# Patient Record
Sex: FEMALE | Race: WHITE | NOT HISPANIC OR LATINO | Employment: OTHER | URBAN - METROPOLITAN AREA
[De-identification: names, ages, dates, MRNs, and addresses within clinical notes are randomized per-mention and may not be internally consistent; named-entity substitution may affect disease eponyms.]

---

## 2023-02-01 PROBLEM — M16.10 ARTHRITIS PAIN, HIP: Status: ACTIVE | Noted: 2023-02-01

## 2023-02-01 PROBLEM — K59.00 CONSTIPATION: Status: ACTIVE | Noted: 2023-02-01

## 2023-02-01 PROBLEM — R53.83 MALAISE AND FATIGUE: Status: ACTIVE | Noted: 2023-02-01
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
## 2023-02-01 PROBLEM — M65.30 TRIGGER FINGER, ACQUIRED: Status: ACTIVE | Noted: 2023-02-01

## 2023-02-01 PROBLEM — N93.9 ABNORMAL UTERINE BLEEDING: Status: ACTIVE | Noted: 2023-02-01

## 2023-02-01 PROBLEM — R73.03 PREDIABETES: Status: ACTIVE | Noted: 2023-02-01

## 2023-02-01 PROBLEM — N95.2 ATROPHIC VAGINITIS: Status: ACTIVE | Noted: 2023-02-01

## 2023-02-01 PROBLEM — R20.0 NUMBNESS AND TINGLING IN RIGHT HAND: Status: ACTIVE | Noted: 2023-02-01

## 2023-02-01 PROBLEM — S62.309A FRACTURE OF METACARPAL BONE OF RIGHT HAND: Status: ACTIVE | Noted: 2023-02-01

## 2023-02-01 PROBLEM — S22.009A THORACIC SPINE FRACTURE (MULTI): Status: ACTIVE | Noted: 2023-02-01

## 2023-02-01 PROBLEM — H91.93 BILATERAL HEARING LOSS: Status: ACTIVE | Noted: 2023-02-01

## 2023-02-01 PROBLEM — M25.552 LEFT HIP PAIN: Status: ACTIVE | Noted: 2023-02-01

## 2023-02-01 PROBLEM — Z96.0 PRESENCE OF PESSARY: Status: ACTIVE | Noted: 2023-02-01

## 2023-02-01 PROBLEM — R53.83 FATIGUE: Status: ACTIVE | Noted: 2023-02-01

## 2023-02-01 PROBLEM — R58 ECCHYMOSIS: Status: ACTIVE | Noted: 2023-02-01

## 2023-02-01 PROBLEM — N81.11 MIDLINE CYSTOCELE: Status: ACTIVE | Noted: 2023-02-01

## 2023-02-01 PROBLEM — D22.9 ATYPICAL MOLE: Status: ACTIVE | Noted: 2023-02-01

## 2023-02-01 PROBLEM — R73.9 HYPERGLYCEMIA: Status: ACTIVE | Noted: 2023-02-01

## 2023-02-01 PROBLEM — R20.2 NUMBNESS AND TINGLING IN RIGHT HAND: Status: ACTIVE | Noted: 2023-02-01

## 2023-02-01 PROBLEM — F41.9 ANXIETY: Status: ACTIVE | Noted: 2023-02-01

## 2023-02-01 PROBLEM — J06.9 URI (UPPER RESPIRATORY INFECTION): Status: ACTIVE | Noted: 2023-02-01

## 2023-02-01 PROBLEM — D22.9 SKIN MOLE: Status: ACTIVE | Noted: 2023-02-01

## 2023-02-01 PROBLEM — R35.0 URINARY FREQUENCY: Status: ACTIVE | Noted: 2023-02-01

## 2023-02-01 PROBLEM — E78.5 HYPERLIPIDEMIA: Status: ACTIVE | Noted: 2023-02-01

## 2023-02-01 PROBLEM — N39.0 RECURRENT URINARY TRACT INFECTION: Status: ACTIVE | Noted: 2023-02-01

## 2023-02-01 PROBLEM — M19.049 OSTEOARTHRITIS, HAND: Status: ACTIVE | Noted: 2023-02-01

## 2023-02-01 PROBLEM — G56.00 CARPAL TUNNEL SYNDROME: Status: ACTIVE | Noted: 2023-02-01

## 2023-02-01 PROBLEM — M80.00XA OSTEOPOROSIS WITH CURRENT PATHOLOGICAL FRACTURE: Status: ACTIVE | Noted: 2023-02-01

## 2023-02-01 PROBLEM — R29.6 FALLS: Status: ACTIVE | Noted: 2023-02-01

## 2023-02-01 PROBLEM — R53.81 MALAISE AND FATIGUE: Status: ACTIVE | Noted: 2023-02-01

## 2023-02-01 PROBLEM — H93.19 TINNITUS: Status: ACTIVE | Noted: 2023-02-01

## 2023-02-01 PROBLEM — B37.2 SKIN YEAST INFECTION: Status: ACTIVE | Noted: 2023-02-01

## 2023-02-01 PROBLEM — M79.609 LIMB PAIN: Status: ACTIVE | Noted: 2023-02-01

## 2023-02-01 RX ORDER — ACETAMINOPHEN 325 MG/1
1-2 TABLET ORAL EVERY 6 HOURS PRN
COMMUNITY
Start: 2018-05-07

## 2023-02-01 RX ORDER — CALCIUM CARBONATE 600 MG
1 TABLET ORAL 2 TIMES DAILY
COMMUNITY

## 2023-02-01 RX ORDER — CHOLECALCIFEROL (VITAMIN D3) 125 MCG
1 CAPSULE ORAL DAILY
COMMUNITY
End: 2023-11-20 | Stop reason: WASHOUT

## 2023-02-01 RX ORDER — LATANOPROST 50 UG/ML
1 SOLUTION/ DROPS OPHTHALMIC NIGHTLY
COMMUNITY
Start: 2021-09-02

## 2023-03-15 ENCOUNTER — OFFICE VISIT (OUTPATIENT)
Dept: PRIMARY CARE | Facility: CLINIC | Age: 88
End: 2023-03-15
Payer: MEDICARE

## 2023-03-15 VITALS
WEIGHT: 143.8 LBS | RESPIRATION RATE: 18 BRPM | HEIGHT: 60 IN | SYSTOLIC BLOOD PRESSURE: 142 MMHG | DIASTOLIC BLOOD PRESSURE: 58 MMHG | OXYGEN SATURATION: 98 % | TEMPERATURE: 97.3 F | HEART RATE: 75 BPM | BODY MASS INDEX: 28.23 KG/M2

## 2023-03-15 DIAGNOSIS — K59.00 CONSTIPATION, UNSPECIFIED CONSTIPATION TYPE: ICD-10-CM

## 2023-03-15 DIAGNOSIS — R10.9 SIDE PAIN: ICD-10-CM

## 2023-03-15 DIAGNOSIS — R35.0 URINARY FREQUENCY: Primary | ICD-10-CM

## 2023-03-15 PROCEDURE — 1036F TOBACCO NON-USER: CPT | Performed by: STUDENT IN AN ORGANIZED HEALTH CARE EDUCATION/TRAINING PROGRAM

## 2023-03-15 PROCEDURE — 1159F MED LIST DOCD IN RCRD: CPT | Performed by: STUDENT IN AN ORGANIZED HEALTH CARE EDUCATION/TRAINING PROGRAM

## 2023-03-15 PROCEDURE — 99213 OFFICE O/P EST LOW 20 MIN: CPT | Performed by: STUDENT IN AN ORGANIZED HEALTH CARE EDUCATION/TRAINING PROGRAM

## 2023-03-15 PROCEDURE — 1157F ADVNC CARE PLAN IN RCRD: CPT | Performed by: STUDENT IN AN ORGANIZED HEALTH CARE EDUCATION/TRAINING PROGRAM

## 2023-03-15 PROCEDURE — 1160F RVW MEDS BY RX/DR IN RCRD: CPT | Performed by: STUDENT IN AN ORGANIZED HEALTH CARE EDUCATION/TRAINING PROGRAM

## 2023-03-15 RX ORDER — SENNOSIDES 8.6 MG/1
2 TABLET ORAL NIGHTLY
COMMUNITY
Start: 2022-08-17 | End: 2023-11-20 | Stop reason: WASHOUT

## 2023-03-15 ASSESSMENT — PATIENT HEALTH QUESTIONNAIRE - PHQ9
SUM OF ALL RESPONSES TO PHQ9 QUESTIONS 1 AND 2: 0
2. FEELING DOWN, DEPRESSED OR HOPELESS: NOT AT ALL
1. LITTLE INTEREST OR PLEASURE IN DOING THINGS: NOT AT ALL

## 2023-03-15 ASSESSMENT — ENCOUNTER SYMPTOMS
DEPRESSION: 0
LOSS OF SENSATION IN FEET: 1

## 2023-03-15 NOTE — PROGRESS NOTES
Subjective   Patient ID: Xiomy Rubio is a 89 y.o. female who presents for 3 month follow up (Pt is here for a 3 month follow up.).    Feels very aggravated due to not being able to clean and do her house work the way that she wants to, it takes her a longer time than previous   Its making her sad    Last week in the shower, her bilateral legs tightened up on her, doing her bike recumbant  Left side hip and upper side/rib pain intermittently- she stopped lifting and running the sweeper and it did feel better           Review of Systems    Objective   Physical Exam  Vitals reviewed.   Constitutional:       Appearance: Normal appearance.   HENT:      Head:      Comments: Extremely hard of hearing, with aides  Cardiovascular:      Rate and Rhythm: Normal rate and regular rhythm.      Heart sounds: No murmur heard.  Pulmonary:      Effort: Pulmonary effort is normal. No respiratory distress.      Breath sounds: Normal breath sounds. No wheezing.   Musculoskeletal:      Cervical back: Neck supple.      Left lower leg: No edema.   Neurological:      Mental Status: She is alert.         Assessment/Plan   Diagnoses and all orders for this visit:  Urinary frequency  Comments:  pt increased hydration to help with constipation now up0 3 times a night   advised to stop her drinking around 8-9pm  Constipation, unspecified constipation type  Comments:  miralax takes 4 days to get a good stool  avised to try every other day then increase to daily  Side pain  Comments:  left rib along rib 9 pin point axillary pain   worse with movement  pt would like to hold off on imaging   worse with her lifting things out of her cabinets

## 2023-04-18 ENCOUNTER — OFFICE VISIT (OUTPATIENT)
Dept: PRIMARY CARE | Facility: CLINIC | Age: 88
End: 2023-04-18
Payer: MEDICARE

## 2023-04-18 VITALS
HEIGHT: 66 IN | RESPIRATION RATE: 16 BRPM | OXYGEN SATURATION: 97 % | DIASTOLIC BLOOD PRESSURE: 82 MMHG | BODY MASS INDEX: 22.92 KG/M2 | HEART RATE: 70 BPM | SYSTOLIC BLOOD PRESSURE: 152 MMHG | WEIGHT: 142.6 LBS

## 2023-04-18 DIAGNOSIS — J01.00 ACUTE NON-RECURRENT MAXILLARY SINUSITIS: Primary | ICD-10-CM

## 2023-04-18 DIAGNOSIS — R41.3 MEMORY CHANGES: ICD-10-CM

## 2023-04-18 DIAGNOSIS — R11.0 NAUSEA: ICD-10-CM

## 2023-04-18 LAB
POC APPEARANCE, URINE: CLEAR
POC BILIRUBIN, URINE: NEGATIVE
POC BLOOD, URINE: ABNORMAL
POC COLOR, URINE: YELLOW
POC GLUCOSE, URINE: NEGATIVE MG/DL
POC KETONES, URINE: NEGATIVE MG/DL
POC LEUKOCYTES, URINE: ABNORMAL
POC NITRITE,URINE: NEGATIVE
POC PH, URINE: 7 PH
POC PROTEIN, URINE: NEGATIVE MG/DL
POC SPECIFIC GRAVITY, URINE: 1.02
POC UROBILINOGEN, URINE: 0.2 EU/DL

## 2023-04-18 PROCEDURE — 99214 OFFICE O/P EST MOD 30 MIN: CPT | Performed by: STUDENT IN AN ORGANIZED HEALTH CARE EDUCATION/TRAINING PROGRAM

## 2023-04-18 PROCEDURE — 1157F ADVNC CARE PLAN IN RCRD: CPT | Performed by: STUDENT IN AN ORGANIZED HEALTH CARE EDUCATION/TRAINING PROGRAM

## 2023-04-18 PROCEDURE — 1159F MED LIST DOCD IN RCRD: CPT | Performed by: STUDENT IN AN ORGANIZED HEALTH CARE EDUCATION/TRAINING PROGRAM

## 2023-04-18 PROCEDURE — 1036F TOBACCO NON-USER: CPT | Performed by: STUDENT IN AN ORGANIZED HEALTH CARE EDUCATION/TRAINING PROGRAM

## 2023-04-18 PROCEDURE — 1160F RVW MEDS BY RX/DR IN RCRD: CPT | Performed by: STUDENT IN AN ORGANIZED HEALTH CARE EDUCATION/TRAINING PROGRAM

## 2023-04-18 PROCEDURE — 87086 URINE CULTURE/COLONY COUNT: CPT

## 2023-04-18 PROCEDURE — 81003 URINALYSIS AUTO W/O SCOPE: CPT | Performed by: STUDENT IN AN ORGANIZED HEALTH CARE EDUCATION/TRAINING PROGRAM

## 2023-04-18 RX ORDER — ONDANSETRON 4 MG/1
4 TABLET, FILM COATED ORAL EVERY 8 HOURS PRN
Qty: 20 TABLET | Refills: 0 | Status: SHIPPED | OUTPATIENT
Start: 2023-04-18 | End: 2023-04-25

## 2023-04-18 RX ORDER — DOXYCYCLINE 100 MG/1
100 CAPSULE ORAL 2 TIMES DAILY
Qty: 14 CAPSULE | Refills: 0 | Status: SHIPPED | OUTPATIENT
Start: 2023-04-18 | End: 2023-04-25

## 2023-04-18 ASSESSMENT — PATIENT HEALTH QUESTIONNAIRE - PHQ9
1. LITTLE INTEREST OR PLEASURE IN DOING THINGS: NOT AT ALL
SUM OF ALL RESPONSES TO PHQ9 QUESTIONS 1 AND 2: 0
2. FEELING DOWN, DEPRESSED OR HOPELESS: NOT AT ALL

## 2023-04-18 ASSESSMENT — ENCOUNTER SYMPTOMS
OCCASIONAL FEELINGS OF UNSTEADINESS: 1
LOSS OF SENSATION IN FEET: 1
DEPRESSION: 1
SINUS PAIN: 1
NAUSEA: 1
SORE THROAT: 1
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
## 2023-04-20 LAB — URINE CULTURE: NORMAL

## 2023-06-08 ENCOUNTER — OFFICE VISIT (OUTPATIENT)
Dept: PRIMARY CARE | Facility: CLINIC | Age: 88
End: 2023-06-08
Payer: MEDICARE

## 2023-06-08 VITALS
SYSTOLIC BLOOD PRESSURE: 136 MMHG | OXYGEN SATURATION: 96 % | HEART RATE: 76 BPM | BODY MASS INDEX: 23.3 KG/M2 | DIASTOLIC BLOOD PRESSURE: 50 MMHG | WEIGHT: 145 LBS | HEIGHT: 66 IN

## 2023-06-08 DIAGNOSIS — F41.9 ANXIETY: ICD-10-CM

## 2023-06-08 DIAGNOSIS — S72.002A CLOSED FRACTURE OF LEFT HIP, INITIAL ENCOUNTER (MULTI): Primary | ICD-10-CM

## 2023-06-08 PROCEDURE — 1157F ADVNC CARE PLAN IN RCRD: CPT | Performed by: STUDENT IN AN ORGANIZED HEALTH CARE EDUCATION/TRAINING PROGRAM

## 2023-06-08 PROCEDURE — 1159F MED LIST DOCD IN RCRD: CPT | Performed by: STUDENT IN AN ORGANIZED HEALTH CARE EDUCATION/TRAINING PROGRAM

## 2023-06-08 PROCEDURE — 1160F RVW MEDS BY RX/DR IN RCRD: CPT | Performed by: STUDENT IN AN ORGANIZED HEALTH CARE EDUCATION/TRAINING PROGRAM

## 2023-06-08 PROCEDURE — 99214 OFFICE O/P EST MOD 30 MIN: CPT | Performed by: STUDENT IN AN ORGANIZED HEALTH CARE EDUCATION/TRAINING PROGRAM

## 2023-06-08 PROCEDURE — 1036F TOBACCO NON-USER: CPT | Performed by: STUDENT IN AN ORGANIZED HEALTH CARE EDUCATION/TRAINING PROGRAM

## 2023-06-08 RX ORDER — LORAZEPAM 0.5 MG/1
0.5 TABLET ORAL DAILY PRN
Qty: 14 TABLET | Refills: 0 | Status: SHIPPED | OUTPATIENT
Start: 2023-06-08 | End: 2023-07-17 | Stop reason: ALTCHOICE

## 2023-06-08 ASSESSMENT — PATIENT HEALTH QUESTIONNAIRE - PHQ9
2. FEELING DOWN, DEPRESSED OR HOPELESS: SEVERAL DAYS
10. IF YOU CHECKED OFF ANY PROBLEMS, HOW DIFFICULT HAVE THESE PROBLEMS MADE IT FOR YOU TO DO YOUR WORK, TAKE CARE OF THINGS AT HOME, OR GET ALONG WITH OTHER PEOPLE: SOMEWHAT DIFFICULT
1. LITTLE INTEREST OR PLEASURE IN DOING THINGS: SEVERAL DAYS
SUM OF ALL RESPONSES TO PHQ9 QUESTIONS 1 AND 2: 2

## 2023-06-08 ASSESSMENT — ENCOUNTER SYMPTOMS: OCCASIONAL FEELINGS OF UNSTEADINESS: 1

## 2023-06-08 NOTE — PROGRESS NOTES
Subjective   Patient ID: Xiomy Rubio is a 89 y.o. female who presents for Follow-up (Follow up on broken left leg nursing home suggested a follow up with pcp ).  Pt was bending over looking at the faucet at home on May 11, 2023 when she fell over in the backyard.  She was home alone when she fell.  Her son found her in the backyard.  She was immediately taken to the emergency department.  She was found to have a left femur fracture and she is /p L femur IMN w/ [facility] for kenneth-implant subtroch femur fx performed on 5/11/23.  Immediately post surgery patient was taken to SNF for more intensive rehab.  Last week patient was discharged from rehab and is now at home with her son.      Currently pain is being maintained Tylenol but patient states she is doing very well.   Patient states that she is doing well at home with her walker.  But is currently refusing life alert button        Review of Systems    Objective   Physical Exam  Vitals reviewed.   Constitutional:       Appearance: Normal appearance.   HENT:      Right Ear: Tympanic membrane normal.      Left Ear: Tympanic membrane normal.   Cardiovascular:      Rate and Rhythm: Normal rate and regular rhythm.   Pulmonary:      Effort: Pulmonary effort is normal.      Breath sounds: Normal breath sounds.   Skin:     Comments: Left surgical incision clean dry and intact without surrounding erythema   Neurological:      Mental Status: She is alert.         Assessment/Plan   Problem List Items Addressed This Visit       Anxiety    Relevant Medications    LORazepam (Ativan) 0.5 mg tablet     Other Visit Diagnoses       Closed fracture of left hip, initial encounter (CMS/[facility])    -  Primary    Reviewed labs images and updated medication list  Patient to start outpatient therapy  Pain is adequately maintained  Encouraged family for life alert

## 2023-06-29 ENCOUNTER — TELEPHONE (OUTPATIENT)
Dept: PRIMARY CARE | Facility: CLINIC | Age: 88
End: 2023-06-29
Payer: MEDICARE

## 2023-07-17 ENCOUNTER — LAB (OUTPATIENT)
Dept: LAB | Facility: LAB | Age: 88
End: 2023-07-17
Payer: MEDICARE

## 2023-07-17 ENCOUNTER — OFFICE VISIT (OUTPATIENT)
Dept: PRIMARY CARE | Facility: CLINIC | Age: 88
End: 2023-07-17
Payer: MEDICARE

## 2023-07-17 VITALS
WEIGHT: 142 LBS | SYSTOLIC BLOOD PRESSURE: 106 MMHG | HEIGHT: 65 IN | OXYGEN SATURATION: 97 % | DIASTOLIC BLOOD PRESSURE: 58 MMHG | RESPIRATION RATE: 18 BRPM | HEART RATE: 76 BPM | BODY MASS INDEX: 23.66 KG/M2

## 2023-07-17 DIAGNOSIS — Z00.00 ROUTINE GENERAL MEDICAL EXAMINATION AT HEALTH CARE FACILITY: Primary | ICD-10-CM

## 2023-07-17 DIAGNOSIS — D64.9 ANEMIA, UNSPECIFIED TYPE: ICD-10-CM

## 2023-07-17 DIAGNOSIS — E78.5 HYPERLIPIDEMIA, UNSPECIFIED HYPERLIPIDEMIA TYPE: ICD-10-CM

## 2023-07-17 DIAGNOSIS — H91.93 BILATERAL HEARING LOSS, UNSPECIFIED HEARING LOSS TYPE: ICD-10-CM

## 2023-07-17 PROBLEM — R73.9 HYPERGLYCEMIA: Status: RESOLVED | Noted: 2023-02-01 | Resolved: 2023-07-17

## 2023-07-17 PROBLEM — H93.19 TINNITUS: Status: RESOLVED | Noted: 2023-02-01 | Resolved: 2023-07-17

## 2023-07-17 PROBLEM — S72.22XA: Status: ACTIVE | Noted: 2023-07-17

## 2023-07-17 PROBLEM — S72.143A INTERTROCHANTERIC FRACTURE OF FEMUR (MULTI): Status: ACTIVE | Noted: 2023-07-17

## 2023-07-17 PROBLEM — M79.606 LEG PAIN: Status: ACTIVE | Noted: 2023-05-12

## 2023-07-17 PROBLEM — J06.9 URI (UPPER RESPIRATORY INFECTION): Status: RESOLVED | Noted: 2023-02-01 | Resolved: 2023-07-17

## 2023-07-17 PROBLEM — R26.9 ABNORMAL GAIT: Status: ACTIVE | Noted: 2023-05-12

## 2023-07-17 PROBLEM — R20.0 NUMBNESS: Status: ACTIVE | Noted: 2023-07-17

## 2023-07-17 PROBLEM — R53.83 TIREDNESS: Status: ACTIVE | Noted: 2023-07-17

## 2023-07-17 LAB
ALANINE AMINOTRANSFERASE (SGPT) (U/L) IN SER/PLAS: 10 U/L (ref 7–45)
ALBUMIN (G/DL) IN SER/PLAS: 3.8 G/DL (ref 3.4–5)
ALKALINE PHOSPHATASE (U/L) IN SER/PLAS: 107 U/L (ref 33–136)
ANION GAP IN SER/PLAS: 10 MMOL/L (ref 10–20)
ASPARTATE AMINOTRANSFERASE (SGOT) (U/L) IN SER/PLAS: 16 U/L (ref 9–39)
BASOPHILS (10*3/UL) IN BLOOD BY AUTOMATED COUNT: 0.08 X10E9/L (ref 0–0.1)
BASOPHILS/100 LEUKOCYTES IN BLOOD BY AUTOMATED COUNT: 1.2 % (ref 0–2)
BILIRUBIN TOTAL (MG/DL) IN SER/PLAS: 0.7 MG/DL (ref 0–1.2)
CALCIUM (MG/DL) IN SER/PLAS: 9.3 MG/DL (ref 8.6–10.3)
CARBON DIOXIDE, TOTAL (MMOL/L) IN SER/PLAS: 30 MMOL/L (ref 21–32)
CHLORIDE (MMOL/L) IN SER/PLAS: 100 MMOL/L (ref 98–107)
CREATININE (MG/DL) IN SER/PLAS: 0.7 MG/DL (ref 0.5–1.05)
EOSINOPHILS (10*3/UL) IN BLOOD BY AUTOMATED COUNT: 0.33 X10E9/L (ref 0–0.4)
EOSINOPHILS/100 LEUKOCYTES IN BLOOD BY AUTOMATED COUNT: 5 % (ref 0–6)
ERYTHROCYTE DISTRIBUTION WIDTH (RATIO) BY AUTOMATED COUNT: 13.1 % (ref 11.5–14.5)
ERYTHROCYTE MEAN CORPUSCULAR HEMOGLOBIN CONCENTRATION (G/DL) BY AUTOMATED: 31.2 G/DL (ref 32–36)
ERYTHROCYTE MEAN CORPUSCULAR VOLUME (FL) BY AUTOMATED COUNT: 91 FL (ref 80–100)
ERYTHROCYTES (10*6/UL) IN BLOOD BY AUTOMATED COUNT: 4.2 X10E12/L (ref 4–5.2)
GFR FEMALE: 81 ML/MIN/1.73M2
GLUCOSE (MG/DL) IN SER/PLAS: 179 MG/DL (ref 74–99)
HEMATOCRIT (%) IN BLOOD BY AUTOMATED COUNT: 38.2 % (ref 36–46)
HEMOGLOBIN (G/DL) IN BLOOD: 11.9 G/DL (ref 12–16)
IMMATURE GRANULOCYTES/100 LEUKOCYTES IN BLOOD BY AUTOMATED COUNT: 0.6 % (ref 0–0.9)
LEUKOCYTES (10*3/UL) IN BLOOD BY AUTOMATED COUNT: 6.6 X10E9/L (ref 4.4–11.3)
LYMPHOCYTES (10*3/UL) IN BLOOD BY AUTOMATED COUNT: 1.51 X10E9/L (ref 0.8–3)
LYMPHOCYTES/100 LEUKOCYTES IN BLOOD BY AUTOMATED COUNT: 22.9 % (ref 13–44)
MONOCYTES (10*3/UL) IN BLOOD BY AUTOMATED COUNT: 0.52 X10E9/L (ref 0.05–0.8)
MONOCYTES/100 LEUKOCYTES IN BLOOD BY AUTOMATED COUNT: 7.9 % (ref 2–10)
NEUTROPHILS (10*3/UL) IN BLOOD BY AUTOMATED COUNT: 4.11 X10E9/L (ref 1.6–5.5)
NEUTROPHILS/100 LEUKOCYTES IN BLOOD BY AUTOMATED COUNT: 62.4 % (ref 40–80)
PLATELETS (10*3/UL) IN BLOOD AUTOMATED COUNT: 253 X10E9/L (ref 150–450)
POTASSIUM (MMOL/L) IN SER/PLAS: 4 MMOL/L (ref 3.5–5.3)
PROTEIN TOTAL: 6.1 G/DL (ref 6.4–8.2)
SODIUM (MMOL/L) IN SER/PLAS: 136 MMOL/L (ref 136–145)
UREA NITROGEN (MG/DL) IN SER/PLAS: 27 MG/DL (ref 6–23)

## 2023-07-17 PROCEDURE — 80053 COMPREHEN METABOLIC PANEL: CPT

## 2023-07-17 PROCEDURE — 1036F TOBACCO NON-USER: CPT | Performed by: STUDENT IN AN ORGANIZED HEALTH CARE EDUCATION/TRAINING PROGRAM

## 2023-07-17 PROCEDURE — 1157F ADVNC CARE PLAN IN RCRD: CPT | Performed by: STUDENT IN AN ORGANIZED HEALTH CARE EDUCATION/TRAINING PROGRAM

## 2023-07-17 PROCEDURE — 85025 COMPLETE CBC W/AUTO DIFF WBC: CPT

## 2023-07-17 PROCEDURE — 36415 COLL VENOUS BLD VENIPUNCTURE: CPT

## 2023-07-17 PROCEDURE — 99213 OFFICE O/P EST LOW 20 MIN: CPT | Performed by: STUDENT IN AN ORGANIZED HEALTH CARE EDUCATION/TRAINING PROGRAM

## 2023-07-17 PROCEDURE — 1160F RVW MEDS BY RX/DR IN RCRD: CPT | Performed by: STUDENT IN AN ORGANIZED HEALTH CARE EDUCATION/TRAINING PROGRAM

## 2023-07-17 PROCEDURE — G0439 PPPS, SUBSEQ VISIT: HCPCS | Performed by: STUDENT IN AN ORGANIZED HEALTH CARE EDUCATION/TRAINING PROGRAM

## 2023-07-17 PROCEDURE — 1170F FXNL STATUS ASSESSED: CPT | Performed by: STUDENT IN AN ORGANIZED HEALTH CARE EDUCATION/TRAINING PROGRAM

## 2023-07-17 PROCEDURE — 1159F MED LIST DOCD IN RCRD: CPT | Performed by: STUDENT IN AN ORGANIZED HEALTH CARE EDUCATION/TRAINING PROGRAM

## 2023-07-17 RX ORDER — NAPROXEN SODIUM 220 MG/1
TABLET, FILM COATED ORAL 2 TIMES DAILY
COMMUNITY
Start: 2023-05-16 | End: 2023-11-20 | Stop reason: WASHOUT

## 2023-07-17 RX ORDER — FERROUS SULFATE, DRIED 160(50) MG
TABLET, EXTENDED RELEASE ORAL 2 TIMES DAILY
COMMUNITY
Start: 2023-05-16

## 2023-07-17 RX ORDER — DOCUSATE SODIUM 100 MG/1
CAPSULE, LIQUID FILLED ORAL 2 TIMES DAILY
COMMUNITY
Start: 2023-05-16 | End: 2023-11-20 | Stop reason: WASHOUT

## 2023-07-17 RX ORDER — OXYCODONE HYDROCHLORIDE 5 MG/1
TABLET ORAL EVERY 4 HOURS PRN
COMMUNITY
Start: 2023-05-16 | End: 2023-11-20 | Stop reason: WASHOUT

## 2023-07-17 RX ORDER — POLYETHYLENE GLYCOL 3350 17 G/17G
17 POWDER, FOR SOLUTION ORAL
COMMUNITY
Start: 2023-05-16 | End: 2023-11-20 | Stop reason: WASHOUT

## 2023-07-17 RX ORDER — PANTOPRAZOLE SODIUM 20 MG/1
1 TABLET, DELAYED RELEASE ORAL DAILY
COMMUNITY
Start: 2023-05-17

## 2023-07-17 RX ORDER — FERROUS SULFATE 325(65) MG
TABLET ORAL EVERY OTHER DAY
COMMUNITY
Start: 2023-05-16

## 2023-07-17 ASSESSMENT — ACTIVITIES OF DAILY LIVING (ADL)
BATHING: INDEPENDENT
DOING_HOUSEWORK: NEEDS ASSISTANCE
TAKING_MEDICATION: INDEPENDENT
DRESSING: INDEPENDENT
MANAGING_FINANCES: INDEPENDENT
GROCERY_SHOPPING: NEEDS ASSISTANCE

## 2023-07-17 NOTE — PROGRESS NOTES
"   Subjective   Reason for Visit: Xiomy Rubio is an 89 y.o. female here for a Medicare Wellness visit.               Pt is s/p hip fracture.   Currently undergoing physical therapy outpt   Ambulating with a walker  Expresses a fear of falling at this time  Stopped her gardening for this year     Last week she had an episodes of HIVE  Round circular rash, erythematous, itching         Patient Care Team:  Lian Hernandez DO as PCP - General  Lian Hernandez DO [facility] PCP [facility]     Review of Systems    Objective   Vitals:  /58   Pulse 76   Resp 18   Ht 1.651 m (5' 5\")   Wt 64.4 kg (142 lb)   SpO2 97%   BMI 23.63 kg/m²       Physical Exam  Constitutional:       Appearance: Normal appearance.   Cardiovascular:      Rate and Rhythm: Normal rate and regular rhythm.      Heart sounds: No murmur heard.  Pulmonary:      Effort: Pulmonary effort is normal. No respiratory distress.      Breath sounds: Normal breath sounds. No wheezing.   Musculoskeletal:      Cervical back: Neck supple.      Left lower leg: No edema.   Neurological:      Mental Status: She is alert.         Assessment/Plan   Problem List Items Addressed This Visit       Bilateral hearing loss    Relevant Orders    Comprehensive metabolic panel (Completed)    Hyperlipidemia    Relevant Orders    CBC and Auto Differential (Completed)    Comprehensive metabolic panel (Completed)     Other Visit Diagnoses       Routine general medical examination at health care facility    -  Primary    Anemia, unspecified type        Relevant Orders    CBC and Auto Differential (Completed)               "

## 2023-07-29 NOTE — ASSESSMENT & PLAN NOTE
Reviewed labs images and updated medication list  Patient to start outpatient therapy  Pain is adequately maintained  Encouraged family for life alert

## 2023-08-08 ENCOUNTER — APPOINTMENT (OUTPATIENT)
Dept: PRIMARY CARE | Facility: CLINIC | Age: 88
End: 2023-08-08
Payer: MEDICARE

## 2023-08-09 ENCOUNTER — APPOINTMENT (OUTPATIENT)
Dept: PRIMARY CARE | Facility: CLINIC | Age: 88
End: 2023-08-09
Payer: MEDICARE

## 2023-09-11 ENCOUNTER — TELEPHONE (OUTPATIENT)
Dept: PRIMARY CARE | Facility: CLINIC | Age: 88
End: 2023-09-11
Payer: MEDICARE

## 2023-09-11 DIAGNOSIS — R26.2 AMBULATORY DYSFUNCTION: Primary | ICD-10-CM

## 2023-11-14 ENCOUNTER — OFFICE VISIT (OUTPATIENT)
Dept: ORTHOPEDIC SURGERY | Facility: HOSPITAL | Age: 88
End: 2023-11-14
Payer: MEDICARE

## 2023-11-14 ENCOUNTER — HOSPITAL ENCOUNTER (OUTPATIENT)
Dept: RADIOLOGY | Facility: HOSPITAL | Age: 88
Discharge: HOME | End: 2023-11-14
Payer: MEDICARE

## 2023-11-14 DIAGNOSIS — S72.8X2D OTHER CLOSED FRACTURE OF LEFT FEMUR WITH ROUTINE HEALING, UNSPECIFIED PORTION OF FEMUR, SUBSEQUENT ENCOUNTER: ICD-10-CM

## 2023-11-14 DIAGNOSIS — M16.12 PRIMARY OSTEOARTHRITIS OF LEFT HIP: Primary | ICD-10-CM

## 2023-11-14 PROCEDURE — 72170 X-RAY EXAM OF PELVIS: CPT | Mod: LEFT SIDE | Performed by: RADIOLOGY

## 2023-11-14 PROCEDURE — 72170 X-RAY EXAM OF PELVIS: CPT

## 2023-11-14 PROCEDURE — 1036F TOBACCO NON-USER: CPT | Performed by: ORTHOPAEDIC SURGERY

## 2023-11-14 PROCEDURE — 1160F RVW MEDS BY RX/DR IN RCRD: CPT | Performed by: ORTHOPAEDIC SURGERY

## 2023-11-14 PROCEDURE — 99214 OFFICE O/P EST MOD 30 MIN: CPT | Performed by: ORTHOPAEDIC SURGERY

## 2023-11-14 PROCEDURE — 1159F MED LIST DOCD IN RCRD: CPT | Performed by: ORTHOPAEDIC SURGERY

## 2023-11-14 PROCEDURE — 73552 X-RAY EXAM OF FEMUR 2/>: CPT | Mod: LT,FY

## 2023-11-14 PROCEDURE — 73552 X-RAY EXAM OF FEMUR 2/>: CPT | Mod: LEFT SIDE | Performed by: RADIOLOGY

## 2023-11-20 ENCOUNTER — OFFICE VISIT (OUTPATIENT)
Dept: PRIMARY CARE | Facility: CLINIC | Age: 88
End: 2023-11-20
Payer: MEDICARE

## 2023-11-20 ENCOUNTER — LAB (OUTPATIENT)
Dept: LAB | Facility: LAB | Age: 88
End: 2023-11-20
Payer: MEDICARE

## 2023-11-20 VITALS
SYSTOLIC BLOOD PRESSURE: 130 MMHG | HEART RATE: 87 BPM | WEIGHT: 152 LBS | DIASTOLIC BLOOD PRESSURE: 60 MMHG | OXYGEN SATURATION: 97 % | RESPIRATION RATE: 17 BRPM | BODY MASS INDEX: 25.29 KG/M2

## 2023-11-20 DIAGNOSIS — M85.89 OTHER SPECIFIED DISORDERS OF BONE DENSITY AND STRUCTURE, MULTIPLE SITES: ICD-10-CM

## 2023-11-20 DIAGNOSIS — R73.03 PREDIABETES: ICD-10-CM

## 2023-11-20 DIAGNOSIS — R63.5 WEIGHT GAIN: ICD-10-CM

## 2023-11-20 DIAGNOSIS — F41.9 ANXIETY: ICD-10-CM

## 2023-11-20 DIAGNOSIS — M80.00XS AGE-RELATED OSTEOPOROSIS WITH CURRENT PATHOLOGICAL FRACTURE, SEQUELA: ICD-10-CM

## 2023-11-20 DIAGNOSIS — E78.5 HYPERLIPIDEMIA, UNSPECIFIED HYPERLIPIDEMIA TYPE: Primary | ICD-10-CM

## 2023-11-20 DIAGNOSIS — E78.5 HYPERLIPIDEMIA, UNSPECIFIED HYPERLIPIDEMIA TYPE: ICD-10-CM

## 2023-11-20 LAB
ALBUMIN SERPL BCP-MCNC: 4.1 G/DL (ref 3.4–5)
ALP SERPL-CCNC: 73 U/L (ref 33–136)
ALT SERPL W P-5'-P-CCNC: 11 U/L (ref 7–45)
ANION GAP SERPL CALC-SCNC: 9 MMOL/L (ref 10–20)
AST SERPL W P-5'-P-CCNC: 15 U/L (ref 9–39)
BASOPHILS # BLD AUTO: 0.07 X10*3/UL (ref 0–0.1)
BASOPHILS NFR BLD AUTO: 1.1 %
BILIRUB SERPL-MCNC: 0.7 MG/DL (ref 0–1.2)
BUN SERPL-MCNC: 27 MG/DL (ref 6–23)
CALCIUM SERPL-MCNC: 8.8 MG/DL (ref 8.6–10.3)
CHLORIDE SERPL-SCNC: 102 MMOL/L (ref 98–107)
CO2 SERPL-SCNC: 30 MMOL/L (ref 21–32)
CREAT SERPL-MCNC: 0.91 MG/DL (ref 0.5–1.05)
EOSINOPHIL # BLD AUTO: 0.35 X10*3/UL (ref 0–0.4)
EOSINOPHIL NFR BLD AUTO: 5.7 %
ERYTHROCYTE [DISTWIDTH] IN BLOOD BY AUTOMATED COUNT: 14.5 % (ref 11.5–14.5)
GFR SERPL CREATININE-BSD FRML MDRD: 59 ML/MIN/1.73M*2
GLUCOSE SERPL-MCNC: 87 MG/DL (ref 74–99)
HCT VFR BLD AUTO: 43.1 % (ref 36–46)
HGB BLD-MCNC: 13.2 G/DL (ref 12–16)
IMM GRANULOCYTES # BLD AUTO: 0.05 X10*3/UL (ref 0–0.5)
IMM GRANULOCYTES NFR BLD AUTO: 0.8 % (ref 0–0.9)
LYMPHOCYTES # BLD AUTO: 1.41 X10*3/UL (ref 0.8–3)
LYMPHOCYTES NFR BLD AUTO: 22.8 %
MCH RBC QN AUTO: 28.7 PG (ref 26–34)
MCHC RBC AUTO-ENTMCNC: 30.6 G/DL (ref 32–36)
MCV RBC AUTO: 94 FL (ref 80–100)
MONOCYTES # BLD AUTO: 0.54 X10*3/UL (ref 0.05–0.8)
MONOCYTES NFR BLD AUTO: 8.7 %
NEUTROPHILS # BLD AUTO: 3.77 X10*3/UL (ref 1.6–5.5)
NEUTROPHILS NFR BLD AUTO: 60.9 %
NRBC BLD-RTO: 0 /100 WBCS (ref 0–0)
PLATELET # BLD AUTO: 218 X10*3/UL (ref 150–450)
POTASSIUM SERPL-SCNC: 4.1 MMOL/L (ref 3.5–5.3)
PROT SERPL-MCNC: 6 G/DL (ref 6.4–8.2)
RBC # BLD AUTO: 4.6 X10*6/UL (ref 4–5.2)
SODIUM SERPL-SCNC: 137 MMOL/L (ref 136–145)
TSH SERPL-ACNC: 2.81 MIU/L (ref 0.44–3.98)
WBC # BLD AUTO: 6.2 X10*3/UL (ref 4.4–11.3)

## 2023-11-20 PROCEDURE — 1160F RVW MEDS BY RX/DR IN RCRD: CPT | Performed by: STUDENT IN AN ORGANIZED HEALTH CARE EDUCATION/TRAINING PROGRAM

## 2023-11-20 PROCEDURE — 85025 COMPLETE CBC W/AUTO DIFF WBC: CPT

## 2023-11-20 PROCEDURE — 1159F MED LIST DOCD IN RCRD: CPT | Performed by: STUDENT IN AN ORGANIZED HEALTH CARE EDUCATION/TRAINING PROGRAM

## 2023-11-20 PROCEDURE — 80053 COMPREHEN METABOLIC PANEL: CPT

## 2023-11-20 PROCEDURE — 82306 VITAMIN D 25 HYDROXY: CPT

## 2023-11-20 PROCEDURE — 83036 HEMOGLOBIN GLYCOSYLATED A1C: CPT

## 2023-11-20 PROCEDURE — 82607 VITAMIN B-12: CPT

## 2023-11-20 PROCEDURE — 1036F TOBACCO NON-USER: CPT | Performed by: STUDENT IN AN ORGANIZED HEALTH CARE EDUCATION/TRAINING PROGRAM

## 2023-11-20 PROCEDURE — 99214 OFFICE O/P EST MOD 30 MIN: CPT | Performed by: STUDENT IN AN ORGANIZED HEALTH CARE EDUCATION/TRAINING PROGRAM

## 2023-11-20 PROCEDURE — 84443 ASSAY THYROID STIM HORMONE: CPT

## 2023-11-20 PROCEDURE — 36415 COLL VENOUS BLD VENIPUNCTURE: CPT

## 2023-11-20 ASSESSMENT — PATIENT HEALTH QUESTIONNAIRE - PHQ9
SUM OF ALL RESPONSES TO PHQ9 QUESTIONS 1 AND 2: 0
1. LITTLE INTEREST OR PLEASURE IN DOING THINGS: NOT AT ALL
2. FEELING DOWN, DEPRESSED OR HOPELESS: NOT AT ALL

## 2023-11-20 NOTE — PROGRESS NOTES
sSubjective   Patient ID: Xiomy Rubio is a 89 y.o. female who presents for Follow-up (Pt is here for a 4 month follow up.).    Less active since her fracture   Planning to go back to the senior center  Doing her stretches at home    [facility] hip xray- 11/13/23 severe arthritis but hardware inplace    Severe hand arthritis-declines further finger injections        Review of Systems    Objective   Physical Exam  Vitals reviewed.   Constitutional:       Appearance: Normal appearance.   Cardiovascular:      Rate and Rhythm: Normal rate and regular rhythm.      Heart sounds: No murmur heard.  Pulmonary:      Effort: Pulmonary effort is normal. No respiratory distress.      Breath sounds: Normal breath sounds. No wheezing.   Musculoskeletal:      Cervical back: Neck supple.      Left lower leg: No edema.   Neurological:      Mental Status: She is alert.         Assessment/Plan   Problem List Items Addressed This Visit             ICD-10-CM    Anxiety F41.9    Relevant Orders    Hemoglobin A1C (Completed)    CBC and Auto Differential (Completed)    Comprehensive metabolic panel (Completed)    Tsh With Reflex To Free T4 If Abnormal (Completed)    Hyperlipidemia - Primary E78.5    Relevant Orders    Hemoglobin A1C (Completed)    CBC and Auto Differential (Completed)    Comprehensive metabolic panel (Completed)    Tsh With Reflex To Free T4 If Abnormal (Completed)    Osteoporosis with current pathological fracture M80.00XA    Relevant Orders    Hemoglobin A1C (Completed)    CBC and Auto Differential (Completed)    Comprehensive metabolic panel (Completed)    Tsh With Reflex To Free T4 If Abnormal (Completed)    Prediabetes R73.03    Relevant Orders    Hemoglobin A1C (Completed)    CBC and Auto Differential (Completed)    Comprehensive metabolic panel (Completed)    Tsh With Reflex To Free T4 If Abnormal (Completed)     Other Visit Diagnoses         Codes    Weight gain     R63.5    Relevant Orders    Vitamin B12 (Completed)     Vitamin D 25-Hydroxy,Total (for eval of Vitamin D levels) (Completed)    Other specified disorders of bone density and structure, multiple sites     M85.89    Relevant Orders    Vitamin D 25-Hydroxy,Total (for eval of Vitamin D levels) (Completed)          Nathen Rubio is a 89 year old female who presents to the office for follow up.         T11 Compression fracture post fall   doing much better, she has residual stiffnes  activity as tolerated   Bone Density Scan revealed osteoporosis   spent extensive time reviewing treatment options with pt and her son   no neurological deficits at this time no need for MRI      Constipation   advised to do the mirlax daily to help with her constipation instead of just when she gets behind  increase hydration        Prediabetes  A1C 5.8%  Continue monitoring Q6 months per pt preference

## 2023-11-21 LAB
25(OH)D3 SERPL-MCNC: 58 NG/ML (ref 30–100)
EST. AVERAGE GLUCOSE BLD GHB EST-MCNC: 117 MG/DL
HBA1C MFR BLD: 5.7 %
VIT B12 SERPL-MCNC: 484 PG/ML (ref 211–911)

## 2023-11-26 PROBLEM — S72.92XD CLOSED FRACTURE OF LEFT FEMUR WITH ROUTINE HEALING: Status: ACTIVE | Noted: 2023-11-26

## 2023-11-27 NOTE — PROGRESS NOTES
Subjective    Patient ID: Xiomy Rubio is a 89 y.o. female.    Chief Complaint: Follow-up left hip fracture  Last Surgery: Left hip removal of hardware and intramedullary nail fixation of kenneth-implant subtrochanteric femur fracture on May 11, 2023    HPI  Patient is here with her son.  She reported that she is doing well.  She currently lives at home with her son.  She has completed physical therapy in September.  She is ambulating using a cane.  She has minimal pain at this time.  She has pre-existing hip arthritis and this is not really bothersome to her.  Overall she is very pleased with her outcome.  She denies any recent fall at home.  Objective   Ortho Exam  Gen: The patient is alert and oriented ×3, is in no acute distress, and appear their stated age and weight.    Patient is able to ambulate with a mild antalgic gait with a cane.  Examination of the hip reveals no skin abnormalities.  No tenderness to palpation about the hip, knee and thigh area.  Range of motion of the hip reveals flexion past 90 degrees, patient has full extension, 40 degrees of hip abduction, 30 degrees of abduction, internal rotation to 10 degrees and external rotation to 30 degrees.  There is no pain with rotational range of motion of the hip.    Image Results:  I personally reviewed left femur radiograph that reveals intramedullary nail fixation.  There is union of the subtrochanteric fracture.  Patient has severe left hip osteoarthritis.  This is stable.    Assessment/Plan   Encounter Diagnoses:  Primary osteoarthritis of left hip    Other closed fracture of left femur with routine healing, unspecified portion of femur, subsequent encounter  Patient is doing well.  She has union of her fracture.  She has minimal pain at this time.  Pre-existing hip arthritis is more bothersome to her.  We discussed option for hip osteoarthritis with his son and recommend continued observation.  Treatment.  She will perform activity as tolerated.   She will continue to use assistive device.  We reviewed fall prevention at home.  Given that her fracture is healed with minimal pain at this point she will come back and see me as needed in the future.  Orders Placed This Encounter    XR femur left 2+ views    XR pelvis 1-2 views     No follow-ups on file.

## 2024-01-03 ENCOUNTER — TELEPHONE (OUTPATIENT)
Dept: PRIMARY CARE | Facility: CLINIC | Age: 89
End: 2024-01-03
Payer: MEDICARE

## 2024-01-09 ENCOUNTER — OFFICE VISIT (OUTPATIENT)
Dept: PRIMARY CARE | Facility: CLINIC | Age: 89
End: 2024-01-09
Payer: MEDICARE

## 2024-01-09 VITALS
DIASTOLIC BLOOD PRESSURE: 72 MMHG | OXYGEN SATURATION: 96 % | BODY MASS INDEX: 24.61 KG/M2 | HEART RATE: 74 BPM | RESPIRATION RATE: 16 BRPM | WEIGHT: 147.87 LBS | SYSTOLIC BLOOD PRESSURE: 156 MMHG

## 2024-01-09 DIAGNOSIS — H61.22 IMPACTED CERUMEN OF LEFT EAR: ICD-10-CM

## 2024-01-09 DIAGNOSIS — H69.90 DYSFUNCTION OF EUSTACHIAN TUBE, UNSPECIFIED LATERALITY: Primary | ICD-10-CM

## 2024-01-09 PROCEDURE — 1036F TOBACCO NON-USER: CPT | Performed by: STUDENT IN AN ORGANIZED HEALTH CARE EDUCATION/TRAINING PROGRAM

## 2024-01-09 PROCEDURE — 1159F MED LIST DOCD IN RCRD: CPT | Performed by: STUDENT IN AN ORGANIZED HEALTH CARE EDUCATION/TRAINING PROGRAM

## 2024-01-09 PROCEDURE — 69210 REMOVE IMPACTED EAR WAX UNI: CPT | Performed by: STUDENT IN AN ORGANIZED HEALTH CARE EDUCATION/TRAINING PROGRAM

## 2024-01-09 PROCEDURE — 99213 OFFICE O/P EST LOW 20 MIN: CPT | Performed by: STUDENT IN AN ORGANIZED HEALTH CARE EDUCATION/TRAINING PROGRAM

## 2024-01-09 RX ORDER — LORATADINE 10 MG/1
10 TABLET ORAL DAILY
Qty: 30 TABLET | Refills: 11 | Status: SHIPPED | OUTPATIENT
Start: 2024-01-09 | End: 2024-04-16 | Stop reason: WASHOUT

## 2024-01-09 ASSESSMENT — ENCOUNTER SYMPTOMS
NAUSEA: 0
COUGH: 1
SORE THROAT: 0
SINUS PAIN: 1

## 2024-01-09 ASSESSMENT — PATIENT HEALTH QUESTIONNAIRE - PHQ9
1. LITTLE INTEREST OR PLEASURE IN DOING THINGS: NOT AT ALL
2. FEELING DOWN, DEPRESSED OR HOPELESS: NOT AT ALL
SUM OF ALL RESPONSES TO PHQ9 QUESTIONS 1 AND 2: 0

## 2024-01-09 NOTE — PROGRESS NOTES
Subjective   Patient ID: Xiomy Rubio is a 89 y.o. female who presents for URI (Pt c/o of sinus pain and ear pain for the last 2 or 3 weeks. Pt has a lot of mucus. Pt is taking mucinex. Pts BP is 188/81).  URI   This is a new problem. The current episode started 1 to 4 weeks ago (3 weeks). The problem has been rapidly improving. There has been no fever. Associated symptoms include congestion, coughing, ear pain, sinus pain and sneezing. Pertinent negatives include no nausea or sore throat. Associated symptoms comments: Hot flashes and chills initially . She has tried decongestant (antihistamineand mucinex) for the symptoms.       Review of Systems   HENT:  Positive for congestion, ear pain, sinus pain and sneezing. Negative for sore throat.    Respiratory:  Positive for cough.    Gastrointestinal:  Negative for nausea.       Objective   Physical Exam  Vitals reviewed.   Constitutional:       Appearance: Normal appearance.   Cardiovascular:      Rate and Rhythm: Normal rate and regular rhythm.      Heart sounds: No murmur heard.  Pulmonary:      Effort: Pulmonary effort is normal. No respiratory distress.      Breath sounds: Normal breath sounds. No wheezing.   Musculoskeletal:      Cervical back: Neck supple.      Left lower leg: No edema.   Neurological:      Mental Status: She is alert.         Assessment/Plan     Viral illness  Patient is much improved today with her Mucinex  BP was likely elevated due to usage of the Mucinex  Patient to continue symptomatic care    Cerumen impaction on the left  He procedure note  Patient'sPatient ID: Xiomy Rubio is a 89 y.o. female.    Ear Cerumen Removal    Date/Time: 1/9/2024 3:33 PM    Performed by: Lian Hernandez DO  Authorized by: Lian Hernandez DO    Consent:     Consent obtained:  Verbal    Consent given by:  Patient    Alternatives discussed:  No treatment  Procedure details:     Location:  L ear    Procedure type: curette      Procedure outcomes: cerumen removed     Post-procedure details:     Inspection:  No bleeding    Procedure completion:  Tolerated well, no immediate complications         Lian Hernandez DO 01/09/24 2:10 PM

## 2024-01-15 ENCOUNTER — APPOINTMENT (OUTPATIENT)
Dept: PRIMARY CARE | Facility: CLINIC | Age: 89
End: 2024-01-15
Payer: MEDICARE

## 2024-02-06 ENCOUNTER — APPOINTMENT (OUTPATIENT)
Dept: PRIMARY CARE | Facility: CLINIC | Age: 89
End: 2024-02-06
Payer: MEDICARE

## 2024-04-09 ENCOUNTER — OFFICE VISIT (OUTPATIENT)
Dept: PRIMARY CARE | Facility: CLINIC | Age: 89
End: 2024-04-09
Payer: MEDICARE

## 2024-04-09 ENCOUNTER — HOSPITAL ENCOUNTER (OUTPATIENT)
Dept: RADIOLOGY | Facility: CLINIC | Age: 89
Discharge: HOME | End: 2024-04-09
Payer: MEDICARE

## 2024-04-09 ENCOUNTER — LAB (OUTPATIENT)
Dept: LAB | Facility: LAB | Age: 89
End: 2024-04-09
Payer: MEDICARE

## 2024-04-09 VITALS
HEIGHT: 66 IN | RESPIRATION RATE: 17 BRPM | WEIGHT: 151 LBS | BODY MASS INDEX: 24.27 KG/M2 | OXYGEN SATURATION: 97 % | HEART RATE: 74 BPM

## 2024-04-09 DIAGNOSIS — N39.0 URINARY TRACT INFECTION WITHOUT HEMATURIA, SITE UNSPECIFIED: ICD-10-CM

## 2024-04-09 DIAGNOSIS — M25.561 ACUTE PAIN OF RIGHT KNEE: ICD-10-CM

## 2024-04-09 DIAGNOSIS — F41.9 ANXIETY: ICD-10-CM

## 2024-04-09 DIAGNOSIS — R35.0 INCREASED URINARY FREQUENCY: ICD-10-CM

## 2024-04-09 DIAGNOSIS — R35.0 INCREASED URINARY FREQUENCY: Primary | ICD-10-CM

## 2024-04-09 LAB
ALBUMIN SERPL BCP-MCNC: 4.3 G/DL (ref 3.4–5)
ALP SERPL-CCNC: 57 U/L (ref 33–136)
ALT SERPL W P-5'-P-CCNC: 11 U/L (ref 7–45)
ANION GAP SERPL CALC-SCNC: 14 MMOL/L (ref 10–20)
APPEARANCE UR: ABNORMAL
AST SERPL W P-5'-P-CCNC: 16 U/L (ref 9–39)
BASOPHILS # BLD AUTO: 0.07 X10*3/UL (ref 0–0.1)
BASOPHILS NFR BLD AUTO: 1 %
BILIRUB SERPL-MCNC: 1.1 MG/DL (ref 0–1.2)
BILIRUB UR STRIP.AUTO-MCNC: NEGATIVE MG/DL
BUN SERPL-MCNC: 24 MG/DL (ref 6–23)
CALCIUM SERPL-MCNC: 9.3 MG/DL (ref 8.6–10.3)
CAOX CRY #/AREA UR COMP ASSIST: ABNORMAL /HPF
CHLORIDE SERPL-SCNC: 101 MMOL/L (ref 98–107)
CO2 SERPL-SCNC: 27 MMOL/L (ref 21–32)
COLOR UR: YELLOW
CREAT SERPL-MCNC: 0.8 MG/DL (ref 0.5–1.05)
EGFRCR SERPLBLD CKD-EPI 2021: 69 ML/MIN/1.73M*2
EOSINOPHIL # BLD AUTO: 0.16 X10*3/UL (ref 0–0.4)
EOSINOPHIL NFR BLD AUTO: 2.4 %
ERYTHROCYTE [DISTWIDTH] IN BLOOD BY AUTOMATED COUNT: 13.5 % (ref 11.5–14.5)
GLUCOSE SERPL-MCNC: 84 MG/DL (ref 74–99)
GLUCOSE UR STRIP.AUTO-MCNC: NEGATIVE MG/DL
HCT VFR BLD AUTO: 45.1 % (ref 36–46)
HGB BLD-MCNC: 14.5 G/DL (ref 12–16)
IMM GRANULOCYTES # BLD AUTO: 0.04 X10*3/UL (ref 0–0.5)
IMM GRANULOCYTES NFR BLD AUTO: 0.6 % (ref 0–0.9)
KETONES UR STRIP.AUTO-MCNC: NEGATIVE MG/DL
LEUKOCYTE ESTERASE UR QL STRIP.AUTO: ABNORMAL
LYMPHOCYTES # BLD AUTO: 1.54 X10*3/UL (ref 0.8–3)
LYMPHOCYTES NFR BLD AUTO: 22.9 %
MCH RBC QN AUTO: 29.6 PG (ref 26–34)
MCHC RBC AUTO-ENTMCNC: 32.2 G/DL (ref 32–36)
MCV RBC AUTO: 92 FL (ref 80–100)
MONOCYTES # BLD AUTO: 0.63 X10*3/UL (ref 0.05–0.8)
MONOCYTES NFR BLD AUTO: 9.4 %
MUCOUS THREADS #/AREA URNS AUTO: ABNORMAL /LPF
NEUTROPHILS # BLD AUTO: 4.28 X10*3/UL (ref 1.6–5.5)
NEUTROPHILS NFR BLD AUTO: 63.7 %
NITRITE UR QL STRIP.AUTO: NEGATIVE
NRBC BLD-RTO: 0 /100 WBCS (ref 0–0)
PH UR STRIP.AUTO: 6 [PH]
PLATELET # BLD AUTO: 223 X10*3/UL (ref 150–450)
POTASSIUM SERPL-SCNC: 4.4 MMOL/L (ref 3.5–5.3)
PROT SERPL-MCNC: 6.9 G/DL (ref 6.4–8.2)
PROT UR STRIP.AUTO-MCNC: NEGATIVE MG/DL
RBC # BLD AUTO: 4.9 X10*6/UL (ref 4–5.2)
RBC # UR STRIP.AUTO: NEGATIVE /UL
RBC #/AREA URNS AUTO: ABNORMAL /HPF
SODIUM SERPL-SCNC: 138 MMOL/L (ref 136–145)
SP GR UR STRIP.AUTO: 1.02
SQUAMOUS #/AREA URNS AUTO: ABNORMAL /HPF
UROBILINOGEN UR STRIP.AUTO-MCNC: <2 MG/DL
WBC # BLD AUTO: 6.7 X10*3/UL (ref 4.4–11.3)
WBC #/AREA URNS AUTO: ABNORMAL /HPF

## 2024-04-09 PROCEDURE — 1036F TOBACCO NON-USER: CPT | Performed by: STUDENT IN AN ORGANIZED HEALTH CARE EDUCATION/TRAINING PROGRAM

## 2024-04-09 PROCEDURE — 81001 URINALYSIS AUTO W/SCOPE: CPT

## 2024-04-09 PROCEDURE — 87086 URINE CULTURE/COLONY COUNT: CPT

## 2024-04-09 PROCEDURE — 36415 COLL VENOUS BLD VENIPUNCTURE: CPT

## 2024-04-09 PROCEDURE — 1160F RVW MEDS BY RX/DR IN RCRD: CPT | Performed by: STUDENT IN AN ORGANIZED HEALTH CARE EDUCATION/TRAINING PROGRAM

## 2024-04-09 PROCEDURE — 85025 COMPLETE CBC W/AUTO DIFF WBC: CPT

## 2024-04-09 PROCEDURE — 1157F ADVNC CARE PLAN IN RCRD: CPT | Performed by: STUDENT IN AN ORGANIZED HEALTH CARE EDUCATION/TRAINING PROGRAM

## 2024-04-09 PROCEDURE — 99213 OFFICE O/P EST LOW 20 MIN: CPT | Performed by: STUDENT IN AN ORGANIZED HEALTH CARE EDUCATION/TRAINING PROGRAM

## 2024-04-09 PROCEDURE — 73562 X-RAY EXAM OF KNEE 3: CPT | Mod: RIGHT SIDE | Performed by: RADIOLOGY

## 2024-04-09 PROCEDURE — 1159F MED LIST DOCD IN RCRD: CPT | Performed by: STUDENT IN AN ORGANIZED HEALTH CARE EDUCATION/TRAINING PROGRAM

## 2024-04-09 PROCEDURE — 73562 X-RAY EXAM OF KNEE 3: CPT | Mod: RT

## 2024-04-09 PROCEDURE — 80053 COMPREHEN METABOLIC PANEL: CPT

## 2024-04-09 ASSESSMENT — PATIENT HEALTH QUESTIONNAIRE - PHQ9
2. FEELING DOWN, DEPRESSED OR HOPELESS: NOT AT ALL
1. LITTLE INTEREST OR PLEASURE IN DOING THINGS: NOT AT ALL
SUM OF ALL RESPONSES TO PHQ9 QUESTIONS 1 AND 2: 0

## 2024-04-09 NOTE — PATIENT INSTRUCTIONS
Please pop downstairs to give me a urine. I do believe you may have a UTI based on your current symptoms.     I also ordered an xray of the right knee due to your swelling and pain.     You can continue to use the tylenol as needed.

## 2024-04-09 NOTE — PROGRESS NOTES
Subjective   Patient ID: Xiomy Rubio is a 89 y.o. female who presents for Follow-up (Pt is here for a 3 month follow up. Pt feels like she has to pee a lot. /62).  Right knee and hip has been bothering her increasingly   Using a cane  Tylenol up to 2 500mg tabs daily   Increased fatigue   Noticed increased swelling no warmth or eythema         Increased urinary urgency         Objective   Physical Exam  Vitals reviewed.   Constitutional:       Appearance: Normal appearance.   Cardiovascular:      Rate and Rhythm: Normal rate and regular rhythm.      Heart sounds: No murmur heard.  Pulmonary:      Effort: Pulmonary effort is normal. No respiratory distress.      Breath sounds: Normal breath sounds. No wheezing.   Musculoskeletal:      Cervical back: Neck supple.      Left lower leg: No edema.   Neurological:      Mental Status: She is alert.         Assessment/Plan   Diagnoses and all orders for this visit:  Increased urinary frequency  -     Urinalysis with Reflex Microscopic; Future  -     Urine Culture; Future  Anxiety  -     CBC and Auto Differential; Future  -     Comprehensive metabolic panel; Future  Urinary tract infection without hematuria, site unspecified  -     CBC and Auto Differential; Future  Acute pain of right knee    Right knee pain   Likely a strain from increasing work outs at the [facility]    Increased Urination   UA suggestive of possible UTI  Urine culture sent for confirmation            Lian Hernandez DO 04/09/24 12:51 PM

## 2024-04-10 DIAGNOSIS — N39.0 URINARY TRACT INFECTION WITHOUT HEMATURIA, SITE UNSPECIFIED: Primary | ICD-10-CM

## 2024-04-10 LAB — BACTERIA UR CULT: NORMAL

## 2024-04-10 RX ORDER — CIPROFLOXACIN 250 MG/1
250 TABLET, FILM COATED ORAL 2 TIMES DAILY
Qty: 14 TABLET | Refills: 0 | Status: SHIPPED | OUTPATIENT
Start: 2024-04-10 | End: 2024-04-16 | Stop reason: WASHOUT

## 2024-04-11 ENCOUNTER — TELEPHONE (OUTPATIENT)
Dept: PRIMARY CARE | Facility: CLINIC | Age: 89
End: 2024-04-11
Payer: MEDICARE

## 2024-04-16 ENCOUNTER — OFFICE VISIT (OUTPATIENT)
Dept: PRIMARY CARE | Facility: CLINIC | Age: 89
End: 2024-04-16
Payer: MEDICARE

## 2024-04-16 VITALS
RESPIRATION RATE: 18 BRPM | WEIGHT: 149.6 LBS | HEIGHT: 66 IN | HEART RATE: 77 BPM | OXYGEN SATURATION: 98 % | BODY MASS INDEX: 24.04 KG/M2

## 2024-04-16 DIAGNOSIS — F41.9 ANXIETY: Primary | ICD-10-CM

## 2024-04-16 PROCEDURE — 99213 OFFICE O/P EST LOW 20 MIN: CPT | Performed by: STUDENT IN AN ORGANIZED HEALTH CARE EDUCATION/TRAINING PROGRAM

## 2024-04-16 PROCEDURE — 1157F ADVNC CARE PLAN IN RCRD: CPT | Performed by: STUDENT IN AN ORGANIZED HEALTH CARE EDUCATION/TRAINING PROGRAM

## 2024-04-16 PROCEDURE — 1036F TOBACCO NON-USER: CPT | Performed by: STUDENT IN AN ORGANIZED HEALTH CARE EDUCATION/TRAINING PROGRAM

## 2024-04-16 PROCEDURE — 1159F MED LIST DOCD IN RCRD: CPT | Performed by: STUDENT IN AN ORGANIZED HEALTH CARE EDUCATION/TRAINING PROGRAM

## 2024-04-16 PROCEDURE — 1160F RVW MEDS BY RX/DR IN RCRD: CPT | Performed by: STUDENT IN AN ORGANIZED HEALTH CARE EDUCATION/TRAINING PROGRAM

## 2024-04-16 RX ORDER — SERTRALINE HYDROCHLORIDE 50 MG/1
50 TABLET, FILM COATED ORAL DAILY
Qty: 30 TABLET | Refills: 1 | Status: SHIPPED | OUTPATIENT
Start: 2024-04-16 | End: 2024-04-26 | Stop reason: SINTOL

## 2024-04-16 NOTE — PROGRESS NOTES
Subjective   Patient ID: Xiomy Rubio is a 89 y.o. female who presents for Anxiety (Pt is here to discuss anxiety. /68).    HPI    Pt states she is having increased anxiety and ruminating thoughts especially at night time. She has been thinking about her parents and mistakes she made within her life. She is having trouble initiating sleep.   Her son will be leaving to go to [place] to stay with his son and grandchildren. She would like to go with him but her son states that she cannot come with him with her current mental concerns.   Concerns for hip scar  Wakes up early   Cooks breakfast for her and her son Thinking about her family and her family       Objective   Physical Exam  Vitals reviewed.   Constitutional:       Appearance: Normal appearance.   Cardiovascular:      Rate and Rhythm: Normal rate and regular rhythm.      Heart sounds: No murmur heard.  Pulmonary:      Effort: Pulmonary effort is normal. No respiratory distress.      Breath sounds: Normal breath sounds. No wheezing.   Musculoskeletal:      Cervical back: Neck supple.      Left lower leg: No edema.   Neurological:      Mental Status: She is alert.         Assessment/Plan   Anxiety   Pt currently having daily symptoms of her anxiety   She declines psychology intervention at this time   Will trial Zoloft 50mg daily          Lian Hernandez DO 04/16/24 1:05 PM

## 2024-04-23 ENCOUNTER — OFFICE VISIT (OUTPATIENT)
Dept: PRIMARY CARE | Facility: CLINIC | Age: 89
End: 2024-04-23
Payer: MEDICARE

## 2024-04-23 VITALS
RESPIRATION RATE: 17 BRPM | HEIGHT: 66 IN | BODY MASS INDEX: 23.91 KG/M2 | SYSTOLIC BLOOD PRESSURE: 128 MMHG | OXYGEN SATURATION: 96 % | WEIGHT: 148.8 LBS | DIASTOLIC BLOOD PRESSURE: 60 MMHG | HEART RATE: 74 BPM

## 2024-04-23 DIAGNOSIS — F41.9 ANXIETY: Primary | ICD-10-CM

## 2024-04-23 PROCEDURE — 99214 OFFICE O/P EST MOD 30 MIN: CPT | Performed by: STUDENT IN AN ORGANIZED HEALTH CARE EDUCATION/TRAINING PROGRAM

## 2024-04-23 PROCEDURE — 1157F ADVNC CARE PLAN IN RCRD: CPT | Performed by: STUDENT IN AN ORGANIZED HEALTH CARE EDUCATION/TRAINING PROGRAM

## 2024-04-23 PROCEDURE — 1160F RVW MEDS BY RX/DR IN RCRD: CPT | Performed by: STUDENT IN AN ORGANIZED HEALTH CARE EDUCATION/TRAINING PROGRAM

## 2024-04-23 PROCEDURE — 1159F MED LIST DOCD IN RCRD: CPT | Performed by: STUDENT IN AN ORGANIZED HEALTH CARE EDUCATION/TRAINING PROGRAM

## 2024-04-23 RX ORDER — BUSPIRONE HYDROCHLORIDE 5 MG/1
5 TABLET ORAL 2 TIMES DAILY
Qty: 20 TABLET | Refills: 0 | Status: SHIPPED | OUTPATIENT
Start: 2024-04-23 | End: 2024-04-30 | Stop reason: SDUPTHER
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
## 2024-04-29 ENCOUNTER — TELEPHONE (OUTPATIENT)
Dept: PRIMARY CARE | Facility: CLINIC | Age: 89
End: 2024-04-29
Payer: MEDICARE

## 2024-04-30 ENCOUNTER — OFFICE VISIT (OUTPATIENT)
Dept: PRIMARY CARE | Facility: CLINIC | Age: 89
End: 2024-04-30
Payer: MEDICARE

## 2024-04-30 VITALS
RESPIRATION RATE: 16 BRPM | HEART RATE: 73 BPM | BODY MASS INDEX: 24.01 KG/M2 | SYSTOLIC BLOOD PRESSURE: 132 MMHG | DIASTOLIC BLOOD PRESSURE: 50 MMHG | WEIGHT: 149.4 LBS | OXYGEN SATURATION: 98 % | HEIGHT: 66 IN

## 2024-04-30 DIAGNOSIS — F41.9 ANXIETY: Primary | ICD-10-CM

## 2024-04-30 PROCEDURE — 1036F TOBACCO NON-USER: CPT | Performed by: STUDENT IN AN ORGANIZED HEALTH CARE EDUCATION/TRAINING PROGRAM

## 2024-04-30 PROCEDURE — 1160F RVW MEDS BY RX/DR IN RCRD: CPT | Performed by: STUDENT IN AN ORGANIZED HEALTH CARE EDUCATION/TRAINING PROGRAM

## 2024-04-30 PROCEDURE — 1159F MED LIST DOCD IN RCRD: CPT | Performed by: STUDENT IN AN ORGANIZED HEALTH CARE EDUCATION/TRAINING PROGRAM

## 2024-04-30 PROCEDURE — 1157F ADVNC CARE PLAN IN RCRD: CPT | Performed by: STUDENT IN AN ORGANIZED HEALTH CARE EDUCATION/TRAINING PROGRAM

## 2024-04-30 PROCEDURE — 99213 OFFICE O/P EST LOW 20 MIN: CPT | Performed by: STUDENT IN AN ORGANIZED HEALTH CARE EDUCATION/TRAINING PROGRAM

## 2024-04-30 RX ORDER — BUSPIRONE HYDROCHLORIDE 10 MG/1
10 TABLET ORAL 2 TIMES DAILY
Qty: 60 TABLET | Refills: 1 | Status: SHIPPED | OUTPATIENT
Start: 2024-04-30 | End: 2024-06-11 | Stop reason: SDUPTHER

## 2024-04-30 NOTE — PROGRESS NOTES
Subjective   Patient ID: Xiomy Rubio is a 89 y.o. female who presents for Follow-up (Pt is here for a 1 wk follow up.).  Patient presents to the office today with her son in regards to starting BuSpar last week.  Patient denies any adverse side effects to this medication.  She does admit to minor somnolence at nighttime.  She has not felt any effects on her anxiety in the past week..        Assessment/Plan   Problem List Items Addressed This Visit             ICD-10-CM    Anxiety - Primary F41.9    Relevant Medications    busPIRone (Buspar) 10 mg tablet    Other Relevant Orders    Referral to Psychiatry     Anxiety   Patient has been compliant with the BuSpar 5 mg twice a day she has not had any adverse effects to this medication.  She has not had any anxiety relief. We will plan to increase to 10mg BID next week. Strongly suggest psychology intervention.     RTC in 1 month           Lian Hernandez DO 04/30/24 11:25 AM

## 2024-04-30 NOTE — PATIENT INSTRUCTIONS
I'm glad that you did not have an adverse reaction to the medication. We will increase to 10mg twice daily starting next week 5/6/24.    [facility], [address] [place] [address] [phone]

## 2024-05-07 ENCOUNTER — APPOINTMENT (OUTPATIENT)
Dept: PRIMARY CARE | Facility: CLINIC | Age: 89
End: 2024-05-07
Payer: MEDICARE

## 2024-05-21 ENCOUNTER — APPOINTMENT (OUTPATIENT)
Dept: PRIMARY CARE | Facility: CLINIC | Age: 89
End: 2024-05-21
Payer: MEDICARE

## 2024-05-21 ENCOUNTER — OFFICE VISIT (OUTPATIENT)
Dept: PRIMARY CARE | Facility: CLINIC | Age: 89
End: 2024-05-21
Payer: MEDICARE

## 2024-05-21 VITALS
BODY MASS INDEX: 23.82 KG/M2 | SYSTOLIC BLOOD PRESSURE: 144 MMHG | OXYGEN SATURATION: 95 % | WEIGHT: 148.2 LBS | HEART RATE: 72 BPM | RESPIRATION RATE: 17 BRPM | HEIGHT: 66 IN | DIASTOLIC BLOOD PRESSURE: 60 MMHG

## 2024-05-21 DIAGNOSIS — F41.9 ANXIETY: Primary | ICD-10-CM

## 2024-05-21 PROCEDURE — 1157F ADVNC CARE PLAN IN RCRD: CPT | Performed by: STUDENT IN AN ORGANIZED HEALTH CARE EDUCATION/TRAINING PROGRAM

## 2024-05-21 PROCEDURE — 99213 OFFICE O/P EST LOW 20 MIN: CPT | Performed by: STUDENT IN AN ORGANIZED HEALTH CARE EDUCATION/TRAINING PROGRAM

## 2024-05-21 PROCEDURE — 1160F RVW MEDS BY RX/DR IN RCRD: CPT | Performed by: STUDENT IN AN ORGANIZED HEALTH CARE EDUCATION/TRAINING PROGRAM

## 2024-05-21 PROCEDURE — 1159F MED LIST DOCD IN RCRD: CPT | Performed by: STUDENT IN AN ORGANIZED HEALTH CARE EDUCATION/TRAINING PROGRAM

## 2024-05-21 NOTE — PROGRESS NOTES
Subjective   Patient ID: Xiomy Rubio is a 89 y.o. female who presents for Follow-up (Pt is here for a 3 wk follow up.).    HPI  Today, presents as a follow-up to adding BuSpar into her regimen.  Her son is present in our visit today.  He states that she continues to have meltdowns periodically throughout the week she gets overwhelmed frequently.  Her son also admitted to having previous episodes like this when his dad passed away in 1999.  She had a stent in a behavioral health facility, [facility] at that time due to her mental health.  She has been very against medications.  We did have a bad adverse reaction to Zoloft.  She seems to tolerate the BuSpar but the anxiety has not shown a significant decrease.    Patient is to follow-up with a therapist tomorrow to start talking about the feelings that she is currently having in regards to her anxiety    Objective   Physical Exam  Vitals reviewed.   Constitutional:       Appearance: Normal appearance.   Cardiovascular:      Rate and Rhythm: Normal rate and regular rhythm.      Heart sounds: No murmur heard.  Pulmonary:      Effort: Pulmonary effort is normal. No respiratory distress.      Breath sounds: Normal breath sounds. No wheezing.   Musculoskeletal:      Cervical back: Neck supple.      Left lower leg: No edema.   Neurological:      Mental Status: She is alert.         Assessment/Plan      Anxiety   Patient has been compliant with the BuSpar 5 mg twice a day she has not had any adverse effects to this medication.  She has not had any anxiety relief. We will plan to increase to 10mg BID next week. Strongly suggest psychology intervention.        Lian Hernandez DO 05/21/24 3:25 PM

## 2024-06-10 ENCOUNTER — TELEPHONE (OUTPATIENT)
Dept: PRIMARY CARE | Facility: CLINIC | Age: 89
End: 2024-06-10
Payer: COMMERCIAL

## 2024-06-11 DIAGNOSIS — F41.9 ANXIETY: ICD-10-CM

## 2024-06-11 RX ORDER — BUSPIRONE HYDROCHLORIDE 10 MG/1
10 TABLET ORAL 3 TIMES DAILY
Qty: 90 TABLET | Refills: 2 | Status: ON HOLD | OUTPATIENT
Start: 2024-06-11 | End: 2024-09-09

## 2024-06-16 ENCOUNTER — APPOINTMENT (OUTPATIENT)
Dept: RADIOLOGY | Facility: HOSPITAL | Age: 89
End: 2024-06-16
Payer: MEDICARE

## 2024-06-16 ENCOUNTER — HOSPITAL ENCOUNTER (INPATIENT)
Facility: HOSPITAL | Age: 89
End: 2024-06-16
Attending: STUDENT IN AN ORGANIZED HEALTH CARE EDUCATION/TRAINING PROGRAM | Admitting: STUDENT IN AN ORGANIZED HEALTH CARE EDUCATION/TRAINING PROGRAM
Payer: MEDICARE

## 2024-06-16 ENCOUNTER — HOSPITAL ENCOUNTER (EMERGENCY)
Facility: HOSPITAL | Age: 89
Discharge: OTHER INSTITUTION | DRG: 880 | End: 2024-06-16
Attending: EMERGENCY MEDICINE
Payer: MEDICARE

## 2024-06-16 ENCOUNTER — APPOINTMENT (OUTPATIENT)
Dept: CARDIOLOGY | Facility: HOSPITAL | Age: 89
End: 2024-06-16
Payer: MEDICARE

## 2024-06-16 VITALS
TEMPERATURE: 98.1 F | DIASTOLIC BLOOD PRESSURE: 86 MMHG | HEIGHT: 66 IN | RESPIRATION RATE: 17 BRPM | BODY MASS INDEX: 23.95 KG/M2 | HEART RATE: 80 BPM | WEIGHT: 149.03 LBS | SYSTOLIC BLOOD PRESSURE: 183 MMHG | OXYGEN SATURATION: 95 %

## 2024-06-16 DIAGNOSIS — I10 HYPERTENSION, UNSPECIFIED TYPE: ICD-10-CM

## 2024-06-16 DIAGNOSIS — F41.9 ANXIETY: Primary | ICD-10-CM

## 2024-06-16 DIAGNOSIS — F41.9 ANXIETY: ICD-10-CM

## 2024-06-16 DIAGNOSIS — D64.9 ANEMIA, UNSPECIFIED TYPE: ICD-10-CM

## 2024-06-16 DIAGNOSIS — K21.9 GASTROESOPHAGEAL REFLUX DISEASE, UNSPECIFIED WHETHER ESOPHAGITIS PRESENT: ICD-10-CM

## 2024-06-16 DIAGNOSIS — M81.0 OSTEOPOROSIS, UNSPECIFIED OSTEOPOROSIS TYPE, UNSPECIFIED PATHOLOGICAL FRACTURE PRESENCE: ICD-10-CM

## 2024-06-16 DIAGNOSIS — G47.00 INSOMNIA, UNSPECIFIED TYPE: ICD-10-CM

## 2024-06-16 DIAGNOSIS — K59.00 CONSTIPATION, UNSPECIFIED CONSTIPATION TYPE: ICD-10-CM

## 2024-06-16 LAB
ALBUMIN SERPL-MCNC: 4 G/DL (ref 3.5–5)
ALP BLD-CCNC: 56 U/L (ref 35–125)
ALT SERPL-CCNC: 11 U/L (ref 5–40)
AMPHETAMINES UR QL SCN>1000 NG/ML: NEGATIVE
ANION GAP SERPL CALC-SCNC: 9 MMOL/L
APPEARANCE UR: CLEAR
AST SERPL-CCNC: 16 U/L (ref 5–40)
BARBITURATES UR QL SCN>300 NG/ML: NEGATIVE
BASOPHILS # BLD AUTO: 0.06 X10*3/UL (ref 0–0.1)
BASOPHILS NFR BLD AUTO: 0.6 %
BENZODIAZ UR QL SCN>300 NG/ML: NEGATIVE
BILIRUB SERPL-MCNC: 1 MG/DL (ref 0.1–1.2)
BILIRUB UR STRIP.AUTO-MCNC: NEGATIVE MG/DL
BUN SERPL-MCNC: 22 MG/DL (ref 8–25)
BZE UR QL SCN>300 NG/ML: NEGATIVE
CALCIUM SERPL-MCNC: 9.3 MG/DL (ref 8.5–10.4)
CANNABINOIDS UR QL SCN>50 NG/ML: NEGATIVE
CHLORIDE SERPL-SCNC: 100 MMOL/L (ref 97–107)
CO2 SERPL-SCNC: 25 MMOL/L (ref 24–31)
COLOR UR: ABNORMAL
CREAT SERPL-MCNC: 0.7 MG/DL (ref 0.4–1.6)
EGFRCR SERPLBLD CKD-EPI 2021: 81 ML/MIN/1.73M*2
EOSINOPHIL # BLD AUTO: 0.1 X10*3/UL (ref 0–0.4)
EOSINOPHIL NFR BLD AUTO: 1 %
ERYTHROCYTE [DISTWIDTH] IN BLOOD BY AUTOMATED COUNT: 13 % (ref 11.5–14.5)
ETHANOL SERPL-MCNC: <0.01 G/DL
FENTANYL+NORFENTANYL UR QL SCN: NEGATIVE
GLUCOSE SERPL-MCNC: 104 MG/DL (ref 65–99)
GLUCOSE UR STRIP.AUTO-MCNC: NORMAL MG/DL
HCT VFR BLD AUTO: 45.6 % (ref 36–46)
HGB BLD-MCNC: 15.1 G/DL (ref 12–16)
IMM GRANULOCYTES # BLD AUTO: 0.09 X10*3/UL (ref 0–0.5)
IMM GRANULOCYTES NFR BLD AUTO: 0.9 % (ref 0–0.9)
KETONES UR STRIP.AUTO-MCNC: NEGATIVE MG/DL
LEUKOCYTE ESTERASE UR QL STRIP.AUTO: ABNORMAL
LYMPHOCYTES # BLD AUTO: 0.99 X10*3/UL (ref 0.8–3)
LYMPHOCYTES NFR BLD AUTO: 9.7 %
MCH RBC QN AUTO: 29.3 PG (ref 26–34)
MCHC RBC AUTO-ENTMCNC: 33.1 G/DL (ref 32–36)
MCV RBC AUTO: 89 FL (ref 80–100)
METHADONE UR QL SCN>300 NG/ML: NEGATIVE
MONOCYTES # BLD AUTO: 0.82 X10*3/UL (ref 0.05–0.8)
MONOCYTES NFR BLD AUTO: 8 %
NEUTROPHILS # BLD AUTO: 8.14 X10*3/UL (ref 1.6–5.5)
NEUTROPHILS NFR BLD AUTO: 79.8 %
NITRITE UR QL STRIP.AUTO: NEGATIVE
NRBC BLD-RTO: 0 /100 WBCS (ref 0–0)
NT-PROBNP SERPL-MCNC: 141 PG/ML (ref 0–624)
OPIATES UR QL SCN>300 NG/ML: NEGATIVE
OXYCODONE UR QL: NEGATIVE
PCP UR QL SCN>25 NG/ML: NEGATIVE
PH UR STRIP.AUTO: 5.5 [PH]
PLATELET # BLD AUTO: 211 X10*3/UL (ref 150–450)
POTASSIUM SERPL-SCNC: 4.2 MMOL/L (ref 3.4–5.1)
PROT SERPL-MCNC: 6.8 G/DL (ref 5.9–7.9)
PROT UR STRIP.AUTO-MCNC: NEGATIVE MG/DL
RBC # BLD AUTO: 5.15 X10*6/UL (ref 4–5.2)
RBC # UR STRIP.AUTO: ABNORMAL /UL
RBC #/AREA URNS AUTO: NORMAL /HPF
SODIUM SERPL-SCNC: 134 MMOL/L (ref 133–145)
SP GR UR STRIP.AUTO: 1.01
SQUAMOUS #/AREA URNS AUTO: NORMAL /HPF
TROPONIN T SERPL-MCNC: 12 NG/L
TROPONIN T SERPL-MCNC: 15 NG/L
UROBILINOGEN UR STRIP.AUTO-MCNC: NORMAL MG/DL
WBC # BLD AUTO: 10.2 X10*3/UL (ref 4.4–11.3)
WBC #/AREA URNS AUTO: NORMAL /HPF

## 2024-06-16 PROCEDURE — 80307 DRUG TEST PRSMV CHEM ANLYZR: CPT | Performed by: PHYSICIAN ASSISTANT

## 2024-06-16 PROCEDURE — 87086 URINE CULTURE/COLONY COUNT: CPT | Mod: TRILAB,WESLAB | Performed by: PHYSICIAN ASSISTANT

## 2024-06-16 PROCEDURE — 82077 ASSAY SPEC XCP UR&BREATH IA: CPT | Performed by: PHYSICIAN ASSISTANT

## 2024-06-16 PROCEDURE — 93005 ELECTROCARDIOGRAM TRACING: CPT

## 2024-06-16 PROCEDURE — 96374 THER/PROPH/DIAG INJ IV PUSH: CPT

## 2024-06-16 PROCEDURE — 2500000004 HC RX 250 GENERAL PHARMACY W/ HCPCS (ALT 636 FOR OP/ED): Performed by: EMERGENCY MEDICINE

## 2024-06-16 PROCEDURE — 71046 X-RAY EXAM CHEST 2 VIEWS: CPT

## 2024-06-16 PROCEDURE — 90839 PSYTX CRISIS INITIAL 60 MIN: CPT

## 2024-06-16 PROCEDURE — 84484 ASSAY OF TROPONIN QUANT: CPT | Mod: 91 | Performed by: PHYSICIAN ASSISTANT

## 2024-06-16 PROCEDURE — 2500000001 HC RX 250 WO HCPCS SELF ADMINISTERED DRUGS (ALT 637 FOR MEDICARE OP): Performed by: STUDENT IN AN ORGANIZED HEALTH CARE EDUCATION/TRAINING PROGRAM

## 2024-06-16 PROCEDURE — 36415 COLL VENOUS BLD VENIPUNCTURE: CPT | Performed by: PHYSICIAN ASSISTANT

## 2024-06-16 PROCEDURE — 85025 COMPLETE CBC W/AUTO DIFF WBC: CPT | Performed by: PHYSICIAN ASSISTANT

## 2024-06-16 PROCEDURE — 83880 ASSAY OF NATRIURETIC PEPTIDE: CPT | Performed by: PHYSICIAN ASSISTANT

## 2024-06-16 PROCEDURE — 96375 TX/PRO/DX INJ NEW DRUG ADDON: CPT

## 2024-06-16 PROCEDURE — 96361 HYDRATE IV INFUSION ADD-ON: CPT

## 2024-06-16 PROCEDURE — 81001 URINALYSIS AUTO W/SCOPE: CPT | Mod: 59 | Performed by: PHYSICIAN ASSISTANT

## 2024-06-16 PROCEDURE — 84484 ASSAY OF TROPONIN QUANT: CPT | Performed by: PHYSICIAN ASSISTANT

## 2024-06-16 PROCEDURE — 71046 X-RAY EXAM CHEST 2 VIEWS: CPT | Performed by: RADIOLOGY

## 2024-06-16 PROCEDURE — 1140000001 HC PRIVATE PSYCH ROOM DAILY

## 2024-06-16 PROCEDURE — 80053 COMPREHEN METABOLIC PANEL: CPT | Performed by: PHYSICIAN ASSISTANT

## 2024-06-16 PROCEDURE — 99285 EMERGENCY DEPT VISIT HI MDM: CPT | Mod: 25

## 2024-06-16 RX ORDER — ONDANSETRON HYDROCHLORIDE 2 MG/ML
4 INJECTION, SOLUTION INTRAVENOUS ONCE
Status: COMPLETED | OUTPATIENT
Start: 2024-06-16 | End: 2024-06-16

## 2024-06-16 RX ORDER — ACETAMINOPHEN 325 MG/1
650 TABLET ORAL EVERY 4 HOURS PRN
Status: DISCONTINUED | OUTPATIENT
Start: 2024-06-16 | End: 2024-06-28 | Stop reason: HOSPADM

## 2024-06-16 RX ORDER — FERROUS SULFATE, DRIED 160(50) MG
1 TABLET, EXTENDED RELEASE ORAL 2 TIMES DAILY
Status: DISCONTINUED | OUTPATIENT
Start: 2024-06-16 | End: 2024-06-28 | Stop reason: HOSPADM

## 2024-06-16 RX ORDER — LORAZEPAM 2 MG/ML
0.5 INJECTION INTRAMUSCULAR ONCE
Status: COMPLETED | OUTPATIENT
Start: 2024-06-16 | End: 2024-06-16

## 2024-06-16 RX ORDER — POLYETHYLENE GLYCOL 3350 17 G/17G
17 POWDER, FOR SOLUTION ORAL DAILY
Status: DISCONTINUED | OUTPATIENT
Start: 2024-06-17 | End: 2024-06-28 | Stop reason: HOSPADM

## 2024-06-16 RX ORDER — QUETIAPINE FUMARATE 25 MG/1
12.5 TABLET, FILM COATED ORAL EVERY 4 HOURS PRN
Status: DISCONTINUED | OUTPATIENT
Start: 2024-06-16 | End: 2024-06-28 | Stop reason: HOSPADM

## 2024-06-16 RX ORDER — FAMOTIDINE 10 MG/ML
20 INJECTION INTRAVENOUS ONCE
Status: COMPLETED | OUTPATIENT
Start: 2024-06-16 | End: 2024-06-16

## 2024-06-16 RX ORDER — PANTOPRAZOLE SODIUM 20 MG/1
20 TABLET, DELAYED RELEASE ORAL DAILY
Status: DISCONTINUED | OUTPATIENT
Start: 2024-06-17 | End: 2024-06-28 | Stop reason: HOSPADM

## 2024-06-16 RX ORDER — HYDROXYZINE HYDROCHLORIDE 10 MG/1
10 TABLET, FILM COATED ORAL EVERY 6 HOURS PRN
Status: DISCONTINUED | OUTPATIENT
Start: 2024-06-16 | End: 2024-06-18

## 2024-06-16 RX ORDER — CALCIUM CARBONATE 500(1250)
1250 TABLET ORAL 2 TIMES DAILY
Status: DISCONTINUED | OUTPATIENT
Start: 2024-06-16 | End: 2024-06-28 | Stop reason: HOSPADM

## 2024-06-16 RX ORDER — OLANZAPINE 10 MG/2ML
5 INJECTION, POWDER, FOR SOLUTION INTRAMUSCULAR EVERY 6 HOURS PRN
Status: DISCONTINUED | OUTPATIENT
Start: 2024-06-16 | End: 2024-06-28 | Stop reason: HOSPADM

## 2024-06-16 RX ORDER — TALC
3 POWDER (GRAM) TOPICAL
Status: DISCONTINUED | OUTPATIENT
Start: 2024-06-17 | End: 2024-06-28 | Stop reason: HOSPADM

## 2024-06-16 RX ORDER — BUSPIRONE HYDROCHLORIDE 10 MG/1
10 TABLET ORAL 3 TIMES DAILY
Status: DISCONTINUED | OUTPATIENT
Start: 2024-06-16 | End: 2024-06-28 | Stop reason: HOSPADM

## 2024-06-16 RX ORDER — LATANOPROST 50 UG/ML
1 SOLUTION/ DROPS OPHTHALMIC NIGHTLY
Status: DISCONTINUED | OUTPATIENT
Start: 2024-06-16 | End: 2024-06-28 | Stop reason: HOSPADM

## 2024-06-16 RX ORDER — FERROUS SULFATE 325(65) MG
65 TABLET ORAL EVERY OTHER DAY
Status: DISCONTINUED | OUTPATIENT
Start: 2024-06-17 | End: 2024-06-28 | Stop reason: HOSPADM

## 2024-06-16 SDOH — SOCIAL STABILITY: SOCIAL NETWORK: HOW OFTEN DO YOU ATTENT MEETINGS OF THE CLUB OR ORGANIZATION YOU BELONG TO?: PATIENT UNABLE TO ANSWER

## 2024-06-16 SDOH — HEALTH STABILITY: MENTAL HEALTH: ANXIETY SYMPTOMS: GENERALIZED;PANIC ATTACK;UNEXPLAINED FEARS;FEELINGS OF DOOM

## 2024-06-16 SDOH — HEALTH STABILITY: MENTAL HEALTH: HOW MANY STANDARD DRINKS CONTAINING ALCOHOL DO YOU HAVE ON A TYPICAL DAY?: PATIENT DECLINED

## 2024-06-16 SDOH — HEALTH STABILITY: MENTAL HEALTH
HOW OFTEN DO YOU NEED TO HAVE SOMEONE HELP YOU WHEN YOU READ INSTRUCTIONS, PAMPHLETS, OR OTHER WRITTEN MATERIAL FROM YOUR DOCTOR OR PHARMACY?: RARELY

## 2024-06-16 SDOH — ECONOMIC STABILITY: FOOD INSECURITY: WITHIN THE PAST 12 MONTHS, THE FOOD YOU BOUGHT JUST DIDN'T LAST AND YOU DIDN'T HAVE MONEY TO GET MORE.: PATIENT DECLINED

## 2024-06-16 SDOH — ECONOMIC STABILITY: INCOME INSECURITY: HOW HARD IS IT FOR YOU TO PAY FOR THE VERY BASICS LIKE FOOD, HOUSING, MEDICAL CARE, AND HEATING?: PATIENT DECLINED

## 2024-06-16 SDOH — SOCIAL STABILITY: SOCIAL INSECURITY
WITHIN THE LAST YEAR, HAVE YOU BEEN HUMILIATED OR EMOTIONALLY ABUSED IN OTHER WAYS BY YOUR PARTNER OR EX-PARTNER?: PATIENT UNABLE TO ANSWER

## 2024-06-16 SDOH — HEALTH STABILITY: MENTAL HEALTH: HOW OFTEN DO YOU HAVE A DRINK CONTAINING ALCOHOL?: PATIENT UNABLE TO ANSWER

## 2024-06-16 SDOH — HEALTH STABILITY: MENTAL HEALTH: NON-SPECIFIC ACTIVE SUICIDAL THOUGHTS (PAST 1 MONTH): NO

## 2024-06-16 SDOH — SOCIAL STABILITY: SOCIAL INSECURITY
WITHIN THE LAST YEAR, HAVE TO BEEN RAPED OR FORCED TO HAVE ANY KIND OF SEXUAL ACTIVITY BY YOUR PARTNER OR EX-PARTNER?: PATIENT UNABLE TO ANSWER

## 2024-06-16 SDOH — HEALTH STABILITY: MENTAL HEALTH: HOW OFTEN DO YOU HAVE 6 OR MORE DRINKS ON ONE OCCASION?: PATIENT DECLINED

## 2024-06-16 SDOH — SOCIAL STABILITY: SOCIAL INSECURITY: HAVE YOU HAD ANY THOUGHTS OF HARMING ANYONE ELSE?: NO

## 2024-06-16 SDOH — ECONOMIC STABILITY: FOOD INSECURITY: WITHIN THE PAST 12 MONTHS, YOU WORRIED THAT YOUR FOOD WOULD RUN OUT BEFORE YOU GOT MONEY TO BUY MORE.: PATIENT DECLINED

## 2024-06-16 SDOH — ECONOMIC STABILITY: TRANSPORTATION INSECURITY
IN THE PAST 12 MONTHS, HAS LACK OF TRANSPORTATION KEPT YOU FROM MEETINGS, WORK, OR FROM GETTING THINGS NEEDED FOR DAILY LIVING?: PATIENT UNABLE TO ANSWER

## 2024-06-16 SDOH — SOCIAL STABILITY: SOCIAL INSECURITY: ABUSE: ADULT

## 2024-06-16 SDOH — SOCIAL STABILITY: SOCIAL INSECURITY: DOES ANYONE TRY TO KEEP YOU FROM HAVING/CONTACTING OTHER FRIENDS OR DOING THINGS OUTSIDE YOUR HOME?: NO

## 2024-06-16 SDOH — ECONOMIC STABILITY: TRANSPORTATION INSECURITY
IN THE PAST 12 MONTHS, HAS LACK OF TRANSPORTATION KEPT YOU FROM MEETINGS, WORK, OR FROM GETTING THINGS NEEDED FOR DAILY LIVING?: PATIENT DECLINED

## 2024-06-16 SDOH — HEALTH STABILITY: MENTAL HEALTH: HOW OFTEN DO YOU HAVE 6 OR MORE DRINKS ON ONE OCCASION?: PATIENT UNABLE TO ANSWER

## 2024-06-16 SDOH — HEALTH STABILITY: MENTAL HEALTH: HOW OFTEN DO YOU HAVE A DRINK CONTAINING ALCOHOL?: PATIENT DECLINED

## 2024-06-16 SDOH — ECONOMIC STABILITY: INCOME INSECURITY
IN THE PAST 12 MONTHS, HAS THE ELECTRIC, GAS, OIL, OR WATER COMPANY THREATENED TO SHUT OFF SERVICE IN YOUR HOME?: PATIENT UNABLE TO ANSWER

## 2024-06-16 SDOH — HEALTH STABILITY: MENTAL HEALTH: SUICIDAL BEHAVIOR (LIFETIME): NO

## 2024-06-16 SDOH — ECONOMIC STABILITY: FOOD INSECURITY
WITHIN THE PAST 12 MONTHS, THE FOOD YOU BOUGHT JUST DIDN'T LAST AND YOU DIDN'T HAVE MONEY TO GET MORE.: PATIENT UNABLE TO ANSWER

## 2024-06-16 SDOH — HEALTH STABILITY: MENTAL HEALTH: ARE YOU HAVING THOUGHTS OF KILLING YOURSELF RIGHT NOW?: NO

## 2024-06-16 SDOH — SOCIAL STABILITY: SOCIAL NETWORK: HOW OFTEN DO YOU ATTEND CHURCH OR RELIGIOUS SERVICES?: PATIENT UNABLE TO ANSWER

## 2024-06-16 SDOH — SOCIAL STABILITY: SOCIAL INSECURITY
WITHIN THE LAST YEAR, HAVE TO BEEN RAPED OR FORCED TO HAVE ANY KIND OF SEXUAL ACTIVITY BY YOUR PARTNER OR EX-PARTNER?: PATIENT DECLINED

## 2024-06-16 SDOH — SOCIAL STABILITY: SOCIAL NETWORK
DO YOU BELONG TO ANY CLUBS OR ORGANIZATIONS SUCH AS CHURCH GROUPS UNIONS, FRATERNAL OR ATHLETIC GROUPS, OR SCHOOL GROUPS?: PATIENT UNABLE TO ANSWER

## 2024-06-16 SDOH — SOCIAL STABILITY: SOCIAL INSECURITY: HAVE YOU HAD THOUGHTS OF HARMING ANYONE ELSE?: NO

## 2024-06-16 SDOH — HEALTH STABILITY: MENTAL HEALTH: IN THE PAST FEW WEEKS, HAVE YOU FELT THAT YOU OR YOUR FAMILY WOULD BE BETTER OFF IF YOU WERE DEAD?: NO

## 2024-06-16 SDOH — HEALTH STABILITY: MENTAL HEALTH: DEPRESSION SYMPTOMS: NO PROBLEMS REPORTED OR OBSERVED.

## 2024-06-16 SDOH — SOCIAL STABILITY: SOCIAL INSECURITY: WITHIN THE LAST YEAR, HAVE YOU BEEN AFRAID OF YOUR PARTNER OR EX-PARTNER?: PATIENT UNABLE TO ANSWER

## 2024-06-16 SDOH — HEALTH STABILITY: PHYSICAL HEALTH: ON AVERAGE, HOW MANY MINUTES DO YOU ENGAGE IN EXERCISE AT THIS LEVEL?: PATIENT UNABLE TO ANSWER

## 2024-06-16 SDOH — SOCIAL STABILITY: SOCIAL INSECURITY
WITHIN THE LAST YEAR, HAVE YOU BEEN HUMILIATED OR EMOTIONALLY ABUSED IN OTHER WAYS BY YOUR PARTNER OR EX-PARTNER?: PATIENT DECLINED

## 2024-06-16 SDOH — HEALTH STABILITY: MENTAL HEALTH: IN THE PAST WEEK, HAVE YOU BEEN HAVING THOUGHTS ABOUT KILLING YOURSELF?: NO

## 2024-06-16 SDOH — HEALTH STABILITY: MENTAL HEALTH: HOW MANY STANDARD DRINKS CONTAINING ALCOHOL DO YOU HAVE ON A TYPICAL DAY?: PATIENT UNABLE TO ANSWER

## 2024-06-16 SDOH — HEALTH STABILITY: MENTAL HEALTH
HOW OFTEN DO YOU NEED TO HAVE SOMEONE HELP YOU WHEN YOU READ INSTRUCTIONS, PAMPHLETS, OR OTHER WRITTEN MATERIAL FROM YOUR DOCTOR OR PHARMACY?: PATIENT DECLINES TO RESPOND

## 2024-06-16 SDOH — HEALTH STABILITY: MENTAL HEALTH: WISH TO BE DEAD (PAST 1 MONTH): NO

## 2024-06-16 SDOH — HEALTH STABILITY: MENTAL HEALTH: HAVE YOU EVER TRIED TO KILL YOURSELF?: NO

## 2024-06-16 SDOH — ECONOMIC STABILITY: INCOME INSECURITY
HOW HARD IS IT FOR YOU TO PAY FOR THE VERY BASICS LIKE FOOD, HOUSING, MEDICAL CARE, AND HEATING?: PATIENT UNABLE TO ANSWER

## 2024-06-16 SDOH — HEALTH STABILITY: MENTAL HEALTH: IN THE PAST FEW WEEKS, HAVE YOU WISHED YOU WERE DEAD?: NO

## 2024-06-16 SDOH — HEALTH STABILITY: PHYSICAL HEALTH: ON AVERAGE, HOW MANY MINUTES DO YOU ENGAGE IN EXERCISE AT THIS LEVEL?: PATIENT DECLINED

## 2024-06-16 SDOH — SOCIAL STABILITY: SOCIAL INSECURITY: DO YOU FEEL UNSAFE GOING BACK TO THE PLACE WHERE YOU ARE LIVING?: NO

## 2024-06-16 SDOH — SOCIAL STABILITY: SOCIAL NETWORK: HOW OFTEN DO YOU GET TOGETHER WITH FRIENDS OR RELATIVES?: PATIENT DECLINED

## 2024-06-16 SDOH — ECONOMIC STABILITY: FOOD INSECURITY
WITHIN THE PAST 12 MONTHS, YOU WORRIED THAT YOUR FOOD WOULD RUN OUT BEFORE YOU GOT MONEY TO BUY MORE.: PATIENT UNABLE TO ANSWER

## 2024-06-16 SDOH — ECONOMIC STABILITY: TRANSPORTATION INSECURITY
IN THE PAST 12 MONTHS, HAS THE LACK OF TRANSPORTATION KEPT YOU FROM MEDICAL APPOINTMENTS OR FROM GETTING MEDICATIONS?: PATIENT DECLINED

## 2024-06-16 SDOH — SOCIAL STABILITY: SOCIAL NETWORK: ARE YOU MARRIED, WIDOWED, DIVORCED, SEPARATED, NEVER MARRIED, OR LIVING WITH A PARTNER?: PATIENT UNABLE TO ANSWER

## 2024-06-16 SDOH — SOCIAL STABILITY: SOCIAL NETWORK: HOW OFTEN DO YOU ATTENT MEETINGS OF THE CLUB OR ORGANIZATION YOU BELONG TO?: PATIENT DECLINED

## 2024-06-16 SDOH — SOCIAL STABILITY: SOCIAL NETWORK: HOW OFTEN DO YOU ATTEND CHURCH OR RELIGIOUS SERVICES?: PATIENT DECLINED

## 2024-06-16 SDOH — ECONOMIC STABILITY: HOUSING INSECURITY: IN THE LAST 12 MONTHS, HOW MANY PLACES HAVE YOU LIVED?: 1

## 2024-06-16 SDOH — SOCIAL STABILITY: SOCIAL INSECURITY: WERE YOU ABLE TO COMPLETE ALL THE BEHAVIORAL HEALTH SCREENINGS?: YES

## 2024-06-16 SDOH — SOCIAL STABILITY: SOCIAL NETWORK: IN A TYPICAL WEEK, HOW MANY TIMES DO YOU TALK ON THE PHONE WITH FAMILY, FRIENDS, OR NEIGHBORS?: PATIENT DECLINED

## 2024-06-16 SDOH — SOCIAL STABILITY: SOCIAL INSECURITY: ARE YOU OR HAVE YOU BEEN THREATENED OR ABUSED PHYSICALLY, EMOTIONALLY, OR SEXUALLY BY ANYONE?: NO

## 2024-06-16 SDOH — ECONOMIC STABILITY: HOUSING INSECURITY
IN THE LAST 12 MONTHS, WAS THERE A TIME WHEN YOU DID NOT HAVE A STEADY PLACE TO SLEEP OR SLEPT IN A SHELTER (INCLUDING NOW)?: PATIENT UNABLE TO ANSWER

## 2024-06-16 SDOH — SOCIAL STABILITY: SOCIAL NETWORK: ARE YOU MARRIED, WIDOWED, DIVORCED, SEPARATED, NEVER MARRIED, OR LIVING WITH A PARTNER?: PATIENT DECLINED

## 2024-06-16 SDOH — ECONOMIC STABILITY: INCOME INSECURITY: IN THE LAST 12 MONTHS, WAS THERE A TIME WHEN YOU WERE NOT ABLE TO PAY THE MORTGAGE OR RENT ON TIME?: PATIENT DECLINED

## 2024-06-16 SDOH — SOCIAL STABILITY: SOCIAL INSECURITY: DO YOU FEEL ANYONE HAS EXPLOITED OR TAKEN ADVANTAGE OF YOU FINANCIALLY OR OF YOUR PERSONAL PROPERTY?: NO

## 2024-06-16 SDOH — SOCIAL STABILITY: SOCIAL INSECURITY: HAS ANYONE EVER THREATENED TO HURT YOUR FAMILY OR YOUR PETS?: NO

## 2024-06-16 SDOH — ECONOMIC STABILITY: INCOME INSECURITY
IN THE PAST 12 MONTHS, HAS THE ELECTRIC, GAS, OIL, OR WATER COMPANY THREATENED TO SHUT OFF SERVICE IN YOUR HOME?: PATIENT DECLINED

## 2024-06-16 SDOH — ECONOMIC STABILITY: HOUSING INSECURITY
IN THE LAST 12 MONTHS, WAS THERE A TIME WHEN YOU DID NOT HAVE A STEADY PLACE TO SLEEP OR SLEPT IN A SHELTER (INCLUDING NOW)?: PATIENT DECLINED

## 2024-06-16 SDOH — SOCIAL STABILITY: SOCIAL NETWORK
DO YOU BELONG TO ANY CLUBS OR ORGANIZATIONS SUCH AS CHURCH GROUPS UNIONS, FRATERNAL OR ATHLETIC GROUPS, OR SCHOOL GROUPS?: PATIENT DECLINED

## 2024-06-16 SDOH — SOCIAL STABILITY: SOCIAL NETWORK: HOW OFTEN DO YOU GET TOGETHER WITH FRIENDS OR RELATIVES?: PATIENT UNABLE TO ANSWER

## 2024-06-16 SDOH — ECONOMIC STABILITY: HOUSING INSECURITY: FEELS SAFE LIVING IN HOME: YES

## 2024-06-16 SDOH — SOCIAL STABILITY: SOCIAL INSECURITY: WITHIN THE LAST YEAR, HAVE YOU BEEN AFRAID OF YOUR PARTNER OR EX-PARTNER?: PATIENT DECLINED

## 2024-06-16 SDOH — HEALTH STABILITY: PHYSICAL HEALTH
ON AVERAGE, HOW MANY DAYS PER WEEK DO YOU ENGAGE IN MODERATE TO STRENUOUS EXERCISE (LIKE A BRISK WALK)?: PATIENT UNABLE TO ANSWER

## 2024-06-16 SDOH — HEALTH STABILITY: PHYSICAL HEALTH
ON AVERAGE, HOW MANY DAYS PER WEEK DO YOU ENGAGE IN MODERATE TO STRENUOUS EXERCISE (LIKE A BRISK WALK)?: PATIENT DECLINED

## 2024-06-16 SDOH — HEALTH STABILITY: MENTAL HEALTH
STRESS IS WHEN SOMEONE FEELS TENSE, NERVOUS, ANXIOUS, OR CAN'T SLEEP AT NIGHT BECAUSE THEIR MIND IS TROUBLED. HOW STRESSED ARE YOU?: PATIENT UNABLE TO ANSWER

## 2024-06-16 SDOH — ECONOMIC STABILITY: INCOME INSECURITY
IN THE LAST 12 MONTHS, WAS THERE A TIME WHEN YOU WERE NOT ABLE TO PAY THE MORTGAGE OR RENT ON TIME?: PATIENT UNABLE TO ANSWER

## 2024-06-16 SDOH — SOCIAL STABILITY: SOCIAL INSECURITY
WITHIN THE LAST YEAR, HAVE YOU BEEN KICKED, HIT, SLAPPED, OR OTHERWISE PHYSICALLY HURT BY YOUR PARTNER OR EX-PARTNER?: PATIENT DECLINED

## 2024-06-16 SDOH — SOCIAL STABILITY: SOCIAL INSECURITY: ARE THERE ANY APPARENT SIGNS OF INJURIES/BEHAVIORS THAT COULD BE RELATED TO ABUSE/NEGLECT?: NO

## 2024-06-16 ASSESSMENT — PAIN - FUNCTIONAL ASSESSMENT
PAIN_FUNCTIONAL_ASSESSMENT: 0-10

## 2024-06-16 ASSESSMENT — LIFESTYLE VARIABLES
PRESCIPTION_ABUSE_PAST_12_MONTHS: NO
TOTAL_SCORE: 0
HEADACHE, FULLNESS IN HEAD: NOT PRESENT
SUBSTANCE_ABUSE_PAST_12_MONTHS: NO
HOW OFTEN DO YOU HAVE A DRINK CONTAINING ALCOHOL: PATIENT UNABLE TO ANSWER
SUBSTANCE_ABUSE_PAST_12_MONTHS: NO
AUDIT-C TOTAL SCORE: -1
VISUAL DISTURBANCES: NOT PRESENT
NAUSEA AND VOMITING: NO NAUSEA AND NO VOMITING
HOW OFTEN DO YOU HAVE 6 OR MORE DRINKS ON ONE OCCASION: PATIENT UNABLE TO ANSWER
PRESCIPTION_ABUSE_PAST_12_MONTHS: NO
ORIENTATION AND CLOUDING OF SENSORIUM: ORIENTED AND CAN DO SERIAL ADDITIONS
AGITATION: NORMAL ACTIVITY
ANXIETY: NO ANXIETY, AT EASE
AUDIT-C TOTAL SCORE: -1
AUDITORY DISTURBANCES: NOT PRESENT
AUDIT-C TOTAL SCORE: -1
AUDIT-C TOTAL SCORE: -1
HOW MANY STANDARD DRINKS CONTAINING ALCOHOL DO YOU HAVE ON A TYPICAL DAY: PATIENT UNABLE TO ANSWER
TREMOR: NO TREMOR
SKIP TO QUESTIONS 9-10: 0
SKIP TO QUESTIONS 9-10: 0
PAROXYSMAL SWEATS: NO SWEAT VISIBLE
SKIP TO QUESTIONS 9-10: 0
CIWA TOTAL SCORE: 0

## 2024-06-16 ASSESSMENT — PAIN SCALES - GENERAL
PAINLEVEL_OUTOF10: 0 - NO PAIN
PAINLEVEL_OUTOF10: 0 - NO PAIN

## 2024-06-16 ASSESSMENT — ACTIVITIES OF DAILY LIVING (ADL)
GROOMING: NEEDS ASSISTANCE
WALKS IN HOME: NEEDS ASSISTANCE
ADEQUATE_TO_COMPLETE_ADL: YES
PATIENT'S MEMORY ADEQUATE TO SAFELY COMPLETE DAILY ACTIVITIES?: NO
DRESSING YOURSELF: NEEDS ASSISTANCE
HEARING - LEFT EAR: HEARING AID
JUDGMENT_ADEQUATE_SAFELY_COMPLETE_DAILY_ACTIVITIES: NO
BATHING: NEEDS ASSISTANCE
TOILETING: NEEDS ASSISTANCE
FEEDING YOURSELF: INDEPENDENT
HEARING - RIGHT EAR: HEARING AID

## 2024-06-16 ASSESSMENT — PATIENT HEALTH QUESTIONNAIRE - PHQ9
SUM OF ALL RESPONSES TO PHQ9 QUESTIONS 1 & 2: 0
2. FEELING DOWN, DEPRESSED OR HOPELESS: NOT AT ALL
1. LITTLE INTEREST OR PLEASURE IN DOING THINGS: NOT AT ALL

## 2024-06-16 ASSESSMENT — COLUMBIA-SUICIDE SEVERITY RATING SCALE - C-SSRS
2. HAVE YOU ACTUALLY HAD ANY THOUGHTS OF KILLING YOURSELF?: NO
6. HAVE YOU EVER DONE ANYTHING, STARTED TO DO ANYTHING, OR PREPARED TO DO ANYTHING TO END YOUR LIFE?: NO
1. IN THE PAST MONTH, HAVE YOU WISHED YOU WERE DEAD OR WISHED YOU COULD GO TO SLEEP AND NOT WAKE UP?: NO

## 2024-06-16 NOTE — PROGRESS NOTES
Attestation/Supervisory note for JUDI Logan      The patient is a 89-year-old female presenting to the emergency department for evaluation of an anxiety attack.  The patient reportedly does have a history of anxiety with previous anxiety attacks with similar symptoms.  She was reportedly at [employer] today and she suddenly felt very anxious.  She was having some chest pain and some shortness of breath.  She states that she just did not feel well.  She states that her symptoms have since improved.  She states that she was just very afraid but she does not know why she was afraid.  She denies any homicidal or suicidal ideation.  No hallucinations.  She denies any headache or visual changes.  No focal weakness or numbness.  The chest pain was a tightness.  No better or worse.  No radiation.  No nausea, vomiting or diarrhea.  No urinary complaints.  She does not have any abdominal pain.  She denies any urinary complaints.  All pertinent positives and negatives are recorded above.  All other systems reviewed and otherwise negative.  Vital signs with hypertension but otherwise within normal limits.  Physical exam with a well-nourished well-developed female who appears intermittently anxious but is calmed with verbal reassurance.  HEENT exam with dry mucous membranes but otherwise unremarkable.  She has no evidence of airway compromise or respiratory distress.  Abdominal exam is benign.  She has no gross motor, neurologic or vascular deficits on exam.  Pulses are equal bilaterally.      EKG with EKG with sinus rhythm at 74 bpm, occasional PVCs, LVH criteria, normal axis, normal ST segment, normal T waves      IV Pepcid, IV Zofran, IV fluids and IV Ativan ordered.      Diagnostic labs without significant abnormality      Initial troponin T 15.  Repeat troponin T 12      XR chest 2 views   Final Result   Worsening bibasilar pulmonary opacities favored to be due to   atelectasis.        Age indeterminate compression fracture  lower thoracic spine.   Recommend follow-up with dedicated imaging of the spine for further   assessment.        Signed by: Nolbertokrystal De La Cruzlenore 6/16/2024 2:04 PM   Dictation workstation:   [id number]           The patient did not have any evidence of ischemia on EKG or cardiac enzymes.  No events on telemetry.  The patient does not have any gross motor, neurologic or vascular deficits on exam.  No significant lab abnormalities.  No evidence of hemodynamic instability.  The patient does have some bibasilar pulmonary opacifications considered to be atelectasis the radiology reading but no evidence of pneumonia.  The patient does have an age-indeterminate compression fracture of the lower thoracic spine but she denies having any acute back pain at this time.  She does not have any gross motor, neurologic or vascular deficits on exam.      The patient's family would like her to be assessed for possible geropsych placement due to her anxiety symptoms.      Crisis team did evaluate the patient and will attempt placement for inpatient [facility].      The patient was accepted by Dr. Nova at [facility] and was transferred for further management.      Impression/diagnosis:  1.  Anxiety, acute on chronic  2.  Hypertension, unspecified  3.  Dyspnea, acute on chronic  4.  Chest tightness, resolved  5.  Compression fracture of the thoracic spine      I personally saw the patient and made/approve the management plan and take responsibility for the patient management.      I independently interpreted the following study (S) EKG and diagnostic labs      I personally discussed the patient's management with the patient      I reviewed the results of the diagnostic labs and diagnostic imaging.  Formal radiology read was completed by the radiologist.      Hilary Bay MD

## 2024-06-16 NOTE — PROGRESS NOTES
EPAT - Social Work Psychiatric Assessment    Arrival Details  Mode of Arrival: Ambulance  Admission Source: Other (Comment)  Admission Type: Voluntary  EPAT Assessment Start Date: 06/16/24  EPAT Assessment Start Time: 1406  Name of : MARCE Johansen    History of Present Illness  Admission Reason: Anxiety/panic attacks  HPI: Pt is a 89 y/o F brought in by EMS for evaluation after pt had panic attack while at [employer]. Friend called EMS. Pt remains very anxious in ED upon arrival especially with staff giving her any other medication aside from her own. Pt denies SI/HI/AVH. Social work reviewed Pts chart, medical record, and provider notes which indicate history of anxiety and receives medication from PCP. No records indicating history of inpatient mental health treatment or history of SI. The triage risk assessment was reviewed and Pt was inidcated to be no risk during triage assessment.    SW Readmission Information   Readmission within 30 Days: No    Psychiatric Symptoms  Anxiety Symptoms: Generalized, Panic attack, Unexplained fears, Feelings of doom  Depression Symptoms: No problems reported or observed.  Sunni Symptoms: No problems reported or observed.    Psychosis Symptoms  Hallucination Type: No problems reported or observed.  Delusion Type: No problems reported or observed.    Additional Symptoms - Adult  Generalized Anxiety Disorder: No problems reported or observed.  Obsessive Compulsive Disorder: No problems reported or observed.  Panic Attack: No problems reported or observed.  Post Traumatic Stress Disorder: No problems reported or observed.  Delirium: No problems reported or observed.    Past Psychiatric History/Meds/Treatments  Past Psychiatric History: Pt has history of anxiety and panic attacks. She is currently on Buspar 30mg which is prescribed by PCP  Past Psychiatric Meds/Treatments: Pt denies any inpatient hospitalization in the past.  Past Violence/Victimization History: None  reported    Current Mental Health Contacts   Name/Phone Number: None  Provider Name/Phone Number: [facility]  Provider Last Appointment Date: unknown; next appointment scheduled for Tuesday (6/18)    Support System: Immediate family, [employer] institution, Friends    Living Arrangement: House, Lives with someone (Son currently living with pt.)    Home Safety  Feels Safe Living in Home: Yes    Income Information  Employment Status for: Patient  Employment Status: Retired    [employer] Service/Education History  Current or Previous  Service: None         Legal  Legal Considerations: Patient/ Family Ability to Make Healthcare Decisions  Assistance with Managing/Advocating Healthcare Needs: Other (Comment)  Criminal Activity/ Legal Involvement Pertinent to Current Situation/ Hospitalization: None  Legal Concerns: None  Legal Comments: Legal guardian- self, POA- self    Drug Screening  Have you used any substances (canabis, cocaine, heroin, hallucinogens, inhalants, etc.) in the past 12 months?: No  Have you used any prescription drugs other than prescribed in the past 12 months?: No  Is a toxicology screen needed?: Yes    Stage of Change  Stage of Change: Other (Comment) (N/A)    Psychosocial  Psychosocial (WDL): Within Defined Limits    Orientation  Orientation Level: Disoriented X4    General Appearance  Motor Activity: Unremarkable  Speech Pattern: Other (Comment) (Clear)  General Attitude: Cooperative  Appearance/Hygiene: Unremarkable    Thought Process  Coherency: Circumstantial  Content: Unremarkable  Delusions: Other (Comment) (none)  Perception: Not altered  Hallucination: None  Judgment/Insight: Impaired  Confusion: None  Cognition: Poor judgement    Sleep Pattern  Sleep Pattern: Sleeps all night    Risk Factors  Self Harm/Suicidal Ideation Plan: Pt denies any thoughts of SI.  Previous Self Harm/Suicidal Plans: Pt denies history of SI or any attempts.  Risk Factors: Other  (Comment)  Description of Thoughts/Ideas Leaving Unit Now: Pt unable to guarantee safety and stabilization.    Violence Risk Assessment  Assessment of Violence: None noted  Thoughts of Harm to Others: No    Ability to Assess Risk Screen  Risk Screen - Ability to Assess: Able to be screened  Ask Suicide-Screening Questions  1. In the past few weeks, have you wished you were dead?: No  2. In the past few weeks, have you felt that you or your family would be better off if you were dead?: No  3. In the past week, have you been having thoughts about killing yourself?: No  4. Have you ever tried to kill yourself?: No  5. Are you having thoughts of killing yourself right now?: No  Calculated Risk Score: No intervention is necessary  [place] Suicide Severity Rating Scale (Screener/Recent Self-Report)  1. Wish to be Dead (Past 1 Month): No  2. Non-Specific Active Suicidal Thoughts (Past 1 Month): No  6. Suicidal Behavior (Lifetime): No  Calculated C-SSRS Risk Score (Lifetime/Recent): No Risk Indicated  Step 1: Risk Factors  Current & Past Psychiatric Dx: Mood disorder  Presenting Symptoms: Anxiety and/or panic, Hopelessness or despair  Precipitants/Stressors: Triggering events leading to humiliation, shame, and/or despair (e.g. loss of relationship, financial or health status) (real or anticipated), Perceived burden on others  Change in Treatment: Hopeless or dissatisfied with provider or treatment (Feels like her medication is not working.)  Access to Lethal Methods : No  Step 2: Protective Factors   Protective Factors Internal: Pentecostalism beliefs, Identifies reasons for living  Protective Factors External: Cultural, spiritual and/or moral attitudes against suicide, Supportive social network or family or friends, Positive therapeutic relationships  Step 3: Suicidal Ideation Intensity  Are There Things - Anyone or Anything - That Stopped You From Wanting to Die or Acting on: Does not apply  What Sort of Reasons Did You Have  "For Thinking About Wanting to Die or Killing Yourself: Does not apply  Step 5: Documentation  Risk Level: Low suicide risk    Psychiatric Impression and Plan of Care  Assessment and Plan: Upon assessment Pt presents with extreme anxiety. She begins to cry when talking about her symptoms and getting anxious stating \"I do not know what is wrong with me\". Pt hard of hearing and son providing collateral at bedside. Per son pt has always dealt with anxiety symptoms however for the past month they seem to be worsening. They have spoken to pt's PCP who has increased pt Buspar medications several times from 5mg to 10mg and then again 5 days ago from 10mg to the current 30mg. Son understands medication can take time to work after being adjusted but pt reports it has not worked for her at all at any of the doses. Pt's PCP had her follow up with providers through [facility] however son states she has only gone twice with a third appointment coming up on Tuesday. Pt has not seen a psychiatrist yet to discuss medications. Pt admitting to consant worry while at home that she \"may leave stove on accidentally and burn the house down\". She also is fearful of daughter \"walking out on me because I need help remembering to take the medications\". Pt putting self into panic attack while talking about these fears and worries while social work in room. Son states pt's daughter Mily does not live at the house with them but was going to stay with pt this upcoming week while he was out of town. Pt then mentions that she \"does not want to hurt Mily\" but when asked what she means pt anxiously states \"I do not know\" and says she does not want to do anything that could hurt herself or anyone else and provides an example of \"burning the house down\" again. Pt then turns to son tearful and states \"just go to [place] and dont worry about me\" and \"tell Mily not to come, I do not want to burden her\". Social work asked pt if she would feel safe " "with self to return home if she was feeling better and pt reports \"I do not know\" and \"whenever I am home I worry about it all\". Pt could not clarify what \"it all\" is and becomes anxious again. After speaking with son pt agrees inpatient may be the best option for her at this time as she cannot guarantee stabilization at home currently. Pt presents with no risk at time of social work assessment but would benefit inpatient placement for further evaluation of panic attacks.  Specific Resources Provided to Patient: Peer support not needed.  CM Notified: Yes  Plan Comments: Diagnostic Impression: Generalized Anxiety DO. Plan for inpatient placement for stabilization.    Outcome/Disposition  Patient's Perception of Outcome Achieved: Pt agreeable to plan and willing to sign voluntarily in to facility.  Assessment, Recommendations and Risk Level Reviewed with: Reviewed case with Dr. Bay and ESTEVAN Artis. All parties agree for placement at this time.  Contact Name: Ramana Rubio  Contact Number(s): [phone]  Contact Relationship: Son  EPAT Assessment Completed Date: 06/16/24  EPAT Assessment Completed Time: 1440  Patient Disposition:  Adult Inpatient Psych      "

## 2024-06-16 NOTE — ED PROVIDER NOTES
HPI   Chief Complaint   Patient presents with    Anxiety     Pt was at [employer]. After [employer], pt got into the car with her friend and started to have a panic attack.        HPI     Patient is a 89-year-old female with a history of anxiety presenting for evaluation of a panic attack.  Patient was supposedly at [employer] she got into a friend's car and started hyperventilating.  Patient has a history of severe anxiety and is followed by her primary care physician for this issue.  Patient denies associated chest pain but does admit to shortness of breath.  Denies abdominal pain.  No constipation or diarrhea.  No nausea or vomiting.  No dysuria or hematuria.  Daughter is at the bedside, stating that her mother has been struggling with anxiety.               Schuyler Coma Scale Score: 15                     Patient History   Past Medical History:   Diagnosis Date    Other conditions influencing health status 11/24/2017    History of cough    Pain in joints of unspecified hand 01/05/2016    Pain, hand joint    Personal history of diseases of the skin and subcutaneous tissue 11/24/2017    History of cellulitis    Personal history of Methicillin resistant Staphylococcus aureus infection 11/24/2017    History of methicillin resistant Staphylococcus aureus infection    Personal history of other diseases of the respiratory system 12/05/2017    History of acute bronchitis    Personal history of other specified conditions 05/04/2017    History of dizziness    Personal history of pneumonia (recurrent)     History of pneumonia     Past Surgical History:   Procedure Laterality Date    OTHER SURGICAL HISTORY  08/29/2017    Hip Surgery Left     No family history on file.  Social History     Tobacco Use    Smoking status: Never    Smokeless tobacco: Never   Vaping Use    Vaping status: Never Used   Substance Use Topics    Alcohol use: Not Currently    Drug use: Not Currently       Physical Exam   ED Triage Vitals [06/16/24 1231]    Temperature Heart Rate Respirations BP   36.7 °C (98 °F) 80 17 (!) 183/86      Pulse Ox Temp Source Heart Rate Source Patient Position   95 % Temporal Monitor Lying      BP Location FiO2 (%)     Left arm --       Physical Exam  Vitals and nursing note reviewed.   Constitutional:       Comments: Tearful, very anxious   HENT:      Head: Normocephalic and atraumatic.   Eyes:      Extraocular Movements: Extraocular movements intact.      Conjunctiva/sclera: Conjunctivae normal.      Pupils: Pupils are equal, round, and reactive to light.   Cardiovascular:      Rate and Rhythm: Normal rate and regular rhythm.   Pulmonary:      Effort: Pulmonary effort is normal.      Breath sounds: Normal breath sounds.   Abdominal:      General: Abdomen is flat. Bowel sounds are normal.      Palpations: Abdomen is soft.   Musculoskeletal:         General: Normal range of motion.   Neurological:      Mental Status: She is alert.         ED Course & MDM   Diagnoses as of 06/16/24 1555   Anxiety       Medical Decision Making  Parts of this chart have been completed using voice recognition software. Please excuse any errors of transcription. Despite the medical decision making time stamp above-my medical decision making has taken place during the patient's entire visit. My thought process and reason for plan has been formulated from the time that I saw the patient until the time of disposition and is not specific to one specific moment during their visit and furthermore my MDM encompasses this entire chart and not only this text box.      HPI: Detailed above.    Exam: A medically appropriate exam performed, outlined above, given the known history and presentation.    History obtained from: Patient, daughter    Social Determinants of Health considered during this visit: Lives at home    EKG interpreted by my attending physician, reviewed by myself.    Labs Reviewed   CBC WITH AUTO DIFFERENTIAL - Abnormal       Result Value    WBC 10.2       nRBC 0.0      RBC 5.15      Hemoglobin 15.1      Hematocrit 45.6      MCV 89      MCH 29.3      MCHC 33.1      RDW 13.0      Platelets 211      Neutrophils % 79.8      Immature Granulocytes %, Automated 0.9      Lymphocytes % 9.7      Monocytes % 8.0      Eosinophils % 1.0      Basophils % 0.6      Neutrophils Absolute 8.14 (*)     Immature Granulocytes Absolute, Automated 0.09      Lymphocytes Absolute 0.99      Monocytes Absolute 0.82 (*)     Eosinophils Absolute 0.10      Basophils Absolute 0.06     COMPREHENSIVE METABOLIC PANEL - Abnormal    Glucose 104 (*)     Sodium 134      Potassium 4.2      Chloride 100      Bicarbonate 25      Urea Nitrogen 22      Creatinine 0.70      eGFR 81      Calcium 9.3      Albumin 4.0      Alkaline Phosphatase 56      Total Protein 6.8      AST 16      Bilirubin, Total 1.0      ALT 11      Anion Gap 9     SERIAL TROPONIN, INITIAL ([facility]) - Abnormal    Troponin T, High Sensitivity 15 (*)    URINALYSIS WITH REFLEX CULTURE AND MICROSCOPIC - Abnormal    Color, Urine Light-Yellow      Appearance, Urine Clear      Specific Gravity, Urine 1.010      pH, Urine 5.5      Protein, Urine NEGATIVE      Glucose, Urine Normal      Blood, Urine 0.03 (TRACE) (*)     Ketones, Urine NEGATIVE      Bilirubin, Urine NEGATIVE      Urobilinogen, Urine Normal      Nitrite, Urine NEGATIVE      Leukocyte Esterase, Urine 25 Deena/µL (*)    N-TERMINAL PROBNP - Normal    PROBNP 141      Narrative:     Reference ranges are based on clinical submission data. These ranges represent the 95th percentile of normal cut-off points. As NT Pro- BNP values approach 1000 pg/ml, clinical symptoms are more likely associated with CHF.   DRUG SCREEN,URINE - Normal    Amphetamine Screen, Urine Negative      Barbiturate Screen, Urine Negative      Benzodiazepines Screen, Urine Negative      Cannabinoid Screen, Urine Negative      Cocaine Metabolite Screen, Urine Negative      Fentanyl Screen, Urine Negative      Methadone Screen,  Urine Negative      Opiate Screen, Urine Negative      Oxycodone Screen, Urine Negative      PCP Screen, Urine Negative      Narrative:     These toxicological screening tests provide unconfirmed qualitative measurements to aid in treatment and diagnosis in cases of drug use or overdose. This test is used only for medical purposes. A positive result does not indicate or measure intoxication. For specific test performance or pathologist consultation, please contact the Laboratory.    The following threshold concentrations are used for these analyses.Values at or above the threshold concentration are reported as positive. Values below the threshold are reported as negative.    Drug /Screening Threshold                                                                                                 THC/CANNABINOIDS................50 ng/ml  METHADONE......................300 ng/ml  COCAINE METABOLITES............300 ng/ml  BENZODIAZEPINE.................300 ng/ml  PCP.............................25 ng/ml  OPIATE.........................300 ng/ml  AMPHETAMINE/ECSTASY...........1000 ng/ml  BARBITURATE....................200 ng/ml  OXYCODONE......................100 ng/ml  FENTANYL.........................5 ng/ml       ALCOHOL - Normal    Alcohol <0.010     SERIAL TROPONIN,  2 HOUR ([facility]) - Normal    Troponin T, High Sensitivity 12     URINE CULTURE   TROPONIN T SERIES, HIGH SENSITIVITY (0, 2 HR, 6 HR)    Narrative:     The following orders were created for panel order Troponin T Series, High Sensitivity (0, 2HR, 6HR).  Procedure                               Abnormality         Status                     ---------                               -----------         ------                     Serial Troponin, Initial...[[id number]]  Abnormal            Final result               Serial Troponin, 2 Hour ...[[id number]]  Normal              Final result                 Please view results for these tests on the individual orders.    MICROSCOPIC ONLY, URINE    WBC, Urine NONE      RBC, Urine NONE      Squamous Epithelial Cells, Urine 1-9 ([facility]AR[facility])     URINALYSIS WITH REFLEX CULTURE AND MICROSCOPIC    Narrative:     The following orders were created for panel order Urinalysis with Reflex Culture and Microscopic.  Procedure                               Abnormality         Status                     ---------                               -----------         ------                     Urinalysis with Reflex C...[[id number]]  Abnormal            Final result               Extra Urine Gray Tube[[id number]]                                                         Please view results for these tests on the individual orders.   EXTRA URINE GRAY TUBE     XR chest 2 views   Final Result   Worsening bibasilar pulmonary opacities favored to be due to   atelectasis.        Age indeterminate compression fracture lower thoracic spine.   Recommend follow-up with dedicated imaging of the spine for further   assessment.        Signed by: Karina Brand 6/16/2024 2:04 PM   Dictation workstation:   [id number]        Medications   famotidine PF (Pepcid) injection 20 mg (20 mg intravenous Given 6/16/24 1415)   ondansetron (Zofran) injection 4 mg (4 mg intravenous Given 6/16/24 1415)   sodium chloride 0.9 % bolus 500 mL (500 mL intravenous New Bag 6/16/24 1415)   LORazepam (Ativan) injection 0.5 mg (0.5 mg intravenous Given 6/16/24 1415)       Differential diagnoses considered for this visit include: Acute coronary syndrome versus STEMI versus NSTEMI versus urinary tract infection versus drug intoxication    Considerations/further MDM:    Patient is a 89-year-old female presenting from evaluation of anxiety.  Vital signs demonstrate hypertension but are otherwise within normal limits.  Physical exam appreciated a well-nourished well-developed female that is very anxious and mildly tearful with mild hyperventilation with speaking.  Patient is a poor historian and  most of the history was obtained from her daughter.  Cardiac workup was ordered to rule out cardiac etiology with additional urinalysis and drug screen.    CBC and CMP were within normal limits.  proBNP unremarkable.  Urinalysis positive for leukocyte esterase but otherwise unremarkable.  Alcohol testing negative.  Initial troponin of 15, repeat of 12.  Drug screen negative. Chest x-ray showed worsening bibasilar pulmonary opacities favored to be due to atelectasis.  Age-indeterminate compression fracture of the lower thoracic spine.    Social work evaluated the patient and found geropsych to be most appropriate.  Patient was medically cleared for inpatient treatment at this time.    Patient was seen in conjunction with attending physician Dr. Hilary Bay   Patient's history, physical exam, diagnostic studies, and treatment plan were discussed thoroughly.    Procedure  Procedures     Julissa Logan PA-C  06/16/24 1557

## 2024-06-16 NOTE — PROGRESS NOTES
Social Work Note    15:40 Referred clinicals to [facility] for review for placement. Still waiting on statement of medical clearance and results of redraw on Troponin. Pt did sign voluntary form as she is her own guardian.     16:05 Pt accepted to [facility] by Dr. Nova for room 400.   [facility] requesting time before pt transported as they have several pts they just accepted to the unit.

## 2024-06-17 LAB
ATRIAL RATE: 74 BPM
CHOLEST SERPL-MCNC: 187 MG/DL (ref 133–200)
CHOLEST/HDLC SERPL: 3.7 {RATIO}
GLUCOSE P FAST SERPL-MCNC: 111 MG/DL (ref 69–99)
HDLC SERPL-MCNC: 50 MG/DL
LDLC SERPL CALC-MCNC: 117 MG/DL (ref 65–130)
P AXIS: 58 DEGREES
P OFFSET: 185 MS
P ONSET: 132 MS
PR INTERVAL: 170 MS
Q ONSET: 217 MS
QRS COUNT: 12 BEATS
QRS DURATION: 100 MS
QT INTERVAL: 386 MS
QTC CALCULATION(BAZETT): 428 MS
QTC FREDERICIA: 414 MS
R AXIS: -19 DEGREES
T AXIS: 58 DEGREES
T OFFSET: 410 MS
TRIGL SERPL-MCNC: 101 MG/DL (ref 40–150)
VENTRICULAR RATE: 74 BPM

## 2024-06-17 PROCEDURE — 2500000004 HC RX 250 GENERAL PHARMACY W/ HCPCS (ALT 636 FOR OP/ED): Performed by: STUDENT IN AN ORGANIZED HEALTH CARE EDUCATION/TRAINING PROGRAM

## 2024-06-17 PROCEDURE — 97530 THERAPEUTIC ACTIVITIES: CPT | Mod: GP

## 2024-06-17 PROCEDURE — 2500000001 HC RX 250 WO HCPCS SELF ADMINISTERED DRUGS (ALT 637 FOR MEDICARE OP): Performed by: INTERNAL MEDICINE

## 2024-06-17 PROCEDURE — 97161 PT EVAL LOW COMPLEX 20 MIN: CPT | Mod: GP

## 2024-06-17 PROCEDURE — 1140000001 HC PRIVATE PSYCH ROOM DAILY

## 2024-06-17 PROCEDURE — 2500000001 HC RX 250 WO HCPCS SELF ADMINISTERED DRUGS (ALT 637 FOR MEDICARE OP): Performed by: STUDENT IN AN ORGANIZED HEALTH CARE EDUCATION/TRAINING PROGRAM

## 2024-06-17 PROCEDURE — 2500000002 HC RX 250 W HCPCS SELF ADMINISTERED DRUGS (ALT 637 FOR MEDICARE OP, ALT 636 FOR OP/ED): Performed by: STUDENT IN AN ORGANIZED HEALTH CARE EDUCATION/TRAINING PROGRAM

## 2024-06-17 PROCEDURE — 97110 THERAPEUTIC EXERCISES: CPT | Mod: GP

## 2024-06-17 PROCEDURE — 97165 OT EVAL LOW COMPLEX 30 MIN: CPT | Mod: GO

## 2024-06-17 PROCEDURE — 36415 COLL VENOUS BLD VENIPUNCTURE: CPT | Performed by: STUDENT IN AN ORGANIZED HEALTH CARE EDUCATION/TRAINING PROGRAM

## 2024-06-17 PROCEDURE — 99222 1ST HOSP IP/OBS MODERATE 55: CPT | Performed by: PSYCHIATRY & NEUROLOGY

## 2024-06-17 PROCEDURE — 82947 ASSAY GLUCOSE BLOOD QUANT: CPT | Performed by: STUDENT IN AN ORGANIZED HEALTH CARE EDUCATION/TRAINING PROGRAM

## 2024-06-17 PROCEDURE — 99221 1ST HOSP IP/OBS SF/LOW 40: CPT | Performed by: INTERNAL MEDICINE

## 2024-06-17 PROCEDURE — 80061 LIPID PANEL: CPT | Performed by: STUDENT IN AN ORGANIZED HEALTH CARE EDUCATION/TRAINING PROGRAM

## 2024-06-17 RX ORDER — DICLOFENAC SODIUM 10 MG/G
4 GEL TOPICAL 4 TIMES DAILY PRN
Status: DISPENSED | OUTPATIENT
Start: 2024-06-17 | End: 2024-06-24

## 2024-06-17 SDOH — HEALTH STABILITY: MENTAL HEALTH: FAMILY HISTORY SUBSTANCE USE: OTHER (COMMENT)

## 2024-06-17 SDOH — SOCIAL STABILITY: SOCIAL INSECURITY
WITHIN THE LAST YEAR, HAVE YOU BEEN KICKED, HIT, SLAPPED, OR OTHERWISE PHYSICALLY HURT BY YOUR PARTNER OR EX-PARTNER?: PATIENT UNABLE TO ANSWER

## 2024-06-17 SDOH — HEALTH STABILITY: MENTAL HEALTH: SUBSTANCE USE: UNABLE TO ASSESS

## 2024-06-17 SDOH — ECONOMIC STABILITY: HOUSING INSECURITY: HOME SAFETY: UNABLE TO ASSESS

## 2024-06-17 SDOH — HEALTH STABILITY: MENTAL HEALTH: BEHAVIOR: OTHER (COMMENT)

## 2024-06-17 SDOH — SOCIAL STABILITY: SOCIAL INSECURITY: FEELS SAFE LIVING IN HOME: OTHER (COMMENT)

## 2024-06-17 SDOH — ECONOMIC STABILITY: HOUSING INSECURITY: POTENTIALLY UNSAFE HOUSING CONDITIONS: UNABLE TO ASSESS

## 2024-06-17 SDOH — SOCIAL STABILITY: SOCIAL INSECURITY: WITHIN THE LAST YEAR, HAVE YOU BEEN AFRAID OF YOUR PARTNER OR EX-PARTNER?: PATIENT UNABLE TO ANSWER

## 2024-06-17 SDOH — HEALTH STABILITY: MENTAL HEALTH: HARK TOTAL SCORE: 0

## 2024-06-17 SDOH — SOCIAL STABILITY: SOCIAL INSECURITY
WITHIN THE LAST YEAR, HAVE YOU BEEN RAPED OR FORCED TO HAVE ANY KIND OF SEXUAL ACTIVITY BY YOUR PARTNER OR EX-PARTNER?: PATIENT UNABLE TO ANSWER

## 2024-06-17 SDOH — SOCIAL STABILITY: SOCIAL INSECURITY: RELIGIOUS/CULTURAL FACTORS: BAPTIST

## 2024-06-17 ASSESSMENT — PAIN SCALES - GENERAL
PAINLEVEL_OUTOF10: 5 - MODERATE PAIN
PAINLEVEL_OUTOF10: 0 - NO PAIN
PAINLEVEL_OUTOF10: 6

## 2024-06-17 ASSESSMENT — COLUMBIA-SUICIDE SEVERITY RATING SCALE - C-SSRS
1. SINCE LAST CONTACT, HAVE YOU WISHED YOU WERE DEAD OR WISHED YOU COULD GO TO SLEEP AND NOT WAKE UP?: NO
2. HAVE YOU ACTUALLY HAD ANY THOUGHTS OF KILLING YOURSELF?: NO
6. HAVE YOU EVER DONE ANYTHING, STARTED TO DO ANYTHING, OR PREPARED TO DO ANYTHING TO END YOUR LIFE?: NO
2. HAVE YOU ACTUALLY HAD ANY THOUGHTS OF KILLING YOURSELF?: NO
6. HAVE YOU EVER DONE ANYTHING, STARTED TO DO ANYTHING, OR PREPARED TO DO ANYTHING TO END YOUR LIFE?: NO
1. SINCE LAST CONTACT, HAVE YOU WISHED YOU WERE DEAD OR WISHED YOU COULD GO TO SLEEP AND NOT WAKE UP?: NO

## 2024-06-17 ASSESSMENT — ACTIVITIES OF DAILY LIVING (ADL)
BATHING_ASSISTANCE: INDEPENDENT
ADL_ASSISTANCE: INDEPENDENT
ADL_ASSISTANCE: INDEPENDENT

## 2024-06-17 ASSESSMENT — COGNITIVE AND FUNCTIONAL STATUS - GENERAL
WALKING IN HOSPITAL ROOM: A LITTLE
CLIMB 3 TO 5 STEPS WITH RAILING: A LITTLE
DAILY ACTIVITIY SCORE: 24
MOBILITY SCORE: 19
STANDING UP FROM CHAIR USING ARMS: A LITTLE
MOVING TO AND FROM BED TO CHAIR: A LITTLE
TURNING FROM BACK TO SIDE WHILE IN FLAT BAD: A LITTLE

## 2024-06-17 ASSESSMENT — PAIN - FUNCTIONAL ASSESSMENT
PAIN_FUNCTIONAL_ASSESSMENT: 0-10

## 2024-06-17 NOTE — CARE PLAN
Problem: Mobility  Goal: Patient will propel wheelchair 150 feet with two turns on even surface with independent assist to facilitate safe mobility.    Outcome: Progressing  Goal: Patient will amb 125 feet with least restrictive device including two turns on even surface with independent assist to facilitate safe mobility.   Outcome: Progressing  Goal: Patient will increase bilateral LE strength to 4+/5 to improve functional mobility.     Outcome: Progressing     Problem: PT Transfers  Goal: Patient will transfer supine to sit and sit to supine with independent assist to facilitate mobility.   Outcome: Progressing  Goal: Patient will transfer sit to stand and stand to sit with  independent assist to facilitate mobility.   Outcome: Progressing  Goal: Patient will transfer bed to chair and chair to bed with independent assist to facilitate mobility.   Outcome: Progressing

## 2024-06-17 NOTE — NURSING NOTE
"Nurse Admission Note    Reason for admission in patient's own words:  \"I am mad at myself. I've been feeling overwhelmed and anxious.\"    Patient living arrangements prior to admission:  home    Legal status:  voluntarily    Medical consult notification to:  Dr. Caldera    Admission folder given to:   No- will need to give to family.    Problems/treatment plans:  Anxiety  Falls    Patient requests family to be notified of admission?    No- pt unable to sign ANH due to cognition and hearing.  Person notified:  N/A    Strengths:  motivated and ready for change and willing to follow established treatment plan    Liabilities:  impaired cognition, hearing impairment.    Previous mental health treatment:  N/A    Preliminary discharge planning needs:  PT/OT, home health, SNF    "

## 2024-06-17 NOTE — GROUP NOTE
Group Topic: Goals   Group Date: 6/17/2024  Start Time: 0730  End Time: 0800  Facilitators: Leland Roca   Department: [facility]    Number of Participants: 7   Group Focus: goals  Treatment Modality: Patient-Centered Therapy  Interventions utilized were assignment  Purpose: coping skills, insight or knowledge, and self-worth    Name: Xiomy Rubio YOB: 1931   MR: [id number]    Facilitator: [facility]  Level of Participation: did not attend  Quality of Participation: did not attend  Interactions with others: did not attend  Mood/Affect: did not attend  Triggers (if applicable): n/a  Cognition: did not attend  Progress: None  Comments: did not attend  Plan: continue with services

## 2024-06-17 NOTE — NURSING NOTE
LOGAN NOTE     Problem:  Anxiety     Behavior:  Pt has a bright and friendly affect.  She is hard of hearing.  She is taking scheduled medications.  Group Participation: yes    Appetite/Meals: fair       Interventions:  1:1 interaction with active listening, every 15 minute checks, give scheduled medications.    Response:  Patient is bright and friendly and calm.  She is confused and does not answer questions appropriately.     Plan:  1:1 interaction with active listening, every 15 minute checks, give scheduled medications.

## 2024-06-17 NOTE — PROGRESS NOTES
Physical Therapy    Physical Therapy Evaluation & Treatment    Patient Name: Xiomy Rubio  MRN: [id number]  Today's Date: 6/17/2024   Time Calculation  Start Time: 1050  Stop Time: 1129  Time Calculation (min): 39 min    Assessment/Plan   PT Assessment  PT Assessment Results: Decreased strength, Decreased endurance, Impaired balance, Decreased mobility, Decreased cognition, Impaired vision, Impaired hearing, Impaired sensation  Rehab Prognosis: Good  Evaluation/Treatment Tolerance: Patient limited by pain  Medical Staff Made Aware: Yes  Strengths: Housing layout, Premorbid level of function, Support of Caregivers  Barriers to Participation: Coping skills  End of Session Communication: Bedside nurse  Assessment Comment: Patient is a 89 y/o female admitted due to increasing anxiety and panic attack. Patient reports independent prior to admssion with mobility. Now requiring contact guard assist due to left knee pain. Demonstrates antalgic transfers and amb.Will benefit from PT services to address functionla impairments.   IP OR SWING BED PT PLAN  Inpatient or Swing Bed: Inpatient  PT Plan  Treatment/Interventions: Bed mobility, Transfer training, Gait training, Strengthening, Balance training, Therapeutic exercise, Therapeutic activity  PT Plan: Ongoing PT  PT Frequency: 3 times per week  PT Discharge Recommendations: Low intensity level of continued care  Equipment Recommended upon Discharge: Wheeled walker  PT Recommended Transfer Status: Contact guard  PT - OK to Discharge: Yes      Subjective     General Visit Information:  General  Reason for Referral: decreased mobility. Patient was at [employer], had increasing anxiety, panic attack.  Referred By: Dr. Smith  Past Medical History Relevant to Rehab: anxiety, panic attacks, HTN, thoracic compression fractue of indeterminent age, fractue left hip with surgical repair, arthritis  Home Living:  Home Living  Type of Home: House  Lives With: Adult children (lives with  son)  Home Layout: Two level, Other (Comment) (stays on main level)  Home Access: Stairs to enter with rails  Entrance Stairs-Rails: Left  Entrance Stairs-Number of Steps: 2  Bathroom Shower/Tub: Tub/shower unit  Bathroom Toilet: Standard  Bathroom Equipment: Shower chair without back, Grab bars in shower  Prior Level of Function:  Prior Function Per Pt/Caregiver Report  Level of [place]: Independent with ADLs and functional transfers, Needs assistance with homemaking  Receives Help From: Family  ADL Assistance: Independent  Meal Prep:  (son does)  Laundry: Independent  Vacuuming:  (son performs)  Cleaning:  (son perfroms)  Ambulatory Assistance: Independent (has cane, rollator, RW. Also reports furniture walks in home as well.)  Vocational: Retired  Leisure:  (go to [employer], [employer])  Hand Dominance: Right  Precautions:  Precautions  Hearing/Visual Limitations: glasses,  still [place] even with hearing aides  Medical Precautions: Fall precautions    Objective   Pain:  Pain Assessment  Pain Assessment: 0-10  Pain Score: 6  Pain Type: Chronic pain  Pain Location: Knee  Pain Orientation: Left  Cognition:  Cognition  Orientation Level: Oriented X4      Sensation  Light Touch: No apparent deficits  Sensation Comment: reports tingling and numbness hands and fingers and occasional burning in toes    Coordination  Movements are Fluid and Coordinated: Yes    Postural Control  Postural Control: Within Functional Limits    Static Sitting Balance  Static Sitting-Balance Support: Feet supported  Static Sitting-Level of Assistance: Independent  Dynamic Sitting Balance  Dynamic Sitting-Balance Support: Feet supported  Dynamic Sitting-Comments:  (independent)    Static Standing Balance  Static Standing-Balance Support: Bilateral upper extremity supported  Static Standing-Level of Assistance: Close supervision  Static Standing-Comment/Number of Minutes: with RW  Dynamic Standing Balance  Dynamic Standing-Balance  Support: Bilateral upper extremity supported  Dynamic Standing-Comments: CGA with RW  Functional Assessments:  Bed Mobility 1  Bed Mobility 1: Supine to sitting  Level of Assistance 1: Contact guard    Transfer 1  Technique 1: Sit to stand, Stand to sit  Transfer Device 1: Walker  Transfer Level of Assistance 1: Contact guard    Ambulation/Gait Training 1  Surface 1: Level tile  Device 1: Rolling walker  Assistance 1: Contact guard  Quality of Gait 1: Decreased step length, Antalgic  Comments/Distance (ft) 1: 75' includes turns. Slightly unsteady on turns but no LOB.Mildly antalgic gait due to left knee pain  Extremity/Trunk Assessments:  Strength RLE  R Hip Flexion: 4-/5  R Hip ABduction: 4-/5  R Hip ADduction: 4-/5  R Knee Flexion: 4/5  R Knee Extension: 4/5  R Ankle Dorsiflexion: 4/5  R Ankle Plantar Flexion: 4/5  Strength LLE  L Hip Flexion: 4-/5  L Hip ABduction: 4-/5  L Hip ADduction: 4-/5  L Knee Flexion: 4-/5  L Knee Extension: 4-/5  L Ankle Dorsiflexion: 4/5  L Ankle Plantar Flexion: 4/5  Treatments:  Therapeutic Exercise  Therapeutic Exercise Activity 1: LAQ 1 x 15  Therapeutic Exercise Activity 2: seated hip flex 1 x 15  Therapeutic Exercise Activity 3: AP 1 x 15  Therapeutic Exercise Activity 4: hip add/ball squeezes 1 x 15       Bed Mobility 1  Bed Mobility 1: Supine to sitting  Level of Assistance 1: Contact guard    Ambulation/Gait Training 1  Surface 1: Level tile  Device 1: Rolling walker  Assistance 1: Contact guard  Quality of Gait 1: Decreased step length, Antalgic  Comments/Distance (ft) 1: 75' includes turns. Slightly unsteady on turns but no LOB.Mildly antalgic gait due to left knee pain  Transfer 1  Technique 1: Sit to stand, Stand to sit  Transfer Device 1: Walker  Transfer Level of Assistance 1: Contact guard       Outcome Measures:  [facility] Basic Mobility  Turning from your back to your side while in a flat bed without using bedrails: None  Moving from lying on your back to sitting on the  side of a flat bed without using bedrails: A little  Moving to and from bed to chair (including a wheelchair): A little  Standing up from a chair using your arms (e.g. wheelchair or bedside chair): A little  To walk in hospital room: A little  Climbing 3-5 steps with railing: A little  Basic Mobility - Total Score: 19    Encounter Problems       Encounter Problems (Active)       Mobility       Patient will propel wheelchair 150 feet with two turns on even surface with independent assist to facilitate safe mobility.   (Progressing)       Start:  06/17/24    Expected End:  07/01/24            Patient will amb 125 feet with least restrictive device including two turns on even surface with independent assist to facilitate safe mobility.  (Progressing)       Start:  06/17/24    Expected End:  07/01/24            Patient will increase bilateral LE strength to 4+/5 to improve functional mobility.    (Progressing)       Start:  06/17/24    Expected End:  07/01/24               PT Transfers       Patient will transfer supine to sit and sit to supine with independent assist to facilitate mobility.  (Progressing)       Start:  06/17/24    Expected End:  07/01/24            Patient will transfer sit to stand and stand to sit with  independent assist to facilitate mobility.  (Progressing)       Start:  06/17/24    Expected End:  07/01/24            Patient will transfer bed to chair and chair to bed with independent assist to facilitate mobility.  (Progressing)       Start:  06/17/24    Expected End:  07/01/24               Pain - Adult              Education Documentation  Precautions, taught by Rachelle Casey PT at 6/17/2024 12:12 PM.  Learner: Patient  Readiness: Acceptance  Method: Explanation  Response: Needs Reinforcement    Mobility Training, taught by Rachelle Casey PT at 6/17/2024 12:12 PM.  Learner: Patient  Readiness: Acceptance  Method: Explanation  Response: Needs Reinforcement    Education  Comments  No comments found.

## 2024-06-17 NOTE — SIGNIFICANT EVENT
"   06/17/24 1800   Initial Assessment   Attention Span 5 Minutes or less   Cognitive Behavior Status/Orientation Other (Comment)  (unable to assess pt is very hard of hearing.)   Crisis Triggers Other (Comment)  (according to chart patient had axiety at [employer] and has not been functioning well at home.)   Emotional Concerns/Mood/Affect Anxious   Hearing Poor;Hearing aid right   Memory Other (Comment)  (Patient did not want to answer questions.)   Motivation Level Other (Comment)  (N/A patient refused to answer.)   Negative Coping Skills Other (Comment)  (refused to answer.)   Speech/Communication/Socialization Inappropriate interation  (pt stated she was not going to answer questions because she can not hear and not interested.)   Vision Glasses   Additional Comments Pt stated \" I won't answer no questions'\"    Rehab Leisure Interest Survey   Activity Preference Other (Comment)  (unable to assess.)   Activity Tolerance Other (Comment)  (unable to assess.Pt refused)   At Home ADL Deficits Other (Comment)  (unable to assess, patient refused.)   Barriers to Leisure Participation Other (Comment)  (unable to assess patient refused.)   Community Resources Other (Comment)  (according to chart patient goes to [employer] .)   Creative Activities Other (Comment)  (unable to assess.)   Education/School N/A  (unable to assess. Pt refused.)   Following Directions Able to follow simple commands;Other (Comment)  (very hard of hearing.)   Leisure Interests Other (Comment)  (did not answer, refused.)   Living Arrangement Alone   Motivators for Recreation/Leisure Involvement Other (Comment)  (unable to assess, pt refused.)   Outdoor Activities Other (Comment)  (uable to assess, pt refused.)   Passive Games Other (Comment)  (refused assessment.)   Patient/Family Education Needs N/A  (Patient has son but could not contact , no sigh of release avaible.)   Patient Strengths N/A  (Refused eval.)   Patient Weakness N/A  (refused eval.) " "  Physial Activity Other (Comment)  (refused eval.)   Social/Group Activities Muslim/Lutheran activities   Solitary Activities Other (Comment)  (refused eval.)   Special Hobbies other  (refused eval.)   Spectator Events Other (Comment)  (N/A refused to answer.)   Transportation Other (Comment)  (N/A refused to answer.)   Work/Volunteer N/A  (Refused to answer.)   Additional Comments Pt reported she can not hear anything and stated \" I will not  answer any questions.\"     Pt. was seen on 6/17/2024 @1515 with an attempt to complete Recreation Therapy Assessment. During interview patient seemed pleasant upon approach but became very upset with this writer when attempting to ask her questions. Pt reported she cannot hear anything and stated” I won't answer no questions “This therapist started to write stuff down on paper and she became agitated and stated I am not answering any question, I do not even know who you are.” She then tried to get up from wheelchair and RN redirected her. RT staff will monitor patient and provide daily programming. Unable to call son for information since patient has not signed  release of information.  "

## 2024-06-17 NOTE — PROGRESS NOTES
Social Work Note    LISW-S and DO met with pt to complete initial assessments. At this time provider and SW had difficulties being able to effectively community with pt d/t her hearing. Pt was unable to provide much information to treatment team. Pt is against treatment team contacting her son despite encouragement.        06/17/24 1400   Referral Data   Referral Source Physician   Referral Name Dr. Smith   Referral Reason Psychosocial assessment   County Information   [facility]   Patient Information   Primary Caregiver Self   Provides Primary Care For No One   Accompanied by/Relationship No One   Support System Immediate family   Lives With Son   [employer]   SW Readmission Information    Readmission within 30 Days No   Home Safety   Feels Safe Living in Home Other (Comment)  (Unable to assess)   Potentially Unsafe Housing Conditions Unable to Assess   Home Safety  Unable to Assess   Family Member Substance Use   Family History Substance Use Other (Comment)  (Unable to assess)   Substance Use Unable to Assess   Activities of Daily Living   Functional Status Other (Comment)  (Unable to assess)   Assistive Device Wheelchair   Living Arrangement House;Lives with someone   Prothesis/Equipment Hearing aid;Glasses   Behavior Other (Comment)  (paranoid)   Communication Talks;Other (Comment)  (Difficulties with hearing)   Income Information   Employment Status for Patient   Employment Status Retired   Emotional/ Psychological   Person Assessed Patient   Affect Unable to Assess   Behaviors/Mood Paranoid   Verbal Skills Other (Comment)  (Unable to assess)   HARK Assessment   Within the last year, have you been afraid of your partner or ex-partner? Pt Unable   Within the last year, have you been humiliated or emotionally abused in other ways by your partner or ex-partner? Pt Unable   Within the last year, have you been kicked, hit, slapped, or otherwise physically hurt by your  partner or ex-partner? Pt Unable   Within the last year, have you been raped or forced to have any kind of sexual activity by your partner or ex-partner? Pt Unable   HARK Total Score 0   Legal   Legal Comments Unable to assess at this time.     Per ED    Patient is a 89-year-old female with a history of anxiety presenting for evaluation of a panic attack.  Patient was supposedly at [employer] she got into a friend's car and started hyperventilating.  Patient has a history of severe anxiety and is followed by her primary care physician for this issue.  Patient denies associated chest pain but does admit to shortness of breath.  Denies abdominal pain.  No constipation or diarrhea.  No nausea or vomiting.  No dysuria or hematuria.  Daughter is at the bedside, stating that her mother has been struggling with anxiety.

## 2024-06-17 NOTE — H&P
The reason for admission includes: anxiety, anxiety attacks, fearfulness, increased irritability, poor concentration, and tearfulness.  Onset of symptoms was gradual starting 3 months ago with gradually worsening course since that time. Psychosocial Stressors: family.        Chart reviewed including Care Everywhere with notes incorporated by reference or direct quotation.    History Of Present Illness  Xiomy Rubio is a 89 y.o. female presenting with anxiety from [facility].    Patient brought in by ambulance to [facility] emergency department on June 16 described as having a panic attack at [facility] with hyperventilation with a history of severe anxiety and being treated by her outpatient provider.    Associated symptoms were noted to be chest pain and shortness of breath and a general feeling of not being well.  Patient in the emergency department reporting though that the acute symptoms were improved by that time.  Patient described as reporting being afraid not knowing why she was afraid.  Patient denying thoughts of harming self or others and also denying hallucinosis.    Patient's family was with her in the emergency department and provided most of the history as documented in the emergency department notes.  The family requesting assessment for potential geriatric psychiatric placement related to her anxiety.    After medical clearance, emergency psychiatric Access team assessed the patient with similar description as above.  Patient continued to deny thoughts of harming self and others and hallucinosis.  Patient was deemed to be a low risk for suicide on the Winn scale.  Patient observed as presenting with extreme anxiety with tearfulness while talking about symptoms reporting increasing anxiety during the encounter with patient questioning what is wrong with her.  It is noted the patient is hard of hearing patient's son was providing collateral during the assessment.  Patient's son reporting the  patient has had anxiety symptoms chronically but worsening over the past month.  Patient has been treated in the outpatient setting with BuSpar with dose adjustments which have largely been unhelpful.  Patient was to follow up with community mental health provider for psychiatry services and was due to be again seen this coming week.  It is noted though the patient had not yet seen a psychiatrist at this agency.  Patient endorsing constant worry about leaving the stove on and burning the house down and being fearful of daughter abandoning her.  Patient is described as experiencing panic during the encounter.  It was noted the patient was asked if she felt safe returning home from the emergency department setting to which patient responded that she did not know and that she continued to feel worried about being at home.  It was thus decided that inpatient admission for stabilization was the next best option.    This morning, patient is in bed in no acute distress in a supine position and appearing to be resting comfortably.  Social work and this provider approach patient for discussion.  Patient immediately reports that she has difficulty hearing.  Despite multiple attempts at leaning close to patient and speaking loudly to interact with her, patient continued to report she was having difficulty hearing.  At this time, social work and provider switched from trying to speak with patient to write down questions for her to answer.  As patient appeared to be reading the papers, she responded that she did not want to get her son into any trouble and multiple attempts were made to ask patient if she would allow us to speak with her son and she questioned the purpose of this appearing to become paranoid and more disengaged changing her posture to 1 of apprehension.  Ultimately, limited content was available during this encounter with patient who appeared paranoid and anxious.  Patient became more disengaged throughout the  "duration of the encounter.  When asked about having thoughts of harming herself or others, patient reported that she was having difficulty hearing.  To most questions asked, patient either responded that she was having difficulty hearing or appeared to misunderstand the content being presented to her and responded in a more paranoid, concerned, and worried manner.    PCP phone call collateral:  Failed low dose zoloft trial - side effects  Trialing buspar now  Lives with son Ramana  ++anxiety  Inpatient before in 1999 at time of husbands death  Son described patient as \"off of her rocker\"  Fear of abandonment as son is due to move out of area  Over past few weeks, declining  Thought was doing better with therapy  Patient usually poorly accepting newly started meds  Discussed with provider that patient unwilling to sign ANH for son at this time  PCP has release to speak with son however and will notify of admission, and encourage son to have patient sign ANH for additional collateral and discharge planning on this unit    Past Medical History  She has a past medical history of Anxiety, Memory loss, Other conditions influencing health status (11/24/2017), Pain in joints of unspecified hand (01/05/2016), Personal history of diseases of the skin and subcutaneous tissue (11/24/2017), Personal history of Methicillin resistant Staphylococcus aureus infection (11/24/2017), Personal history of other diseases of the respiratory system (12/05/2017), Personal history of other specified conditions (05/04/2017), and Personal history of pneumonia (recurrent).    Past Psychiatric History:   From chart review, patient is linked with outpatient mental health treatment yet to see a psychiatrist.  Has a history of anxiety managed by her primary care provider with BuSpar with limited improvement.  There is no documented history of suicidal ideations nor attempts nor hospitalizations.  Patient was able to contribute following the birth of " "her youngest son that she may have struggled with some psychiatric features but does not further explain when asked.  Family history is not available for review at this time.    Surgical History  She has a past surgical history that includes Other surgical history (08/29/2017).     Social History  She reports that she has never smoked. She has never used smokeless tobacco. She reports that she does not currently use alcohol. She reports that she does not currently use drugs.  Patient describes being from [place] having been raised on a farm.  She notes she moved to [place] and lived with her  and had a family.  When asked further questions, patient responds that she is not able to hear what is being asked of her and further contests that the questions being asked her questions that she has never been asked before and does not contribute further information to this extent.     Allergies  Adhesive tape-silicones, Other, Cortisone, Penicillin, Prednisone, and Zoloft [sertraline]    Review of Systems    Psychiatric ROS - Adult  Anxiety: General Anxiety Disorder (YAMILETH)YAMILETH Behaviors: difficult to control worry, excessive anxiety/worry, and restlessness  Depression: concentration, tearfulness, and withdrawn  Delirium: negative  Psychosis: negative  Sunni: negative  Safety Issues:  not documented, patient unwilling or unable to respond directly to questions asked  Psychiatric ROS Comment: as noted    Physical Exam      Mental Status Examination  General Appearance: Younger than age appearing female in hospital provided garments supine in bed resting in no acute distress  Gait/Station: Not observed ambulating, observed changing positions with these  Speech: Normal rate, volume, prosody  Mood: \"what are you trying to do asking all these questions?  I've never been asked these questions before\"  Affect: Constricted, Anxious, and Paranoid  Thought Process:  illogical with concern related to hard of hearing, when " "spontaneous, is linear and off topic  Thought Associations: No loosening of associations  Thought Content:  paranoia of treatment team  Perception: No perceptual abnormalities noted  Level of Consciousness: Alert  Orientation: Oriented to person, place, and situation  Attention and Concentration:  hard of hearing in discussion with illogical responses  Recent Memory: Intact as evidenced by ability to recall details from the past 24 hours   Remote Memory: Intact as evidenced by ability to recall previous medical issues   Executive function: Intact  Language: Naming intact  Fund of knowledge: Good  Insight: Limited, as evidenced by hard of hearing status and limited treatment team engagement, aware of anxiety as discussed superficially  Judgment: Limited, as evidence by inability to reason through medical decision making     Psychiatric Risk Assessment  Violence Risk Assessment: major mental illness, paranoia  Acute Risk of Harm to Others is Considered: moderate   Suicide Risk Assessment: age > 65 yrs old, , current psychiatric illness, feelings of hopelessness, living alone or lack of social support, severe anxiety, and unmarried  Protective Factors against Suicide: [employer]ism affiliation/spirituality  Acute Risk of Harm to Self is Considered: moderate    Last Recorded Vitals  Blood pressure 139/69, pulse 69, temperature 37 °C (98.6 °F), temperature source Temporal, resp. rate 16, height 1.676 m (5' 6\"), weight 66.9 kg (147 lb 7.8 oz), SpO2 94%.    Relevant Results        Results for orders placed or performed during the hospital encounter of 06/16/24 (from the past 96 hour(s))   Glucose, Fasting   Result Value Ref Range    Glucose, Fasting 111 (H) 69 - 99 mg/dL   Lipid Panel   Result Value Ref Range    Cholesterol 187 133 - 200 mg/dL    HDL-Cholesterol 50.0 (L) >50.0 mg/dL    Cholesterol/HDL Ratio 3.7 SEE COMMENT    LDL Calculated 117 65 - 130 mg/dL    Triglycerides 101 40 - 150 mg/dL        "   Assessment/Plan   Principal Problem:    Anxiety      89-year-old female admitted from the community after presenting to [facility] with panic like symptoms.  Patient is hard of hearing with limited engagement in initial encounter with concern for paranoia being evident as questions were asked with patient responding more apprehensively.  Emergency room documentation reporting son serving as primary collateral.  Patient unwilling to sign release for son at this time.  Conversation with primary care provider who will update son and encourage contact with unit to allow for patient to sign a release of information.  No medication changes indicated at this time.  Patient is reluctant to further engage the treatment team and we will work with family to improve communication strategies while patient is admitted.  Whether presenting with primary anxiety versus neurocognitive impairments complicated by hard of hearing status resulting in relative paranoia will need to be teased apart.  Continue hospitalization for safety stabilization and monitoring at this time will further details become available.       Biological -     Continue buspar 10 mg tid - anxiety - potentially limited benefit as monotherapy, failed previous trial of SSRI per PCP.     Discussion with patient regarding risk benefits and alternatives and side effects with opportunity to ask questions and questions answered.  Patient given consent to the plan as noted    2. Psychological -       Patient is encouraged to participate with the therapeutic milieu and program and group therapy      3. Sociological -      Patient encouraged to cooperate with social work staff on issues relevant to discharge planning    Goals for discharge include:  Psychiatric condition: to lower acute risk, return to baseline level of functioning, work to identify positive coping skills to manage psychiatric symptoms, allow for reconciliation of medications  Physical condition: increase  daily physical activity, demonstrate interest in completing daily activity, and complete Activities of Daily Living  Social function: identify protective factors and family supports, increase social interactions on the unit with peers and providing staff, and demonstrate appropriate problem solving skills for engaging after care on discharge      I personally spent 50 minutes today providing care for this patient, including preparation, face to face time, documentation and other services such as review of medical records, diagnostic result, patient education, counseling, coordination of care as specified in the encounter.    Parts of this chart have been completed using voice recognition software.  Please excuse any errors of transcription.  Despite the medical decision making time stamp, my medical decision making has taken place during the patient's entire visit.  Thought process and reason for plan has been formulated from the time that I saw the patient until the time of disposition and is not specific to one specific moment during their visit and furthermore the medical decision making encompasses the entire chart and not only that represented in this note.        Medication Consent  Medication Consent: unwilling or unable to engage for consent discussions.  Patient will be offered medications as ordered in home regimen.  No medication changes pending further discussion and consent obtaining.      Ke Smith, DO

## 2024-06-17 NOTE — NURSING NOTE
Pt complained of pain behind and around her left knee. Dr Caldera saw the patient and ordered Voltaren gel which was applied when the patient requested it at 1534.     Reassessment for Voltaren gel: At 1630 the patient reports the pain around her knee is better.

## 2024-06-17 NOTE — CONSULTS
"Nutrition Assessement Note Pt seen per unsure MST. Per old charts, wt has been stable. Currently taking po well. Pt is very [place], and caretakers are having difficulty in communicating    Nutrition Assessment    Reason for Assessment: Admission nursing screening    Reason for Hospital Admission:  Xiomy Rubio is a 89 y.o. female who is admitted for anxiety    Past Medical History:   Diagnosis Date    Anxiety     Memory loss     Other conditions influencing health status 11/24/2017    History of cough    Pain in joints of unspecified hand 01/05/2016    Pain, hand joint    Personal history of diseases of the skin and subcutaneous tissue 11/24/2017    History of cellulitis    Personal history of Methicillin resistant Staphylococcus aureus infection 11/24/2017    History of methicillin resistant Staphylococcus aureus infection    Personal history of other diseases of the respiratory system 12/05/2017    History of acute bronchitis    Personal history of other specified conditions 05/04/2017    History of dizziness    Personal history of pneumonia (recurrent)     History of pneumonia      Past Surgical History:   Procedure Laterality Date    OTHER SURGICAL HISTORY  08/29/2017    Hip Surgery Left       Nutrition History:     Energy Intake: Good > 75 %  Food Allergies/Intolerances:  None  GI Symptoms: None  Oral Problems: Chewing difficulty    Anthropometrics:  Ht: 167.6 cm (5' 6\"), Wt: 66.9 kg (147 lb 7.8 oz), BMI: 23.82  IBW/kg (Dietitian Calculated): 59 kg  Percent of IBW: 114 %       Weight Change:  Daily Weight  06/16/24 : 66.9 kg (147 lb 7.8 oz)  06/16/24 : 67.6 kg (149 lb 0.5 oz)  05/21/24 : 67.2 kg (148 lb 3.2 oz)  04/30/24 : 67.8 kg (149 lb 6.4 oz)  04/23/24 : 67.5 kg (148 lb 12.8 oz)  04/16/24 : 67.9 kg (149 lb 9.6 oz)  04/09/24 : 68.5 kg (151 lb)  01/09/24 : 67.1 kg (147 lb 13.9 oz)  11/20/23 : 68.9 kg (152 lb)  07/17/23 : 64.4 kg (142 lb)                 Nutrition Focused Physical Exam Findings: defer: CASSI, " not appropriate        Edema  Edema: none     Nutrition Significant Labs:  Lab Results   Component Value Date    WBC 10.2 06/16/2024    HGB 15.1 06/16/2024    HCT 45.6 06/16/2024     06/16/2024    CHOL 187 06/17/2024    TRIG 101 06/17/2024    HDL 50.0 (L) 06/17/2024    ALT 11 06/16/2024    AST 16 06/16/2024     06/16/2024    K 4.2 06/16/2024     06/16/2024    CREATININE 0.70 06/16/2024    BUN 22 06/16/2024    CO2 25 06/16/2024    TSH 2.81 11/20/2023    INR 1.1 05/10/2023    GLUF 111 (H) 06/17/2024    HGBA1C 5.7 (H) 11/20/2023     Nutrition Specific Medications:  busPIRone, 10 mg, oral, TID  calcium carbonate, 1,250 mg, oral, BID  calcium carbonate-vitamin D3, 1 tablet, oral, BID  ferrous sulfate (325 mg ferrous sulfate), 65 mg of iron, oral, Every other day  latanoprost, 1 drop, ophthalmic (eye), Nightly  melatonin, 3 mg, oral, Daily  pantoprazole, 20 mg, oral, Daily  polyethylene glycol, 17 g, oral, Daily         Dietary Orders (From admission, onward)       Start     Ordered    06/16/24 2058  Adult diet Regular; Minced/moist food 5  Diet effective now        Question Answer Comment   Diet type Regular    Texture Minced/moist food 5        06/16/24 2057                    Estimated Needs:   Estimated Energy Needs  Total Energy Estimated Needs (kCal): 1675 kCal  Total Estimated Energy Need per Day (kCal/kg): 25 kCal/kg  Method for Estimating Needs: actual wt    Estimated Protein Needs  Total Protein Estimated Needs (g): 67 g  Total Protein Estimated Needs (g/kg): 1 g/kg  Method for Estimating Needs: actual wt    Estimated Fluid Needs  Total Fluid Estimated Needs (mL): 1675 mL  Total Fluid Estimated Needs (mL/kg): 1 mL/kg  Method for Estimating Needs: 1ml/kcal      Nutrition Diagnosis   Nutrition Diagnosis:  Malnutrition Diagnosis  Patient has Malnutrition Diagnosis: No    Nutrition Diagnosis  Patient has Nutrition Diagnosis: No     Nutrition Interventions/Recommendations   Nutrition Interventions  and Recommendations:    Nutrition Prescription:  Individualized Nutrition Prescription Provided for : 1675 calories, 67gm protein    Nutrition Interventions:   Food and/or Nutrient Delivery Interventions  Interventions: Meals and snacks  Meals and Snacks: Texture-modified diet  Goal: Provide diet as ordered    Education Documentation  No documentation found.       Nutrition Monitoring and Evaluation   Monitoring/Evaluation:   Food/Nutrient Related History Monitoring  Monitoring and Evaluation Plan: Energy intake  Energy Intake: Estimated energy intake  Criteria: Pt will consume >/=75^% estimated energy needs    Body Composition/Growth/Weight History  Monitoring and Evaluation Plan: Weight  Weight: Measured weight  Criteria: Pt will maintain wt           Time Spent/Follow-up:   Follow Up  Time Spent (min): 25 minutes  Last Date of Nutrition Visit: 06/17/24  Nutrition Follow-Up Needed?: 5-7 days  Follow up Comment: 6/24/24

## 2024-06-17 NOTE — CONSULTS
[facility]: [facility]  PROGRESS NOTE      Xiomy Rubio is a 89 y.o. female that is being seen  today for No chief complaint on file..  Subjective   Patient 82-year-old female with history of osteoarthritis, gastroesophageal reflux disease and anxiety who is being seen for medical management and geropsych.  Patient was seen in the ER after she had a panic attack after coming back from [facility].  Patient denies any significant anxiety at present.  Patient has been able to walk with a walker.  Patient does complain of pain in her left knee and has history of osteoarthritis.  Patient is being started on Voltaren gel topically.  Patient does have memory loss .      ROS  Negative for fever or chills  Negative for sore throat, ear pain, nasal discharge  Negative for cough, shortness of breath or wheezing  Negative for chest pain, palpitations, swelling of legs  Negative for abdominal pain, constipation, diarrhea, blood in the stools  Negative for urinary complaints  Negative for headache, dizziness, weakness or numbness  Negative for joint pain  Positive for anxiety  All other systems reviewed and were negative   Vitals:    06/20/24 0600   BP: 176/78   Pulse: 76   Resp: 18   Temp: 36.5 °C (97.7 °F)   SpO2: 93%      Vitals:    06/19/24 0539   Weight: 65.4 kg (144 lb 2.9 oz)     Body mass index is 23.27 kg/m².  Physical Exam  Constitutional: Patient does not appear to be in any acute distress  Head and Face: NCAT  Eyes: Normal external exam, EOMI  ENT: Patient does have hearing loss   Oropharynx normal.  Cardiovascular: RRR, S1/S2, no murmurs, rubs, or gallops, radial pulses +2, no edema of extremities  Pulmonary: CTAB, no respiratory distress.  Abdomen: +BS, soft, non-tender, nondistended, no guarding or rebound, no masses noted  MSK: No joint swelling, normal movements of all extremities. Range of motion- normal.  Skin- No lesions, contusions, or erythema.  Peripheral puslses palpable bilaterally  2+  Neuro: AAO X3, Cranial nerves 2-12 grossly intact,DTR 2+ in all 4 limbs       LABS   [unfilled]  Lab Results   Component Value Date    GLUCOSE 104 (H) 06/16/2024    CALCIUM 9.3 06/16/2024     06/16/2024    K 4.2 06/16/2024    CO2 25 06/16/2024     06/16/2024    BUN 22 06/16/2024    CREATININE 0.70 06/16/2024     Lab Results   Component Value Date    ALT 11 06/16/2024    AST 16 06/16/2024    ALKPHOS 56 06/16/2024    BILITOT 1.0 06/16/2024     Lab Results   Component Value Date    WBC 10.2 06/16/2024    HGB 15.1 06/16/2024    HCT 45.6 06/16/2024    MCV 89 06/16/2024     06/16/2024     Lab Results   Component Value Date    CHOL 187 06/17/2024    CHOL 215 (H) 01/03/2022    CHOL 196 09/07/2021     Lab Results   Component Value Date    HDL 50.0 (L) 06/17/2024    HDL 51.8 01/03/2022    HDL 53.3 09/07/2021     Lab Results   Component Value Date    LDLCALC 117 06/17/2024     Lab Results   Component Value Date    TRIG 101 06/17/2024    TRIG 143 01/03/2022    TRIG 116 09/07/2021     Lab Results   Component Value Date    HGBA1C 5.7 (H) 11/20/2023     Other labs not included in the list above were reviewed either before or during this encounter.    History    Past Medical History:   Diagnosis Date    Anxiety     Memory loss     Other conditions influencing health status 11/24/2017    History of cough    Pain in joints of unspecified hand 01/05/2016    Pain, hand joint    Personal history of diseases of the skin and subcutaneous tissue 11/24/2017    History of cellulitis    Personal history of Methicillin resistant Staphylococcus aureus infection 11/24/2017    History of methicillin resistant Staphylococcus aureus infection    Personal history of other diseases of the respiratory system 12/05/2017    History of acute bronchitis    Personal history of other specified conditions 05/04/2017    History of dizziness    Personal history of pneumonia (recurrent)     History of pneumonia     Past Surgical History:    Procedure Laterality Date    OTHER SURGICAL HISTORY  08/29/2017    Hip Surgery Left     Family History   Problem Relation Name Age of Onset    No Known Problems Mother      No Known Problems Father       Allergies   Allergen Reactions    Adhesive Tape-Silicones Unknown    Other Unknown     tape    Cortisone Rash    Penicillin Rash    Prednisone Rash    Zoloft [Sertraline] Anxiety and GI Upset     Related to previous starting dose of 50 mg.  Will reintroduce at lower dose and titrate slowly.     No current facility-administered medications on file prior to encounter.     Current Outpatient Medications on File Prior to Encounter   Medication Sig Dispense Refill    acetaminophen (Tylenol) 325 mg tablet Take 1-2 tablets (325-650 mg) by mouth every 6 hours if needed.      busPIRone (Buspar) 10 mg tablet Take 1 tablet (10 mg) by mouth 3 times a day. 90 tablet 2    calcium carbonate 600 mg calcium (1,500 mg) tablet Take 1 tablet (1,500 mg) by mouth 2 times a day.      calcium carbonate-vitamin D3 500 mg-5 mcg (200 unit) tablet Take by mouth twice a day.      ferrous sulfate 325 (65 Fe) MG tablet Take by mouth every other day.      latanoprost (Xalatan) 0.005 % ophthalmic solution Administer 1 drop into affected eye(s) once daily at bedtime.      pantoprazole (ProtoNix) 20 mg EC tablet Take 1 tablet (20 mg) by mouth once daily.       Immunization History   Administered Date(s) Administered    Flu vaccine, quadrivalent, high-dose, preservative free, age 65y+ (FLUZONE) 10/24/2020, 09/30/2021    Influenza, seasonal, injectable 10/15/2022    Pfizer COVID-19 vaccine, Fall 2023, 12 years and older, (30mcg/0.3mL) 10/27/2023    Pfizer Purple Cap SARS-CoV-2 01/28/2021, 02/19/2021, 10/04/2021, 09/24/2022    Pneumococcal conjugate vaccine, 13-valent (PREVNAR 13) 05/07/2018    Pneumococcal polysaccharide vaccine, 23-valent, age 2 years and older (PNEUMOVAX 23) 10/06/2009, 07/16/2020    SARS-CoV-2, Unspecified 04/30/2022     Patient's  medical history was reviewed and updated either before or during this encounter.  ASSESSMENT / PLAN:  Active Hospital Problems    *Anxiety      Arthritis pain, hip      Bilateral hearing loss    Patient is being seen for medical management at [facility].  Patient has anxiety.  Patient does have arthritis of the hip and is uses walker.  Patient does have mild anemia and is on iron supplementation.        Kashif Caldera MD

## 2024-06-17 NOTE — PROGRESS NOTES
Evaluation    Patient Name: Xiomy Rubio  MRN: [id number]  Today's Date: 6/17/2024  Time Calculation  Start Time: 1230  Stop Time: 1248  Time Calculation (min): 18 min    Assessment  IP OT Assessment  OT Assessment: 89 yo female with history of anxiety and admitted following an episode of hyperventilation presents at baseline functional status of independence with ADLs.  No skilled OT needs identified at this time.  Will sign off.  End of Session Communication: Bedside nurse  End of Session Patient Position:  (Up ad patrizia ambulating in hallway)    Plan:  No Skilled OT: No acute OT goals identified  OT Frequency: OT eval only  OT Discharge Recommendations: No further acute OT  Equipment Recommended upon Discharge: Wheeled walker  OT Recommended Transfer Status: Independent  OT - OK to Discharge: Yes      Subjective     Current Problem:  No diagnosis found.    General:  General  Reason for Referral: Impaired ADLs  Referred By: Dr Nova  Past Medical History Relevant to Rehab: anxiety, panic attacks, HTN, thoracic compression fractue of indeterminent age, fractue left hip with surgical repair, arthritis  Prior to Session Communication: Bedside nurse  Patient Position Received: Bed, 0 rail up, Alarm off, not on at start of session  Preferred Learning Style: verbal, visual  General Comment: 89 yo female admitted for treatment of a panic attack was cleared by nursing for therapy and agreeable to same.    Precautions:  Hearing/Visual Limitations:  (very [place] with aides; +corrective lenses)  Medical Precautions: Fall precautions    Pain:  Pain Assessment  Pain Assessment: 0-10  Pain Score: 5 - Moderate pain  Pain Type: Chronic pain  Pain Location: Knee  Pain Orientation: Left        Objective     Cognition:  Orientation Level: Oriented X4  Following Commands: Follows one step commands without difficulty     Home Living:  Type of Home: House  Lives With: Adult children (lives with son)  Home Layout: Two level, Able to live on  main level with bedroom/bathroom  Home Access: Stairs to enter with rails  Entrance Stairs-Rails: Left  Bathroom Shower/Tub: Tub/shower unit  Bathroom Toilet: Standard  Bathroom Equipment: Shower chair without back, Grab bars in shower     Prior Function:  Level of [place]: Independent with ADLs and functional transfers, Needs assistance with homemaking  Receives Help From: Family  ADL Assistance: Independent  Homemaking Assistance: Needs assistance (patient does own laundry - family completes remainder)  Ambulatory Assistance: Independent (uses a 2 wheeled walker, rollator, or furniture)  Vocational: Retired  Leisure: enjoys going to [employer] and the [employer]  Hand Dominance: Right    ADL:  Eating Assistance: Independent  Grooming Assistance: Independent  Bathing Assistance: Independent  UE Dressing Assistance: Independent  LE Dressing Assistance: Independent  LE Dressing Deficit:  (donning socks - remainder of ADLs = per clinical judgement)  Toileting Assistance with Device: Independent    Activity Tolerance:  Endurance: Endurance does not limit participation in activity    Bed Mobility/Transfers: Bed Mobility  Bed Mobility: Yes  Bed Mobility 1  Bed Mobility 1: Supine to sitting  Level of Assistance 1: Independent  Bed Mobility Comments 1: toward the right side from a flat surface  Bed Mobility 2  Bed Mobility  2: Sitting to supine  Level of Assistance 2: Independent    Transfers  Transfer: Yes  Transfer 1  Transfer From 1: Bed to  Transfer to 1: Stand  Technique 1: Sit to stand, Stand to sit  Transfer Device 1: Walker  Transfer Level of Assistance 1: Independent    Functional Mobility:  Functional Mobility  Functional Mobility Performed: Yes (Modified independent using a 2 wheeled walker on the unit ~300'.  Gait steady with device.)    Sensation:  Sensation Comment: reports tingling/numbness in fingertips at baseline d/t OA    Strength:  Strength Comments: BUEs=~4-/5 grossly    Hand Function:  Hand  Function  Gross Grasp: Functional  Coordination: Functional (with increased time for fine motor tasks d/t OA)    Extremities: RUE   RUE : Within Functional Limits and LUE   LUE: Within Functional Limits    Outcome Measures: [facility] Daily Activity  Putting on and taking off regular lower body clothing: None  Bathing (including washing, rinsing, drying): None  Putting on and taking off regular upper body clothing: None  Toileting, which includes using toilet, bedpan or urinal: None  Taking care of personal grooming such as brushing teeth: None  Eating Meals: None  Daily Activity - Total Score: 24

## 2024-06-18 LAB — BACTERIA UR CULT: NO GROWTH

## 2024-06-18 PROCEDURE — 2500000001 HC RX 250 WO HCPCS SELF ADMINISTERED DRUGS (ALT 637 FOR MEDICARE OP): Performed by: PSYCHIATRY & NEUROLOGY

## 2024-06-18 PROCEDURE — 2500000002 HC RX 250 W HCPCS SELF ADMINISTERED DRUGS (ALT 637 FOR MEDICARE OP, ALT 636 FOR OP/ED): Performed by: STUDENT IN AN ORGANIZED HEALTH CARE EDUCATION/TRAINING PROGRAM

## 2024-06-18 PROCEDURE — 99232 SBSQ HOSP IP/OBS MODERATE 35: CPT | Performed by: PSYCHIATRY & NEUROLOGY

## 2024-06-18 PROCEDURE — 2500000001 HC RX 250 WO HCPCS SELF ADMINISTERED DRUGS (ALT 637 FOR MEDICARE OP): Performed by: STUDENT IN AN ORGANIZED HEALTH CARE EDUCATION/TRAINING PROGRAM

## 2024-06-18 PROCEDURE — 2500000004 HC RX 250 GENERAL PHARMACY W/ HCPCS (ALT 636 FOR OP/ED): Performed by: STUDENT IN AN ORGANIZED HEALTH CARE EDUCATION/TRAINING PROGRAM

## 2024-06-18 PROCEDURE — 1140000001 HC PRIVATE PSYCH ROOM DAILY

## 2024-06-18 RX ORDER — LORAZEPAM 0.5 MG/1
0.25 TABLET ORAL EVERY 6 HOURS PRN
Status: DISCONTINUED | OUTPATIENT
Start: 2024-06-18 | End: 2024-06-28 | Stop reason: HOSPADM

## 2024-06-18 ASSESSMENT — PAIN SCALES - GENERAL
PAINLEVEL_OUTOF10: 0 - NO PAIN
PAINLEVEL_OUTOF10: 0 - NO PAIN

## 2024-06-18 ASSESSMENT — COLUMBIA-SUICIDE SEVERITY RATING SCALE - C-SSRS
1. SINCE LAST CONTACT, HAVE YOU WISHED YOU WERE DEAD OR WISHED YOU COULD GO TO SLEEP AND NOT WAKE UP?: NO
6. HAVE YOU EVER DONE ANYTHING, STARTED TO DO ANYTHING, OR PREPARED TO DO ANYTHING TO END YOUR LIFE?: NO
2. HAVE YOU ACTUALLY HAD ANY THOUGHTS OF KILLING YOURSELF?: NO
6. HAVE YOU EVER DONE ANYTHING, STARTED TO DO ANYTHING, OR PREPARED TO DO ANYTHING TO END YOUR LIFE?: NO
1. SINCE LAST CONTACT, HAVE YOU WISHED YOU WERE DEAD OR WISHED YOU COULD GO TO SLEEP AND NOT WAKE UP?: NO
2. HAVE YOU ACTUALLY HAD ANY THOUGHTS OF KILLING YOURSELF?: NO

## 2024-06-18 ASSESSMENT — PAIN - FUNCTIONAL ASSESSMENT
PAIN_FUNCTIONAL_ASSESSMENT: 0-10
PAIN_FUNCTIONAL_ASSESSMENT: 0-10

## 2024-06-18 NOTE — PROGRESS NOTES
Social Work Note    HALEY was able to meet with pt's daughter in the lobby and informed her that as of now [facility] is unable to release any information at thi time due to HIPAA. Daughter was understanding and provided HALEY with some information. Daughter shared that pt is welcomed to stay with her and her  upon discharge and shared concerns about pt living on her own. Daughter confirmed that pt's son was planning on going to [place] leaving pt at home by herself. Daughter was unaware of any previous hospitalization related to mental health but did disclose that pt did once go to some sort of mental health services when daughter was a teenager. Daughter also shared that pt has been living in the same house for over 70 years and believes it will be very difficult for pt to consider any other living arrangement at this time. Daughter brought some of pt's belongings including hearing aid batteries for pt. [facility] provided belongings to RN. RN was able to get pt's her hearing aid batteries and at this time pt is still refusing to sign any ROIs for family.

## 2024-06-18 NOTE — NURSING NOTE
LOGAN NOTE     Problem:  Anxiety     Behavior:  Patient has been tearful this morning stating she is so ashamed of being here.  She is hyperventilating while crying and unable to console her.  Pt making statements that she does not trust people here and that she doesn't know why people are asking her questions and telling staff she wont answer any questions.  Group Participation: no  Appetite/Meals: 6good       Interventions:  1:1 interaction with patient and reassurance given, scheduled medications given, every 15 minute checks, distraction, Pt given Ativan PO at 0958.    Response:  At 1045 the patient is no longer hyperventilating and able to sit without distress.     Plan:  1:1 interaction with patient and reassurance given, scheduled medications given, every 15 minute checks, distraction,

## 2024-06-18 NOTE — PROGRESS NOTES
"Social Work Note    LISW-S called son Ramana to discuss current hospitalization and address current concerns. Son shared, \"for the past month she has been taking an anxiety medication\" which she started on Zoloft at 50 mg and she was \"only on it for two days and she had such a strong reaction\". Son then shared that her provider started her on Buspar and was being increased to 20 mg. Son shared that he called pt's provider the week prior to admission requesting that she increase the medications to 30 mg \"because my mom didn't think it was quire strong enough. Its just this past week where she pretty much lost it. Her anxiety attacks were constant\". Son shared that pt \"went to [facility] on Sunday morning and in the middle of it she had anxiety attack\". Son inquired what medications where given to pt while she was in the hospital because it \"made heck of a difference\". LISW-S will share thoughts with provider to discuss son concerns. Son shared that \"she tells me she has nightmares and when she wakes in the morning it makes her really really upset and she dwells on them\". Son and LISW-S discussed pt returning home and son confirmed that he will be with her when she returns home. Son is aware of treatment team recommendation of pt having continuous supervision when she returns home. Son cancelled his trip and understands the need of being home with pt while she adjusts back to the community.   "

## 2024-06-18 NOTE — GROUP NOTE
Group Topic: Self Esteem   Group Date: 6/18/2024  Start Time: 1100  End Time: 1140  Facilitators: JESSICA Herbert   Department: [facility]    Number of Participants: 8   Group Focus: other Self esteem Cards  Treatment Modality: Other: Recreation Therapy  Interventions utilized were exploration, patient education, problem solving, and support  Purpose: coping skills, maladaptive thinking, feelings, irrational fears, communication skills, insight or knowledge, self-worth, self-care, and relapse prevention strategies    Name: Xiomy Rubio YOB: 1931   MR: [id number]      Facilitator: Recreational Therapist  Level of Participation: did not attend  Quality of Participation:  did not attend  Interactions with others:  did not attend  Mood/Affect:  did not attend  Triggers (if applicable): n/a  Cognition:  did not attend  Progress: None  Comments: pt problem is anxiety.  Pt is engaging with RN at this time.  No handout to offer.  Plan: continue with services

## 2024-06-18 NOTE — GROUP NOTE
Group Topic: Medication Education   Group Date: 6/18/2024  Start Time: 1000  End Time: 1055  Facilitators: Julissa Qureshi PharmD   Department: [facility]    Number of Participants: 13   Group Focus: daily focus and other General Medication Education  Treatment Modality: Patient-Centered Therapy and Skills Training  Interventions utilized were group exercise, other Medication BINGO, and patient education  Purpose: insight or knowledge    Name: Xiomy Rubio YOB: 1931   MR: [id number]      Facilitator: Pharmacist  Level of Participation: did not attend  Quality of Participation:  did not attend   Interactions with others:  did not attend   Mood/Affect:  did not attend   Triggers (if applicable): n/a  Cognition:  did not attend   Progress: None  Comments: did not attend medication education group   Plan: continue with services

## 2024-06-18 NOTE — PROGRESS NOTES
Xiomy Rubio is a 89 y.o. female on day 2 of admission presenting with Anxiety.      Subjective     Chart reviewed and case discussed with multidisciplinary treatment team.    Acute panic episode this morning relieved by 0.25 mg of one time PO Ativan.  Patient continuing to contribute limited information during discussion.  Patient's daughter arrived to the facility and was able to share information with social work although patient had not yet signed release and so no information was given.  Patient's daughter brought battery charging device for the hearing aids however even with the device charged patient remains with very difficult time hearing what is being discussed with her.  PCP assisting with family liaising and provided pin to them such that son was able to call and encourage patient to allow for release.  Following patient consent received to speak with son, the son and the  were then able to discuss patient's care.  It is noted that the son no longer plans to leave for [place] and could allow for the patient to return to his home as soon as this week in a supervised setting.      We will continue to approach patient for discussion related to her care and symptoms, monitor her in the interim evaluating for acute symptoms and need for further medical management of psychiatric decompensation.  Thus far, patient has not been agitated nor aggressive.  She is nonthreatening to others.  She participates in most of the milieu operation including meals and some groups.  Sleep was reported to be intact without issue.  She is not expressing desire to harm herself nor others and remains without evidence of overt psychosis through hallucination and karoline.  Patient has declined medication adjustments in the outpatient realm and has discussed more insightful discussion regarding benefits of trying SSRI medications for anxiety prevention moving forward.  If continues to respond reactively to small  "doses of Ativan, these could be supplied at discharge for PRN use only for a short period of time.  It is not anticipated that monotherapy buspar will have a robust response.  Patient previously failed trial of zoloft due to side effects.  Will meet with family to further discuss treatment goals and hospitalization discharge planning.       Objective     Last Recorded Vitals  Blood pressure 118/81, pulse 85, temperature 35.8 °C (96.4 °F), temperature source Temporal, resp. rate 16, height 1.676 m (5' 6\"), weight 66.9 kg (147 lb 7.8 oz), SpO2 96%.    Review of Systems    Psychiatric ROS - Adult  Anxiety: General Anxiety Disorder (YAMILETH)YAMILETH Behaviors: difficult to control worry, excessive anxiety/worry, and restlessness  Depression: concentration, tearfulness, and withdrawn  Delirium: negative  Psychosis: negative  Sunni: negative  Safety Issues:  not documented, patient unwilling or unable to respond directly to questions asked  Psychiatric ROS Comment: as noted    Physical Exam      Mental Status Exam  General Appearance: Younger than age appearing female in hospital provided garments in wheelchair near nursing station acutely anxious, tearful  Gait/Station: Not observed ambulating, observed changing positions with these  Speech: crying, wailing  Mood: not reported when asked  Affect: tearful, Anxious, and Paranoid  Thought Process:  illogical with concern related to hard of hearing, when spontaneous, is linear and off topic  Thought Associations: No loosening of associations  Thought Content:  some paranoia of treatment team, acute anxiety  Perception: No perceptual abnormalities noted  Level of Consciousness: Alert  Orientation: Oriented to person, place, and situation  Attention and Concentration:  hard of hearing in discussion with illogical responses  Recent Memory: Intact as evidenced by ability to recall details from the past 24 hours   Remote Memory: Intact as evidenced by ability to recall previous medical " issues   Executive function: Intact  Language: Naming intact  Fund of knowledge: Good  Insight: Limited, as evidenced by hard of hearing status and limited treatment team engagement, aware of anxiety as discussed superficially  Judgment: Limited, as evidence by inability to reason through medical decision making     Psychiatric Risk Assessment  Violence Risk Assessment: major mental illness, paranoia  Acute Risk of Harm to Others is Considered: moderate   Suicide Risk Assessment: age > 65 yrs old, , current psychiatric illness, feelings of hopelessness, living alone or lack of social support, severe anxiety, and unmarried  Protective Factors against Suicide: Jain affiliation/spirituality  Acute Risk of Harm to Self is Considered: moderate    Relevant Results             Results for orders placed or performed during the hospital encounter of 06/16/24 (from the past 96 hour(s))   Glucose, Fasting   Result Value Ref Range    Glucose, Fasting 111 (H) 69 - 99 mg/dL   Lipid Panel   Result Value Ref Range    Cholesterol 187 133 - 200 mg/dL    HDL-Cholesterol 50.0 (L) >50.0 mg/dL    Cholesterol/HDL Ratio 3.7 SEE COMMENT    LDL Calculated 117 65 - 130 mg/dL    Triglycerides 101 40 - 150 mg/dL         Assessment/Plan   Principal Problem:    Anxiety      89-year-old female admitted from the community after presenting to [facility] with panic like symptoms.  Patient is hard of hearing with limited engagement in encounters despite hearing aid use with concern for paranoia being evident as questions were asked with patient responding more apprehensively.  Emergency room documentation reporting son serving as primary collateral.  Patient allowed for contact with son today.  Patient is reluctant to further engage the treatment team and we will work with family to improve communication strategies while patient is admitted.  Whether presenting with primary anxiety versus neurocognitive impairments complicated by hard of  hearing status resulting in relative paranoia will need to be teased apart.  Continue hospitalization for safety stabilization and monitoring at this time will further details become available.           Biological -      Continue buspar 10 mg tid - anxiety - potentially limited benefit as monotherapy, failed previous trial of SSRI per PCP. Declining discussion to consider other medication options at this time.  Initiate ativan 0.25 mg q6h prn anxiety      Discussion with patient regarding risk benefits and alternatives and side effects with opportunity to ask questions and questions answered.  Patient given consent to the plan as noted     2. Psychological -        Patient is encouraged to participate with the therapeutic milieu and program and group therapy        3. Sociological -       Patient encouraged to cooperate with social work staff on issues relevant to discharge planning                    I personally spent 20 minutes today providing care for this patient, including preparation, face to face time, documentation and other services such as review of medical records, diagnostic result, patient education, counseling, coordination of care as specified in the encounter.    Parts of this chart have been completed using voice recognition software.  Please excuse any errors of transcription.  Despite the medical decision making time stamp, my medical decision making has taken place during the patient's entire visit.  Thought process and reason for plan has been formulated from the time that I saw the patient until the time of disposition and is not specific to one specific moment during their visit and furthermore the medical decision making encompasses the entire chart and not only that represented in this note.        Ke Smith, DO

## 2024-06-18 NOTE — GROUP NOTE
Group Topic: Other   Group Date: 6/18/2024  Start Time: 1330  End Time: 1420  Facilitators: JESSICA Herbert   Department: [facility] REH[facility] VIRTUAL    Number of Participants: 6   Group Focus: other Group Word Puzzle  Treatment Modality: Other: Recreation Therapy  Interventions utilized were mental fitness  Purpose: other: fun, elevate mood, increase socialization, enhance self esteem    Name: Xiomy Rubio YOB: 1931   MR: [id number]      Facilitator: Recreational Therapist  Level of Participation: did not attend  Quality of Participation:  did not attend  Interactions with others:  did not attend  Mood/Affect:  did not attend  Triggers (if applicable): n/a  Cognition:  did not attend  Progress: None  Comments: pt problem is anxiety.  Pt unable to engage much in group setting secondary to extreme hearing loss.  Resting in her room.  No handout to offer.   Plan: continue with services

## 2024-06-19 PROCEDURE — 2500000004 HC RX 250 GENERAL PHARMACY W/ HCPCS (ALT 636 FOR OP/ED): Performed by: STUDENT IN AN ORGANIZED HEALTH CARE EDUCATION/TRAINING PROGRAM

## 2024-06-19 PROCEDURE — 2500000002 HC RX 250 W HCPCS SELF ADMINISTERED DRUGS (ALT 637 FOR MEDICARE OP, ALT 636 FOR OP/ED): Performed by: PSYCHIATRY & NEUROLOGY

## 2024-06-19 PROCEDURE — 2500000002 HC RX 250 W HCPCS SELF ADMINISTERED DRUGS (ALT 637 FOR MEDICARE OP, ALT 636 FOR OP/ED): Performed by: STUDENT IN AN ORGANIZED HEALTH CARE EDUCATION/TRAINING PROGRAM

## 2024-06-19 PROCEDURE — 2500000001 HC RX 250 WO HCPCS SELF ADMINISTERED DRUGS (ALT 637 FOR MEDICARE OP): Performed by: STUDENT IN AN ORGANIZED HEALTH CARE EDUCATION/TRAINING PROGRAM

## 2024-06-19 PROCEDURE — 99232 SBSQ HOSP IP/OBS MODERATE 35: CPT | Performed by: PSYCHIATRY & NEUROLOGY

## 2024-06-19 PROCEDURE — 1140000001 HC PRIVATE PSYCH ROOM DAILY

## 2024-06-19 PROCEDURE — 99232 SBSQ HOSP IP/OBS MODERATE 35: CPT | Performed by: INTERNAL MEDICINE

## 2024-06-19 RX ORDER — SERTRALINE HYDROCHLORIDE 25 MG/1
12.5 TABLET, FILM COATED ORAL DAILY
Status: DISCONTINUED | OUTPATIENT
Start: 2024-06-19 | End: 2024-06-21

## 2024-06-19 ASSESSMENT — COLUMBIA-SUICIDE SEVERITY RATING SCALE - C-SSRS
2. HAVE YOU ACTUALLY HAD ANY THOUGHTS OF KILLING YOURSELF?: NO
1. SINCE LAST CONTACT, HAVE YOU WISHED YOU WERE DEAD OR WISHED YOU COULD GO TO SLEEP AND NOT WAKE UP?: NO
6. HAVE YOU EVER DONE ANYTHING, STARTED TO DO ANYTHING, OR PREPARED TO DO ANYTHING TO END YOUR LIFE?: NO
2. HAVE YOU ACTUALLY HAD ANY THOUGHTS OF KILLING YOURSELF?: NO
6. HAVE YOU EVER DONE ANYTHING, STARTED TO DO ANYTHING, OR PREPARED TO DO ANYTHING TO END YOUR LIFE?: NO
1. SINCE LAST CONTACT, HAVE YOU WISHED YOU WERE DEAD OR WISHED YOU COULD GO TO SLEEP AND NOT WAKE UP?: NO

## 2024-06-19 ASSESSMENT — PAIN SCALES - GENERAL
PAINLEVEL_OUTOF10: 0 - NO PAIN

## 2024-06-19 ASSESSMENT — PAIN - FUNCTIONAL ASSESSMENT
PAIN_FUNCTIONAL_ASSESSMENT: 0-10

## 2024-06-19 NOTE — NURSING NOTE
LOGAN NOTE     Problem:  Anxiety     Behavior:  Patient is in hallway sitting in a rocking chair. Patient is much calmer and no longer hyperventilating and patient is pleasant. Patient continues to make statements of being worried about patient’s safety tonight. Patient’s affect is broad range. Patient is talkative. Patient maintains good eye contact.     Group Participation: N/A  Appetite/Meals: N/A       Interventions:  This nurse completed a shift assessment and administered patient's scheduled night time medications.    Response:  Patient remained pleasant and calm and was cooperative throughout this shift assessment and medication administration.     Plan:  Continue to monitor for anxiety. Continue to monitor for patient safety.

## 2024-06-19 NOTE — GROUP NOTE
Group Topic: Reminiscence   Group Date: 6/19/2024  Start Time: 1330  End Time: 1430  Facilitators: JESSICA Herbert   Department: [facility] REHABTH VIRTUAL    Number of Participants: 9   Group Focus: other Let's talk  Treatment Modality: Other: Recreation Therapy  Interventions utilized were mental fitness, story telling, and support  Purpose: other: fun, elevate mood, increase socialization. Enhance self esteem    Name: Xiomy Rubio YOB: 1931   MR: [id number]      Facilitator: Recreational Therapist  Level of Participation: minimal  Quality of Participation: passive and quiet  Interactions with others:  passive  Mood/Affect:  passive  Triggers (if applicable): n/a  Cognition:  passive, unable to assess at this time  Progress: Minimal  Comments: pt problem is anxiety.  Pt continues to display passive participation during group.  Smiles at this writer often during group.    Plan: continue with services

## 2024-06-19 NOTE — GROUP NOTE
Group Topic: Feeling Awareness/Expression   Group Date: 6/19/2024  Start Time: 1000  End Time: 1040  Facilitators: HALEY Upton   Department: [facility] CARE TRANSITIONS VIRTUAL    Number of Participants: 9   Group Focus: goals  Treatment Modality: Psychoeducation  Interventions utilized were assignment  Purpose: coping skills    Name: Xiomy Rubio YOB: 1931   MR: [id number]      Facilitator:   Level of Participation: active  Quality of Participation: appropriate/pleasant  Interactions with others: appropriate  Mood/Affect: appropriate  Triggers (if applicable): n/a  Cognition: coherent/clear  Progress: Minimal  Comments: Xiomy had difficulties with completing activity due to hearing issues. Xiomy did complete the reading and made efforts to complete questions once the questions were written for her.   Plan: continue with services

## 2024-06-19 NOTE — NURSING NOTE
LOGAN NOTE     Problem:  anxiety     Behavior:  Pt had anxiety when she saw her son but used breathing exercises and Group Participation: yes  Appetite/Meals: 100x3       Interventions:  Advised to seek staff with any issues, use breathing techniques    Response:  It stated it helped     Plan:  Will continue to monitor

## 2024-06-19 NOTE — NURSING NOTE
General Note      At this time of reassessment for the effectiveness of PRN PO LORazepam (Ativan) 0.25 mg given for Anxiety at 2014 on 6/18/2024, patient is in the hallway sitting in a rocking chair and patient is much calmer and no longer hyperventilating.

## 2024-06-19 NOTE — GROUP NOTE
Group Topic: Self-Care/Wellness   Group Date: 6/19/2024  Start Time: 1100  End Time: 1140  Facilitators: JESSICA Herbert   Department: [facility] REHABTH VIRTUAL    Number of Participants: 8   Group Focus: other Healthy Living Group  Treatment Modality: Other: Recreation Therapy  Interventions utilized were exploration, group exercise, leisure development, patient education, problem solving, reality testing, and support  Purpose: coping skills, maladaptive thinking, feelings, irrational fears, communication skills, insight or knowledge, self-worth, self-care, relapse prevention strategies, and trigger / craving management    Name: Xiomy Rubio YOB: 1931   MR: [id number]      Facilitator: Recreational Therapist  Level of Participation: minimal  Quality of Participation: passive and quiet  Interactions with others:  passive  Mood/Affect:  passive  Triggers (if applicable): n/a  Cognition:  passive  Progress: Minimal  Comments: pt problem is anxiety.  Pt joins group with little encouragement.  Unable to hold any meaningful conversation during group secondary to extreme hearing loss.  Colors a coloring sheet during group.    Plan: continue with services

## 2024-06-19 NOTE — NURSING NOTE
LOGAN NOTE     Problem:  Anxiety     Behavior:  Patient is visibly highly anxious. Patient is hyperventilating. Patient is making statements of being worried about patient’s safety tonight.    Interventions:  This nurse administered PRN PO LORazepam (Ativan) 0.25 mg for Anxiety at 2014 on 6/18/2024.    Response:  This nurse presented to patient PRN PO LORazepam (Ativan) 0.25 mg whole in medication cup and then this patient took this medication cup with PRN PO LORazepam (Ativan) 0.25 mg in this medication cup from this nurse and then this patient put PRN PO LORazepam (Ativan) 0.25 mg into patient's mouth and then this patient swallowed PRN PO LORazepam (Ativan) 0.25 mg without any intervening from this nurse.      Plan:  Continue to monitor for medication effectiveness and for decreased or increased anxiety. Continue to monitor for Anxiety. Continue to monitor for patient safety.

## 2024-06-19 NOTE — PROGRESS NOTES
"Xiomy Rubio is a 89 y.o. female on day 3 of admission presenting with Anxiety.      Subjective     Chart reviewed and case discussed with multidisciplinary treatment team.    Persisting acute panic episodes, self limited, resolving spontaneously during which time is wailing, inconsolable, limited function to engage in goal directed activity, lasting minutes.  Son came for visitation and met with provider who contributed limited gains from buspar titration thus far, concern for worsening anxiety of patient.  Per son and Epic, prior initial dose of zoloft was 50 mg.  Discussed retitration with starting dose of 12.5 mg and allowing 2-3 days between dose adjustments and that lower dose initiation will likely be better tolerated and that SSRI remains class of choice and if responds poorly again, would consider remeron instead.  To continue buspar as adjunct, but may be able to taper and discontinue depending on response to zoloft.  PRN low dose ativan will remain for time being.  Son notes that dose of ativan given in emergency room offered great relief but with limited duration of effect.  Agreeable with changes.  Patient will not discharge tomorrow, and patient length of stay is now projected to be well into next week while zoloft dose is adjusted.  Patient is unable to maintain outside of hospital with impairments related to acute onset anxiety described and seen.  Son noting speaking with patient to encourage this retrial.  Son notes patient was driving recently, and discussed with son concerns about patient operating a motor vehicle with recommendation that patient no longer drive.  In agreement.      Patient remains hard of hearing, difficulty clearly communicating concerns and engaging in treatment related discussions.       Objective     Last Recorded Vitals  Blood pressure 94/63, pulse 72, temperature 36.5 °C (97.7 °F), temperature source Temporal, resp. rate 17, height 1.676 m (5' 6\"), weight 65.4 kg (144 " "lb 2.9 oz), SpO2 100%.    Review of Systems    Psychiatric ROS - Adult  Anxiety: General Anxiety Disorder (YAMILETH)YAMILETH Behaviors: difficult to control worry, excessive anxiety/worry, and restlessness  Depression: concentration, tearfulness, and withdrawn  Delirium: negative  Psychosis: negative  Sunni: negative  Safety Issues:  not documented, patient unwilling or unable to respond directly to questions asked  Psychiatric ROS Comment: as noted    Physical Exam      Mental Status Exam  General Appearance: Younger than age appearing female in personal garments in wheelchair near nursing station acutely anxious, tearful  Gait/Station: Not observed ambulating, observed changing positions with these  Speech: crying, wailing  Mood: \"what?\"  Affect: tearful, Anxious,  Thought Process:  illogical with concern related to hard of hearing, when spontaneous, is linear and off topic  Thought Associations: No loosening of associations  Thought Content:  some paranoia of treatment team, acute anxiety  Perception: No perceptual abnormalities noted  Level of Consciousness: Alert  Orientation: Oriented to person, place, and situation  Attention and Concentration:  hard of hearing in discussion with illogical responses  Recent Memory: Intact as evidenced by ability to recall details from the past 24 hours   Remote Memory: Intact as evidenced by ability to recall previous medical issues   Executive function: Intact  Language: Naming intact  Fund of knowledge: Good  Insight: Limited, as evidenced by hard of hearing status and limited treatment team engagement, aware of anxiety as discussed superficially  Judgment: Limited, as evidence by inability to reason through medical decision making     Psychiatric Risk Assessment  Violence Risk Assessment: major mental illness, paranoia  Acute Risk of Harm to Others is Considered: moderate   Suicide Risk Assessment: age > 65 yrs old, , current psychiatric illness, feelings of hopelessness, " living alone or lack of social support, severe anxiety, and unmarried  Protective Factors against Suicide: [employer] affiliation/spirituality  Acute Risk of Harm to Self is Considered: moderate    Relevant Results             Results for orders placed or performed during the hospital encounter of 06/16/24 (from the past 96 hour(s))   Glucose, Fasting   Result Value Ref Range    Glucose, Fasting 111 (H) 69 - 99 mg/dL   Lipid Panel   Result Value Ref Range    Cholesterol 187 133 - 200 mg/dL    HDL-Cholesterol 50.0 (L) >50.0 mg/dL    Cholesterol/HDL Ratio 3.7 SEE COMMENT    LDL Calculated 117 65 - 130 mg/dL    Triglycerides 101 40 - 150 mg/dL         Assessment/Plan   Principal Problem:    Anxiety      89-year-old female admitted from the community after presenting to [facility] with panic like symptoms.  Patient is hard of hearing with limited engagement in encounters despite hearing aid use with concern for paranoia being evident as questions were asked with patient responding more apprehensively.  Emergency room documentation reporting son serving as primary collateral.  Patient allowed for contact with son today.  Patient remains reluctant to further engage the treatment team and we will work with family to improve communication strategies while patient is admitted.  Collateral by son contributing primary anxiety, panic like disorder symptoms.  Discussion regarding retitration of zoloft.  Continue hospitalization for safety stabilization and monitoring at this time will further details become available.           Biological -      Continue buspar 10 mg tid - anxiety - may be able to taper and discontinue in community after demonstrating stability with zoloft titration    Reinitiate Zoloft titration with 12-1/2 mg daily for 2 to 3 days then increase by 12-1/2 mg every 2 to 3 days thereafter to a target dose of 50 to 100 mg daily for anxiety  If fails retitration of zoloft, would rec to try remeron    Initiate ativan  0.25 mg q6h prn anxiety      Discussion with patient regarding risk benefits and alternatives and side effects with opportunity to ask questions and questions answered.  Patient given consent to the plan as noted     2. Psychological -        Patient is encouraged to participate with the therapeutic milieu and program and group therapy        3. Sociological -       Patient encouraged to cooperate with social work staff on issues relevant to discharge planning                    I personally spent 30 minutes today providing care for this patient, including preparation, face to face time, documentation and other services such as review of medical records, diagnostic result, patient education, counseling, coordination of care as specified in the encounter.    Parts of this chart have been completed using voice recognition software.  Please excuse any errors of transcription.  Despite the medical decision making time stamp, my medical decision making has taken place during the patient's entire visit.  Thought process and reason for plan has been formulated from the time that I saw the patient until the time of disposition and is not specific to one specific moment during their visit and furthermore the medical decision making encompasses the entire chart and not only that represented in this note.        Ke Smith, DO

## 2024-06-19 NOTE — PROGRESS NOTES
Social Work Note    TATIANA-S called son to discuss hospitalization and to confirm that pt was still planning to visit judy. Son confirmed that he was planning on being here at 4 pm today. TATIANA-S shared with son if he sees no safety concerns and feels comfortable with pt returning home then the treatment team here would be able to proceed with discharge planning for pt to return home. Son was supportive of this idea and will provide updates to CARONS tomorrow. Son is still requesting to be able to speak to provider at this time but understands that today provider is unable. TATIANA-S will talk with provider to coordinate a time for treatment team to call son.

## 2024-06-19 NOTE — GROUP NOTE
Group Topic: Goals   Group Date: 6/18/2024  Start Time: 2005  End Time: 2017  Facilitators: Jessi Thompson   Department: [facility]     Number of Participants: 6   Group Focus: goals  Treatment Modality: Other: PCA  Interventions utilized were group exercise and reminiscence  Purpose: feelings, self-worth, self-care, and relapse prevention strategies    Name: Xiomy Rubio YOB: 1931   MR: [id number]      Facilitator: Mental Health [facility]  Level of Participation: did not attend  Quality of Participation:  did not attend  Interactions with others:  did not attend  Mood/Affect:  did not attend  Triggers (if applicable): N/A  Cognition:  did not attend  Progress: Other  Comments: Pt problem is anxiety. Pt declined the invitation to attend group.   Plan: continue with services

## 2024-06-19 NOTE — PROGRESS NOTES
[facility]: [facility]  PROGRESS NOTE      Xiomy Rubio is a 89 y.o. female that is being seen  today for follow-up at [facility].  Subjective   Patient is being seen for follow-up at [facility].  Patient has been anxious.  Able to walk with a walker.  Vital signs stable.  Discussed with the nurse no acute medical issues.      ROS  Negative for fever or chills  Negative for sore throat, ear pain, nasal discharge  Negative for cough, shortness of breath or wheezing  Negative for chest pain, palpitations, swelling of legs  Negative for abdominal pain, constipation, diarrhea, blood in the stools  Negative for urinary complaints  Negative for headache, dizziness, weakness or numbness  Negative for joint pain  Positive for anxiety  All other systems reviewed and were negative   Vitals:    06/20/24 0600   BP: 176/78   Pulse: 76   Resp: 18   Temp: 36.5 °C (97.7 °F)   SpO2: 93%      Vitals:    06/19/24 0539   Weight: 65.4 kg (144 lb 2.9 oz)     Body mass index is 23.27 kg/m².  Physical Exam  Constitutional: Patient does not appear to be in any acute distress  Head and Face: NCAT  Eyes: Normal external exam, EOMI  ENT: Normal external inspection of ears and nose. Oropharynx normal.  Cardiovascular: RRR, S1/S2, no murmurs, rubs, or gallops, radial pulses +2, no edema of extremities  Pulmonary: CTAB, no respiratory distress.  Abdomen: +BS, soft, non-tender, nondistended, no guarding or rebound, no masses noted  MSK: No joint swelling, normal movements of all extremities. Range of motion- normal.  Skin- No lesions, contusions, or erythema.  Peripheral puslses palpable bilaterally 2+  Neuro: AAO X3, Cranial nerves 2-12 grossly intact,DTR 2+ in all 4 limbs   Psychiatric: Judgment intact. Appropriate mood and behavior    LABS   [unfilled]  Lab Results   Component Value Date    GLUCOSE 104 (H) 06/16/2024    CALCIUM 9.3 06/16/2024     06/16/2024    K 4.2 06/16/2024    CO2 25 06/16/2024      06/16/2024    BUN 22 06/16/2024    CREATININE 0.70 06/16/2024     Lab Results   Component Value Date    ALT 11 06/16/2024    AST 16 06/16/2024    ALKPHOS 56 06/16/2024    BILITOT 1.0 06/16/2024     Lab Results   Component Value Date    WBC 10.2 06/16/2024    HGB 15.1 06/16/2024    HCT 45.6 06/16/2024    MCV 89 06/16/2024     06/16/2024     Lab Results   Component Value Date    CHOL 187 06/17/2024    CHOL 215 (H) 01/03/2022    CHOL 196 09/07/2021     Lab Results   Component Value Date    HDL 50.0 (L) 06/17/2024    HDL 51.8 01/03/2022    HDL 53.3 09/07/2021     Lab Results   Component Value Date    LDLCALC 117 06/17/2024     Lab Results   Component Value Date    TRIG 101 06/17/2024    TRIG 143 01/03/2022    TRIG 116 09/07/2021     Lab Results   Component Value Date    HGBA1C 5.7 (H) 11/20/2023     Other labs not included in the list above were reviewed either before or during this encounter.    History    Past Medical History:   Diagnosis Date    Anxiety     Memory loss     Other conditions influencing health status 11/24/2017    History of cough    Pain in joints of unspecified hand 01/05/2016    Pain, hand joint    Personal history of diseases of the skin and subcutaneous tissue 11/24/2017    History of cellulitis    Personal history of Methicillin resistant Staphylococcus aureus infection 11/24/2017    History of methicillin resistant Staphylococcus aureus infection    Personal history of other diseases of the respiratory system 12/05/2017    History of acute bronchitis    Personal history of other specified conditions 05/04/2017    History of dizziness    Personal history of pneumonia (recurrent)     History of pneumonia     Past Surgical History:   Procedure Laterality Date    OTHER SURGICAL HISTORY  08/29/2017    Hip Surgery Left     Family History   Problem Relation Name Age of Onset    No Known Problems Mother      No Known Problems Father       Allergies   Allergen Reactions    Adhesive Tape-Silicones  Unknown    Other Unknown     tape    Cortisone Rash    Penicillin Rash    Prednisone Rash    Zoloft [Sertraline] Anxiety and GI Upset     Related to previous starting dose of 50 mg.  Will reintroduce at lower dose and titrate slowly.     No current facility-administered medications on file prior to encounter.     Current Outpatient Medications on File Prior to Encounter   Medication Sig Dispense Refill    acetaminophen (Tylenol) 325 mg tablet Take 1-2 tablets (325-650 mg) by mouth every 6 hours if needed.      busPIRone (Buspar) 10 mg tablet Take 1 tablet (10 mg) by mouth 3 times a day. 90 tablet 2    calcium carbonate 600 mg calcium (1,500 mg) tablet Take 1 tablet (1,500 mg) by mouth 2 times a day.      calcium carbonate-vitamin D3 500 mg-5 mcg (200 unit) tablet Take by mouth twice a day.      ferrous sulfate 325 (65 Fe) MG tablet Take by mouth every other day.      latanoprost (Xalatan) 0.005 % ophthalmic solution Administer 1 drop into affected eye(s) once daily at bedtime.      pantoprazole (ProtoNix) 20 mg EC tablet Take 1 tablet (20 mg) by mouth once daily.       Immunization History   Administered Date(s) Administered    Flu vaccine, quadrivalent, high-dose, preservative free, age 65y+ (FLUZONE) 10/24/2020, 09/30/2021    Influenza, seasonal, injectable 10/15/2022    Pfizer COVID-19 vaccine, Fall 2023, 12 years and older, (30mcg/0.3mL) 10/27/2023    Pfizer Purple Cap SARS-CoV-2 01/28/2021, 02/19/2021, 10/04/2021, 09/24/2022    Pneumococcal conjugate vaccine, 13-valent (PREVNAR 13) 05/07/2018    Pneumococcal polysaccharide vaccine, 23-valent, age 2 years and older (PNEUMOVAX 23) 10/06/2009, 07/16/2020    SARS-CoV-2, Unspecified 04/30/2022     Patient's medical history was reviewed and updated either before or during this encounter.  ASSESSMENT / PLAN:  Active Hospital Problems    *Anxiety      Arthritis pain, hip      Bilateral hearing loss       Patient has anxiety and needs redirection.  Patient has been  using nishant.      Kashif Caldera MD

## 2024-06-20 PROCEDURE — 2500000002 HC RX 250 W HCPCS SELF ADMINISTERED DRUGS (ALT 637 FOR MEDICARE OP, ALT 636 FOR OP/ED): Performed by: STUDENT IN AN ORGANIZED HEALTH CARE EDUCATION/TRAINING PROGRAM

## 2024-06-20 PROCEDURE — 2500000001 HC RX 250 WO HCPCS SELF ADMINISTERED DRUGS (ALT 637 FOR MEDICARE OP): Performed by: STUDENT IN AN ORGANIZED HEALTH CARE EDUCATION/TRAINING PROGRAM

## 2024-06-20 PROCEDURE — 2500000002 HC RX 250 W HCPCS SELF ADMINISTERED DRUGS (ALT 637 FOR MEDICARE OP, ALT 636 FOR OP/ED): Performed by: PSYCHIATRY & NEUROLOGY

## 2024-06-20 PROCEDURE — 2500000001 HC RX 250 WO HCPCS SELF ADMINISTERED DRUGS (ALT 637 FOR MEDICARE OP): Performed by: PSYCHIATRY & NEUROLOGY

## 2024-06-20 PROCEDURE — 1140000001 HC PRIVATE PSYCH ROOM DAILY

## 2024-06-20 PROCEDURE — 99232 SBSQ HOSP IP/OBS MODERATE 35: CPT | Performed by: PSYCHIATRY & NEUROLOGY

## 2024-06-20 PROCEDURE — 2500000004 HC RX 250 GENERAL PHARMACY W/ HCPCS (ALT 636 FOR OP/ED): Performed by: STUDENT IN AN ORGANIZED HEALTH CARE EDUCATION/TRAINING PROGRAM

## 2024-06-20 ASSESSMENT — PAIN SCALES - GENERAL
PAINLEVEL_OUTOF10: 0 - NO PAIN
PAINLEVEL_OUTOF10: 0 - NO PAIN

## 2024-06-20 ASSESSMENT — PAIN - FUNCTIONAL ASSESSMENT
PAIN_FUNCTIONAL_ASSESSMENT: 0-10
PAIN_FUNCTIONAL_ASSESSMENT: 0-10

## 2024-06-20 NOTE — PROGRESS NOTES
"Social Work Note    LISW-S called son to discuss visitation and to follow up with him to determine if he has any concerns after meeting with the provider last night. At this time son is agreeable to plan of medication changes is supportive of pt's continued hospitalization into next week. Son shared, \"we gotta try something. Her previous medications weren't working\". Son had no questions or concerns at this time. Son confirmed that he plans to visit pt tonight and over the weekend. LISW-S will reach out to son on Monday to determine if he has noticed any improvement and will update treatment team.   "

## 2024-06-20 NOTE — NURSING NOTE
LOGAN NOTE     Problem:  anxiety     Behavior:  Pt had a great day, slightly confused after breakfast but was redirected easily. Great visit with her son, no signs of anxiety during her visit.  Group Participation: yes  Appetite/Meals: 100x3       Interventions:  Advised to seek staff with any issues    Response:  Pt stated she seemed she had a better day     Plan:  Will continue to monitor

## 2024-06-20 NOTE — GROUP NOTE
"Group Topic: Other   Group Date: 6/20/2024  Start Time: 1330  End Time: 1420  Facilitators: JESSICA Herbert   Department: [facility] REHABTH VIRTUAL    Number of Participants: 7   Group Focus: other Top 5 Card Game  Treatment Modality: Other: Recreation Therapy  Interventions utilized were mental fitness, reminiscence, and story telling  Purpose: other: elevate mood, increase socialization, enhance self esteem    Name: Xiomy Rubio YOB: 1931   MR: [id number]      Facilitator: Recreational Therapist  Level of Participation: did not attend  Quality of Participation:  did not attend  Interactions with others:  did not attend  Mood/Affect:  did not attend  Triggers (if applicable): n/a  Cognition:  did not attend  Progress: None  Comments: pt problem is anxiety.  Pt is resting in her room.  Displays extreme hearing loss and shakes her head \"no\" with invitation to join group.  Reports \"its so hard to hear\".  No handout to offer.  Plan: continue with services      "

## 2024-06-20 NOTE — GROUP NOTE
"Group Topic: Reminiscence   Group Date: 6/20/2024  Start Time: 1000  End Time: 1030  Facilitators: ERIN HerbertS   Department: [facility] REHABTH VIRTUAL    Number of Participants: 8   Group Focus: other Life Interview Questions  Treatment Modality: Other: Recreation Therapy  Interventions utilized were group exercise, mental fitness, reminiscence, and story telling  Purpose: feelings, self-care, and relapse prevention strategies    Name: Xiomy Rubio YOB: 1931   MR: [id number]      Facilitator: Recreational Therapist  Level of Participation: minimal  Quality of Participation: passive and quiet  Interactions with others:  passive  Mood/Affect:  passive  Triggers (if applicable): n/a  Cognition:  passive, unable to assess  Progress: Minimal  Comments: pt problem is anxiety.  Pt joins group ( is able to follow the direction of CTRS waving her into the room) with little encouragement.  Does not engage much secondary to extreme hearing loss.  Does shake her head yes/no at times, possible hearing some questions.  Prior to group, pt engaged appropriately with this writer when CTRS wrote questions/comments down.  Pt reported she felt\"jittery\" and is \"hoping that medication will help me feel better soon\".    Plan: continue with services      "

## 2024-06-20 NOTE — PROGRESS NOTES
"Xiomy Rubio is a 89 y.o. female on day 4 of admission presenting with Anxiety.      Subjective     Chart reviewed and case discussed with multidisciplinary treatment team.    Overnight panic episode around 1:00 in the morning and received as needed dose of Ativan.  On meeting today, patient reports she is tolerating Zoloft without recognizing that she was taking it.  She appears to be optimistic regarding restarting this medication.  She remains agreeable with continued admission for further titration and monitoring.  Patient hard of hearing status continues to contribute significant communication difficulties.  Patient's nurse with provider approached patient for encounter today and patient benefited from restatements of queries multiple times due to hard of hearing status.  Patient contributed little content during the encounter.  Later in the afternoon, patient was seen emerging from her room reporting that she was startled by finding a staff member in her room when she did not expect to see nor could she hear come in the room.  Discussed with patient that unit protocol is for patient to be checked on several times per hour.  Patient accepting of this description and then returned to her room without issue.  Patient not appearing to have acute panic episodes for the duration of the remainder of the day.       Objective     Last Recorded Vitals  Blood pressure 176/78, pulse 76, temperature 36.5 °C (97.7 °F), temperature source Temporal, resp. rate 18, height 1.676 m (5' 6\"), weight 65.4 kg (144 lb 2.9 oz), SpO2 93%.    Review of Systems    Psychiatric ROS - Adult  Anxiety: General Anxiety Disorder (YAMILETH)YAMILETH Behaviors: difficult to control worry, excessive anxiety/worry, and restlessness  Depression: concentration, tearfulness, and withdrawn  Delirium: negative  Psychosis: negative  Sunni: negative  Safety Issues:  not documented, patient unwilling or unable to respond directly to questions asked  Psychiatric " "ROS Comment: as noted    Physical Exam      Mental Status Exam  General Appearance: Younger than age appearing female in personal garments supine in bed, no acute distress, fairly groomed  Gait/Station: ambulating slowly with walker  Speech: clear, conversational volume, limited overall spontaneity  Mood: \"what's going on?\" - not appearing to ask how provider is doing, rather questioning present hospitalization, circumstance, seeking to be oriented  Affect: less anxious  Thought Process:  illogical with concern related to hard of hearing, when spontaneous, is linear and off topic  Thought Associations: No loosening of associations  Thought Content:  some paranoia of treatment team, acute anxiety  Perception: No perceptual abnormalities noted  Level of Consciousness: Alert  Orientation: Oriented to person  Attention and Concentration:  hard of hearing in discussion with illogical responses  Recent Memory: Intact as evidenced by ability to recall details from the past 24 hours   Remote Memory: Intact as evidenced by ability to recall previous medical issues   Executive function: Intact  Language: Naming intact  Fund of knowledge: Good  Insight: Limited, as evidenced by hard of hearing status and limited treatment team engagement, aware of anxiety as discussed superficially  Judgment: Limited, as evidence by inability to reason through medical decision making     Psychiatric Risk Assessment  Violence Risk Assessment: major mental illness, paranoia  Acute Risk of Harm to Others is Considered: moderate   Suicide Risk Assessment: age > 65 yrs old, , current psychiatric illness, feelings of hopelessness, living alone or lack of social support, severe anxiety, and unmarried  Protective Factors against Suicide: Muslim affiliation/spirituality  Acute Risk of Harm to Self is Considered: moderate    Relevant Results             Results for orders placed or performed during the hospital encounter of 06/16/24 (from " the past 96 hour(s))   Glucose, Fasting   Result Value Ref Range    Glucose, Fasting 111 (H) 69 - 99 mg/dL   Lipid Panel   Result Value Ref Range    Cholesterol 187 133 - 200 mg/dL    HDL-Cholesterol 50.0 (L) >50.0 mg/dL    Cholesterol/HDL Ratio 3.7 SEE COMMENT    LDL Calculated 117 65 - 130 mg/dL    Triglycerides 101 40 - 150 mg/dL         Assessment/Plan   Principal Problem:    Anxiety  Active Problems:    Arthritis pain, hip    Bilateral hearing loss      89-year-old female admitted from the community after presenting to [facility] with panic like symptoms.  Patient is hard of hearing with limited engagement in encounters despite hearing aid use with concern for paranoia being evident as questions were asked with patient responding more apprehensively.  Emergency room documentation reporting son serving as primary collateral.  Patient allowed for contact with son today.  Patient remains reluctant to further engage the treatment team and we will work with family to improve communication strategies while patient is admitted.  Collateral by son contributing primary anxiety, panic like disorder symptoms.  Discussion regarding retitration of zoloft.  Continue hospitalization for safety stabilization and monitoring at this time will further details become available.           Biological -      Continue buspar 10 mg tid - anxiety - may be able to taper and discontinue in community after demonstrating stability with zoloft titration    Continue Zoloft 12.5 mg daily for anxiety, titrate as tolerated and beneficial by not more than 12.5 mg/3-4 days.    Continue ativan 0.25 mg q6h prn anxiety      Discussion with patient regarding risk benefits and alternatives and side effects with opportunity to ask questions and questions answered.  Patient given consent to the plan as noted     2. Psychological -        Patient is encouraged to participate with the therapeutic milieu and program and group therapy        3. Sociological -        Patient encouraged to cooperate with social work staff on issues relevant to discharge planning                    I personally spent 25 minutes today providing care for this patient, including preparation, face to face time, documentation and other services such as review of medical records, diagnostic result, patient education, counseling, coordination of care as specified in the encounter.    Parts of this chart have been completed using voice recognition software.  Please excuse any errors of transcription.  Despite the medical decision making time stamp, my medical decision making has taken place during the patient's entire visit.  Thought process and reason for plan has been formulated from the time that I saw the patient until the time of disposition and is not specific to one specific moment during their visit and furthermore the medical decision making encompasses the entire chart and not only that represented in this note.        Ke Smith, DO

## 2024-06-20 NOTE — NURSING NOTE
"LOGAN NOTE     Problem:  Anxiety     Behavior:  Pt sitting in hallway at start of shift. Pleasant, smiling, behavior in control. Compliant with meds, cooperative with care. Before bed, pt noted to be sitting on side of bed tearful and moaning. Pt states \"I'm so ashamed. I'm afraid.\" Pt able to calm with reassurance by this RN. Pt resting in bed until approximately 0030, at this time pt sitting on side of bed rocking and crying, appearing anxious and endorsing anxiety. Pt states \"I don't know why this keeps happening.\" RN encouraged pt to employ her deep breathing coping skills, but they are ineffective at this time.   Group Participation: Attended evening group  Appetite/Meals: HS snack provided     Interventions:  1:1 provided with reassurance and positive reinforcement. Evening meds given per orders. Assisted pt into bed, positioned for comfort. Walker within reach.   0045-PRN lorazepam given for anxiety.    Response:  Reassessment: 0145-Pt calm, resting in bed at this time. No s/s of distress noted.     Plan:  Will continue to monitor behaviors and effectiveness of interventions while maintaining pt safety.    "

## 2024-06-20 NOTE — GROUP NOTE
Group Topic: Music Therapy   Group Date: 6/20/2024  Start Time: 0730  End Time: 0800  Facilitators: Arti Castillo   Department: [facility]    Number of Participants: 7   Group Focus: music therapy  Treatment Modality: Music Therapy  Interventions utilized were mental fitness  Purpose: feelings    Name: Xiomy Rubio YOB: 1931   MR: [id number]      Facilitator: Mental [facility]  Level of Participation: moderate  Quality of Participation: anxious  Interactions with others: appropriate  Mood/Affect: anxious  Triggers (if applicable): none  Cognition: coherent/clear  Progress: Gaining insight or knowledge  Comments: none  Plan: continue with services

## 2024-06-20 NOTE — GROUP NOTE
Group Topic: Goals   Group Date: 6/19/2024  Start Time: 2000  End Time: 2100  Facilitators: Raj Dye   Department: [facility]    Number of Participants: 5   Group Focus: goals  Treatment Modality: Other: PCA  Interventions utilized were assignment  Purpose: coping skills, communication skills, self-worth, self-care, and relapse prevention strategies    Name: Xiomy Rubio YOB: 1931   MR: [id number]      Facilitator: Mental Health [facility]  Level of Participation: minimal  Quality of Participation: appropriate/pleasant  Interactions with others: appropriate  Mood/Affect: appropriate  Triggers (if applicable): N/A  Cognition: coherent/clear  Progress: Minimal  Comments: pt problem is anxiety.   Plan: continue with services

## 2024-06-21 PROCEDURE — 2500000004 HC RX 250 GENERAL PHARMACY W/ HCPCS (ALT 636 FOR OP/ED): Performed by: STUDENT IN AN ORGANIZED HEALTH CARE EDUCATION/TRAINING PROGRAM

## 2024-06-21 PROCEDURE — 2500000002 HC RX 250 W HCPCS SELF ADMINISTERED DRUGS (ALT 637 FOR MEDICARE OP, ALT 636 FOR OP/ED): Performed by: STUDENT IN AN ORGANIZED HEALTH CARE EDUCATION/TRAINING PROGRAM

## 2024-06-21 PROCEDURE — 1140000001 HC PRIVATE PSYCH ROOM DAILY

## 2024-06-21 PROCEDURE — 2500000001 HC RX 250 WO HCPCS SELF ADMINISTERED DRUGS (ALT 637 FOR MEDICARE OP): Performed by: STUDENT IN AN ORGANIZED HEALTH CARE EDUCATION/TRAINING PROGRAM

## 2024-06-21 PROCEDURE — 2500000002 HC RX 250 W HCPCS SELF ADMINISTERED DRUGS (ALT 637 FOR MEDICARE OP, ALT 636 FOR OP/ED): Performed by: PSYCHIATRY & NEUROLOGY

## 2024-06-21 PROCEDURE — 99232 SBSQ HOSP IP/OBS MODERATE 35: CPT | Performed by: PSYCHIATRY & NEUROLOGY

## 2024-06-21 RX ORDER — SERTRALINE HYDROCHLORIDE 25 MG/1
25 TABLET, FILM COATED ORAL DAILY
Status: DISCONTINUED | OUTPATIENT
Start: 2024-06-22 | End: 2024-06-24

## 2024-06-21 ASSESSMENT — COLUMBIA-SUICIDE SEVERITY RATING SCALE - C-SSRS
6. HAVE YOU EVER DONE ANYTHING, STARTED TO DO ANYTHING, OR PREPARED TO DO ANYTHING TO END YOUR LIFE?: NO
1. SINCE LAST CONTACT, HAVE YOU WISHED YOU WERE DEAD OR WISHED YOU COULD GO TO SLEEP AND NOT WAKE UP?: NO
2. HAVE YOU ACTUALLY HAD ANY THOUGHTS OF KILLING YOURSELF?: NO

## 2024-06-21 ASSESSMENT — PAIN - FUNCTIONAL ASSESSMENT
PAIN_FUNCTIONAL_ASSESSMENT: 0-10
PAIN_FUNCTIONAL_ASSESSMENT: 0-10

## 2024-06-21 ASSESSMENT — PAIN SCALES - GENERAL
PAINLEVEL_OUTOF10: 0 - NO PAIN
PAINLEVEL_OUTOF10: 0 - NO PAIN

## 2024-06-21 NOTE — NURSING NOTE
"MARKP NOTE     Problem:  anxiety     Behavior:  Great morning but had anxiety during groups, might be due to hard of hearing and staff/patients walking in and out of activity room.  Group Participation: yes  Appetite/Meals: 75/50/pending       Interventions:  Advised her that everything was ok and we need to adjust her hearing aides so she doesn't get startled with the surrounding noises.    Response:  Pt states \"OK\"     Plan:  Will continue to monitor   "

## 2024-06-21 NOTE — GROUP NOTE
"Group Topic: Stress Reduction/Relaxation   Group Date: 6/21/2024  Start Time: 1100  End Time: 1145  Facilitators: JESSICA Herbert   Department: [facility] REHABTH VIRTUAL    Number of Participants: 7   Group Focus: other Stress Management Game  Treatment Modality: Other: Recreation Therapy  Interventions utilized were exploration, group exercise, patient education, problem solving, and support  Purpose: coping skills, maladaptive thinking, feelings, irrational fears, communication skills, insight or knowledge, self-worth, self-care, relapse prevention strategies, and trigger / craving management    Name: Xiomy Rubio YOB: 1931   MR: [id number]      Facilitator: Recreational Therapist  Level of Participation: minimal  Quality of Participation: distractible and intrusive  Interactions with others: intrusive  Mood/Affect: anxious  Triggers (if applicable): n/a  Cognition: processing slowly  Progress: Minimal  Comments: pt problem is anxiety.  Pt joins group with little encouragement.  Unable to follow along with group topic and group discussion secondary to extreme hearing loss.  Anxiousness related to hearing loss as she is unable to hear full conversation.  This writer attempts to include pt in discussion using pen and paper but attempt is ineffective as pt is already flustered.  Pt requires much reassurance that she is \"ok and nothing is going on\".    Plan: continue with services      "

## 2024-06-21 NOTE — PROGRESS NOTES
" REHAB Therapy Assessment & Treatment    Patient Name: Xiomy Rubio  MRN: [id number]  Today's Date: 6/21/2024      Recreation note : pt is alert and oriented x 2-3 with some poor insight/judgement.  Has attended some groups this week but displayed mostly passive participation secondary to extreme hearing loss.   Pt does communicate with this writer via CTRS writing questions/comments.  Has displayed appropriate and pleasant behaviors during interactions.  Continues to display anxiety AEB pt shaking, moaning at times and pt continues to voice when she feels \"jittery\".   Pt often asks \"am I ok\" during the day for reassurance.   Pt is eating well and sleeping \"ok\".  Will continue to encourage pt to attend groups of choice daily.    "

## 2024-06-21 NOTE — GROUP NOTE
Group Topic: Goals   Group Date: 6/21/2024  Start Time: 0730  End Time: 0800  Facilitators: Ari Farah   Department: [facility]    Number of Participants: 6   Group Focus: activities of daily living skills, daily focus, and goals  Treatment Modality: Other: goals  Interventions utilized were assignment  Purpose: coping skills, feelings, and communication skills    Name: Xiomy Rubio YOB: 1931   MR: [id number]      Facilitator: Mental Health [facility]  Level of Participation: minimal  Quality of Participation: attention seeking  Interactions with others: appropriate  Mood/Affect: appropriate  Triggers (if applicable): n/a  Cognition: coherent/clear  Progress: Minimal  Comments:  pt problem is anxiety.   Plan: continue with services

## 2024-06-21 NOTE — PROGRESS NOTES
"Xiomy Rubio is a 89 y.o. female on day 5 of admission presenting with Anxiety.      Subjective     Chart reviewed and case discussed with multidisciplinary treatment team.    No additional panic episodes noted.  Continues to tolerate and accept titration of Zoloft.  We will increase dose to 25 mg tomorrow.  Patient evidencing being friendly and happy, smiling on approach.  Remains hard of hearing with difficult communication effectively is noted.  Denies thoughts of harming self or others.  If continues to tolerate titration of Zoloft, will likely be able to discharge early next week.  PRN Ativan remaining part of her protocol however for acute anxiety episodes.  Absence of needing this will also help to reinforce discharge candidacy into next week.  Recommendation remains that patient not operate a motor vehicle any longer after discharge.       Objective     Last Recorded Vitals  Blood pressure 157/79, pulse 66, temperature 36.8 °C (98.2 °F), temperature source Temporal, resp. rate 17, height 1.676 m (5' 6\"), weight 65.4 kg (144 lb 2.9 oz), SpO2 94%.    Review of Systems    Psychiatric ROS - Adult  Anxiety: General Anxiety Disorder (YAMILETH)YAMILETH Behaviors: difficult to control worry, excessive anxiety/worry, and restlessness  Depression: concentration, tearfulness, and withdrawn  Delirium: negative  Psychosis: negative  Sunni: negative  Safety Issues:  not documented, patient unwilling or unable to respond directly to questions asked  Psychiatric ROS Comment: as noted    Physical Exam      Mental Status Exam  General Appearance: Younger than age appearing female in personal garments supine in bed, no acute distress, fairly groomed  Gait/Station: ambulating slowly with walker  Speech: clear, conversational volume, limited overall spontaneity  Mood: \"I'm fine\"  Affect: less anxious  Thought Process:  illogical with concern related to hard of hearing, when spontaneous, is linear and off topic  Thought Associations: No " loosening of associations  Thought Content:  some paranoia of treatment team, acute anxiety  Perception: No perceptual abnormalities noted  Level of Consciousness: Alert  Orientation: Oriented to person  Attention and Concentration:  hard of hearing in discussion with illogical responses  Recent Memory: Intact as evidenced by ability to recall details from the past 24 hours   Remote Memory: Intact as evidenced by ability to recall previous medical issues   Executive function: Intact  Language: Naming intact  Fund of knowledge: Good  Insight: Limited, as evidenced by hard of hearing status and limited treatment team engagement, aware of anxiety as discussed superficially  Judgment: Limited, as evidence by inability to reason through medical decision making     Psychiatric Risk Assessment  Violence Risk Assessment: major mental illness, paranoia  Acute Risk of Harm to Others is Considered: moderate   Suicide Risk Assessment: age > 65 yrs old, , current psychiatric illness, feelings of hopelessness, living alone or lack of social support, severe anxiety, and unmarried  Protective Factors against Suicide: Sabianism affiliation/spirituality  Acute Risk of Harm to Self is Considered: moderate    Relevant Results             No results found for this or any previous visit (from the past 96 hour(s)).        Assessment/Plan   Principal Problem:    Anxiety  Active Problems:    Arthritis pain, hip    Bilateral hearing loss      89-year-old female admitted from the community after presenting to [facility] with panic like symptoms.  Patient is hard of hearing with limited engagement in encounters despite hearing aid use with concern for paranoia being evident as questions were asked with patient responding more apprehensively.  Emergency room documentation reporting son serving as primary collateral.  Patient allowed for contact with son today.  Patient remains reluctant to further engage the treatment team and we will work  with family to improve communication strategies while patient is admitted.  Collateral by son contributing primary anxiety, panic like disorder symptoms.  Discussion regarding retitration of zoloft.  Continue hospitalization for safety stabilization and monitoring at this time will further details become available.           Biological -      Continue buspar 10 mg tid - anxiety - may be able to taper and discontinue in community after demonstrating stability with zoloft titration    Titrate Zoloft to 25 mg daily for anxiety, titrate as tolerated and beneficial by not more than 12.5 mg/3-4 days.    Continue ativan 0.25 mg q6h prn anxiety      Discussion with patient regarding risk benefits and alternatives and side effects with opportunity to ask questions and questions answered.  Patient given consent to the plan as noted     2. Psychological -        Patient is encouraged to participate with the therapeutic milieu and program and group therapy        3. Sociological -       Patient encouraged to cooperate with social work staff on issues relevant to discharge planning                    I personally spent 25 minutes today providing care for this patient, including preparation, face to face time, documentation and other services such as review of medical records, diagnostic result, patient education, counseling, coordination of care as specified in the encounter.    Parts of this chart have been completed using voice recognition software.  Please excuse any errors of transcription.  Despite the medical decision making time stamp, my medical decision making has taken place during the patient's entire visit.  Thought process and reason for plan has been formulated from the time that I saw the patient until the time of disposition and is not specific to one specific moment during their visit and furthermore the medical decision making encompasses the entire chart and not only that represented in this  note.        Ke Smith, DO

## 2024-06-21 NOTE — NURSING NOTE
"LOGAN NOTE     Problem:  Anxiety     Behavior:  Pt awake in bed. Calm, no anxiety noted. Pleasant, smiling with interaction. Pt states, \"they gave me a bigger pill. I'm feeling much better.\" Compliant with meds, cooperative with care. No anxiety noted.  Group Participation: n/a  Appetite/Meals: Declined HS snack     Interventions:  1:1 provided. Evening meds given per orders. Encouraged pt to use call light for needs.    Response:  Pt resting in bed at this time. No s/s of distress noted.     Plan:  Will continue to monitor behaviors and effectiveness of interventions while maintaining pt safety.        "

## 2024-06-21 NOTE — GROUP NOTE
Group Topic: Music Therapy   Group Date: 6/21/2024  Start Time: 1000  End Time: 1100  Facilitators: Niki Murphy   Department: [facility] EXPRESSIVE THER VIRTUAL    Number of Participants: 7   Group Focus: expressive outlet, leisure skills, music therapy, problem solving, and reality orientation  Treatment Modality: Music Therapy  Interventions Utilized were: leisure development, passive music engagement, and reality testing      Name: Xiomy Rubio YOB: 1931   MR: [id number]      Level of Participation: moderate  Quality of Participation: appropriate/pleasant, cooperative, and quiet  Interactions with others: appropriate  Mood/Affect: appropriate and brightens with interaction  Cognition, Pre Treatment: goal directed and logical  Cognition, Post Treatment: goal directed and logical  Progress: Minimal  Plan: continue with services

## 2024-06-21 NOTE — GROUP NOTE
Group Topic: Other   Group Date: 6/21/2024  Start Time: 1330  End Time: 1430  Facilitators: JESSICA Herbert   Department: [facility] REHABTH VIRTUAL    Number of Participants: 8   Group Focus: other Can you Name 5?  Treatment Modality: Other: Recreation Therapy  Interventions utilized were mental fitness  Purpose: other: elevate mood, increase socialization, enhance self esteem    Name: Xiomy Rubio YOB: 1931   MR: [id number]      Facilitator: Recreational Therapist  Level of Participation: minimal  Quality of Participation: disruptive, distractible, and distracting to others  Interactions with others: intrusive  Mood/Affect: anxious and restless  Triggers (if applicable): n/a  Cognition: processing slowly  Progress: Minimal  Comments: pt problem is anxiety.  Pt continues to join group with little encouragement.  Intrusiveness related to extreme hearing loss as pt requires much cueing, redirection and reassurance during group game.  Unable to hold any meaningful conversation or hear peers or staff in a group setting.  Pt is also misinterpreting what she sees which contributes to anxiety.     Plan: continue with services

## 2024-06-22 PROCEDURE — 2500000001 HC RX 250 WO HCPCS SELF ADMINISTERED DRUGS (ALT 637 FOR MEDICARE OP): Performed by: STUDENT IN AN ORGANIZED HEALTH CARE EDUCATION/TRAINING PROGRAM

## 2024-06-22 PROCEDURE — 99232 SBSQ HOSP IP/OBS MODERATE 35: CPT | Performed by: INTERNAL MEDICINE

## 2024-06-22 PROCEDURE — 1140000001 HC PRIVATE PSYCH ROOM DAILY

## 2024-06-22 PROCEDURE — 99232 SBSQ HOSP IP/OBS MODERATE 35: CPT | Performed by: REGISTERED NURSE

## 2024-06-22 PROCEDURE — 2500000004 HC RX 250 GENERAL PHARMACY W/ HCPCS (ALT 636 FOR OP/ED): Performed by: STUDENT IN AN ORGANIZED HEALTH CARE EDUCATION/TRAINING PROGRAM

## 2024-06-22 PROCEDURE — 2500000002 HC RX 250 W HCPCS SELF ADMINISTERED DRUGS (ALT 637 FOR MEDICARE OP, ALT 636 FOR OP/ED): Performed by: STUDENT IN AN ORGANIZED HEALTH CARE EDUCATION/TRAINING PROGRAM

## 2024-06-22 PROCEDURE — 2500000002 HC RX 250 W HCPCS SELF ADMINISTERED DRUGS (ALT 637 FOR MEDICARE OP, ALT 636 FOR OP/ED): Performed by: PSYCHIATRY & NEUROLOGY

## 2024-06-22 ASSESSMENT — COLUMBIA-SUICIDE SEVERITY RATING SCALE - C-SSRS
1. SINCE LAST CONTACT, HAVE YOU WISHED YOU WERE DEAD OR WISHED YOU COULD GO TO SLEEP AND NOT WAKE UP?: NO
1. SINCE LAST CONTACT, HAVE YOU WISHED YOU WERE DEAD OR WISHED YOU COULD GO TO SLEEP AND NOT WAKE UP?: NO
6. HAVE YOU EVER DONE ANYTHING, STARTED TO DO ANYTHING, OR PREPARED TO DO ANYTHING TO END YOUR LIFE?: NO
6. HAVE YOU EVER DONE ANYTHING, STARTED TO DO ANYTHING, OR PREPARED TO DO ANYTHING TO END YOUR LIFE?: NO
2. HAVE YOU ACTUALLY HAD ANY THOUGHTS OF KILLING YOURSELF?: NO
2. HAVE YOU ACTUALLY HAD ANY THOUGHTS OF KILLING YOURSELF?: NO

## 2024-06-22 ASSESSMENT — PAIN - FUNCTIONAL ASSESSMENT
PAIN_FUNCTIONAL_ASSESSMENT: 0-10
PAIN_FUNCTIONAL_ASSESSMENT: 0-10

## 2024-06-22 ASSESSMENT — PAIN SCALES - GENERAL
PAINLEVEL_OUTOF10: 0 - NO PAIN
PAINLEVEL_OUTOF10: 0 - NO PAIN

## 2024-06-22 NOTE — GROUP NOTE
Group Topic: Leisure Skills   Group Date: 6/22/2024  Start Time: 1100  End Time: 1150  Facilitators: ERIN ChicasS   Department: [facility]    Number of Participants: 8   Group Focus: communication, concentration, and leisure skills  Treatment Modality: Other: Recreation Therapy  Interventions utilized were leisure development  Purpose: coping skills  Session focused on opening discussion on Leisure and the benefits of leisure. Pt.'s then were engaged in filling out a worksheet brainstorming on Leisure Activities and writing down as many activities from A-Z.  The group discussed benefits of leisure and barriers to leisure.  Goals:  1. Pt. will identify personal definition of leisure.   2. Pt. will identify 1-3 leisure activities.   3. Pt. engaged in group discussion appropriately and meaningfully    Name: Xiomy Rubio YOB: 1931   MR: [id number]      Facilitator: Recreational Therapist  Level of Participation: when cued  Quality of Participation: appropriate/pleasant  Interactions with others: appropriate  Mood/Affect: appropriate  Triggers (if applicable): [place]  Cognition: insightful  Progress: Moderate  Comments: pt problem is anxiety. Pt is very hard of hearing but was able to achieve the above goals and listed several leisure activities and their benefits.  Plan: continue with services

## 2024-06-22 NOTE — NURSING NOTE
LOGAN NOTE     Problem:  Anxiety     Behavior:  Pt was tearful in the morning and began hyperventilating but stopped after doing breathing exercises.  Pt focused on wanting to see Ramana her son and stated she felt guilty that she walked into another patient's room last night.  Group Participation: yes  Appetite/Meals: fair       Interventions:  Give scheduled medications, 1:1 interaction with reassurance, deep breathing exercises, encourage group, distraction.    Response:  Pt has not been tearful this afternoon, she had a nice visit with Ramana her son.     Plan:  Give scheduled medications, 1:1 interaction with reassurance, deep breathing exercises, encourage group, distraction.

## 2024-06-22 NOTE — PROGRESS NOTES
[facility]: [facility]  PROGRESS NOTE      Xiomy Rubio is a 89 y.o. female that is being seen  today for follow-up at [facility].  Subjective   Patient is being seen for follow-up at [facility].  Patient has been anxious.  Able to walk with a walker.  Vital signs stable.  Discussed with the nurse no acute medical issues.      ROS  Negative for fever or chills  Negative for sore throat, ear pain, nasal discharge  Negative for cough, shortness of breath or wheezing  Negative for chest pain, palpitations, swelling of legs  Negative for abdominal pain, constipation, diarrhea, blood in the stools  Negative for urinary complaints  Negative for headache, dizziness, weakness or numbness  Negative for joint pain  Positive for anxiety  All other systems reviewed and were negative   Vitals:    06/22/24 0500   BP: 151/75   Pulse: 64   Resp: 16   Temp: 36.6 °C (97.9 °F)   SpO2: 94%      Vitals:    06/19/24 0539   Weight: 65.4 kg (144 lb 2.9 oz)     Body mass index is 23.27 kg/m².  Physical Exam  Constitutional: Patient does not appear to be in any acute distress  Head and Face: NCAT  Eyes: Normal external exam, EOMI  ENT: Normal external inspection of ears and nose. Oropharynx normal.  Cardiovascular: RRR, S1/S2, no murmurs, rubs, or gallops, radial pulses +2, no edema of extremities  Pulmonary: CTAB, no respiratory distress.  Abdomen: +BS, soft, non-tender, nondistended, no guarding or rebound, no masses noted  MSK: No joint swelling, normal movements of all extremities. Range of motion- normal.  Skin- No lesions, contusions, or erythema.  Peripheral puslses palpable bilaterally 2+  Neuro: AAO X3, Cranial nerves 2-12 grossly intact,DTR 2+ in all 4 limbs   Psychiatric: Judgment intact. Appropriate mood and behavior    LABS   [unfilled]  Lab Results   Component Value Date    GLUCOSE 104 (H) 06/16/2024    CALCIUM 9.3 06/16/2024     06/16/2024    K 4.2 06/16/2024    CO2 25 06/16/2024      06/16/2024    BUN 22 06/16/2024    CREATININE 0.70 06/16/2024     Lab Results   Component Value Date    ALT 11 06/16/2024    AST 16 06/16/2024    ALKPHOS 56 06/16/2024    BILITOT 1.0 06/16/2024     Lab Results   Component Value Date    WBC 10.2 06/16/2024    HGB 15.1 06/16/2024    HCT 45.6 06/16/2024    MCV 89 06/16/2024     06/16/2024     Lab Results   Component Value Date    CHOL 187 06/17/2024    CHOL 215 (H) 01/03/2022    CHOL 196 09/07/2021     Lab Results   Component Value Date    HDL 50.0 (L) 06/17/2024    HDL 51.8 01/03/2022    HDL 53.3 09/07/2021     Lab Results   Component Value Date    LDLCALC 117 06/17/2024     Lab Results   Component Value Date    TRIG 101 06/17/2024    TRIG 143 01/03/2022    TRIG 116 09/07/2021     Lab Results   Component Value Date    HGBA1C 5.7 (H) 11/20/2023     Other labs not included in the list above were reviewed either before or during this encounter.    History    Past Medical History:   Diagnosis Date    Anxiety     Memory loss     Other conditions influencing health status 11/24/2017    History of cough    Pain in joints of unspecified hand 01/05/2016    Pain, hand joint    Personal history of diseases of the skin and subcutaneous tissue 11/24/2017    History of cellulitis    Personal history of Methicillin resistant Staphylococcus aureus infection 11/24/2017    History of methicillin resistant Staphylococcus aureus infection    Personal history of other diseases of the respiratory system 12/05/2017    History of acute bronchitis    Personal history of other specified conditions 05/04/2017    History of dizziness    Personal history of pneumonia (recurrent)     History of pneumonia     Past Surgical History:   Procedure Laterality Date    OTHER SURGICAL HISTORY  08/29/2017    Hip Surgery Left     Family History   Problem Relation Name Age of Onset    No Known Problems Mother      No Known Problems Father       Allergies   Allergen Reactions    Adhesive Tape-Silicones  Unknown    Other Unknown     tape    Cortisone Rash    Penicillin Rash    Prednisone Rash    Zoloft [Sertraline] Anxiety and GI Upset     Related to previous starting dose of 50 mg.  Will reintroduce at lower dose and titrate slowly.     No current facility-administered medications on file prior to encounter.     Current Outpatient Medications on File Prior to Encounter   Medication Sig Dispense Refill    acetaminophen (Tylenol) 325 mg tablet Take 1-2 tablets (325-650 mg) by mouth every 6 hours if needed.      busPIRone (Buspar) 10 mg tablet Take 1 tablet (10 mg) by mouth 3 times a day. 90 tablet 2    calcium carbonate 600 mg calcium (1,500 mg) tablet Take 1 tablet (1,500 mg) by mouth 2 times a day.      calcium carbonate-vitamin D3 500 mg-5 mcg (200 unit) tablet Take by mouth twice a day.      ferrous sulfate 325 (65 Fe) MG tablet Take by mouth every other day.      latanoprost (Xalatan) 0.005 % ophthalmic solution Administer 1 drop into affected eye(s) once daily at bedtime.      pantoprazole (ProtoNix) 20 mg EC tablet Take 1 tablet (20 mg) by mouth once daily.       Immunization History   Administered Date(s) Administered    Flu vaccine, quadrivalent, high-dose, preservative free, age 65y+ (FLUZONE) 10/24/2020, 09/30/2021    Influenza, seasonal, injectable 10/15/2022    Pfizer COVID-19 vaccine, Fall 2023, 12 years and older, (30mcg/0.3mL) 10/27/2023    Pfizer Purple Cap SARS-CoV-2 01/28/2021, 02/19/2021, 10/04/2021, 09/24/2022    Pneumococcal conjugate vaccine, 13-valent (PREVNAR 13) 05/07/2018    Pneumococcal polysaccharide vaccine, 23-valent, age 2 years and older (PNEUMOVAX 23) 10/06/2009, 07/16/2020    SARS-CoV-2, Unspecified 04/30/2022     Patient's medical history was reviewed and updated either before or during this encounter.  ASSESSMENT / PLAN:  Active Hospital Problems    *Anxiety      Arthritis pain, hip      Bilateral hearing loss       Patient has anxiety and needs redirection.  Patient has been  using nishant.      Kashif Caldera MD

## 2024-06-22 NOTE — PROGRESS NOTES
"Xiomy Rubio is a 89 y.o. female on day 6 of admission presenting with Anxiety.      Subjective     Chart reviewed and case discussed with multidisciplinary treatment team.    No additional panic episodes noted.  Continues to tolerate and accept titration of Zoloft.  We will increase dose to 25 mg tomorrow.  Patient evidencing being friendly and happy, smiling on approach.  Remains hard of hearing with difficult communication effectively is noted.  Denies thoughts of harming self or others.  If continues to tolerate titration of Zoloft, will likely be able to discharge early next week.  PRN Ativan remaining part of her protocol however for acute anxiety episodes.  Absence of needing this will also help to reinforce discharge candidacy into next week.  Recommendation remains that patient not operate a motor vehicle any longer after discharge.   Patient reports she is feeling a little anioux today and showed provider slightly tremulous hands- was dressed casually, using walker to enter group room.    Objective     Last Recorded Vitals  Blood pressure 151/75, pulse 64, temperature 36.6 °C (97.9 °F), temperature source Oral, resp. rate 16, height 1.676 m (5' 6\"), weight 65.4 kg (144 lb 2.9 oz), SpO2 94%.    Review of Systems    Psychiatric ROS - Adult  Anxiety: General Anxiety Disorder (YAMILETH)YAMILETH Behaviors: difficult to control worry, excessive anxiety/worry, and restlessness  Depression: concentration, tearfulness, and withdrawn  Delirium: negative  Psychosis: negative  Sunni: negative  Safety Issues:  not documented, patient unwilling or unable to respond directly to questions asked  Psychiatric ROS Comment: as noted    Physical Exam      Mental Status Exam  General Appearance: Younger than age appearing female in personal garments supine in bed, no acute distress, fairly groomed  Gait/Station: ambulating slowly with walker  Speech: clear, conversational volume, limited overall spontaneity  Mood: \"I'm fine\"  Affect: " less anxious  Thought Process:  illogical with concern related to hard of hearing, when spontaneous, is linear and off topic  Thought Associations: No loosening of associations  Thought Content:  some paranoia of treatment team, acute anxiety  Perception: No perceptual abnormalities noted  Level of Consciousness: Alert  Orientation: Oriented to person  Attention and Concentration:  hard of hearing in discussion with illogical responses  Recent Memory: Intact as evidenced by ability to recall details from the past 24 hours   Remote Memory: Intact as evidenced by ability to recall previous medical issues   Executive function: Intact  Language: Naming intact  Fund of knowledge: Good  Insight: Limited, as evidenced by hard of hearing status and limited treatment team engagement, aware of anxiety as discussed superficially  Judgment: Limited, as evidence by inability to reason through medical decision making     Psychiatric Risk Assessment  Violence Risk Assessment: major mental illness, paranoia  Acute Risk of Harm to Others is Considered: moderate   Suicide Risk Assessment: age > 65 yrs old, , current psychiatric illness, feelings of hopelessness, living alone or lack of social support, severe anxiety, and unmarried  Protective Factors against Suicide: Alevism affiliation/spirituality  Acute Risk of Harm to Self is Considered: moderate    Relevant Results             No results found for this or any previous visit (from the past 96 hour(s)).        Assessment/Plan   Principal Problem:    Anxiety  Active Problems:    Arthritis pain, hip    Bilateral hearing loss      89-year-old female admitted from the community after presenting to [facility] with panic like symptoms.  Patient is hard of hearing with limited engagement in encounters despite hearing aid use with concern for paranoia being evident as questions were asked with patient responding more apprehensively.  Emergency room documentation reporting son  serving as primary collateral.  Patient allowed for contact with son today.  Patient remains reluctant to further engage the treatment team and we will work with family to improve communication strategies while patient is admitted.  Collateral by son contributing primary anxiety, panic like disorder symptoms.  Discussion regarding retitration of zoloft.  Continue hospitalization for safety stabilization and monitoring at this time will further details become available.           Biological -      Continue buspar 10 mg tid - anxiety - may be able to taper and discontinue in community after demonstrating stability with zoloft titration    Titrate Zoloft to 25 mg daily for anxiety, titrate as tolerated and beneficial by not more than 12.5 mg/3-4 days.    Continue ativan 0.25 mg q6h prn anxiety   Cont current meds.   Discussion with patient regarding risk benefits and alternatives and side effects with opportunity to ask questions and questions answered.  Patient given consent to the plan as noted     2. Psychological -        Patient is encouraged to participate with the therapeutic milieu and program and group therapy        3. Sociological -       Patient encouraged to cooperate with social work staff on issues relevant to discharge planning                    I personally spent 25 minutes today providing care for this patient, including preparation, face to face time, documentation and other services such as review of medical records, diagnostic result, patient education, counseling, coordination of care as specified in the encounter.    Parts of this chart have been completed using voice recognition software.  Please excuse any errors of transcription.  Despite the medical decision making time stamp, my medical decision making has taken place during the patient's entire visit.  Thought process and reason for plan has been formulated from the time that I saw the patient until the time of disposition and is not  specific to one specific moment during their visit and furthermore the medical decision making encompasses the entire chart and not only that represented in this note.        Lexis Ontiveros, APRN-CNS

## 2024-06-22 NOTE — GROUP NOTE
Group Topic: Goals   Group Date: 6/21/2024  Start Time: 2018  End Time: 2030  Facilitators: Jessi Thompson   Department: [facility]    Number of Participants: 4   Group Focus: goals  Treatment Modality: Other: PCA  Interventions utilized were group exercise and reminiscence  Purpose: feelings, self-worth, self-care, and relapse prevention strategies    Name: Xiomy Rubio YOB: 1931   MR: [id number]      Facilitator: [facility]  Level of Participation: did not attend  Quality of Participation:  did not attend  Interactions with others:  did not attend  Mood/Affect:  did not attend  Triggers (if applicable): N/A  Cognition:  did not attend  Progress: Other  Comments: Pt problem is anxiety. Pt declined the invitation to attend group.  Plan: continue with services

## 2024-06-22 NOTE — GROUP NOTE
Group Topic: Journaling   Group Date: 6/22/2024  Start Time: 1015  End Time: 1100  Facilitators: JESSICA Chicas   Department: [facility]    Number of Participants: 8   Group Focus: check in, daily focus, feeling awareness/expression, and mindfulness  Treatment Modality: Other: Recreation Therapy  Interventions utilized were exploration and patient education  Purpose: coping skills and communication skills    Pt will identify ways to truthfully deal with questions like: Where have you been lately? What have you been up to through journaling and answering some self-awareness questions.  Pt will identify 1-2 coping skills.  Pt will identify benefits of journaling.     Name: Xiomy Rubio YOB: 1931   MR: [id number]      Facilitator: Recreational Therapist  Level of Participation: when cued  Quality of Participation: appropriate/pleasant and very hard of hearing.  Interactions with others: gave feedback  Mood/Affect: brightens with interaction  Triggers (if applicable): N/A  Cognition: goal directed  Progress: Moderate  Comments: pt problem is anxiety. Pt is very hard of hearing but was able to achieve the above goals and shared her journal with the group.   Plan: continue with services

## 2024-06-22 NOTE — NURSING NOTE
"LOGAN NOTE     Problem:  Anxiety     Behavior:  Pt seated in hallway by nurse's station. She is asking where her son, Ramana, is. This nurse explained to pt that pt is at a hospital and Ramana is not on unit. Pt acknowledges that she is aware that she is in a hospital. This nurse explained to pt that she had medications due several times, though pt told this nurse repeatedly that she , \"could not hear\" what this nurse was saying. Despite this, cpt would respond verbally to conversations between staff and other pts at a notably lower volume.       Interventions:  This nurse communicated with pt verbally and nonverbally. This nurse attempted several times to administer hs meds.    Response:  Pt refused hs meds.     Plan:  Continue to assess for anxiety.    "

## 2024-06-23 VITALS
HEIGHT: 66 IN | HEART RATE: 67 BPM | RESPIRATION RATE: 17 BRPM | WEIGHT: 144.18 LBS | BODY MASS INDEX: 23.17 KG/M2 | TEMPERATURE: 97.7 F | SYSTOLIC BLOOD PRESSURE: 148 MMHG | DIASTOLIC BLOOD PRESSURE: 71 MMHG | OXYGEN SATURATION: 93 %

## 2024-06-23 PROCEDURE — 1140000001 HC PRIVATE PSYCH ROOM DAILY

## 2024-06-23 PROCEDURE — 2500000002 HC RX 250 W HCPCS SELF ADMINISTERED DRUGS (ALT 637 FOR MEDICARE OP, ALT 636 FOR OP/ED): Performed by: STUDENT IN AN ORGANIZED HEALTH CARE EDUCATION/TRAINING PROGRAM

## 2024-06-23 PROCEDURE — 2500000002 HC RX 250 W HCPCS SELF ADMINISTERED DRUGS (ALT 637 FOR MEDICARE OP, ALT 636 FOR OP/ED): Performed by: PSYCHIATRY & NEUROLOGY

## 2024-06-23 PROCEDURE — 2500000004 HC RX 250 GENERAL PHARMACY W/ HCPCS (ALT 636 FOR OP/ED): Performed by: STUDENT IN AN ORGANIZED HEALTH CARE EDUCATION/TRAINING PROGRAM

## 2024-06-23 PROCEDURE — 99232 SBSQ HOSP IP/OBS MODERATE 35: CPT | Performed by: REGISTERED NURSE

## 2024-06-23 PROCEDURE — 2500000001 HC RX 250 WO HCPCS SELF ADMINISTERED DRUGS (ALT 637 FOR MEDICARE OP): Performed by: STUDENT IN AN ORGANIZED HEALTH CARE EDUCATION/TRAINING PROGRAM

## 2024-06-23 ASSESSMENT — COLUMBIA-SUICIDE SEVERITY RATING SCALE - C-SSRS
2. HAVE YOU ACTUALLY HAD ANY THOUGHTS OF KILLING YOURSELF?: NO
6. HAVE YOU EVER DONE ANYTHING, STARTED TO DO ANYTHING, OR PREPARED TO DO ANYTHING TO END YOUR LIFE?: NO
2. HAVE YOU ACTUALLY HAD ANY THOUGHTS OF KILLING YOURSELF?: NO
6. HAVE YOU EVER DONE ANYTHING, STARTED TO DO ANYTHING, OR PREPARED TO DO ANYTHING TO END YOUR LIFE?: NO
1. SINCE LAST CONTACT, HAVE YOU WISHED YOU WERE DEAD OR WISHED YOU COULD GO TO SLEEP AND NOT WAKE UP?: NO
1. SINCE LAST CONTACT, HAVE YOU WISHED YOU WERE DEAD OR WISHED YOU COULD GO TO SLEEP AND NOT WAKE UP?: NO

## 2024-06-23 ASSESSMENT — PAIN - FUNCTIONAL ASSESSMENT
PAIN_FUNCTIONAL_ASSESSMENT: 0-10
PAIN_FUNCTIONAL_ASSESSMENT: 0-10

## 2024-06-23 ASSESSMENT — PAIN SCALES - GENERAL
PAINLEVEL_OUTOF10: 0 - NO PAIN
PAINLEVEL_OUTOF10: 0 - NO PAIN

## 2024-06-23 NOTE — GROUP NOTE
Group Topic: Goals   Group Date: 6/23/2024  Start Time: 0730  End Time: 0800  Facilitators: Ari Farah   Department: [facility]    Number of Participants: 7   Group Focus: goals  Treatment Modality: Other:    Interventions utilized were assignment  Purpose: coping skills and communication skills    Name: Xiomy Rubio YOB: 1931   MR: [id number]      Facilitator: Mental [facility]  Level of Participation: minimal  Quality of Participation: appropriate/pleasant  Interactions with others: appropriate  Mood/Affect: appropriate  Triggers (if applicable): n/a  Cognition: coherent/clear  Progress: Minimal  Comments:  pt problem is anxiety   Plan: continue with services

## 2024-06-23 NOTE — GROUP NOTE
Group Topic: Feeling Awareness/Expression   Group Date: 6/23/2024  Start Time: 1015  End Time: 1100  Facilitators: JESSICA Chicas   Department: [facility]    Number of Participants: 6   Group Focus: self-awareness  Treatment Modality: Other: Recreation Therapy  Interventions utilized were exploration  Purpose: feelings and insight or knowledge  Patient will be aware of one's inner self and draw 6 positive pictures reflecting a positive thought or feeling during self-awareness activity.    Name: Xiomy Rubio YOB: 1931   MR: [id number]      Facilitator: Recreational Therapist  Level of Participation: did not attend  Quality of Participation: did not attend  Interactions with others: did not attend  Mood/Affect: did not attend  Triggers (if applicable): did not attend  Cognition: did not attend  Progress: Minimal  Comments: pt problem is anxiety.   Plan: continue with services

## 2024-06-23 NOTE — PROGRESS NOTES
"Xiomy Rubio is a 89 y.o. female on day 7 of admission presenting with Anxiety.      Subjective     Chart reviewed and case discussed with multidisciplinary treatment team.    No additional panic episodes noted.  Continues to tolerate and accept titration of Zoloft.  We will increase dose to 25 mg tomorrow.  Patient evidencing being friendly and happy, smiling on approach.  Remains hard of hearing with difficult communication effectively is noted.  Denies thoughts of harming self or others.  If continues to tolerate titration of Zoloft, will likely be able to discharge early next week.  PRN Ativan remaining part of her protocol however for acute anxiety episodes.  Absence of needing this will also help to reinforce discharge candidacy into next week.  Recommendation remains that patient not operate a motor vehicle any longer after discharge.   Patient reports she is feeling a little anioux today and showed provider slightly tremulous hands- was dressed casually, using walker to enter group room.    Objective     Last Recorded Vitals  Blood pressure 177/89, pulse 73, temperature 36.9 °C (98.4 °F), temperature source Temporal, resp. rate 16, height 1.676 m (5' 6\"), weight 65.4 kg (144 lb 2.9 oz), SpO2 96%.    Review of Systems    Psychiatric ROS - Adult  Anxiety: General Anxiety Disorder (YAMILETH)YAMILETH Behaviors: difficult to control worry, excessive anxiety/worry, and restlessness  Depression: concentration, tearfulness, and withdrawn  Delirium: negative  Psychosis: negative  Sunni: negative  Safety Issues:  not documented, patient unwilling or unable to respond directly to questions asked  Psychiatric ROS Comment: as noted    Physical Exam      Mental Status Exam  General Appearance: Younger than age appearing female in personal garments supine in bed, no acute distress, fairly groomed  Gait/Station: ambulating slowly with walker  Speech: clear, conversational volume, limited overall spontaneity  Mood: \"I'm " "fine\"  Affect: less anxious  Thought Process:  illogical with concern related to hard of hearing, when spontaneous, is linear and off topic  Thought Associations: No loosening of associations  Thought Content:  some paranoia of treatment team, acute anxiety  Perception: No perceptual abnormalities noted  Level of Consciousness: Alert  Orientation: Oriented to person  Attention and Concentration:  hard of hearing in discussion with illogical responses  Recent Memory: Intact as evidenced by ability to recall details from the past 24 hours   Remote Memory: Intact as evidenced by ability to recall previous medical issues   Executive function: Intact  Language: Naming intact  Fund of knowledge: Good  Insight: Limited, as evidenced by hard of hearing status and limited treatment team engagement, aware of anxiety as discussed superficially  Judgment: Limited, as evidence by inability to reason through medical decision making     Psychiatric Risk Assessment  Violence Risk Assessment: major mental illness, paranoia  Acute Risk of Harm to Others is Considered: moderate   Suicide Risk Assessment: age > 65 yrs old, , current psychiatric illness, feelings of hopelessness, living alone or lack of social support, severe anxiety, and unmarried  Protective Factors against Suicide: Restorationist affiliation/spirituality  Acute Risk of Harm to Self is Considered: moderate    Relevant Results             No results found for this or any previous visit (from the past 96 hour(s)).        Assessment/Plan   Principal Problem:    Anxiety  Active Problems:    Arthritis pain, hip    Bilateral hearing loss      89-year-old female admitted from the community after presenting to [facility] with panic like symptoms.  Patient is hard of hearing with limited engagement in encounters despite hearing aid use with concern for paranoia being evident as questions were asked with patient responding more apprehensively.  Emergency room documentation " reporting son serving as primary collateral.  Patient allowed for contact with son today.  Patient remains reluctant to further engage the treatment team and we will work with family to improve communication strategies while patient is admitted.  Collateral by son contributing primary anxiety, panic like disorder symptoms.  Discussion regarding retitration of zoloft.  Continue hospitalization for safety stabilization and monitoring at this time will further details become available.           Biological -      Continue buspar 10 mg tid - anxiety - may be able to taper and discontinue in community after demonstrating stability with zoloft titration    Titrate Zoloft to 25 mg daily for anxiety, titrate as tolerated and beneficial by not more than 12.5 mg/3-4 days.    Continue ativan 0.25 mg q6h prn anxiety   Cont current meds.   Discussion with patient regarding risk benefits and alternatives and side effects with opportunity to ask questions and questions answered.  Patient given consent to the plan as noted     2. Psychological -        Patient is encouraged to participate with the therapeutic milieu and program and group therapy        3. Sociological -       Patient encouraged to cooperate with social work staff on issues relevant to discharge planning                    I personally spent 25 minutes today providing care for this patient, including preparation, face to face time, documentation and other services such as review of medical records, diagnostic result, patient education, counseling, coordination of care as specified in the encounter.    Parts of this chart have been completed using voice recognition software.  Please excuse any errors of transcription.  Despite the medical decision making time stamp, my medical decision making has taken place during the patient's entire visit.  Thought process and reason for plan has been formulated from the time that I saw the patient until the time of disposition  and is not specific to one specific moment during their visit and furthermore the medical decision making encompasses the entire chart and not only that represented in this note.        Lexis Ontiveros, APRN-CNS

## 2024-06-23 NOTE — NURSING NOTE
BIRP NOTE     Problem:  Anxiety     Behavior:  Patient is in patient’s room sitting on the side of the bed. Patient is pleasant and calm. Patient’s affect is broad range. Patient is talkative. Patient maintains good eye contact.    Group Participation: N/A  Appetite/Meals: N/A       Interventions:  This nurse completed a shift assessment and administered patient's scheduled night time medications.    Response:  Patient remained pleasant and calm and was cooperative throughout this shift assessment and medication administration.     Plan:  Continue to monitor for anxiety. Continue to monitor for patient safety.

## 2024-06-23 NOTE — NURSING NOTE
LOGAN NOTE     Problem:  Anxiety     Behavior:  Patient did not go to group this morning, she came out of her room and approached the nurses station and asked if the group was about her.  She stated Are they talking about me?  Then she asked if her family was going to be hurt.  She walked away when this RN tried to engage in conversation with her.  Group Participation: yes    Appetite/Meals: good       Interventions:  1:1 interaction with reassurance, every 15 minute checks, encourage participation in milieu, give scheduled medications.    Response:  This patient began hyperventilating and stating she was having a panic attack when the patient in room 409 was agitated and yelling.  Reassurance given to the patient  and offered her PRN medeication and she refused it.  Assisted her to deep breathe and she was able to calm her breathing.  Ramses the rec. therapist then sat with her for support.     Plan:  Encourage patient to deep breathe, give scheduled medications and PRN medications, encourage patient to participate in groups.

## 2024-06-23 NOTE — GROUP NOTE
Group Topic: Other   Group Date: 6/23/2024  Start Time: 1100  End Time: 1145  Facilitators: JESSICA Chicas   Department: [facility]    Number of Participants: 7   Group Focus: feeling awareness/expression  Treatment Modality: Other: Recreation Therapy  Interventions utilized were patient education  Purpose: feelings  Pt will identify 6 emotions and share with the group.    Name: Xiomy Rubio YOB: 1931   MR: [id number]      Facilitator: Recreational Therapist  Level of Participation: when cued  Quality of Participation: attentive  Interactions with others: appropriate  Mood/Affect: bright  Triggers (if applicable): N/A  Cognition: goal directed  Progress: Moderate  Comments:  pt problem is anxiety. Pt's affect brighter , less anxiety during group , still has hearing issues but did better in group.  Plan: continue with services

## 2024-06-23 NOTE — NURSING NOTE
Pts son stated the patient needs her nails trimmed on her toes so he was wondering if a podiatry consult can be ordered, will pass on,.

## 2024-06-24 PROCEDURE — 2500000001 HC RX 250 WO HCPCS SELF ADMINISTERED DRUGS (ALT 637 FOR MEDICARE OP): Performed by: STUDENT IN AN ORGANIZED HEALTH CARE EDUCATION/TRAINING PROGRAM

## 2024-06-24 PROCEDURE — 2500000002 HC RX 250 W HCPCS SELF ADMINISTERED DRUGS (ALT 637 FOR MEDICARE OP, ALT 636 FOR OP/ED): Performed by: PSYCHIATRY & NEUROLOGY

## 2024-06-24 PROCEDURE — 97110 THERAPEUTIC EXERCISES: CPT | Mod: GP

## 2024-06-24 PROCEDURE — 99232 SBSQ HOSP IP/OBS MODERATE 35: CPT | Performed by: INTERNAL MEDICINE

## 2024-06-24 PROCEDURE — 2500000002 HC RX 250 W HCPCS SELF ADMINISTERED DRUGS (ALT 637 FOR MEDICARE OP, ALT 636 FOR OP/ED): Performed by: STUDENT IN AN ORGANIZED HEALTH CARE EDUCATION/TRAINING PROGRAM

## 2024-06-24 PROCEDURE — 2500000004 HC RX 250 GENERAL PHARMACY W/ HCPCS (ALT 636 FOR OP/ED): Performed by: STUDENT IN AN ORGANIZED HEALTH CARE EDUCATION/TRAINING PROGRAM

## 2024-06-24 PROCEDURE — 1140000001 HC PRIVATE PSYCH ROOM DAILY

## 2024-06-24 PROCEDURE — 97116 GAIT TRAINING THERAPY: CPT | Mod: GP

## 2024-06-24 RX ORDER — SERTRALINE HYDROCHLORIDE 25 MG/1
37.5 TABLET, FILM COATED ORAL DAILY
Status: DISCONTINUED | OUTPATIENT
Start: 2024-06-25 | End: 2024-06-28 | Stop reason: HOSPADM

## 2024-06-24 ASSESSMENT — PAIN - FUNCTIONAL ASSESSMENT
PAIN_FUNCTIONAL_ASSESSMENT: 0-10

## 2024-06-24 ASSESSMENT — COGNITIVE AND FUNCTIONAL STATUS - GENERAL
MOVING TO AND FROM BED TO CHAIR: A LITTLE
WALKING IN HOSPITAL ROOM: A LITTLE
MOBILITY SCORE: 20
CLIMB 3 TO 5 STEPS WITH RAILING: A LITTLE
STANDING UP FROM CHAIR USING ARMS: A LITTLE

## 2024-06-24 ASSESSMENT — PAIN SCALES - GENERAL
PAINLEVEL_OUTOF10: 0 - NO PAIN

## 2024-06-24 NOTE — PROGRESS NOTES
Physical Therapy    Physical Therapy Treatment    Patient Name: Xiomy Rubio  MRN: [id number]  Today's Date: 6/24/2024  Time Calculation  Start Time: 1000  Stop Time: 1025  Time Calculation (min): 25 min    Assessment/Plan   PT Assessment  PT Assessment Results: Decreased strength, Decreased endurance, Impaired balance, Decreased mobility, Decreased cognition, Impaired vision, Impaired hearing, Impaired sensation  Rehab Prognosis: Good  Assessment Comment: Patient is a 89 y/o female admitted due to increasing anxiety and panic attack. Patient reports independent prior to admssion with mobility. Now requiring contact guard assist due to left knee pain. Demonstrates antalgic transfers and amb.Will benefit from PT services to address functionla impairments.  End of Session Patient Position: Up in chair (Group Session)  PT Plan  Inpatient/Swing Bed or Outpatient: Inpatient  PT Plan  Treatment/Interventions: Transfer training, Gait training, Balance training, Strengthening, Endurance training  PT Plan: Ongoing PT  PT Frequency: 3 times per week  PT Discharge Recommendations: Low intensity level of continued care  Equipment Recommended upon Discharge: Wheeled walker  PT Recommended Transfer Status: Stand by assist  PT - OK to Discharge: Yes      General Visit Information:   PT  Visit  PT Received On: 06/24/24  Response to Previous Treatment: Patient with no complaints from previous session.  General  Reason for Referral: Impaired Balance, weakness  Referred By: Dr Smith  Past Medical History Relevant to Rehab: anxiety, panic attacks, HTN, thoracic compression fractue of indeterminent age, fractue left hip with surgical repair, arthritis  Prior to Session Communication: Bedside nurse  General Comment: 89 yo female admitted for treatment of a panic attack was cleared by nursing for therapy and agreeable to same.    Subjective   Precautions:     Vital Signs:       Objective   Pain:  Pain Assessment  Pain Assessment:  0-10  0-10 (Numeric) Pain Score: 0 - No pain  Patient's Stated Pain Goal: No pain  Cognition:  Cognition  Overall Cognitive Status:  (Mood changes from tearful to laughing quickly.  [place] significant)  Orientation Level: Disoriented to place, Disoriented to situation  Coordination:  Movements are Fluid and Coordinated: Yes (Mild Tremor RUE)  Postural Control:  Postural Control  Postural Control: Within Functional Limits  Extremity/Trunk Assessments:        Activity Tolerance:  Activity Tolerance  Endurance: Endurance does not limit participation in activity  Treatments:  Therapeutic Exercise  Therapeutic Exercise Performed: Yes  Therapeutic Exercise Activity 1: Seated LAQ 2x15 2.5 BLE  Therapeutic Exercise Activity 2: Seated Hip Flexion 2x15 2.5 BLE  Therapeutic Exercise Activity 3: Seated Hip Abd 2x15 green tband  Therapeutic Exercise Activity 4: seated Hip Add with ball 2x15  Therapeutic Exercise Activity 5: Standing Heel Raises  Therapeutic Exercise Activity 6: Standing Marches 2x15 2.5 BLE  Therapeutic Exercise Activity 7: Standing Hip Abduction 2x15 2.5 BLE  Therapeutic Exercise Activity 8: Standing Hamstring Curls 2x15 2.5 BLE  Therapeutic Exercise Activity 9: Walking with High Knees 2.5 BLE syupervised with RW                   Ambulation/Gait Training  Ambulation/Gait Training Performed: Yes  Ambulation/Gait Training 1  Surface 1: Level tile  Device 1: Rolling walker  Assistance 1: Distant supervision, Independent  Quality of Gait 1: Decreased step length, Antalgic  Comments/Distance (ft) 1: Up ad patrizia with RW in halls.  Unsteady without RW cgx1 150 feet x3 including turns  Transfer 1  Transfer From 1: Sit to  Transfer to 1: Stand  Technique 1: Sit to stand, Stand to sit  Transfer Device 1: Walker  Transfer Level of Assistance 1: Independent         Outcome Measures:  [facility] Basic Mobility  Turning from your back to your side while in a flat bed without using bedrails: None  Moving from lying on your back to  sitting on the side of a flat bed without using bedrails: None  Moving to and from bed to chair (including a wheelchair): A little  Standing up from a chair using your arms (e.g. wheelchair or bedside chair): A little  To walk in hospital room: A little  Climbing 3-5 steps with railing: A little  Basic Mobility - Total Score: 20      OP EDUCATION:  Outpatient Education  Individual(s) Educated: Patient  Education Provided: Fall Risk, Other (Importance of PREs)    Encounter Problems       Encounter Problems (Active)       Mobility       Patient will propel wheelchair 150 feet with two turns on even surface with independent assist to facilitate safe mobility.   (Met)       Start:  06/17/24    Expected End:  07/01/24    Resolved:  06/24/24         Patient will amb 125 feet with least restrictive device including two turns on even surface with independent assist to facilitate safe mobility.  (Progressing)       Start:  06/17/24    Expected End:  07/01/24            Patient will increase bilateral LE strength to 4+/5 to improve functional mobility.    (Progressing)       Start:  06/17/24    Expected End:  07/01/24               PT Transfers       Patient will transfer supine to sit and sit to supine with independent assist to facilitate mobility.  (Met)       Start:  06/17/24    Expected End:  07/01/24    Resolved:  06/24/24         Patient will transfer sit to stand and stand to sit with  independent assist to facilitate mobility.  (Progressing)       Start:  06/17/24    Expected End:  07/01/24            Patient will transfer bed to chair and chair to bed with independent assist to facilitate mobility.  (Progressing)       Start:  06/17/24    Expected End:  07/01/24               Pain - Adult

## 2024-06-24 NOTE — CARE PLAN
Problem: Mobility  Goal: Patient will propel wheelchair 150 feet with two turns on even surface with independent assist to facilitate safe mobility.    Outcome: Met  Goal: Patient will amb 125 feet with least restrictive device including two turns on even surface with independent assist to facilitate safe mobility.   Outcome: Progressing  Goal: Patient will increase bilateral LE strength to 4+/5 to improve functional mobility.     Outcome: Progressing     Problem: PT Transfers  Goal: Patient will transfer supine to sit and sit to supine with independent assist to facilitate mobility.   Outcome: Met  Goal: Patient will transfer sit to stand and stand to sit with  independent assist to facilitate mobility.   Outcome: Progressing  Goal: Patient will transfer bed to chair and chair to bed with independent assist to facilitate mobility.   Outcome: Progressing

## 2024-06-24 NOTE — PROGRESS NOTES
" Xiomy Rubio is a 89 y.o. female on day 8 of admission presenting with Anxiety.      Subjective   Chart reviewed and case discussed with multidisciplinary treatment team.    Per staff, patient has shown improvement in participation with groups and noted decrease in anxious behavior. However, today when interviewing patient she developed increase in anxious behavior, noted tremor and expressed worry about \"what did I do wrong?\", briefly wailing. After walking to the rocking chairs and placed in more quiet setting, patient de-escalated. She did not require a dose of PRN ativan. She was reminded of relaxing techniques, demonstrated the techniques, and stated \"that was helpful\". She stated she's here in the hospital for her anxiety and is able to recall what she had for breakfast and lunch. Zoloft 25mg continues (increased from 12.5mg on 6/22).       Objective   Last Recorded Vitals  Blood pressure 153/77, pulse 71, temperature 36.6 °C (97.9 °F), temperature source Temporal, resp. rate 16, height 1.676 m (5' 6\"), weight 65.4 kg (144 lb 2.9 oz), SpO2 95%.    Review of Systems   All other systems reviewed and are negative.      Psychiatric ROS - Adult  Anxiety: General Anxiety Disorder (YAMILETH)YAMILETH Behaviors: difficult to control worry, excessive anxiety/worry, and restlessness  Depression: concentration, tearfulness, and withdrawn  Delirium: negative  Psychosis: negative  Sunni: negative  Safety Issues:  not documented, patient unwilling or unable to respond directly to questions asked however during her episode today stated \"Ramana is going to be mad at me!\"  Psychiatric ROS Comment: as noted    Physical Exam  Vitals and nursing note reviewed.   Pulmonary:      Effort: Pulmonary effort is normal.   Skin:     General: Skin is warm.   Neurological:      Mental Status: She is alert.           Mental Status Exam  General Appearance: NAD. Appropriately groomed, in home clothes  Gait/Station: ambulating slowly with " "walker  Speech: clear, conversational volume, limited overall spontaneity  Mood: \"am I in trouble?  Affect: anxious  Thought Process:  illogical with concern related to hard of hearing. when spontaneous, is linear and off topic  Thought Associations: No loosening of associations  Thought Content:  some paranoia of treatment team, other patients. acute anxiety  Perception: No perceptual abnormalities noted  Level of Consciousness: Alert  Orientation: Oriented to person  Attention and Concentration:  hard of hearing in discussion with illogical responses  Recent Memory: Intact as evidenced by ability to recall details from the past 24 hours   Remote Memory: Intact as evidenced by ability to recall previous medical issues   Executive function: Intact  Language: Naming intact  Fund of knowledge: Good  Insight: Limited, as evidenced by hard of hearing status and limited treatment team engagement, aware of anxiety   Judgment: Limited, as evidence by inability to reason through medical decision making (although improved today, pt was able to calm her anxious feelings with proper relaxation techniques)    Psychiatric Risk Assessment  Violence Risk Assessment: major mental illness, paranoia  Acute Risk of Harm to Others is Considered: moderate   Suicide Risk Assessment: age > 65 yrs old, , current psychiatric illness, feelings of hopelessness, living alone or lack of social support, severe anxiety, and unmarried  Protective Factors against Suicide: Judaism affiliation/spirituality  Acute Risk of Harm to Self is Considered: moderate    Relevant Results     No results found for this or any previous visit (from the past 96 hour(s)).        Assessment/Plan   Principal Problem:    Anxiety  Active Problems:    Arthritis pain, hip    Bilateral hearing loss      89-year-old female admitted from the community after presenting to [facility] with panic like symptoms. Complicated by she is very hard of hearing with limited " engagement in encounters despite hearing aid use, concerns for paranoia when experiencing heightened levels of anxiety (severe hearing loss possible contributor).  Emergency room documentation reporting son serving as primary collateral. Collateral by son contributed primary anxiety, panic like disorder symptoms.  Discussion regarding retitration of Zoloft, pt and son agreeable and it was started 6/19 low dose with very slow up-titration.  Pt continues to require hospitalization for safety stabilization and monitoring at this time..       1. Biological -   - continue buspar 10 mg tid for anxiety (may be able to taper and discontinue in community after demonstrating stability with zoloft titration)  - continue Zoloft 25mg. Increase 6/25 to 37.5mg. Continue to titrate to target dose of 50-100mg, as tolerated and beneficial by not more than 12.5 mg/3-4 days.  - continue ativan 0.25 mg q6h prn anxiety  - encourage non-pharmacological coping mechanisms  - ensure hearing aides remained adequately charged    2. Psychological -   -  Patient is encouraged to participate with the therapeutic milieu and program and group therapy      3. Sociological -   - Patient encouraged to cooperate with social work staff on issues relevant to discharge planning          I personally spent 60 minutes today providing care for this patient, including preparation, face to face time, documentation and other services such as review of medical records, diagnostic result, patient education, counseling, coordination of care as specified in the encounter. Discussion with patient regarding risk benefits and alternatives and side effects with opportunity to ask questions and questions answered.  Patient given consent to the plan as noted.    Luis Schaefer, APRN-CNP, AGPCNP-BC, PMHNP-BC  Psychiatry, [facility]  1* contact: [employer] preferred

## 2024-06-24 NOTE — NURSING NOTE
LOGAN NOTE     Problem:  Anxiety     Behavior:  Patient is in patient’s room laying in bed sleeping. Upon being woken up by this nurse, patient is pleasant and calm. Patient’s affect is broad range. Patient is talkative. Patient maintains good eye contact.    Group Participation: N/A  Appetite/Meals: N/A       Interventions:  This nurse completed a shift assessment and administered patient's scheduled night time medications.    Response:  Patient remained pleasant and calm and was cooperative throughout this shift assessment and medication administration.     Plan:  Continue to monitor for anxiety. Continue to monitor for patient safety.

## 2024-06-24 NOTE — GROUP NOTE
Group Topic: Goals   Group Date: 6/23/2024  Start Time: 2001  End Time: 2012  Facilitators: Jessi Thompson   Department: [facility]     Number of Participants: 5   Group Focus: goals  Treatment Modality: Other: PCA  Interventions utilized were group exercise and reminiscence  Purpose: feelings, self-worth, self-care, and relapse prevention strategies    Name: Xiomy Rubio YOB: 1931   MR: [id number]      Facilitator: [facility]  Level of Participation: did not attend  Quality of Participation:  did not attend  Interactions with others:  did not attend  Mood/Affect:  did not attend  Triggers (if applicable): N/A  Cognition:  did not attend  Progress: Other  Comments: Pt problem is anxiety. Pt declined the invitation to attend group.  Plan: continue with services

## 2024-06-24 NOTE — NURSING NOTE
LOGAN NOTE     Problem:  anxiety     Behavior:  Pt became anxious after lunch but was redirected with breathing exercises and redirection. No PRN's needed  Group Participation: yes  Appetite/Meals: 100x3       Interventions:  Advised to seek staff with any issues, pt does come to nurses when she feels anxious    Response:  Comes to staff when feeling overwhelmed     Plan:  Will continue to monitor

## 2024-06-24 NOTE — GROUP NOTE
Group Topic: Self-Care/Wellness   Group Date: 6/24/2024  Start Time: 1015  End Time: 1120  Facilitators: JESSICA Herbert   Department: [facility]    Number of Participants: 8   Group Focus: other Positive Steps for Well Being  Treatment Modality: Other: Recreation Therapy  Interventions utilized were exploration, group exercise, patient education, problem solving, and support  Purpose: coping skills, maladaptive thinking, feelings, irrational fears, communication skills, insight or knowledge, self-worth, self-care, relapse prevention strategies, and trigger / craving management    Name: Xiomy Rubio YOB: 1931   MR: [id number]      Facilitator: Recreational Therapist  Level of Participation: moderate  Quality of Participation: appropriate/pleasant, attentive, and cooperative  Interactions with others: appropriate and gave feedback  Mood/Affect: appropriate and brightens with interaction  Triggers (if applicable): n/a  Cognition: coherent/clear  Progress: Moderate  Comments: pt problem is anxiety.  Pt joins group with little encouragement.  Pt displays cooperative and calm behaviors and appears to hear and focus better today.  Is able to hear some questions and follow along with group topic better.  No anxiety noted.  Plan: continue with services

## 2024-06-24 NOTE — PROGRESS NOTES
[facility]: [facility]  PROGRESS NOTE      Xiomy Rubio is a 89 y.o. female that is being seen  today for follow-up at [facility].  Subjective   Patient is being seen for follow-up at [facility].  Patient has been anxious.  Able to walk with a walker.  Vital signs stable.  Discussed with the nurse no acute medical issues.      ROS  Negative for fever or chills  Negative for sore throat, ear pain, nasal discharge  Negative for cough, shortness of breath or wheezing  Negative for chest pain, palpitations, swelling of legs  Negative for abdominal pain, constipation, diarrhea, blood in the stools  Negative for urinary complaints  Negative for headache, dizziness, weakness or numbness  Negative for joint pain  Positive for anxiety  All other systems reviewed and were negative   Vitals:    06/24/24 0500   BP: 153/77   Pulse: 71   Resp: 16   Temp: 36.6 °C (97.9 °F)   SpO2: 95%      Vitals:    06/19/24 0539   Weight: 65.4 kg (144 lb 2.9 oz)     Body mass index is 23.27 kg/m².  Physical Exam  Constitutional: Patient does not appear to be in any acute distress  Head and Face: NCAT  Eyes: Normal external exam, EOMI  ENT: Normal external inspection of ears and nose. Oropharynx normal.  Cardiovascular: RRR, S1/S2, no murmurs, rubs, or gallops, radial pulses +2, no edema of extremities  Pulmonary: CTAB, no respiratory distress.  Abdomen: +BS, soft, non-tender, nondistended, no guarding or rebound, no masses noted  MSK: No joint swelling, normal movements of all extremities. Range of motion- normal.  Skin- No lesions, contusions, or erythema.  Peripheral puslses palpable bilaterally 2+  Neuro: AAO X3, Cranial nerves 2-12 grossly intact,DTR 2+ in all 4 limbs   Psychiatric: Judgment intact. Appropriate mood and behavior    LABS   [unfilled]  Lab Results   Component Value Date    GLUCOSE 104 (H) 06/16/2024    CALCIUM 9.3 06/16/2024     06/16/2024    K 4.2 06/16/2024    CO2 25 06/16/2024      06/16/2024    BUN 22 06/16/2024    CREATININE 0.70 06/16/2024     Lab Results   Component Value Date    ALT 11 06/16/2024    AST 16 06/16/2024    ALKPHOS 56 06/16/2024    BILITOT 1.0 06/16/2024     Lab Results   Component Value Date    WBC 10.2 06/16/2024    HGB 15.1 06/16/2024    HCT 45.6 06/16/2024    MCV 89 06/16/2024     06/16/2024     Lab Results   Component Value Date    CHOL 187 06/17/2024    CHOL 215 (H) 01/03/2022    CHOL 196 09/07/2021     Lab Results   Component Value Date    HDL 50.0 (L) 06/17/2024    HDL 51.8 01/03/2022    HDL 53.3 09/07/2021     Lab Results   Component Value Date    LDLCALC 117 06/17/2024     Lab Results   Component Value Date    TRIG 101 06/17/2024    TRIG 143 01/03/2022    TRIG 116 09/07/2021     Lab Results   Component Value Date    HGBA1C 5.7 (H) 11/20/2023     Other labs not included in the list above were reviewed either before or during this encounter.    History    Past Medical History:   Diagnosis Date    Anxiety     Memory loss     Other conditions influencing health status 11/24/2017    History of cough    Pain in joints of unspecified hand 01/05/2016    Pain, hand joint    Personal history of diseases of the skin and subcutaneous tissue 11/24/2017    History of cellulitis    Personal history of Methicillin resistant Staphylococcus aureus infection 11/24/2017    History of methicillin resistant Staphylococcus aureus infection    Personal history of other diseases of the respiratory system 12/05/2017    History of acute bronchitis    Personal history of other specified conditions 05/04/2017    History of dizziness    Personal history of pneumonia (recurrent)     History of pneumonia     Past Surgical History:   Procedure Laterality Date    OTHER SURGICAL HISTORY  08/29/2017    Hip Surgery Left     Family History   Problem Relation Name Age of Onset    No Known Problems Mother      No Known Problems Father       Allergies   Allergen Reactions    Adhesive Tape-Silicones  Unknown    Other Unknown     tape    Cortisone Rash    Penicillin Rash    Prednisone Rash    Zoloft [Sertraline] Anxiety and GI Upset     Related to previous starting dose of 50 mg.  Will reintroduce at lower dose and titrate slowly.     No current facility-administered medications on file prior to encounter.     Current Outpatient Medications on File Prior to Encounter   Medication Sig Dispense Refill    acetaminophen (Tylenol) 325 mg tablet Take 1-2 tablets (325-650 mg) by mouth every 6 hours if needed.      busPIRone (Buspar) 10 mg tablet Take 1 tablet (10 mg) by mouth 3 times a day. 90 tablet 2    calcium carbonate 600 mg calcium (1,500 mg) tablet Take 1 tablet (1,500 mg) by mouth 2 times a day.      calcium carbonate-vitamin D3 500 mg-5 mcg (200 unit) tablet Take by mouth twice a day.      ferrous sulfate 325 (65 Fe) MG tablet Take by mouth every other day.      latanoprost (Xalatan) 0.005 % ophthalmic solution Administer 1 drop into affected eye(s) once daily at bedtime.      pantoprazole (ProtoNix) 20 mg EC tablet Take 1 tablet (20 mg) by mouth once daily.       Immunization History   Administered Date(s) Administered    Flu vaccine, quadrivalent, high-dose, preservative free, age 65y+ (FLUZONE) 10/24/2020, 09/30/2021    Influenza, seasonal, injectable 10/15/2022    Pfizer COVID-19 vaccine, Fall 2023, 12 years and older, (30mcg/0.3mL) 10/27/2023    Pfizer Purple Cap SARS-CoV-2 01/28/2021, 02/19/2021, 10/04/2021, 09/24/2022    Pneumococcal conjugate vaccine, 13-valent (PREVNAR 13) 05/07/2018    Pneumococcal polysaccharide vaccine, 23-valent, age 2 years and older (PNEUMOVAX 23) 10/06/2009, 07/16/2020    SARS-CoV-2, Unspecified 04/30/2022     Patient's medical history was reviewed and updated either before or during this encounter.  ASSESSMENT / PLAN:  Active Hospital Problems    *Anxiety      Arthritis pain, hip      Bilateral hearing loss       Patient has anxiety and needs redirection.  Patient has been  using nishant.      Kashif Caldera MD

## 2024-06-24 NOTE — GROUP NOTE
Group Topic: Other   Group Date: 6/24/2024  Start Time: 1330  End Time: 1420  Facilitators: JESSICA Herbert   Department: [facility]    Number of Participants: 6   Group Focus: other Leisure Awareness Game  Treatment Modality: Other: Recreation Therapy  Interventions utilized were exploration, leisure development, and mental fitness  Purpose: coping skills, trigger / craving management, and other: elevate mood, increase socialization, enhance self esteem    Name: Xiomy Rubio YOB: 1931   MR: [id number]      Facilitator: Recreational Therapist  Level of Participation: active  Quality of Participation: appropriate/pleasant, attentive, and cooperative  Interactions with others: appropriate and gave feedback  Mood/Affect: appropriate and bright  Triggers (if applicable): n/a  Cognition: coherent/clear  Progress: Significant  Comments: pt problem is anxiety.  Pt continues to display cooperative, calm and appropriate participation in group.  Able to focus and hear staff and peers during group game.   Plan: continue with services

## 2024-06-24 NOTE — PROGRESS NOTES
"Nutrition Follow up Note    Nutrition Assessment      PO intake continues to be good    Nutrition History:     Energy Intake: Good > 75 %  Food Allergies/Intolerances:  None  GI Symptoms: None  Oral Problems: Chewing difficulty    Anthropometrics:  Ht: 167.6 cm (5' 6\"), Wt: 65.4 kg (144 lb 2.9 oz), BMI: 23.28  IBW/kg (Dietitian Calculated): 59 kg  Percent of IBW: 114 %       Weight Change:  Daily Weight  06/19/24 : 65.4 kg (144 lb 2.9 oz)  06/16/24 : 67.6 kg (149 lb 0.5 oz)  05/21/24 : 67.2 kg (148 lb 3.2 oz)  04/30/24 : 67.8 kg (149 lb 6.4 oz)  04/23/24 : 67.5 kg (148 lb 12.8 oz)  04/16/24 : 67.9 kg (149 lb 9.6 oz)  04/09/24 : 68.5 kg (151 lb)  01/09/24 : 67.1 kg (147 lb 13.9 oz)  11/20/23 : 68.9 kg (152 lb)  07/17/23 : 64.4 kg (142 lb)                 Nutrition Focused Physical Exam Findings:         Edema  Edema: none     Nutrition Significant Labs:  Lab Results   Component Value Date    WBC 10.2 06/16/2024    HGB 15.1 06/16/2024    HCT 45.6 06/16/2024     06/16/2024    CHOL 187 06/17/2024    TRIG 101 06/17/2024    HDL 50.0 (L) 06/17/2024    ALT 11 06/16/2024    AST 16 06/16/2024     06/16/2024    K 4.2 06/16/2024     06/16/2024    CREATININE 0.70 06/16/2024    BUN 22 06/16/2024    CO2 25 06/16/2024    TSH 2.81 11/20/2023    INR 1.1 05/10/2023    GLUF 111 (H) 06/17/2024    HGBA1C 5.7 (H) 11/20/2023     Nutrition Specific Medications:  busPIRone, 10 mg, oral, TID  calcium carbonate, 1,250 mg, oral, BID  calcium carbonate-vitamin D3, 1 tablet, oral, BID  ferrous sulfate (325 mg ferrous sulfate), 65 mg of iron, oral, Every other day  latanoprost, 1 drop, ophthalmic (eye), Nightly  melatonin, 3 mg, oral, Daily  pantoprazole, 20 mg, oral, Daily  polyethylene glycol, 17 g, oral, Daily  sertraline, 25 mg, oral, Daily         Dietary Orders (From admission, onward)       Start     Ordered    06/16/24 2058  Adult diet Regular; Minced/moist food 5  Diet effective now        Question Answer Comment   Diet " type Regular    Texture Minced/moist food 5        06/16/24 2057                     Estimated Needs:   Estimated Energy Needs  Total Energy Estimated Needs (kCal): 1675 kCal  Total Estimated Energy Need per Day (kCal/kg): 25 kCal/kg  Method for Estimating Needs: actual wt    Estimated Protein Needs  Total Protein Estimated Needs (g): 67 g  Total Protein Estimated Needs (g/kg): 1 g/kg  Method for Estimating Needs: actual wt    Estimated Fluid Needs  Total Fluid Estimated Needs (mL): 1675 mL  Total Fluid Estimated Needs (mL/kg): 1 mL/kg  Method for Estimating Needs: 1ml/kcal      Nutrition Diagnosis   Nutrition Diagnosis:  Malnutrition Diagnosis  Patient has Malnutrition Diagnosis: No    Nutrition Diagnosis  Patient has Nutrition Diagnosis: No     Nutrition Interventions/Recommendations   Nutrition Interventions and Recommendations:    Nutrition Prescription:  Individualized Nutrition Prescription Provided for : 1675 calories, 67gm protein    Nutrition Interventions:   Food and/or Nutrient Delivery Interventions  Interventions: Meals and snacks  Meals and Snacks: Texture-modified diet  Goal: Provide diet as ordered    Education Documentation  No documentation found.       Nutrition Monitoring and Evaluation   Monitoring/Evaluation:   Food/Nutrient Related History Monitoring  Monitoring and Evaluation Plan: Energy intake  Energy Intake: Estimated energy intake  Criteria: Pt will consume >/=75^% estimated energy needs    Body Composition/Growth/Weight History  Monitoring and Evaluation Plan: Weight  Weight: Measured weight  Criteria: Pt will maintain wt           Time Spent/Follow-up:   Follow Up  Time Spent (min): 20 minutes  Last Date of Nutrition Visit: 06/24/24  Nutrition Follow-Up Needed?: 7-10 days  Follow up Comment: 7/2/24

## 2024-06-25 PROCEDURE — 97116 GAIT TRAINING THERAPY: CPT | Mod: GP

## 2024-06-25 PROCEDURE — 2500000002 HC RX 250 W HCPCS SELF ADMINISTERED DRUGS (ALT 637 FOR MEDICARE OP, ALT 636 FOR OP/ED): Performed by: STUDENT IN AN ORGANIZED HEALTH CARE EDUCATION/TRAINING PROGRAM

## 2024-06-25 PROCEDURE — 97110 THERAPEUTIC EXERCISES: CPT | Mod: GP

## 2024-06-25 PROCEDURE — 1140000001 HC PRIVATE PSYCH ROOM DAILY

## 2024-06-25 PROCEDURE — 2500000001 HC RX 250 WO HCPCS SELF ADMINISTERED DRUGS (ALT 637 FOR MEDICARE OP): Performed by: STUDENT IN AN ORGANIZED HEALTH CARE EDUCATION/TRAINING PROGRAM

## 2024-06-25 PROCEDURE — 2500000004 HC RX 250 GENERAL PHARMACY W/ HCPCS (ALT 636 FOR OP/ED): Performed by: STUDENT IN AN ORGANIZED HEALTH CARE EDUCATION/TRAINING PROGRAM

## 2024-06-25 PROCEDURE — 2500000002 HC RX 250 W HCPCS SELF ADMINISTERED DRUGS (ALT 637 FOR MEDICARE OP, ALT 636 FOR OP/ED): Performed by: NURSE PRACTITIONER

## 2024-06-25 ASSESSMENT — PAIN - FUNCTIONAL ASSESSMENT
PAIN_FUNCTIONAL_ASSESSMENT: 0-10

## 2024-06-25 ASSESSMENT — COGNITIVE AND FUNCTIONAL STATUS - GENERAL
MOBILITY SCORE: 20
CLIMB 3 TO 5 STEPS WITH RAILING: A LITTLE
WALKING IN HOSPITAL ROOM: A LITTLE
MOVING TO AND FROM BED TO CHAIR: A LITTLE
STANDING UP FROM CHAIR USING ARMS: A LITTLE

## 2024-06-25 ASSESSMENT — COLUMBIA-SUICIDE SEVERITY RATING SCALE - C-SSRS
6. HAVE YOU EVER DONE ANYTHING, STARTED TO DO ANYTHING, OR PREPARED TO DO ANYTHING TO END YOUR LIFE?: NO
2. HAVE YOU ACTUALLY HAD ANY THOUGHTS OF KILLING YOURSELF?: NO
6. HAVE YOU EVER DONE ANYTHING, STARTED TO DO ANYTHING, OR PREPARED TO DO ANYTHING TO END YOUR LIFE?: NO
1. SINCE LAST CONTACT, HAVE YOU WISHED YOU WERE DEAD OR WISHED YOU COULD GO TO SLEEP AND NOT WAKE UP?: NO
2. HAVE YOU ACTUALLY HAD ANY THOUGHTS OF KILLING YOURSELF?: NO
1. SINCE LAST CONTACT, HAVE YOU WISHED YOU WERE DEAD OR WISHED YOU COULD GO TO SLEEP AND NOT WAKE UP?: NO

## 2024-06-25 ASSESSMENT — PAIN SCALES - GENERAL
PAINLEVEL_OUTOF10: 0 - NO PAIN

## 2024-06-25 NOTE — GROUP NOTE
Group Topic: Self-Care/Wellness   Group Date: 6/25/2024  Start Time: 1100  End Time: 1145  Facilitators: JESSICA Herbert   Department: [facility] REH[facility]IRT[facility]    Number of Participants: 7   Group Focus: other Tickling Your FunnyBone  Treatment Modality: Other: Recreation Therapy  Interventions utilized were exploration, group exercise, patient education, problem solving, and support  Purpose: coping skills, maladaptive thinking, feelings, irrational fears, communication skills, insight or knowledge, self-worth, self-care, relapse prevention strategies, and trigger / craving management    Name: Xiomy Rubio YOB: 1931   MR: [id number]      Facilitator: Recreational Therapist  Level of Participation: active  Quality of Participation: appropriate/pleasant, attentive, and cooperative  Interactions with others: appropriate and gave feedback  Mood/Affect: appropriate and bright  Triggers (if applicable): n/a  Cognition: coherent/clear  Progress: Significant  Comments: pt problem is anxiety.  Pt joins group with little encouragement.  Is able to focus on group with little difficulty and displays cooperative and calm behaviors.  No anxiety noted.  Responses are appropriate and related to questions asked.    Plan: continue with services

## 2024-06-25 NOTE — NURSING NOTE
"LOGAN NOTE     Problem:  Anxiety     Behavior:  Pt OOR sitting in group room during snack time. Pleasant, behavior in control. Pt showered this evening, cooperative with care. Compliant with meds. When asked about her anxiety, pt giggles and states \"I've been blowing out the candles\" (referencing her deep breathing coping skill).   Group Participation: n/a  Appetite/Meals: HS snack provided     Interventions:  1:1 provided. Evening meds administered per orders. Encouraged pt to make needs known.    Response:  Pt in room, preparing for sleep. No s/s of distress noted.     Plan:  Will continue to monitor behaviors and effectiveness of interventions while maintaining pt safety.    "

## 2024-06-25 NOTE — PROGRESS NOTES
"Xiomy Rubio is a 89 y.o. female on day 9 of admission presenting with Anxiety.      Subjective   Chart reviewed and case discussed with multidisciplinary treatment team.     Per staff, patient slept six hours. Had an anxious episode this morning however with positive reinforcement pt was able to calm herself down. Pt states she feels the medication is helping her, she was able to say she feels \"less nervous\" but still notes some intermittent tremor. When working with therapies, she did require brief reassurance that she is doing things right. Staff has not noticed any panic like symptoms. Pt denies side effect or intolerance to increased dose of Zoloft.      Objective   Last Recorded Vitals  Blood pressure 159/69, pulse 74, temperature 36.4 °C (97.5 °F), temperature source Temporal, resp. rate 17, height 1.676 m (5' 6\"), weight 65.4 kg (144 lb 2.9 oz), SpO2 96%.    Review of Systems   All other systems reviewed and are negative.      Psychiatric ROS - Adult  Anxiety: General Anxiety Disorder (YAMILETH)YAMILETH Behaviors: difficult to control worry, excessive anxiety/worry, and restlessness    Depression: concentration impaired  Delirium: negative  Psychosis: negative  Sunni: negative  Safety Issues:  patient denies  Psychiatric ROS Comment: as noted    Physical Exam  Vitals and nursing note reviewed.   Pulmonary:      Effort: Pulmonary effort is normal.   Skin:     General: Skin is warm.   Neurological:      Mental Status: She is alert.           Mental Status Exam  General Appearance: NAD. Appropriately groomed, in home clothes  Gait/Station: ambulating slowly with walker, gait steady  Speech: clear, conversational volume, limited overall spontaneity  Mood: \"good today!\"  Affect: mood congruent, smiling more  Thought Process:  illogical mainly with concern related to severe hard of hearing. When she comprehends, her thought process is linear  Thought Associations: No loosening of associations  Thought Content:  some " paranoia of treatment team, other patients - notice an improvement today  Perception: No perceptual abnormalities noted  Level of Consciousness: Alert  Orientation: Oriented to person and place  Attention and Concentration:  sustained when she is able to hear conversation  Recent Memory: Intact as evidenced by ability to recall details from the past 24 hours   Remote Memory: Intact as evidenced by ability to recall previous medical issues, past trial of zoloft  Executive function: Intact  Language: Naming intact  Fund of knowledge: Good  Insight: Limited    Judgment: Limited, but improvement with decision making and ability to reason    Psychiatric Risk Assessment  Violence Risk Assessment: major mental illness, paranoia  Acute Risk of Harm to Others is Considered: moderate   Suicide Risk Assessment: age > 65 yrs old, , current psychiatric illness, feelings of hopelessness, living alone or lack of social support, severe anxiety, and unmarried  Protective Factors against Suicide: Tenriism affiliation/spirituality  Acute Risk of Harm to Self is Considered: moderate    Relevant Results     No results found for this or any previous visit (from the past 96 hour(s)).    Assessment/Plan   Principal Problem:    Anxiety  Active Problems:    Arthritis pain, hip    Bilateral hearing loss    89-year-old female admitted from the community after presenting to [facility] with panic like symptoms. Complicated by she is very hard of hearing with limited engagement in encounters despite hearing aid use, concerns for paranoia when experiencing heightened levels of anxiety (severe hearing loss possible contributor).  Emergency room documentation reporting son serving as primary collateral. Collateral by son contributed primary anxiety, panic like disorder symptoms.  Discussion regarding retitration of Zoloft, pt and son agreeable and it was started 6/19 low dose with very slow up-titration.  Pt continues to require  hospitalization for safety stabilization and monitoring at this time.       1. Biological -   - continue buspar 10 mg tid for anxiety (may be able to taper and discontinue in community after demonstrating stability with zoloft titration)  - continue Zoloft 25mg. Increased 6/25 to 37.5mg. Continue to titrate to target dose of 50-100mg, as tolerated and beneficial by not more than 12.5 mg/3-4 days.  - continue ativan 0.25 mg q6h prn anxiety - last required 6/20  - encourage non-pharmacological coping mechanisms, positive reinforcement  - ensure hearing aides remained adequately charged    2. Psychological -   -  Patient is encouraged to participate with the therapeutic milieu and program and group therapy      3. Sociological -   - Patient encouraged to cooperate with social work staff on issues relevant to discharge planning    I personally spent 25 minutes today providing care for this patient, including preparation, face to face time, documentation and other services such as review of medical records, diagnostic result, patient education, counseling, coordination of care as specified in the encounter. Discussion with patient regarding risk benefits and alternatives and side effects with opportunity to ask questions and questions answered.  Patient given consent to the plan as noted.    Luis Schaefer, FRANKY-CNP, AGPCNP-BC, PMHNP-BC  Psychiatry, [facility]  1* contact: [facility] preferred

## 2024-06-25 NOTE — PROGRESS NOTES
Physical Therapy    Physical Therapy Treatment    Patient Name: Xiomy Rubio  MRN: [id number]  Today's Date: 6/25/2024  Time Calculation  Start Time: 1131  Stop Time: 1157  Time Calculation (min): 26 min    Assessment/Plan   PT Assessment  PT Assessment Results: Decreased strength, Decreased endurance, Impaired balance, Decreased mobility, Decreased cognition, Impaired vision, Impaired hearing, Impaired sensation  Rehab Prognosis: Good  End of Session Patient Position: Up in chair  PT Plan  Inpatient/Swing Bed or Outpatient: Inpatient  PT Plan  Treatment/Interventions: Transfer training, Gait training, Strengthening, Therapeutic exercise, Therapeutic activity  PT Plan: Ongoing PT  PT Frequency: 3 times per week  PT Discharge Recommendations: Low intensity level of continued care  Equipment Recommended upon Discharge: Wheeled walker  PT Recommended Transfer Status: Stand by assist  PT - OK to Discharge: Yes      General Visit Information:   PT  Visit  PT Received On: 06/25/24  Response to Previous Treatment: Patient with no complaints from previous session.  General  Reason for Referral: Impaired Balance, weakness  Referred By: Dr Smith  Past Medical History Relevant to Rehab: anxiety, panic attacks, HTN, thoracic compression fractue of indeterminent age, fractue left hip with surgical repair, arthritis  Patient Position Received: Up in chair (in tv room/group therapy.)  General Comment: 89 yo female admitted for treatment of a panic attack was cleared by nursing for therapy and agreeable to same.    Subjective   Precautions:  Precautions  Hearing/Visual Limitations: glasses,  still [place] even with hearing aides  Medical Precautions: Fall precautions  Vital Signs:       Objective   Pain:  Pain Assessment  Pain Assessment: 0-10  0-10 (Numeric) Pain Score: 0 - No pain  Cognition:  Cognition  Orientation Level: Disoriented to place, Disoriented to situation    Activity Tolerance:  Activity Tolerance  Endurance:  Endurance does not limit participation in activity  Treatments:  Therapeutic Exercise  Therapeutic Exercise Activity 1: standing heel raises 2 x 15  Therapeutic Exercise Activity 2: standing hip flex 2 x 15  Therapeutic Exercise Activity 3: standing hip abd 2 x 15  Therapeutic Exercise Activity 4: standing hip ext 1 x 15  Therapeutic Exercise Activity 5: standing hamstring curls 1 x 15    Ambulation/Gait Training 1  Surface 1: Level tile  Device 1: Rolling walker  Assistance 1: Distant supervision  Quality of Gait 1: Decreased step length, Diminished heel strike  Comments/Distance (ft) 1: 100' x 3 slightly unsteady on turns but no LOB.  Transfer 1  Technique 1: Sit to stand, Stand to sit  Transfer Device 1: Walker  Transfer Level of Assistance 1: Modified independent    Outcome Measures:  [facility] Basic Mobility  Turning from your back to your side while in a flat bed without using bedrails: None  Moving from lying on your back to sitting on the side of a flat bed without using bedrails: None  Moving to and from bed to chair (including a wheelchair): A little  Standing up from a chair using your arms (e.g. wheelchair or bedside chair): A little  To walk in hospital room: A little  Climbing 3-5 steps with railing: A little  Basic Mobility - Total Score: 20    Education Documentation  Precautions, taught by Rachelle Casey PT at 6/25/2024 11:53 AM.  Learner: Patient  Readiness: Acceptance  Method: Explanation  Response: Needs Reinforcement    Mobility Training, taught by Rachelle Casey, PT at 6/25/2024 11:53 AM.  Learner: Patient  Readiness: Acceptance  Method: Explanation  Response: Needs Reinforcement    Education Comments  No comments found.        OP EDUCATION:       Encounter Problems       Encounter Problems (Active)       Mobility       Patient will propel wheelchair 150 feet with two turns on even surface with independent assist to facilitate safe mobility.   (Met)       Start:  06/17/24    Expected  End:  07/01/24    Resolved:  06/24/24         Patient will amb 125 feet with least restrictive device including two turns on even surface with independent assist to facilitate safe mobility.  (Progressing)       Start:  06/17/24    Expected End:  07/01/24            Patient will increase bilateral LE strength to 4+/5 to improve functional mobility.    (Progressing)       Start:  06/17/24    Expected End:  07/01/24               PT Transfers       Patient will transfer supine to sit and sit to supine with independent assist to facilitate mobility.  (Met)       Start:  06/17/24    Expected End:  07/01/24    Resolved:  06/24/24         Patient will transfer sit to stand and stand to sit with  independent assist to facilitate mobility.  (Met)       Start:  06/17/24    Expected End:  07/01/24    Resolved:  06/25/24         Patient will transfer bed to chair and chair to bed with independent assist to facilitate mobility.  (Progressing)       Start:  06/17/24    Expected End:  07/01/24               Pain - Adult

## 2024-06-25 NOTE — GROUP NOTE
Group Topic: Reminiscence   Group Date: 6/25/2024  Start Time: 1330  End Time: 1430  Facilitators: JESSICA Herbert   Department: [facility]    Number of Participants: 8   Group Focus: other [employer] Game  Treatment Modality: Other: Recreation Therapy  Interventions utilized were exploration, mental fitness, reminiscence, and story telling  Purpose: feelings, self-worth, self-care, and other: elevate mood, increase socialization, enhance self esteem    Name: Xiomy Rubio YOB: 1931   MR: [id number]      Facilitator: Recreational Therapist  Level of Participation: moderate  Quality of Participation: appropriate/pleasant, attentive, and cooperative  Interactions with others: appropriate and gave feedback  Mood/Affect: appropriate and bright  Triggers (if applicable): n/a  Cognition: coherent/clear  Progress: Significant  Comments: pt problem is anxiety.  Pt joins group after having a rest after lunch.  Pleasant, cooperative and calm with no signs of anxiety noted.  Is able to hear questions and responds appropriate with staff and peers.  Plan: continue with services

## 2024-06-25 NOTE — PROGRESS NOTES
"Social Work Note    HALEY called pt's PCP office to reschedule pt's appointment. Pt is scheduled to see Dr. Hernandez on 7/8/24 at 3:20 pm for hospital discharge appointment.     HALEY called son to discuss pt's progress and to address any concerns. TATIANA-S informed him that pt is has a potential discharge date set for Friday. Son shared some concerns due to   \"Yesterday she was little confused and out of sorts. Hopefully she will settle down a bit with her medications\" and son shared that he was able to speak with the provider last night and his understanding was that pt would be here into next week. TATIANA-S will call son back with more updates once a discharge date is confirmed.   "

## 2024-06-25 NOTE — GROUP NOTE
Group Topic: Goals   Group Date: 6/25/2024  Start Time: 0730  End Time: 0800  Facilitators: Arti Castillo   Department: [facility]    Number of Participants: 7   Group Focus: goals  Treatment Modality: Psychoeducation  Interventions utilized were support  Purpose: coping skills    Name: Xiomy Rubio YOB: 1931   MR: [id number]      Facilitator: Mental Health [facility]  Level of Participation: active  Quality of Participation: appropriate/pleasant  Interactions with others: appropriate  Mood/Affect: appropriate  Triggers (if applicable): NONE  Cognition: coherent/clear  Progress: Gaining insight or knowledge  Comments: NONE  Plan: continue with services

## 2024-06-25 NOTE — GROUP NOTE
Group Topic: Medication Education   Group Date: 6/25/2024  Start Time: 1000  End Time: 1050  Facilitators: Julissa Qureshi PharmD   Department: [facility]    Number of Participants: 7   Group Focus: daily focus and other general medication educaiton  Treatment Modality: Skills Training  Interventions utilized were group exercise and other Game: [employer]  Purpose: insight or knowledge and other: improved medication compliance/understanding     Name: Xiomy Rubio YOB: 1931   MR: [id number]      Facilitator: Pharmacist  Level of Participation: withdrawn  Quality of Participation: quiet, removed from group, withdrawn, and unable to follow along with group activity  Interactions with others: appropriate  Mood/Affect: incongruent and confused  Triggers (if applicable): n/a  Cognition: confused, no insight, and tracking difficulty  Progress: None  Comments: Xiomy Rubio attended the general medication education group today. We played [employer].  We went around the the room choosing categories and each time that it was Xiomy Rubio's turn we had to skip her as she was confused and unable to follow the Jeopard[employer] game  Xiomy Rubio was quiet and smiley and kept trying to make eye contact but was unable to participate in group.  She did stay the entire time.     Plan: continue with services

## 2024-06-26 PROCEDURE — 2500000001 HC RX 250 WO HCPCS SELF ADMINISTERED DRUGS (ALT 637 FOR MEDICARE OP): Performed by: STUDENT IN AN ORGANIZED HEALTH CARE EDUCATION/TRAINING PROGRAM

## 2024-06-26 PROCEDURE — 2500000002 HC RX 250 W HCPCS SELF ADMINISTERED DRUGS (ALT 637 FOR MEDICARE OP, ALT 636 FOR OP/ED): Performed by: NURSE PRACTITIONER

## 2024-06-26 PROCEDURE — 2500000002 HC RX 250 W HCPCS SELF ADMINISTERED DRUGS (ALT 637 FOR MEDICARE OP, ALT 636 FOR OP/ED): Performed by: STUDENT IN AN ORGANIZED HEALTH CARE EDUCATION/TRAINING PROGRAM

## 2024-06-26 PROCEDURE — 2500000004 HC RX 250 GENERAL PHARMACY W/ HCPCS (ALT 636 FOR OP/ED): Performed by: STUDENT IN AN ORGANIZED HEALTH CARE EDUCATION/TRAINING PROGRAM

## 2024-06-26 PROCEDURE — 1140000001 HC PRIVATE PSYCH ROOM DAILY

## 2024-06-26 ASSESSMENT — COLUMBIA-SUICIDE SEVERITY RATING SCALE - C-SSRS
2. HAVE YOU ACTUALLY HAD ANY THOUGHTS OF KILLING YOURSELF?: NO
1. SINCE LAST CONTACT, HAVE YOU WISHED YOU WERE DEAD OR WISHED YOU COULD GO TO SLEEP AND NOT WAKE UP?: NO
6. HAVE YOU EVER DONE ANYTHING, STARTED TO DO ANYTHING, OR PREPARED TO DO ANYTHING TO END YOUR LIFE?: NO
6. HAVE YOU EVER DONE ANYTHING, STARTED TO DO ANYTHING, OR PREPARED TO DO ANYTHING TO END YOUR LIFE?: NO
2. HAVE YOU ACTUALLY HAD ANY THOUGHTS OF KILLING YOURSELF?: NO
1. SINCE LAST CONTACT, HAVE YOU WISHED YOU WERE DEAD OR WISHED YOU COULD GO TO SLEEP AND NOT WAKE UP?: NO

## 2024-06-26 ASSESSMENT — PAIN SCALES - GENERAL
PAINLEVEL_OUTOF10: 0 - NO PAIN
PAINLEVEL_OUTOF10: 0 - NO PAIN

## 2024-06-26 ASSESSMENT — PAIN - FUNCTIONAL ASSESSMENT: PAIN_FUNCTIONAL_ASSESSMENT: 0-10

## 2024-06-26 NOTE — PROGRESS NOTES
"Xiomy Rubio is a 89 y.o. female on day 10 of admission presenting with Anxiety.      Subjective   Chart reviewed and case discussed with multidisciplinary treatment team.     Per staff, patient slept 6 to 7 hours uninterrupted. PO intake good. No repeat episodes of high anxiety. Pt endorses she feels improvement with the medication, is noted to be smiling more, more active with groups. Updated her son and daughter yesterday.      Objective   Last Recorded Vitals  Blood pressure 158/78, pulse 66, temperature 36.7 °C (98.1 °F), temperature source Temporal, resp. rate 17, height 1.676 m (5' 6\"), weight 64.8 kg (142 lb 13.7 oz), SpO2 97%.    Review of Systems   All other systems reviewed and are negative.      Psychiatric ROS - Adult  Anxiety: General Anxiety Disorder (YAMILETH)YAMILETH Behaviors: difficult to control worry, excessive anxiety/worry, and restlessness  - improving  Depression: concentration impaired - improving  Delirium: negative  Psychosis: negative  Sunni: negative  Safety Issues: patient denies  Psychiatric ROS Comment: as noted    Physical Exam  Vitals and nursing note reviewed.   Pulmonary:      Effort: Pulmonary effort is normal.   Skin:     General: Skin is warm.   Neurological:      Mental Status: She is alert.           Mental Status Exam  General Appearance: NAD. Appropriately groomed, in home clothes  Gait/Station: ambulating slowly with walker, gait steady  Speech: clear, conversational volume, limited overall spontaneity  Mood: \"good!\"  Affect: mood congruent, smiling more  Thought Process: linear  Thought Associations: No loosening of associations  Thought Content:  appropriate. No paranoia noted today  Perception: No perceptual abnormalities noted  Level of Consciousness: Alert  Orientation: Oriented to person and place  Attention and Concentration:  sustained when she is able to hear conversation  Recent Memory: Intact as evidenced by ability to recall details from the past 24 hours   Remote " Memory: Intact as evidenced by ability to recall previous medical issues, past trial of zoloft  Executive function: Intact  Language: Naming intact  Fund of knowledge: Good  Insight: Limited    Judgment: Limited, but improvement with decision making and ability to reason    Psychiatric Risk Assessment  Violence Risk Assessment: major mental illness, paranoia  Acute Risk of Harm to Others is Considered: low  Suicide Risk Assessment: age > 65 yrs old, , current psychiatric illness, feelings of hopelessness, living alone or lack of social support, severe anxiety, and unmarried  Protective Factors against Suicide: Scientologist affiliation/spirituality  Acute Risk of Harm to Self is Considered: moderate    Relevant Results     No results found for this or any previous visit (from the past 96 hour(s)).    Assessment/Plan   Principal Problem:    Anxiety  Active Problems:    Arthritis pain, hip    Bilateral hearing loss    89-year-old female admitted from the community after presenting to [facility] with panic like symptoms. Complicated by she is very hard of hearing with limited engagement in encounters despite hearing aid use, concerns for paranoia when experiencing heightened levels of anxiety (severe hearing loss possible contributor).  Emergency room documentation reporting son serving as primary collateral. Collateral by son contributed primary anxiety, panic like disorder symptoms.  Discussion regarding retitration of Zoloft, pt and son agreeable and it was started 6/19 low dose with very slow up-titration. Overall symptoms improving with Zoloft therapy, no intolerance or side-effect thus far. Pt continues to require hospitalization for safety stabilization and monitoring at this time.       1. Biological -   - continue buspar 10 mg tid for anxiety (may be able to taper and discontinue in community after demonstrating stability with zoloft titration)  - continue Zoloft 37.5mg (increased from 25mg 6/25). Continue to  titrate to target dose of 50-100mg, as tolerated and beneficial by not more than 12.5 mg/3-4 days.  - continue ativan 0.25 mg q6h prn anxiety - last required 6/20  - encourage non-pharmacological coping mechanisms, positive reinforcement  - ensure hearing aides remained adequately charged    2. Psychological -   -  Patient is encouraged to participate with the therapeutic milieu and program and group therapy      3. Sociological -   - Patient encouraged to cooperate with social work staff on issues relevant to discharge planning    I personally spent 25 minutes today providing care for this patient, including preparation, face to face time, documentation and other services such as review of medical records, diagnostic result, patient education, counseling, coordination of care as specified in the encounter. Discussion with patient regarding risk benefits and alternatives and side effects with opportunity to ask questions and questions answered.  Patient given consent to the plan as noted.    Luis Schaefer APRN-CNP, AGPCNP-BC, PMHNP-BC  Psychiatry, [facility]  1* contact: [employer] preferred

## 2024-06-26 NOTE — NURSING NOTE
LOGAN NOTE     Problem:  Anxiety     Behavior:  Pt OOR briefly this evening, walking in hallway. Pleasant, behavior in control. Pt reports she had a good day visited by her son and daughter. Compliant with meds, cooperative with care. No s/s of anxiety noted this evening.  Group Participation: Did not attend, resting in bed  Appetite/Meals: Declined evening snack     Interventions:  1:1 provided with positive reinforcement. Evening meds given per orders. Encouraged pt to make needs known.    Response:  Pt calm, resting in bed at this time.     Plan:  Will continue to monitor behaviors and effectiveness of interventions while maintaining pt safety.

## 2024-06-26 NOTE — GROUP NOTE
Group Topic: Goals   Group Date: 6/25/2024  Start Time: 2018  End Time: 2030  Facilitators: Jessi Thompson   Department: [facility]    Number of Participants: 5   Group Focus: goals  Treatment Modality: Other: PCA  Interventions utilized were group exercise and reminiscence  Purpose: feelings, self-worth, self-care, and relapse prevention strategies    Name: Xiomy Rubio YOB: 1931   MR: [id number]      Facilitator: Mental Health PCNA  Level of Participation: did not attend  Quality of Participation:  did not attend  Interactions with others:  did not attend  Mood/Affect:  did not attend  Triggers (if applicable): N/A  Cognition:  did not attend  Progress: Other  Comments: Pt problem is anxiety. Pt declined the invitation to attend group.  Plan: continue with services

## 2024-06-26 NOTE — CARE PLAN
The patient's goals for the shift include participate in group    The clinical goals for the shift include Maintain safety, promote sleep    Over the shift, the patient did make progress toward the following goals. Barriers to progression include need for gentle reminders to practice breathing techniques. Recommendations to address these barriers include continue to encourage.    Problem: Safety - Adult  Goal: Free from fall injury  Outcome: Progressing     Problem: Discharge Planning  Goal: Discharge to home or other facility with appropriate resources  Outcome: Progressing     Problem: Anxiety  Goal: Verbalizes ways to manage anxiety  Outcome: Progressing     Problem: Anxiety  Goal: Implements measures to reduce anxiety  Outcome: Progressing

## 2024-06-26 NOTE — NURSING NOTE
BIRP NOTE     Problem:  anxiety     Behavior:  Pt happy and in good spirits all day.  Group Participation: group unavailable today  Appetite/Meals: % for all meals       Interventions:  Maintain pt safety and encourage participation when possible    Response:  Pt receptive and pleasant     Plan:  Continue current plan of care

## 2024-06-27 PROCEDURE — 2500000002 HC RX 250 W HCPCS SELF ADMINISTERED DRUGS (ALT 637 FOR MEDICARE OP, ALT 636 FOR OP/ED): Performed by: STUDENT IN AN ORGANIZED HEALTH CARE EDUCATION/TRAINING PROGRAM

## 2024-06-27 PROCEDURE — 99232 SBSQ HOSP IP/OBS MODERATE 35: CPT | Performed by: REGISTERED NURSE

## 2024-06-27 PROCEDURE — 2500000002 HC RX 250 W HCPCS SELF ADMINISTERED DRUGS (ALT 637 FOR MEDICARE OP, ALT 636 FOR OP/ED): Performed by: NURSE PRACTITIONER

## 2024-06-27 PROCEDURE — 2500000004 HC RX 250 GENERAL PHARMACY W/ HCPCS (ALT 636 FOR OP/ED): Performed by: STUDENT IN AN ORGANIZED HEALTH CARE EDUCATION/TRAINING PROGRAM

## 2024-06-27 PROCEDURE — 2500000001 HC RX 250 WO HCPCS SELF ADMINISTERED DRUGS (ALT 637 FOR MEDICARE OP): Performed by: STUDENT IN AN ORGANIZED HEALTH CARE EDUCATION/TRAINING PROGRAM

## 2024-06-27 PROCEDURE — 1140000001 HC PRIVATE PSYCH ROOM DAILY

## 2024-06-27 ASSESSMENT — PAIN SCALES - GENERAL
PAINLEVEL_OUTOF10: 0 - NO PAIN
PAINLEVEL_OUTOF10: 0 - NO PAIN

## 2024-06-27 ASSESSMENT — PAIN - FUNCTIONAL ASSESSMENT
PAIN_FUNCTIONAL_ASSESSMENT: 0-10
PAIN_FUNCTIONAL_ASSESSMENT: 0-10

## 2024-06-27 NOTE — NURSING NOTE
LOGAN NOTE     Problem:  Anxiety     Behavior:  Patient has been anxious and tearful at times.  She lays in bed wringing her hands and crying.  She tells this RN that she is scared.    Group Participation: no group  Appetite/Meals: good         Interventions:  Give scheduled medications, assist patient to deep breathe, give PRN medications, every 15 minute checks, encourage patient to come out of her room.    Response:  Patient has been calm this afternoon.  She has been out of her room on and off this afternoon.     Plan:  Give scheduled medications, assist patient to deep breathe, give PRN medications, every 15 minute checks, encourage patient to come out of her room.

## 2024-06-27 NOTE — NURSING NOTE
"MARKP NOTE     Problem:  Anxiety     Behavior:  Pt easily awakens for med pass. Worrisome expression noted, pt states she is scared asking if it is still \"busy\" out in the milieu. Pleasant, easily calms with distraction and reassurance. Pt reports she had a good day with a visit from her son. Compliant with meds, cooperative with care.   Group Participation: n/a  Appetite/Meals: Declined evening snack     Interventions:  1:1 provided. Evening meds given per orders. Encouraged to use call light for needs. Walker within reach.    Response:  Pt calm, resting in bed. No s/s of distress noted.     Plan:  Will continue to monitor behaviors and effectiveness of interventions while maintaining pt safety.    "

## 2024-06-27 NOTE — PROGRESS NOTES
"Xiomy Rubio is a 89 y.o. female on day 11 of admission presenting with Anxiety.      Subjective   Chart reviewed and case discussed with multidisciplinary treatment team.     Per staff, patient slept 6 to 7 hours uninterrupted. PO intake good. No repeat episodes of high anxiety/panic attacks. Pt endorses she feels improvement with the medication, is noted to be smiling more, more active with groups. Updated her son and daughter yesterday. Possible discharge tomorrow      Objective   Last Recorded Vitals  Blood pressure 160/83, pulse 62, temperature 36.7 °C (98.1 °F), temperature source Oral, resp. rate 18, height 1.676 m (5' 6\"), weight 64.8 kg (142 lb 13.7 oz), SpO2 95%.    Review of Systems   All other systems reviewed and are negative.      Psychiatric ROS - Adult  Anxiety: General Anxiety Disorder (YAMILETH)YAMILETH Behaviors: difficult to control worry, excessive anxiety/worry, and restlessness  - improving  Depression: concentration impaired - improving  Delirium: negative  Psychosis: negative  Sunni: negative  Safety Issues: patient denies  Psychiatric ROS Comment: as noted    Physical Exam  Vitals and nursing note reviewed.   Pulmonary:      Effort: Pulmonary effort is normal.   Skin:     General: Skin is warm.   Neurological:      Mental Status: She is alert.           Mental Status Exam  General Appearance: NAD. Appropriately groomed, in home clothes  Gait/Station: ambulating slowly with walker, gait steady  Speech: clear, conversational volume, limited overall spontaneity  Mood: \"good!\"  Affect: mood congruent, smiling more  Thought Process: linear  Thought Associations: No loosening of associations  Thought Content:  appropriate. No paranoia noted today  Perception: No perceptual abnormalities noted  Level of Consciousness: Alert  Orientation: Oriented to person and place  Attention and Concentration:  sustained when she is able to hear conversation  Recent Memory: Intact as evidenced by ability to recall " details from the past 24 hours   Remote Memory: Intact as evidenced by ability to recall previous medical issues, past trial of zoloft  Executive function: Intact  Language: Naming intact  Fund of knowledge: Good  Insight: Limited    Judgment: Limited, but improvement with decision making and ability to reason    Psychiatric Risk Assessment  Violence Risk Assessment: major mental illness, paranoia  Acute Risk of Harm to Others is Considered: low  Suicide Risk Assessment: age > 65 yrs old, , current psychiatric illness, feelings of hopelessness, living alone or lack of social support, severe anxiety, and unmarried  Protective Factors against Suicide: Adventism affiliation/spirituality  Acute Risk of Harm to Self is Considered: moderate    Relevant Results     No results found for this or any previous visit (from the past 96 hour(s)).    Assessment/Plan   Principal Problem:    Anxiety  Active Problems:    Arthritis pain, hip    Bilateral hearing loss    89-year-old female admitted from the community after presenting to [facility] with panic like symptoms. Complicated by she is very hard of hearing with limited engagement in encounters despite hearing aid use, concerns for paranoia when experiencing heightened levels of anxiety (severe hearing loss possible contributor).  Emergency room documentation reporting son serving as primary collateral. Collateral by son contributed primary anxiety, panic like disorder symptoms.  Discussion regarding retitration of Zoloft, pt and son agreeable and it was started 6/19 low dose with very slow up-titration. Overall symptoms improving with Zoloft therapy, no intolerance or side-effect thus far. Pt continues to require hospitalization for safety stabilization and monitoring at this time.       1. Biological -   - continue buspar 10 mg tid for anxiety (may be able to taper and discontinue in community after demonstrating stability with zoloft titration)  - continue Zoloft 37.5mg  (increased from 25mg 6/25). Continue to titrate to target dose of 50-100mg, as tolerated and beneficial by not more than 12.5 mg/3-4 days.  - continue ativan 0.25 mg q6h prn anxiety - last required 6/20  - encourage non-pharmacological coping mechanisms, positive reinforcement  - ensure hearing aides remained adequately charged  Possible discharge tomorrow  2. Psychological -   -  Patient is encouraged to participate with the therapeutic milieu and program and group therapy      3. Sociological -   - Patient encouraged to cooperate with social work staff on issues relevant to discharge planning    I personally spent 25 minutes today providing care for this patient, including preparation, face to face time, documentation and other services such as review of medical records, diagnostic result, patient education, counseling, coordination of care as specified in the encounter. Discussion with patient regarding risk benefits and alternatives and side effects with opportunity to ask questions and questions answered.  Patient given consent to the plan as noted.    Luis Schaefer, APRN-CNP, AGPCNP-BC, PMHNP-BC  Psychiatry, [facility]  1* contact: [employer] preferred

## 2024-06-27 NOTE — PROGRESS NOTES
LSW spoke with pt's son Ramana regarding pt's discharge tomorrow and if there were any questions or concerns. Pt's son just has some general medication questions that will be reviewed with nurse upon pt's discharge. LSW discussed outpatient follow up with pt's son who reports that pt attends [facility]. LSW contacted the [facility] and was informed that pt only receives counseling services. LSW scheduled pt an appt with Counselor Hu James for 7/5/24 at 4m. As well LSW scheduled pt a medication management appt for 7/15/24 at 1:15pm. Pt has pre-scheduled appt with PCP Lian Hernandez on 7/2/24 at 3:20pm. Pt's son will  pt tomorrow at 4pm and transport home.     MARCE Galvan

## 2024-06-27 NOTE — PROGRESS NOTES
LSW spoke with son while on unit to discuss outpatient follow up appt and planned discharge for tomorrow. Pt's son is agreeable to pt being discharged tomorrow.     MARCE Galvan

## 2024-06-28 VITALS
SYSTOLIC BLOOD PRESSURE: 165 MMHG | BODY MASS INDEX: 22.96 KG/M2 | TEMPERATURE: 97.2 F | OXYGEN SATURATION: 99 % | HEIGHT: 66 IN | RESPIRATION RATE: 18 BRPM | HEART RATE: 68 BPM | WEIGHT: 142.86 LBS | DIASTOLIC BLOOD PRESSURE: 95 MMHG

## 2024-06-28 PROCEDURE — 99238 HOSP IP/OBS DSCHRG MGMT 30/<: CPT | Performed by: REGISTERED NURSE

## 2024-06-28 PROCEDURE — 2500000002 HC RX 250 W HCPCS SELF ADMINISTERED DRUGS (ALT 637 FOR MEDICARE OP, ALT 636 FOR OP/ED): Performed by: NURSE PRACTITIONER

## 2024-06-28 PROCEDURE — 2500000001 HC RX 250 WO HCPCS SELF ADMINISTERED DRUGS (ALT 637 FOR MEDICARE OP): Performed by: STUDENT IN AN ORGANIZED HEALTH CARE EDUCATION/TRAINING PROGRAM

## 2024-06-28 PROCEDURE — 2500000002 HC RX 250 W HCPCS SELF ADMINISTERED DRUGS (ALT 637 FOR MEDICARE OP, ALT 636 FOR OP/ED): Performed by: STUDENT IN AN ORGANIZED HEALTH CARE EDUCATION/TRAINING PROGRAM

## 2024-06-28 RX ORDER — SERTRALINE HYDROCHLORIDE 25 MG/1
37.5 TABLET, FILM COATED ORAL DAILY
Qty: 45 TABLET | Refills: 0 | Status: SHIPPED | OUTPATIENT
Start: 2024-06-29 | End: 2024-07-29

## 2024-06-28 RX ORDER — FERROUS SULFATE 325(65) MG
65 TABLET ORAL EVERY OTHER DAY
Qty: 15 TABLET | Refills: 0 | Status: SHIPPED | OUTPATIENT
Start: 2024-06-29 | End: 2024-07-29

## 2024-06-28 RX ORDER — POLYETHYLENE GLYCOL 3350 17 G/17G
17 POWDER, FOR SOLUTION ORAL DAILY
Qty: 30 PACKET | Refills: 0 | Status: SHIPPED | OUTPATIENT
Start: 2024-06-29 | End: 2024-07-29

## 2024-06-28 RX ORDER — FERROUS SULFATE, DRIED 160(50) MG
1 TABLET, EXTENDED RELEASE ORAL 2 TIMES DAILY
Qty: 60 TABLET | Refills: 0 | Status: SHIPPED | OUTPATIENT
Start: 2024-06-28 | End: 2024-07-28

## 2024-06-28 RX ORDER — BUSPIRONE HYDROCHLORIDE 10 MG/1
10 TABLET ORAL 3 TIMES DAILY
Qty: 90 TABLET | Refills: 0 | Status: SHIPPED | OUTPATIENT
Start: 2024-06-28 | End: 2024-07-28

## 2024-06-28 RX ORDER — TALC
3 POWDER (GRAM) TOPICAL NIGHTLY
Qty: 30 TABLET | Refills: 0 | Status: SHIPPED | OUTPATIENT
Start: 2024-06-28 | End: 2024-07-28

## 2024-06-28 RX ORDER — PANTOPRAZOLE SODIUM 20 MG/1
20 TABLET, DELAYED RELEASE ORAL DAILY
Qty: 30 TABLET | Refills: 0 | Status: SHIPPED | OUTPATIENT
Start: 2024-06-28 | End: 2024-07-28

## 2024-06-28 RX ORDER — CALCIUM CARBONATE 500(1250)
1250 TABLET ORAL 2 TIMES DAILY
Qty: 60 TABLET | Refills: 0 | Status: SHIPPED | OUTPATIENT
Start: 2024-06-28 | End: 2024-07-02 | Stop reason: WASHOUT

## 2024-06-28 ASSESSMENT — PAIN SCALES - GENERAL
PAINLEVEL_OUTOF10: 0 - NO PAIN
PAINLEVEL_OUTOF10: 0 - NO PAIN

## 2024-06-28 ASSESSMENT — COLUMBIA-SUICIDE SEVERITY RATING SCALE - C-SSRS
1. SINCE LAST CONTACT, HAVE YOU WISHED YOU WERE DEAD OR WISHED YOU COULD GO TO SLEEP AND NOT WAKE UP?: NO
6. HAVE YOU EVER DONE ANYTHING, STARTED TO DO ANYTHING, OR PREPARED TO DO ANYTHING TO END YOUR LIFE?: NO
2. HAVE YOU ACTUALLY HAD ANY THOUGHTS OF KILLING YOURSELF?: NO

## 2024-06-28 ASSESSMENT — PAIN - FUNCTIONAL ASSESSMENT
PAIN_FUNCTIONAL_ASSESSMENT: 0-10
PAIN_FUNCTIONAL_ASSESSMENT: 0-10

## 2024-06-28 NOTE — GROUP NOTE
Group Topic: Excercise/Physical    Group Date: 6/28/2024  Start Time: 1300  End Time: 1400  Facilitators: Cinthya Whalen RN   Department: [facility]    Number of Participants: 6   Group Focus: communication, concentration, leisure skills, self-esteem, and social skills  Treatment Modality: Other: RN group  Interventions utilized were group exercise and problem solving  Purpose: self-worth and self-care    Name: Xiomy Rubio YOB: 1931   MR: [id number]      Facilitator: Registered Nurse  Level of Participation: did not attend  Quality of Participation:   did not attend  Interactions with others:   did not attend  Mood/Affect:  did not attend  Triggers (if applicable): na  Cognition:   did not attend  Progress: None  Comments: did not attend  Plan: continue with services

## 2024-06-28 NOTE — DISCHARGE SUMMARY
Admit Date: 6/16/24  Discharge Date: 6/28/24    Reason For Admission:   The reason for admission includes: anxiety, anxiety attacks, fearfulness, increased irritability, poor concentration, and tearfulness.  Onset of symptoms was gradual starting 3 months ago with gradually worsening course since that time. Psychosocial Stressors: family.        Discharge Diagnosis  Anxiety    Issues Requiring Follow-Up  Patient will follow up with PCP for medical issues. He will followup with psychiatry for counseling and med management    Test Results Pending At Discharge  Pending Labs       No current pending labs.            Hospital Course   Xiomy Rubio is a 89 y.o. female presenting with anxiety from [facility].     Patient brought in by ambulance to [facility] emergency department on June 16 described as having a panic attack at [facility] with hyperventilation with a history of severe anxiety and being treated by her outpatient provider.     Associated symptoms were noted to be chest pain and shortness of breath and a general feeling of not being well.  Patient in the emergency department reporting though that the acute symptoms were improved by that time.  Patient described as reporting being afraid not knowing why she was afraid.  Patient denying thoughts of harming self or others and also denying hallucinosis.     Patient's family was with her in the emergency department and provided most of the history as documented in the emergency department notes.  The family requesting assessment for potential geriatric psychiatric placement related to her anxiety.     After medical clearance, emergency psychiatric Access team assessed the patient with similar description as above.  Patient continued to deny thoughts of harming self and others and hallucinosis.  Patient was deemed to be a low risk for suicide on the Smyth scale.  Patient observed as presenting with extreme anxiety with tearfulness while talking about symptoms reporting  increasing anxiety during the encounter with patient questioning what is wrong with her.  It is noted the patient is hard of hearing patient's son was providing collateral during the assessment.  Patient's son reporting the patient has had anxiety symptoms chronically but worsening over the past month.  Patient has been treated in the outpatient setting with BuSpar with dose adjustments which have largely been unhelpful.  Patient was to follow up with community mental health provider for psychiatry services and was due to be again seen this coming week.  It is noted though the patient had not yet seen a psychiatrist at this agency.  Patient endorsing constant worry about leaving the stove on and burning the house down and being fearful of daughter abandoning her.  Patient is described as experiencing panic during the encounter.  It was noted the patient was asked if she felt safe returning home from the emergency department setting to which patient responded that she did not know and that she continued to feel worried about being at home.  It was thus decided that inpatient admission for stabilization was the next best option.     This morning, patient is in bed in no acute distress in a supine position and appearing to be resting comfortably.  Social work and this provider approach patient for discussion.  Patient immediately reports that she has difficulty hearing.  Despite multiple attempts at leaning close to patient and speaking loudly to interact with her, patient continued to report she was having difficulty hearing.  At this time, social work and provider switched from trying to speak with patient to write down questions for her to answer.  As patient appeared to be reading the papers, she responded that she did not want to get her son into any trouble and multiple attempts were made to ask patient if she would allow us to speak with her son and she questioned the purpose of this appearing to become paranoid  and more disengaged changing her posture to 1 of apprehension.  Ultimately, limited content was available during this encounter with patient who appeared paranoid and anxious.  Patient became more disengaged throughout the duration of the encounter.  When asked about having thoughts of harming herself or others, patient reported that she was having difficulty hearing.  To most questions asked, patient either responded that she was having difficulty hearing or appeared to misunderstand the content being presented to her and responded in a more paranoid, concerned, and worried manner.    Patient was admitted with sigsn  and symptoms og significant anxiety. She had not tolerated zoloft in past when started at higher dose so zoloft was titrated slowly and patien tappeared to tolerate well- she was discharged on 37.5mg every day for anxiety and depression and buspar 10mg tid for anxiety.  Patient had difficulty with hearing aids while on unit but attended all groups and participated. Her episodes of anxiety decreased during her stay and she was significantly less anxious on discharge. She will follow up with [facility] for counseling and medication management.    Pertinent Physical Exam At Time of Discharge  Physical Exam  Per medical team: Patient is being seen for follow-up at [facility]. Patient has been anxious. Able to walk with a walker. Vital signs stable. Discussed with the nurse no acute medical issues.     Scheduled medications  busPIRone, 10 mg, oral, TID  calcium carbonate, 1,250 mg, oral, BID  calcium carbonate-vitamin D3, 1 tablet, oral, BID  ferrous sulfate (325 mg ferrous sulfate), 65 mg of iron, oral, Every other day  latanoprost, 1 drop, ophthalmic (eye), Nightly  melatonin, 3 mg, oral, Daily  pantoprazole, 20 mg, oral, Daily  polyethylene glycol, 17 g, oral, Daily  sertraline, 37.5 mg, oral, Daily      Continuous medications     PRN medications  PRN medications: acetaminophen, LORazepam, QUEtiapine  "**OR** OLANZapine   Mental Status Exam  General Appearance: NAD. Appropriately groomed, in home clothes  Gait/Station: ambulating slowly with walker, gait steady  Speech: clear, conversational volume, limited overall spontaneity  Mood: \"good!\"  Affect: mood congruent, smiling more  Thought Process: linear  Thought Associations: No loosening of associations  Thought Content:  appropriate. No paranoia noted today, denies SI/HI  Perception: No perceptual abnormalities noted  Level of Consciousness: Alert  Orientation: Oriented to person and place  Attention and Concentration:  sustained when she is able to hear conversation  Recent Memory: Intact as evidenced by ability to recall details from the past 24 hours   Remote Memory: Intact as evidenced by ability to recall previous medical issues, past trial of zoloft  Executive function: Intact  Language: Naming intact  Fund of knowledge: Good  Insight: Limited    Judgment: Limited, but improvement with decision making and ability to reason  Risk Assessment at Discharge:  Violence Risk Assessment: major mental illness, paranoia  Acute Risk of Harm to Others is Considered: low  Suicide Risk Assessment: age > 65 yrs old, , current psychiatric illness, feelings of hopelessness, living alone or lack of social support, severe anxiety, and unmarried  Protective Factors against Suicide: Catholic affiliation/spirituality  Acute Risk of Harm to Self is Considered: moderate     Outpatient Appointments/Follow-Ups  Future Appointments   Date Time Provider Department [place]   7/2/2024  3:20 PM Lian Hernandez DO [id number] [facility]   7/22/2024 11:20 AM Lian Hernandez DO [id number] [facility]     Hu Baca   [address] [place] [address]   [place] [address]  [phone]  Go on 7/5/2024  attend Counseling appt with provider at 4pm    KENDRA Shen  [address] [place] [address]   [place] [address]   [phone]  Go on 7/15/2024  Attend appt for Medication Management with provider at " 1:15pm.    Lian Hernandez, DO  [address]  [address]  [facility] [address]  [phone]            Lexis Ontiveros, FRANKY-CNS

## 2024-06-28 NOTE — CARE PLAN
The patient's goals for the shift include go home    The clinical goals for the shift include medication compliance    Over the shift, the patient did not make progress toward the following goals. Barriers to progression include some physical limitations. Recommendations to address these barriers include pacing, education, & positive encouragement.      Problem: Safety - Adult  Goal: Free from fall injury  Outcome: Progressing

## 2024-06-28 NOTE — NURSING NOTE
"Discharge Nursing Note    Discharge date: 06/28/24    Discharge time: 5:07 PM      Discharged to:  home    Transportation provided by:  son    Responsible person notified of discharge/transfer?  Include name of person if answering \"YES\"  Yes - sin    Patient left with all belongings:  Yes    Patient left with discharge medication list:  Yes    Patient left with discharge instructions:  Yes    AVS/Continuing Care Plan discussed with patient and copy given to either patient or handed to medical transport for discharges to facilities:  Yes    Other concerns at discharge:  none    Presentation on discharge:  Pleasantly confused   "

## 2024-06-28 NOTE — GROUP NOTE
Group Topic: Music Therapy   Group Date: 6/28/2024  Start Time: 1000  End Time: 1100  Facilitators: Niki Murphy   Department: [facility] EXPRESSIVE THER VIRTUAL    Number of Participants: 9   Group Focus: clarity of thought, communication/socialization, concentration, expressive outlet, leisure skills, music therapy, opportunity for choice/control, problem solving, reality orientation, and social skills  Treatment Modality: Music Therapy  Interventions Utilized were: education/instruction, leisure development, passive music engagement, problem solving, reality testing, and sharing/discussion      Name: Xiomy Rubio YOB: 1931   MR: [id number]      Level of Participation: withdrawn  Quality of Participation: appropriate/pleasant, quiet, and withdrawn  Interactions with others: appropriate  Mood/Affect: positive  Cognition, Pre Treatment: confused and not focused  Cognition, Post Treatment: confused and not focused  Progress: Minimal  Plan: continue with services

## 2024-06-28 NOTE — NURSING NOTE
LOGAN NOTE     Problem:  Anxiety     Behavior:  Pt is calm, resting in her bed on approach. She is compliant with her hs meds.      Interventions:  No interventions needed at this time.    Response:  No change.     Plan:  Continue to monitor for anxiety.

## 2024-07-01 ENCOUNTER — PATIENT OUTREACH (OUTPATIENT)
Dept: PRIMARY CARE | Facility: CLINIC | Age: 89
End: 2024-07-01
Payer: MEDICARE

## 2024-07-01 NOTE — PROGRESS NOTES
Discharge Facility:  [facility]  Discharge Diagnosis: Anxiety  Admission Date: 6/16/2024  Discharge Date: 6/28/2024    PCP Appointment Date: 7/2/2024 3:20 PM  Specialist Appointment Date: 7/5/2024 4:00 PM Hu James [facility],  7/15/2024 1:15 PM Rosio Phelps CNP Medication Management  Hospital Encounter and Summary: Linked     **Post discharge assessment deferred. Patient has a follow up within 2 business days of hospital discharge.

## 2024-07-02 ENCOUNTER — APPOINTMENT (OUTPATIENT)
Dept: PRIMARY CARE | Facility: CLINIC | Age: 89
End: 2024-07-02
Payer: MEDICARE

## 2024-07-02 VITALS
BODY MASS INDEX: 23.46 KG/M2 | SYSTOLIC BLOOD PRESSURE: 126 MMHG | OXYGEN SATURATION: 96 % | HEART RATE: 78 BPM | DIASTOLIC BLOOD PRESSURE: 72 MMHG | WEIGHT: 146 LBS | HEIGHT: 66 IN

## 2024-07-02 DIAGNOSIS — Z09 HOSPITAL DISCHARGE FOLLOW-UP: Primary | ICD-10-CM

## 2024-07-02 DIAGNOSIS — F41.9 ANXIETY: ICD-10-CM

## 2024-07-02 PROCEDURE — 1111F DSCHRG MED/CURRENT MED MERGE: CPT | Performed by: STUDENT IN AN ORGANIZED HEALTH CARE EDUCATION/TRAINING PROGRAM

## 2024-07-02 PROCEDURE — 1123F ACP DISCUSS/DSCN MKR DOCD: CPT | Performed by: STUDENT IN AN ORGANIZED HEALTH CARE EDUCATION/TRAINING PROGRAM

## 2024-07-02 PROCEDURE — 1157F ADVNC CARE PLAN IN RCRD: CPT | Performed by: STUDENT IN AN ORGANIZED HEALTH CARE EDUCATION/TRAINING PROGRAM

## 2024-07-02 PROCEDURE — 99496 TRANSJ CARE MGMT HIGH F2F 7D: CPT | Performed by: STUDENT IN AN ORGANIZED HEALTH CARE EDUCATION/TRAINING PROGRAM

## 2024-07-02 ASSESSMENT — PATIENT HEALTH QUESTIONNAIRE - PHQ9
2. FEELING DOWN, DEPRESSED OR HOPELESS: SEVERAL DAYS
SUM OF ALL RESPONSES TO PHQ9 QUESTIONS 1 AND 2: 1
1. LITTLE INTEREST OR PLEASURE IN DOING THINGS: NOT AT ALL

## 2024-07-02 NOTE — PROGRESS NOTES
Subjective   Patient ID: Xiomy Rubio is a 89 y.o. female who presents for Hospital Follow-up (Pt is here for a hospital follow up from [facility].).    HPI   Patient presents to the office today for hospital discharge follow-up from [facility].  Patient was attending her community [employer] when she started to have panic attack symptoms.  Patient was transferred to the local hospital and admitted to the psychiatry service for anxiety for 10 days.    There was concerns of paranoia.    Patient was restarted on Zoloft at 25 mg on 6/25 and this was increased to 37.5 mg.  Patient was also started on Ativan 0.25 mg every 6 while in house.  She did not need this after 6/20 of admission.     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 Fearful for their safety.    Plan for both psychology and psychiatry appointments scheduled in the next week.   Trial of melotonin for sleep     Objective   Physical Exam  Vitals reviewed.   Constitutional:       Appearance: Normal appearance.   Cardiovascular:      Rate and Rhythm: Normal rate and regular rhythm.      Heart sounds: No murmur heard.  Pulmonary:      Effort: Pulmonary effort is normal. No respiratory distress.      Breath sounds: Normal breath sounds. No wheezing.   Musculoskeletal:      Cervical back: Neck supple.      Left lower leg: No edema.   Neurological:      Mental Status: She is alert.         Assessment/Plan   Diagnoses and all orders for this visit:  Hospital discharge follow-up  Anxiety    Hospital discharge follow-up  Reviewed patient notes, imaging and notes from her admission as well as her stay in [facility]  Patient was released on Zoloft 37.5 mg with the anticipation to increase to 50 mg.  Patient has been referred to [facility] for her psychiatry care.  She is currently also receiving psychology services at this center.  Patient has an appointment set up and son is  aware of her new appointments.  Patient is continuing to be mildly paranoid and very anxious at the visit today noticeably crying at times when talking about her experience.  We will follow-up in 1 month          Lian Hernandez DO 07/30/24 9:08 PM

## 2024-07-03 ENCOUNTER — PATIENT OUTREACH (OUTPATIENT)
Dept: PRIMARY CARE | Facility: CLINIC | Age: 89
End: 2024-07-03
Payer: MEDICARE

## 2024-07-03 NOTE — PROGRESS NOTES
Call regarding appt with Dr. Hernandez on 7/2/2024 after hospitalization. Spoke with patient's son. Per son, the patient is doing much better. No questions regarding appt. Next follow up is with counselor. Explained TCM program and provided contact info for future questions or concerns.

## 2024-07-08 ENCOUNTER — APPOINTMENT (OUTPATIENT)
Dept: PRIMARY CARE | Facility: CLINIC | Age: 89
End: 2024-07-08
Payer: MEDICARE

## 2024-07-22 ENCOUNTER — APPOINTMENT (OUTPATIENT)
Dept: PRIMARY CARE | Facility: CLINIC | Age: 89
End: 2024-07-22
Payer: MEDICARE

## 2024-07-25 ENCOUNTER — PATIENT OUTREACH (OUTPATIENT)
Dept: PRIMARY CARE | Facility: CLINIC | Age: 89
End: 2024-07-25

## 2024-07-25 ENCOUNTER — OFFICE VISIT (OUTPATIENT)
Dept: PRIMARY CARE | Facility: CLINIC | Age: 89
End: 2024-07-25
Payer: MEDICARE

## 2024-07-25 VITALS
OXYGEN SATURATION: 94 % | SYSTOLIC BLOOD PRESSURE: 110 MMHG | HEIGHT: 66 IN | DIASTOLIC BLOOD PRESSURE: 50 MMHG | HEART RATE: 76 BPM | BODY MASS INDEX: 23.3 KG/M2 | WEIGHT: 145 LBS

## 2024-07-25 DIAGNOSIS — M81.0 OSTEOPOROSIS, UNSPECIFIED OSTEOPOROSIS TYPE, UNSPECIFIED PATHOLOGICAL FRACTURE PRESENCE: ICD-10-CM

## 2024-07-25 DIAGNOSIS — Z00.00 ROUTINE GENERAL MEDICAL EXAMINATION AT HEALTH CARE FACILITY: Primary | ICD-10-CM

## 2024-07-25 DIAGNOSIS — D64.9 ANEMIA, UNSPECIFIED TYPE: ICD-10-CM

## 2024-07-25 DIAGNOSIS — K21.9 GASTROESOPHAGEAL REFLUX DISEASE, UNSPECIFIED WHETHER ESOPHAGITIS PRESENT: ICD-10-CM

## 2024-07-25 PROCEDURE — 1036F TOBACCO NON-USER: CPT | Performed by: STUDENT IN AN ORGANIZED HEALTH CARE EDUCATION/TRAINING PROGRAM

## 2024-07-25 PROCEDURE — 1170F FXNL STATUS ASSESSED: CPT | Performed by: STUDENT IN AN ORGANIZED HEALTH CARE EDUCATION/TRAINING PROGRAM

## 2024-07-25 PROCEDURE — 1157F ADVNC CARE PLAN IN RCRD: CPT | Performed by: STUDENT IN AN ORGANIZED HEALTH CARE EDUCATION/TRAINING PROGRAM

## 2024-07-25 PROCEDURE — 1111F DSCHRG MED/CURRENT MED MERGE: CPT | Performed by: STUDENT IN AN ORGANIZED HEALTH CARE EDUCATION/TRAINING PROGRAM

## 2024-07-25 PROCEDURE — 99214 OFFICE O/P EST MOD 30 MIN: CPT | Performed by: STUDENT IN AN ORGANIZED HEALTH CARE EDUCATION/TRAINING PROGRAM

## 2024-07-25 PROCEDURE — 1159F MED LIST DOCD IN RCRD: CPT | Performed by: STUDENT IN AN ORGANIZED HEALTH CARE EDUCATION/TRAINING PROGRAM

## 2024-07-25 PROCEDURE — G0439 PPPS, SUBSEQ VISIT: HCPCS | Performed by: STUDENT IN AN ORGANIZED HEALTH CARE EDUCATION/TRAINING PROGRAM

## 2024-07-25 PROCEDURE — 1123F ACP DISCUSS/DSCN MKR DOCD: CPT | Performed by: STUDENT IN AN ORGANIZED HEALTH CARE EDUCATION/TRAINING PROGRAM

## 2024-07-25 RX ORDER — FERROUS SULFATE, DRIED 160(50) MG
1 TABLET, EXTENDED RELEASE ORAL 2 TIMES DAILY
Qty: 180 TABLET | Refills: 1 | Status: SHIPPED | OUTPATIENT
Start: 2024-07-25 | End: 2025-01-21

## 2024-07-25 RX ORDER — FERROUS SULFATE 325(65) MG
65 TABLET ORAL EVERY OTHER DAY
Qty: 45 TABLET | Refills: 1 | Status: SHIPPED | OUTPATIENT
Start: 2024-07-25 | End: 2025-01-21

## 2024-07-25 RX ORDER — PANTOPRAZOLE SODIUM 20 MG/1
20 TABLET, DELAYED RELEASE ORAL DAILY
Qty: 90 TABLET | Refills: 1 | Status: SHIPPED | OUTPATIENT
Start: 2024-07-25 | End: 2025-01-21

## 2024-07-25 ASSESSMENT — ACTIVITIES OF DAILY LIVING (ADL)
GROCERY_SHOPPING: NEEDS ASSISTANCE
DRESSING: NEEDS ASSISTANCE
BATHING: NEEDS ASSISTANCE
TAKING_MEDICATION: NEEDS ASSISTANCE
MANAGING_FINANCES: NEEDS ASSISTANCE
DOING_HOUSEWORK: NEEDS ASSISTANCE

## 2024-07-25 NOTE — PROGRESS NOTES
"Subjective   Reason for Visit: Xiomy Rubio is an 89 y.o. female here for a Medicare Wellness visit.     Past Medical, Surgical, and Family History reviewed and updated in chart.    Reviewed all medications by prescribing practitioner or clinical pharmacist (such as prescriptions, OTCs, herbal therapies and supplements) and documented in the medical record.    HPI  Patient presents to the office today for hospital discharge follow-up from [facility].  Patient was attending her community [employer] when she started to have panic attack symptoms.  Patient was transferred to the local hospital and admitted to the psychiatry service for anxiety for 10 days.     There was concerns of paranoia.     Patient was restarted on Zoloft at 25 mg on 6/25 and this was increased to 37.5 mg.  Patient was also started on Ativan 0.25 mg every 6 while in house.  She did not need this after 6/20 of admission.      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 Fearful for their safety.     Plan for both psychology and psychiatry appointments scheduled in the next week.   Trial of melotonin for sleep       Since our last visit:   Pt was increased to zoloft to 75mg after 3 days she became very paranoid and increased tremor.   She was reduced to 50mg 3 days ago and symptoms have improved.   Yup to 5mg of melatonin at night, which has improved   Constipation continued: using laxative   Advise to try prune juice.     Patient Care Team:  Lian Hernandez DO as PCP - General (Family Medicine)  Lian Hernandez DO as PCP - [facility]ributed Provider  Lily Moreno MD as Primary Care Provider  Glendy Melendez RN as Care Manager (Case Management)       Objective   Vitals:  /50   Pulse 76   Ht 1.676 m (5' 6\")   Wt 65.8 kg (145 lb)   LMP  (LMP Unknown) Comment: Pt is 89 years old- last mentrauls cycle unknown  SpO2 94%   BMI 23.40 kg/m²   "     Physical Exam  Vitals reviewed.   Constitutional:       Appearance: Normal appearance.   Cardiovascular:      Rate and Rhythm: Normal rate and regular rhythm.      Heart sounds: No murmur heard.  Pulmonary:      Effort: Pulmonary effort is normal. No respiratory distress.      Breath sounds: Normal breath sounds. No wheezing.   Musculoskeletal:      Cervical back: Neck supple.      Left lower leg: No edema.   Neurological:      Mental Status: She is alert.         Assessment/Plan   Problem List Items Addressed This Visit    None  Visit Diagnoses         Codes    Routine general medical examination at health care facility    -  Primary Z00.00    Gastroesophageal reflux disease, unspecified whether esophagitis present     K21.9    Relevant Medications    pantoprazole (ProtoNix) 20 mg EC tablet    Anemia, unspecified type     D64.9    Relevant Medications    ferrous sulfate, 325 mg ferrous sulfate, tablet    Osteoporosis, unspecified osteoporosis type, unspecified pathological fracture presence     M81.0    Relevant Medications    calcium carbonate-vitamin D3 500 mg-5 mcg (200 unit) tablet        Anxiety  Patient is following with both psychology and a psychiatrist  Due to patient's adverse reactions she has been reduced back to her Zoloft 50 mg daily  There has been improvement to her adverse side effects.     GERD prophylaxis  Pantoprazole was refilled    Osteoporosis  Her vitamin D, calcium supplementation was refilled    Constipation  Patient to continue with as needed laxative  Encourage daily prune juice or an increase in fiber    Prediabetes  A1c 5.7% 8 months ago      Health Maintenance   Topic Date Due    DTaP/Tdap/Td Vaccines (1 - Tdap) Never done    Zoster Vaccines (1 of 2) Never done    RSV Pregnant patients and/or  patients aged 60+ years (1 - 1-dose 60+ series) Never done    Medicare Annual Wellness Visit (AWV)  07/07/2023    COVID-19 Vaccine (7 - 2023-24 season) 02/27/2024    Bone Density Scan   08/29/2024    Influenza Vaccine (1) 09/01/2024    Diabetes: Hemoglobin A1C  11/20/2024    Lipid Panel  06/17/2029    Pneumococcal Vaccine: 65+ Years  Completed    HIB Vaccines  Aged Out    Hepatitis B Vaccines  Aged Out    IPV Vaccines  Aged Out    Hepatitis A Vaccines  Aged Out    Meningococcal Vaccine  Aged Out    Rotavirus Vaccines  Aged Out    HPV Vaccines  Aged Out

## 2024-07-25 NOTE — PROGRESS NOTES
Unable to reach patient or caregiver for call back after patient's follow up appointment with Dr. Tyesha ZUNIGA with call back number to call if needed.

## 2024-09-03 ENCOUNTER — APPOINTMENT (OUTPATIENT)
Dept: PRIMARY CARE | Facility: CLINIC | Age: 89
End: 2024-09-03
Payer: MEDICARE

## 2024-09-25 ENCOUNTER — APPOINTMENT (OUTPATIENT)
Dept: RADIOLOGY | Facility: HOSPITAL | Age: 89
End: 2024-09-25
Payer: MEDICARE

## 2024-09-25 ENCOUNTER — HOSPITAL ENCOUNTER (EMERGENCY)
Facility: HOSPITAL | Age: 89
Discharge: HOME | End: 2024-09-25
Payer: MEDICARE

## 2024-09-25 VITALS
DIASTOLIC BLOOD PRESSURE: 76 MMHG | WEIGHT: 148 LBS | OXYGEN SATURATION: 96 % | TEMPERATURE: 97.3 F | RESPIRATION RATE: 18 BRPM | BODY MASS INDEX: 23.78 KG/M2 | HEIGHT: 66 IN | SYSTOLIC BLOOD PRESSURE: 145 MMHG | HEART RATE: 72 BPM

## 2024-09-25 DIAGNOSIS — S70.01XA CONTUSION OF RIGHT HIP, INITIAL ENCOUNTER: ICD-10-CM
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
## 2024-09-25 PROCEDURE — 72100 X-RAY EXAM L-S SPINE 2/3 VWS: CPT

## 2024-09-25 PROCEDURE — 73502 X-RAY EXAM HIP UNI 2-3 VIEWS: CPT | Mod: RIGHT SIDE | Performed by: RADIOLOGY

## 2024-09-25 PROCEDURE — 72100 X-RAY EXAM L-S SPINE 2/3 VWS: CPT | Performed by: RADIOLOGY

## 2024-09-25 PROCEDURE — 73502 X-RAY EXAM HIP UNI 2-3 VIEWS: CPT | Mod: RT

## 2024-09-25 PROCEDURE — 2500000005 HC RX 250 GENERAL PHARMACY W/O HCPCS

## 2024-09-25 PROCEDURE — 99284 EMERGENCY DEPT VISIT MOD MDM: CPT

## 2024-09-25 RX ORDER — LIDOCAINE 50 MG/G
1 PATCH TOPICAL DAILY PRN
Qty: 10 PATCH | Refills: 0 | Status: SHIPPED | OUTPATIENT
Start: 2024-09-25 | End: 2024-10-05

## 2024-09-25 RX ORDER — LIDOCAINE 560 MG/1
1 PATCH PERCUTANEOUS; TOPICAL; TRANSDERMAL ONCE
Status: DISCONTINUED | OUTPATIENT
Start: 2024-09-25 | End: 2024-09-25 | Stop reason: HOSPADM

## 2024-09-25 ASSESSMENT — PAIN DESCRIPTION - ONSET: ONSET: SUDDEN

## 2024-09-25 ASSESSMENT — PAIN DESCRIPTION - DESCRIPTORS: DESCRIPTORS: SORE

## 2024-09-25 ASSESSMENT — COLUMBIA-SUICIDE SEVERITY RATING SCALE - C-SSRS
6. HAVE YOU EVER DONE ANYTHING, STARTED TO DO ANYTHING, OR PREPARED TO DO ANYTHING TO END YOUR LIFE?: NO
2. HAVE YOU ACTUALLY HAD ANY THOUGHTS OF KILLING YOURSELF?: NO
1. IN THE PAST MONTH, HAVE YOU WISHED YOU WERE DEAD OR WISHED YOU COULD GO TO SLEEP AND NOT WAKE UP?: NO

## 2024-09-25 ASSESSMENT — PAIN DESCRIPTION - LOCATION: LOCATION: HIP

## 2024-09-25 ASSESSMENT — PAIN DESCRIPTION - PAIN TYPE: TYPE: ACUTE PAIN

## 2024-09-25 ASSESSMENT — PAIN DESCRIPTION - PROGRESSION: CLINICAL_PROGRESSION: NOT CHANGED

## 2024-09-25 ASSESSMENT — PAIN - FUNCTIONAL ASSESSMENT: PAIN_FUNCTIONAL_ASSESSMENT: 0-10

## 2024-09-25 ASSESSMENT — PAIN DESCRIPTION - FREQUENCY: FREQUENCY: CONSTANT/CONTINUOUS

## 2024-09-25 ASSESSMENT — PAIN SCALES - GENERAL: PAINLEVEL_OUTOF10: 7

## 2024-09-25 ASSESSMENT — PAIN DESCRIPTION - ORIENTATION: ORIENTATION: RIGHT

## 2024-09-25 NOTE — ED PROVIDER NOTES
HPI   Chief Complaint   Patient presents with    Hip Injury     I have sore rt hip I fell on Saturday it hurts more when I wt bare        Patient is a 89-year-old female presenting with right hip/right lumbar spine soreness.  Reported that the patient had a fall on Saturday and over the last several days have is complaining of right hip and right lower back soreness.  Patient is very [place].  Son at bedside.  They state that the patient is still ambulatory and has not taken over-the-counter medications but has been complaining of worsening pain.  Patient denies fevers, chills, cough, sore throat, runny nose, chest pain, shortness of breath, abdominal pain, nausea, vomiting, diarrhea or urinary complaints.              Patient History   Past Medical History:   Diagnosis Date    Anxiety     Memory loss     Other conditions influencing health status 11/24/2017    History of cough    Pain in joints of unspecified hand 01/05/2016    Pain, hand joint    Personal history of diseases of the skin and subcutaneous tissue 11/24/2017    History of cellulitis    Personal history of Methicillin resistant Staphylococcus aureus infection 11/24/2017    History of methicillin resistant Staphylococcus aureus infection    Personal history of other diseases of the respiratory system 12/05/2017    History of acute bronchitis    Personal history of other specified conditions 05/04/2017    History of dizziness    Personal history of pneumonia (recurrent)     History of pneumonia     Past Surgical History:   Procedure Laterality Date    OTHER SURGICAL HISTORY  08/29/2017    Hip Surgery Left     Family History   Problem Relation Name Age of Onset    No Known Problems Mother      No Known Problems Father       Social History     Tobacco Use    Smoking status: Never    Smokeless tobacco: Never   Vaping Use    Vaping status: Never Used   Substance Use Topics    Alcohol use: Not Currently    Drug use: Not Currently       Physical Exam   ED Triage  Vitals [09/25/24 1438]   Temperature Heart Rate Respirations BP   36.3 °C (97.3 °F) 72 18 145/76      Pulse Ox Temp src Heart Rate Source Patient Position   96 % -- Monitor Sitting      BP Location FiO2 (%)     Left arm --       Physical Exam  Vitals and nursing note reviewed.   Constitutional:       Appearance: She is well-developed.      Comments: Awake, sitting in examination chair   HENT:      Head: Normocephalic and atraumatic.      Nose: Nose normal.      Mouth/Throat:      Mouth: Mucous membranes are moist.      Pharynx: Oropharynx is clear.   Eyes:      Conjunctiva/sclera: Conjunctivae normal.   Cardiovascular:      Rate and Rhythm: Normal rate and regular rhythm.      Pulses: Normal pulses.      Heart sounds: Normal heart sounds. No murmur heard.  Pulmonary:      Effort: Pulmonary effort is normal. No respiratory distress.      Breath sounds: Normal breath sounds.   Abdominal:      General: Abdomen is flat.      Palpations: Abdomen is soft.      Tenderness: There is no abdominal tenderness.   Musculoskeletal:         General: No swelling. Normal range of motion.      Cervical back: Normal range of motion and neck supple.      Comments: Mild tenderness palpation of the right-sided paraspinal muscles and right hip.   Skin:     General: Skin is warm and dry.      Capillary Refill: Capillary refill takes less than 2 seconds.   Neurological:      General: No focal deficit present.      Mental Status: She is alert and oriented to person, place, and time.   Psychiatric:         Mood and Affect: Mood normal.         Behavior: Behavior normal.           ED Course & MDM   Diagnoses as of 09/25/24 1955   Fall, initial encounter   Contusion of right hip, initial encounter                 No data recorded     [place] Coma Scale Score: 15 (09/25/24 1454 : Steff Orozco RN)                           Medical Decision Making  Patient is a 89-year-old female presenting with right hip/right lumbar spine soreness.   Imaging ordered.  Lidocaine patch ordered.  Condition considered include but are not limited to: Contusion, fracture.    X-ray of right hip without acute findings.  X-ray lumbar spine without acute findings.  I believe this patient is at low risk for complication, and a disposition of discharge is acceptable.  Return to the Emergency Department if new or worsening symptoms including headache, fever, chills, chest pain, shortness of breath, syncope, near syncope, abdominal pain, nausea, vomiting,  diarrhea, or worsening pain.  Patient does endorse that lidocaine patch did help with pain.  Prescription written.  Patient is agreeable to a disposition of discharge with follow-up with primary care in the next several days.  Educated patient to use over-the-counter medications in addition to lidocaine patch.    Portions of this note made with Dragon software, please be mindful of potential grammatical errors.        Medications - No data to display      XR hip right with pelvis when performed 2 or 3 views   Final Result   See discussion above. No definite acute findings.             MACRO:   None        Signed by: Joseph Schoenberger 9/25/2024 3:58 PM   Dictation workstation:   [id number]      XR lumbar spine 2-3 views   Final Result   Degenerative changes as detailed above. No acute fracture or   subluxation.             MACRO:   None        Signed by: Joseph Schoenberger 9/25/2024 4:00 PM   Dictation workstation:   [id number]            Procedure  Procedures     Odin Carlos PA-C  09/25/24 1955

## 2024-09-26 ENCOUNTER — PATIENT OUTREACH (OUTPATIENT)
Dept: PRIMARY CARE | Facility: CLINIC | Age: 89
End: 2024-09-26
Payer: MEDICARE

## 2024-09-26 NOTE — PROGRESS NOTES
Unable to reach caregiver for wrap up of Transitional Care Management (TCM) program. The patient had a recent fall and was treated and released from [facility] ED. LVM confirming upcoming appt with Dr. Hernandez on 10/2/2024 and call back number for questions or concerns.

## 2024-10-02 ENCOUNTER — HOME HEALTH ADMISSION (OUTPATIENT)
Dept: HOME HEALTH SERVICES | Facility: HOME HEALTH | Age: 89
End: 2024-10-02
Payer: MEDICARE

## 2024-10-02 ENCOUNTER — HOSPITAL ENCOUNTER (OUTPATIENT)
Dept: RADIOLOGY | Facility: CLINIC | Age: 89
Discharge: HOME | End: 2024-10-02
Payer: MEDICARE

## 2024-10-02 ENCOUNTER — DOCUMENTATION (OUTPATIENT)
Dept: HOME HEALTH SERVICES | Facility: HOME HEALTH | Age: 89
End: 2024-10-02

## 2024-10-02 ENCOUNTER — APPOINTMENT (OUTPATIENT)
Dept: PRIMARY CARE | Facility: CLINIC | Age: 89
End: 2024-10-02
Payer: MEDICARE

## 2024-10-02 VITALS — DIASTOLIC BLOOD PRESSURE: 86 MMHG | OXYGEN SATURATION: 97 % | HEART RATE: 71 BPM | SYSTOLIC BLOOD PRESSURE: 122 MMHG
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
## 2024-10-02 DIAGNOSIS — K59.04 CHRONIC IDIOPATHIC CONSTIPATION: ICD-10-CM

## 2024-10-02 PROCEDURE — 1123F ACP DISCUSS/DSCN MKR DOCD: CPT | Performed by: STUDENT IN AN ORGANIZED HEALTH CARE EDUCATION/TRAINING PROGRAM

## 2024-10-02 PROCEDURE — 73552 X-RAY EXAM OF FEMUR 2/>: CPT | Mod: LT

## 2024-10-02 PROCEDURE — 1159F MED LIST DOCD IN RCRD: CPT | Performed by: STUDENT IN AN ORGANIZED HEALTH CARE EDUCATION/TRAINING PROGRAM

## 2024-10-02 PROCEDURE — 72170 X-RAY EXAM OF PELVIS: CPT

## 2024-10-02 PROCEDURE — 1157F ADVNC CARE PLAN IN RCRD: CPT | Performed by: STUDENT IN AN ORGANIZED HEALTH CARE EDUCATION/TRAINING PROGRAM

## 2024-10-02 PROCEDURE — 1036F TOBACCO NON-USER: CPT | Performed by: STUDENT IN AN ORGANIZED HEALTH CARE EDUCATION/TRAINING PROGRAM

## 2024-10-02 PROCEDURE — 99214 OFFICE O/P EST MOD 30 MIN: CPT | Performed by: STUDENT IN AN ORGANIZED HEALTH CARE EDUCATION/TRAINING PROGRAM

## 2024-10-02 RX ORDER — TRAMADOL HYDROCHLORIDE 50 MG/1
50 TABLET ORAL EVERY 8 HOURS PRN
Qty: 10 TABLET | Refills: 0 | Status: SHIPPED | OUTPATIENT
Start: 2024-10-02 | End: 2024-10-07

## 2024-10-02 RX ORDER — SENNOSIDES 8.6 MG/1
8.6 CAPSULE, GELATIN COATED ORAL NIGHTLY
Qty: 90 CAPSULE | Refills: 1 | Status: SHIPPED | OUTPATIENT
Start: 2024-10-02

## 2024-10-02 NOTE — PROGRESS NOTES
Subjective   Patient ID: Xiomy Rubio is a 89 y.o. female who presents for Follow-up (Pt is here for an ED follow up. Pt son took her to ED 4-5 days after fall due to showing no symptoms till then. Pt is limping with her right leg.).  HIP   Pt was evaluated on 9/25/24 at the [facility] for a fall.     Pt had a fall 5 days piror to presentation. Right hip and lower back soreness.   Ordered lidocaine patches  Lumbar and R Hip xray negative for acute fracture  but sig for osteopenia     Fell in her living room    Right side having a lot of pain     Son noticing increase limping and pain on the left 3 days ago     Buspar 10mg TID   Zoloft has been increased to 50 mg daily     Objective   Physical Exam  Musculoskeletal:        Arms:         Legs:       Comments: Bruising        Current Outpatient Medications:     calcium carbonate-vitamin D3 500 mg-5 mcg (200 unit) tablet, Take 1 tablet by mouth 2 times a day., Disp: 180 tablet, Rfl: 1    ferrous sulfate, 325 mg ferrous sulfate, tablet, Take 1 tablet by mouth every other day., Disp: 45 tablet, Rfl: 1    latanoprost (Xalatan) 0.005 % ophthalmic solution, Administer 1 drop into affected eye(s) once daily at bedtime., Disp: , Rfl:     lidocaine (Lidoderm) 5 % patch, Place 1 patch over 12 hours on the skin once daily as needed for mild pain (1 - 3) for up to 10 days. Remove & discard patch within 12 hours or as directed by MD., Disp: 10 patch, Rfl: 0    pantoprazole (ProtoNix) 20 mg EC tablet, Take 1 tablet (20 mg) by mouth once daily., Disp: 90 tablet, Rfl: 1    busPIRone (Buspar) 10 mg tablet, Take 1 tablet (10 mg) by mouth 3 times a day., Disp: 90 tablet, Rfl: 0    sennosides (senna) 8.6 mg capsule, Take 1 capsule (8.6 mg) by mouth once daily at bedtime., Disp: 90 capsule, Rfl: 1    sertraline (Zoloft) 25 mg tablet, Take 1.5 tablets (37.5 mg) by mouth once daily., Disp: 45 tablet, Rfl: 0    traMADol (Ultram) 50 mg tablet, Take 1 tablet (50 mg) by mouth  every 8 hours if needed for severe pain (7 - 10) for up to 5 days., Disp: 10 tablet, Rfl: 0    Assessment/Plan   Diagnoses and all orders for this visit:  Fall, initial encounter  -     Referral to Home Care; Future  -     traMADol (Ultram) 50 mg tablet; Take 1 tablet (50 mg) by mouth every 8 hours if needed for severe pain (7 - 10) for up to 5 days.  Chronic idiopathic constipation  -     sennosides (senna) 8.6 mg capsule; Take 1 capsule (8.6 mg) by mouth once daily at bedtime.    Fall   Home health care ordered for physical therapy   For pain management lidocaine patches, tramadol at night time   Xray right hip negative   Lumbar spine negative for acute pathology   Xray of left hip     Constipation   Pt is not consistent with her miralax would like to try tablets   Senna sent       Lian Hernandez DO 10/02/24 11:43 AM

## 2024-10-02 NOTE — HH CARE COORDINATION
Home Care received a Referral for Physical Therapy and Occupational Therapy. We have processed the referral for a Start of Care on 10/4/24.     If you have any questions or concerns, please feel free to contact us at [phone]. Follow the prompts, enter your five digit zip code, and you will be directed to your care team on E[facility] 1.

## 2024-10-03 ENCOUNTER — TELEPHONE (OUTPATIENT)
Dept: PRIMARY CARE | Facility: CLINIC | Age: 89
End: 2024-10-03
Payer: MEDICARE

## 2024-10-04 ENCOUNTER — HOME CARE VISIT (OUTPATIENT)
Dept: HOME HEALTH SERVICES | Facility: HOME HEALTH | Age: 89
End: 2024-10-04
Payer: MEDICARE

## 2024-10-04 VITALS
HEIGHT: 66 IN | DIASTOLIC BLOOD PRESSURE: 74 MMHG | RESPIRATION RATE: 16 BRPM | BODY MASS INDEX: 23.3 KG/M2 | WEIGHT: 145 LBS | HEART RATE: 71 BPM | TEMPERATURE: 96.9 F | SYSTOLIC BLOOD PRESSURE: 127 MMHG | OXYGEN SATURATION: 96 %

## 2024-10-04 PROCEDURE — G0151 HHCP-SERV OF PT,EA 15 MIN: HCPCS | Mod: HHH

## 2024-10-04 PROCEDURE — 169592 NO-PAY CLAIM PROCEDURE

## 2024-10-04 SDOH — HEALTH STABILITY: PHYSICAL HEALTH
EXERCISE COMMENTS: LIMITED ABILITY TO PARTICIPATE IN EXERCISES TODAY DUE TO PAIN AND ANXIETY  DID PERFORM SUPINE DF PF AND SLR LIMITED DUE TO BACK PAIN.  SEATED ANKLE PUMPS AND HIP FLEXION AND LAQ X 10 REPS .

## 2024-10-04 SDOH — HEALTH STABILITY: PHYSICAL HEALTH: EXERCISE TYPE: B LE

## 2024-10-04 ASSESSMENT — ACTIVITIES OF DAILY LIVING (ADL)
TOILETING: STAND BY ASSIST
DRESSING_LB_CURRENT_FUNCTION: STAND BY ASSIST
CURRENT_FUNCTION: STAND BY ASSIST
DRESSING_UB_CURRENT_FUNCTION: STAND BY ASSIST
AMBULATION ASSISTANCE: ONE PERSON
DRESSING_LB_CURRENT_FUNCTION: CONTACT GUARD ASSIST
GROOMING ASSESSED: 1
FEEDING ASSESSED: 1
AMBULATION ASSISTANCE: CONTACT GUARD ASSIST
OASIS_M1830: 03
FEEDING: CONTACT GUARD ASSIST
GROOMING_CURRENT_FUNCTION: CONTACT GUARD ASSIST
AMBULATION ASSISTANCE ON FLAT SURFACES: 1
ENTERING_EXITING_HOME: MINIMUM ASSIST
AMBULATION ASSISTANCE: 1
AMBULATION ASSISTANCE: STAND BY ASSIST
AMBULATION_DISTANCE/DURATION_TOLERATED: 75 X 2
TOILETING: 1
DRESSING_UB_CURRENT_FUNCTION: CONTACT GUARD ASSIST
TOILETING: CONTACT GUARD ASSIST
GROOMING_CURRENT_FUNCTION: STAND BY ASSIST
FEEDING: STAND BY ASSIST
PHYSICAL TRANSFERS ASSESSED: 1
CURRENT_FUNCTION: CONTACT GUARD ASSIST

## 2024-10-04 ASSESSMENT — ENCOUNTER SYMPTOMS
PAIN LOCATION - EXACERBATING FACTORS: WALKING OR STANDING
PAIN LOCATION - PAIN SEVERITY: 5/10
PAIN LOCATION: RIGHT HIP
PAIN LOCATION - PAIN QUALITY: TIGHT
SUBJECTIVE PAIN PROGRESSION: WAXING AND WANING
PAIN LOCATION - RELIEVING FACTORS: REST AND MEDS
PAIN LOCATION - EXACERBATING FACTORS: WALKING AND STANDING
PAIN SEVERITY GOAL: 1/10
MUSCLE WEAKNESS: 1
LOWEST PAIN SEVERITY IN PAST 24 HOURS: 2/10
HIGHEST PAIN SEVERITY IN PAST 24 HOURS: 7/10
PERSON REPORTING PAIN: PATIENT
PAIN: 1
PAIN LOCATION - PAIN SEVERITY: 5/10
PAIN LOCATION - RELIEVING FACTORS: REST AND MEDS
PAIN LOCATION - PAIN QUALITY: TIGHT
PAIN LOCATION: BACK

## 2024-10-08 ENCOUNTER — HOME CARE VISIT (OUTPATIENT)
Dept: HOME HEALTH SERVICES | Facility: HOME HEALTH | Age: 89
End: 2024-10-08
Payer: MEDICARE

## 2024-10-08 VITALS
SYSTOLIC BLOOD PRESSURE: 132 MMHG | RESPIRATION RATE: 18 BRPM | OXYGEN SATURATION: 97 % | TEMPERATURE: 97.1 F | HEART RATE: 82 BPM | DIASTOLIC BLOOD PRESSURE: 74 MMHG

## 2024-10-08 PROCEDURE — G0151 HHCP-SERV OF PT,EA 15 MIN: HCPCS | Mod: HHH

## 2024-10-08 ASSESSMENT — ENCOUNTER SYMPTOMS
PERSON REPORTING PAIN: PATIENT
PAIN LOCATION: RIGHT HIP
HIGHEST PAIN SEVERITY IN PAST 24 HOURS: 4/10
PAIN LOCATION - PAIN SEVERITY: 2/10
PAIN: 1
PAIN LOCATION - RELIEVING FACTORS: REST AND MEDS
PAIN LOCATION - PAIN FREQUENCY: WITH ACTIVITY
LOWEST PAIN SEVERITY IN PAST 24 HOURS: 2/10
SUBJECTIVE PAIN PROGRESSION: WAXING AND WANING
PAIN LOCATION - PAIN QUALITY: DULL
PAIN SEVERITY GOAL: 2/10
PAIN LOCATION - EXACERBATING FACTORS: WALKING OR STANDING

## 2024-10-10 ENCOUNTER — HOME CARE VISIT (OUTPATIENT)
Dept: HOME HEALTH SERVICES | Facility: HOME HEALTH | Age: 89
End: 2024-10-10
Payer: MEDICARE

## 2024-10-10 VITALS
RESPIRATION RATE: 18 BRPM | SYSTOLIC BLOOD PRESSURE: 118 MMHG | TEMPERATURE: 97.2 F | HEART RATE: 76 BPM | DIASTOLIC BLOOD PRESSURE: 64 MMHG | OXYGEN SATURATION: 99 %

## 2024-10-10 PROCEDURE — G0157 HHC PT ASSISTANT EA 15: HCPCS | Mod: CQ,HHH

## 2024-10-10 ASSESSMENT — ENCOUNTER SYMPTOMS
PAIN LOCATION - PAIN QUALITY: ACHE
PAIN LOCATION - PAIN FREQUENCY: INTERMITTENT
PAIN LOCATION: LEFT HIP
PAIN: 1
PAIN LOCATION - PAIN SEVERITY: 2/10
SUBJECTIVE PAIN PROGRESSION: UNCHANGED
PAIN LOCATION - EXACERBATING FACTORS: ACTIVITY
HIGHEST PAIN SEVERITY IN PAST 24 HOURS: 4/10
PERSON REPORTING PAIN: PATIENT
PAIN LOCATION - PAIN DURATION: INTERMITTENT
PAIN SEVERITY GOAL: 0/10
PAIN LOCATION - RELIEVING FACTORS: REST, MEDICATION
LOWEST PAIN SEVERITY IN PAST 24 HOURS: 1/10

## 2024-10-14 ENCOUNTER — HOME CARE VISIT (OUTPATIENT)
Dept: HOME HEALTH SERVICES | Facility: HOME HEALTH | Age: 89
End: 2024-10-14
Payer: MEDICARE

## 2024-10-14 VITALS
SYSTOLIC BLOOD PRESSURE: 108 MMHG | HEART RATE: 75 BPM | RESPIRATION RATE: 18 BRPM | DIASTOLIC BLOOD PRESSURE: 54 MMHG | OXYGEN SATURATION: 95 % | TEMPERATURE: 97.4 F

## 2024-10-14 PROCEDURE — G0157 HHC PT ASSISTANT EA 15: HCPCS | Mod: CQ,HHH

## 2024-10-14 ASSESSMENT — ENCOUNTER SYMPTOMS
PAIN LOCATION - PAIN FREQUENCY: INTERMITTENT
PAIN LOCATION - PAIN DURATION: INTERMITTENT
PERSON REPORTING PAIN: PATIENT
PAIN LOCATION - RELIEVING FACTORS: REST, MEDICATION
HIGHEST PAIN SEVERITY IN PAST 24 HOURS: 5/10
SUBJECTIVE PAIN PROGRESSION: UNCHANGED
PAIN SEVERITY GOAL: 0/10
PAIN LOCATION: RIGHT HIP
PAIN LOCATION - PAIN QUALITY: ACHE
LOWEST PAIN SEVERITY IN PAST 24 HOURS: 1/10
AGITATION: 1
PAIN: 1
PAIN LOCATION - PAIN SEVERITY: 4/10
PAIN LOCATION - EXACERBATING FACTORS: ACTIVITY

## 2024-10-18 ENCOUNTER — HOME CARE VISIT (OUTPATIENT)
Dept: HOME HEALTH SERVICES | Facility: HOME HEALTH | Age: 89
End: 2024-10-18
Payer: MEDICARE

## 2024-10-18 VITALS — HEART RATE: 73 BPM | RESPIRATION RATE: 18 BRPM | TEMPERATURE: 97.3 F

## 2024-10-18 PROCEDURE — G0157 HHC PT ASSISTANT EA 15: HCPCS | Mod: CQ,HHH

## 2024-10-18 ASSESSMENT — ENCOUNTER SYMPTOMS
PAIN LOCATION - PAIN SEVERITY: 4/10
PAIN LOCATION - PAIN DURATION: INTERMITTENT
PERSON REPORTING PAIN: PATIENT
LOWEST PAIN SEVERITY IN PAST 24 HOURS: 1/10
SUBJECTIVE PAIN PROGRESSION: UNCHANGED
PAIN LOCATION - PAIN FREQUENCY: INTERMITTENT
PAIN SEVERITY GOAL: 0/10
PAIN LOCATION - EXACERBATING FACTORS: ACTIVITY
PAIN LOCATION: BACK
PAIN: 1
PAIN LOCATION - PAIN QUALITY: SHARP
HIGHEST PAIN SEVERITY IN PAST 24 HOURS: 6/10

## 2024-10-23 ENCOUNTER — HOME CARE VISIT (OUTPATIENT)
Dept: HOME HEALTH SERVICES | Facility: HOME HEALTH | Age: 89
End: 2024-10-23
Payer: MEDICARE

## 2024-10-23 VITALS
HEART RATE: 74 BPM | OXYGEN SATURATION: 98 % | TEMPERATURE: 97.4 F | RESPIRATION RATE: 18 BRPM | DIASTOLIC BLOOD PRESSURE: 68 MMHG | SYSTOLIC BLOOD PRESSURE: 124 MMHG

## 2024-10-23 PROCEDURE — G0157 HHC PT ASSISTANT EA 15: HCPCS | Mod: CQ,HHH

## 2024-10-23 ASSESSMENT — ENCOUNTER SYMPTOMS
PAIN LOCATION - PAIN DURATION: INTERMITTENT
PAIN LOCATION - PAIN FREQUENCY: INTERMITTENT
PAIN LOCATION - RELIEVING FACTORS: REST, MEDICATION
PAIN LOCATION - PAIN SEVERITY: 6/10
PAIN: 1
PAIN SEVERITY GOAL: 0/10
LOWEST PAIN SEVERITY IN PAST 24 HOURS: 0/10
PERSON REPORTING PAIN: PATIENT
PAIN LOCATION: RIGHT HIP
PAIN LOCATION - EXACERBATING FACTORS: ACTIVITY
HIGHEST PAIN SEVERITY IN PAST 24 HOURS: 6/10
PAIN LOCATION - PAIN QUALITY: SHARP
SUBJECTIVE PAIN PROGRESSION: UNCHANGED

## 2024-10-25 ENCOUNTER — HOME CARE VISIT (OUTPATIENT)
Dept: HOME HEALTH SERVICES | Facility: HOME HEALTH | Age: 89
End: 2024-10-25
Payer: MEDICARE

## 2024-10-28 ENCOUNTER — HOME CARE VISIT (OUTPATIENT)
Dept: HOME HEALTH SERVICES | Facility: HOME HEALTH | Age: 89
End: 2024-10-28
Payer: MEDICARE

## 2024-10-28 VITALS
TEMPERATURE: 97.4 F | OXYGEN SATURATION: 97 % | SYSTOLIC BLOOD PRESSURE: 128 MMHG | RESPIRATION RATE: 18 BRPM | DIASTOLIC BLOOD PRESSURE: 76 MMHG | HEART RATE: 78 BPM

## 2024-10-28 PROCEDURE — G0157 HHC PT ASSISTANT EA 15: HCPCS | Mod: CQ,HHH

## 2024-10-28 ASSESSMENT — ENCOUNTER SYMPTOMS
PAIN: 1
LOWEST PAIN SEVERITY IN PAST 24 HOURS: 1/10
PAIN SEVERITY GOAL: 0/10
PAIN LOCATION - PAIN SEVERITY: 5/10
PERSON REPORTING PAIN: PATIENT
SUBJECTIVE PAIN PROGRESSION: GRADUALLY IMPROVING
PAIN LOCATION - PAIN DURATION: INTERMITTENT
PAIN LOCATION - PAIN QUALITY: SHARP
PAIN LOCATION - PAIN FREQUENCY: INTERMITTENT
PAIN LOCATION: BACK
HIGHEST PAIN SEVERITY IN PAST 24 HOURS: 5/10
PAIN LOCATION - EXACERBATING FACTORS: ACTIVITY
PAIN LOCATION - RELIEVING FACTORS: REST, MEDICATION

## 2024-10-30 ENCOUNTER — HOME CARE VISIT (OUTPATIENT)
Dept: HOME HEALTH SERVICES | Facility: HOME HEALTH | Age: 89
End: 2024-10-30
Payer: MEDICARE

## 2024-10-30 VITALS
HEART RATE: 70 BPM | TEMPERATURE: 96.6 F | OXYGEN SATURATION: 95 % | RESPIRATION RATE: 18 BRPM | DIASTOLIC BLOOD PRESSURE: 60 MMHG | SYSTOLIC BLOOD PRESSURE: 110 MMHG

## 2024-10-30 PROCEDURE — G0151 HHCP-SERV OF PT,EA 15 MIN: HCPCS | Mod: HHH

## 2024-10-30 SDOH — HEALTH STABILITY: PHYSICAL HEALTH: EXERCISE TYPE: B LES HEP

## 2024-10-30 SDOH — HEALTH STABILITY: PHYSICAL HEALTH
EXERCISE COMMENTS: DOING 15 REPS OF ALL EXERCISES    SUPINE  ISOMETRIC QUADS  ISOMETRIC GLUTS  ANKLE PUMPS  SAQ  SLR  HIP ABDUCTION  HEEL SLIDES    SITTING  ANKLE PUMPS  LAQ  MARCHING  ISOM HIP ADDUCTION      STANDING  TOE RAISES  HIP ABDUCTION  MARCHING  HAMSTRING CUR

## 2024-10-30 SDOH — HEALTH STABILITY: PHYSICAL HEALTH: EXERCISE COMMENTS: LS  HIP EXTENSION  PARTIAL SQUATS

## 2024-10-30 ASSESSMENT — ENCOUNTER SYMPTOMS
PERSON REPORTING PAIN: PATIENT
LOWEST PAIN SEVERITY IN PAST 24 HOURS: 3/10
PAIN LOCATION: BACK
PAIN: 1
PAIN LOCATION - EXACERBATING FACTORS: WALKING
PAIN LOCATION - RELIEVING FACTORS: REST/MEDS
PAIN LOCATION - PAIN QUALITY: DULL AND ACHY
MUSCLE WEAKNESS: 1
SUBJECTIVE PAIN PROGRESSION: WAXING AND WANING
PAIN LOCATION - PAIN FREQUENCY: INTERMITTENT
HIGHEST PAIN SEVERITY IN PAST 24 HOURS: 6/10
PAIN SEVERITY GOAL: 3/10
PAIN LOCATION - PAIN SEVERITY: 3/10

## 2024-10-30 ASSESSMENT — ACTIVITIES OF DAILY LIVING (ADL)
FEEDING: INDEPENDENT
OASIS_M1830: 01
AMBULATION ASSISTANCE: 1
PHYSICAL TRANSFERS ASSESSED: 1
FEEDING ASSESSED: 1
GROOMING ASSESSED: 1
TOILETING: INDEPENDENT
HOME_HEALTH_OASIS: 00
CURRENT_FUNCTION: INDEPENDENT
AMBULATION ASSISTANCE ON FLAT SURFACES: 1
GROOMING_CURRENT_FUNCTION: INDEPENDENT
DRESSING_UB_CURRENT_FUNCTION: INDEPENDENT
DRESSING_LB_CURRENT_FUNCTION: INDEPENDENT
AMBULATION ASSISTANCE: SUPERVISION
TOILETING: 1

## 2024-11-05 ENCOUNTER — TELEPHONE (OUTPATIENT)
Dept: PRIMARY CARE | Facility: CLINIC | Age: 89
End: 2024-11-05
Payer: MEDICARE

## 2024-11-11 ENCOUNTER — TELEPHONE (OUTPATIENT)
Dept: PRIMARY CARE | Facility: CLINIC | Age: 89
End: 2024-11-11

## 2024-11-11 ENCOUNTER — APPOINTMENT (OUTPATIENT)
Dept: PRIMARY CARE | Facility: CLINIC | Age: 89
End: 2024-11-11
Payer: MEDICARE

## 2024-11-13 ENCOUNTER — APPOINTMENT (OUTPATIENT)
Dept: PRIMARY CARE | Facility: CLINIC | Age: 89
End: 2024-11-13
Payer: MEDICARE

## 2024-11-13 VITALS
WEIGHT: 138.2 LBS | BODY MASS INDEX: 22.21 KG/M2 | DIASTOLIC BLOOD PRESSURE: 68 MMHG | HEART RATE: 93 BPM | OXYGEN SATURATION: 97 % | HEIGHT: 66 IN | SYSTOLIC BLOOD PRESSURE: 134 MMHG

## 2024-11-13 DIAGNOSIS — H81.11 BENIGN PAROXYSMAL POSITIONAL VERTIGO OF RIGHT EAR: Primary | ICD-10-CM

## 2024-11-13 DIAGNOSIS — R53.1 GENERALIZED WEAKNESS: ICD-10-CM

## 2024-11-13 PROCEDURE — 99214 OFFICE O/P EST MOD 30 MIN: CPT | Performed by: STUDENT IN AN ORGANIZED HEALTH CARE EDUCATION/TRAINING PROGRAM

## 2024-11-13 PROCEDURE — 1159F MED LIST DOCD IN RCRD: CPT | Performed by: STUDENT IN AN ORGANIZED HEALTH CARE EDUCATION/TRAINING PROGRAM

## 2024-11-13 PROCEDURE — 1157F ADVNC CARE PLAN IN RCRD: CPT | Performed by: STUDENT IN AN ORGANIZED HEALTH CARE EDUCATION/TRAINING PROGRAM

## 2024-11-13 PROCEDURE — 1123F ACP DISCUSS/DSCN MKR DOCD: CPT | Performed by: STUDENT IN AN ORGANIZED HEALTH CARE EDUCATION/TRAINING PROGRAM

## 2024-11-13 NOTE — PROGRESS NOTES
Subjective   Patient ID: Xiomy Rubio is a 89 y.o. female who presents for Follow-up (Pt is here for a 4 month follow up.).  HPI  Frequent Falls   Pt was walking in the dark in the laundry       Fell in the kitchen   Hit her head   No LOC   Hematoma on her head  Was not walking around with Rolator  No Pain noted  No increase in somnolence  Has had a nightmare    Home physical therapy  Pt reports no nausea,  Change in her     When she rolls over to the right she admits to becoming dizzy prior to fall    Objective   Physical Exam  Vitals reviewed.   Constitutional:       Appearance: Normal appearance.   HENT:      Head:        Comments: Central posterior occiput bruising and hematoma formation   Cardiovascular:      Rate and Rhythm: Normal rate and regular rhythm.      Heart sounds: Murmur heard.   Pulmonary:      Effort: Pulmonary effort is normal. No respiratory distress.      Breath sounds: Normal breath sounds. No wheezing.   Musculoskeletal:      Cervical back: Neck supple.      Left lower leg: No edema.   Neurological:      Mental Status: She is alert.       Assessment/Plan   Diagnoses and all orders for this visit:  Benign paroxysmal positional vertigo of right ear  -     Referral to Physical Therapy; Future  Generalized weakness  -     Referral to Physical Therapy; Future      Frequent Falls   Mechanical- pt not currently   Concussion protocol/red flags reviewed with son and red flag paperwork given in hand   Advised to present to the ED   Hematoma of the occiput noted  Physical therapy      Fall   Home health care ordered for physical therapy   For pain management lidocaine patches, tramadol at night time   Xray right hip negative   Lumbar spine negative for acute pathology   Xray of left hip      Constipation   Pt is not consistent with her miralax would like to try tablets   Senna sent    Anxiety  Patient is following with both psychology and a psychiatrist  Due to patient's adverse reactions she has  been reduced back to her Zoloft 50 mg daily  There has been improvement to her adverse side effects.      GERD prophylaxis  Pantoprazole was refilled     Osteoporosis  Her vitamin D, calcium supplementation was refilled     Constipation  Patient to continue with as needed laxative  Encourage daily prune juice or an increase in fiber     Prediabetes  A1c 5.7% 8 months ago         Lian Hernandez DO 11/13/24 2:45 PM

## 2024-11-26 DIAGNOSIS — D64.9 ANEMIA, UNSPECIFIED TYPE: ICD-10-CM

## 2024-11-26 RX ORDER — FERROUS SULFATE 325(65) MG
65 TABLET ORAL EVERY OTHER DAY
Qty: 45 TABLET | Refills: 1 | Status: SHIPPED | OUTPATIENT
Start: 2024-11-26 | End: 2025-05-25

## 2025-01-13 DIAGNOSIS — M81.0 OSTEOPOROSIS, UNSPECIFIED OSTEOPOROSIS TYPE, UNSPECIFIED PATHOLOGICAL FRACTURE PRESENCE: ICD-10-CM

## 2025-01-13 DIAGNOSIS — K21.9 GASTROESOPHAGEAL REFLUX DISEASE, UNSPECIFIED WHETHER ESOPHAGITIS PRESENT: ICD-10-CM

## 2025-01-13 RX ORDER — PANTOPRAZOLE SODIUM 20 MG/1
20 TABLET, DELAYED RELEASE ORAL DAILY
Qty: 90 TABLET | Refills: 1 | Status: SHIPPED | OUTPATIENT
Start: 2025-01-13

## 2025-01-13 RX ORDER — CALCIUM CARBONATE/VITAMIN D3 500MG-5MCG
1 TABLET ORAL 2 TIMES DAILY
Qty: 180 TABLET | Refills: 1 | Status: SHIPPED | OUTPATIENT
Start: 2025-01-13

## 2025-02-03 ENCOUNTER — APPOINTMENT (OUTPATIENT)
Dept: RADIOLOGY | Facility: HOSPITAL | Age: OVER 89
End: 2025-02-03
Payer: MEDICARE

## 2025-02-03 ENCOUNTER — HOSPITAL ENCOUNTER (INPATIENT)
Facility: HOSPITAL | Age: OVER 89
LOS: 4 days | Discharge: NURSING, REHAB OR LONG-TERM CARE | End: 2025-02-07
Attending: EMERGENCY MEDICINE | Admitting: SURGERY
Payer: MEDICARE

## 2025-02-03 ENCOUNTER — HOSPITAL ENCOUNTER (EMERGENCY)
Facility: HOSPITAL | Age: OVER 89
Discharge: ANOTHER HOSPITAL | End: 2025-02-03
Attending: STUDENT IN AN ORGANIZED HEALTH CARE EDUCATION/TRAINING PROGRAM
Payer: MEDICARE

## 2025-02-03 ENCOUNTER — CLINICAL SUPPORT (OUTPATIENT)
Dept: EMERGENCY MEDICINE | Facility: HOSPITAL | Age: OVER 89
End: 2025-02-03
Payer: MEDICARE

## 2025-02-03 VITALS
RESPIRATION RATE: 20 BRPM | SYSTOLIC BLOOD PRESSURE: 130 MMHG | WEIGHT: 151.24 LBS | HEIGHT: 66 IN | OXYGEN SATURATION: 94 % | HEART RATE: 74 BPM | TEMPERATURE: 97.3 F | DIASTOLIC BLOOD PRESSURE: 55 MMHG | BODY MASS INDEX: 24.31 KG/M2

## 2025-02-03 DIAGNOSIS — K59.03 CONSTIPATION DUE TO OPIOID THERAPY: ICD-10-CM
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
## 2025-02-03 DIAGNOSIS — S02.2XXA CLOSED FRACTURE OF NASAL BONE, INITIAL ENCOUNTER: Primary | ICD-10-CM

## 2025-02-03 DIAGNOSIS — R04.0 EPISTAXIS: ICD-10-CM

## 2025-02-03 DIAGNOSIS — S02.411A: ICD-10-CM

## 2025-02-03 DIAGNOSIS — S09.90XA CLOSED HEAD INJURY, INITIAL ENCOUNTER: ICD-10-CM

## 2025-02-03 DIAGNOSIS — R73.03 PREDIABETES: ICD-10-CM

## 2025-02-03 DIAGNOSIS — M80.00XA OSTEOPOROSIS WITH CURRENT PATHOLOGICAL FRACTURE: ICD-10-CM

## 2025-02-03 DIAGNOSIS — S22.42XA CLOSED FRACTURE OF MULTIPLE RIBS OF LEFT SIDE, INITIAL ENCOUNTER: ICD-10-CM

## 2025-02-03 DIAGNOSIS — S02.2XXB OPEN FRACTURE OF NASAL BONE, INITIAL ENCOUNTER: ICD-10-CM

## 2025-02-03 DIAGNOSIS — S32.040A: ICD-10-CM

## 2025-02-03 DIAGNOSIS — R11.0 NAUSEA: ICD-10-CM

## 2025-02-03 DIAGNOSIS — T40.2X5A CONSTIPATION DUE TO OPIOID THERAPY: ICD-10-CM

## 2025-02-03 LAB
25(OH)D3 SERPL-MCNC: 34 NG/ML (ref 30–100)
ABO GROUP (TYPE) IN BLOOD: NORMAL
ALBUMIN SERPL BCP-MCNC: 3.2 G/DL (ref 3.4–5)
ALBUMIN SERPL BCP-MCNC: 3.8 G/DL (ref 3.4–5)
ALP SERPL-CCNC: 64 U/L (ref 33–136)
ALP SERPL-CCNC: 75 U/L (ref 33–136)
ALT SERPL W P-5'-P-CCNC: 10 U/L (ref 7–45)
ALT SERPL W P-5'-P-CCNC: 9 U/L (ref 7–45)
ANION GAP SERPL CALC-SCNC: 9 MMOL/L (ref 10–20)
ANION GAP SERPL CALCULATED.3IONS-SCNC: 9 MMOL/L (ref 10–20)
ANTIBODY SCREEN: NORMAL
APTT PPP: 25.3 SECONDS (ref 22–32.5)
AST SERPL W P-5'-P-CCNC: 14 U/L (ref 9–39)
AST SERPL W P-5'-P-CCNC: 18 U/L (ref 9–39)
BASOPHILS # BLD AUTO: 0.05 X10*3/UL (ref 0–0.1)
BASOPHILS # BLD AUTO: 0.07 X10*3/UL (ref 0–0.1)
BASOPHILS NFR BLD AUTO: 0.7 %
BASOPHILS NFR BLD AUTO: 1.1 %
BILIRUB SERPL-MCNC: 0.8 MG/DL (ref 0–1.2)
BILIRUB SERPL-MCNC: 1 MG/DL (ref 0–1.2)
BUN SERPL-MCNC: 22 MG/DL (ref 6–23)
BUN SERPL-MCNC: 24 MG/DL (ref 6–23)
CALCIUM SERPL-MCNC: 8.6 MG/DL (ref 8.6–10.6)
CALCIUM SERPL-MCNC: 9 MG/DL (ref 8.6–10.3)
CARDIAC TROPONIN I PNL SERPL HS: 5 NG/L (ref 0–13)
CHLORIDE SERPL-SCNC: 103 MMOL/L (ref 98–107)
CHLORIDE SERPL-SCNC: 103 MMOL/L (ref 98–107)
CK SERPL-CCNC: 100 U/L (ref 0–215)
CO2 SERPL-SCNC: 30 MMOL/L (ref 21–32)
CO2 SERPL-SCNC: 31 MMOL/L (ref 21–32)
CREAT SERPL-MCNC: 0.55 MG/DL (ref 0.5–1.05)
CREAT SERPL-MCNC: 0.59 MG/DL (ref 0.5–1.05)
EGFRCR SERPLBLD CKD-EPI 2021: 84 ML/MIN/1.73M*2
EGFRCR SERPLBLD CKD-EPI 2021: 86 ML/MIN/1.73M*2
EOSINOPHIL # BLD AUTO: 0.12 X10*3/UL (ref 0–0.4)
EOSINOPHIL # BLD AUTO: 0.45 X10*3/UL (ref 0–0.4)
EOSINOPHIL NFR BLD AUTO: 1.6 %
EOSINOPHIL NFR BLD AUTO: 6.8 %
ERYTHROCYTE [DISTWIDTH] IN BLOOD BY AUTOMATED COUNT: 13.3 % (ref 11.5–14.5)
ERYTHROCYTE [DISTWIDTH] IN BLOOD BY AUTOMATED COUNT: 13.3 % (ref 11.5–14.5)
GLUCOSE SERPL-MCNC: 103 MG/DL (ref 74–99)
GLUCOSE SERPL-MCNC: 163 MG/DL (ref 74–99)
HCT VFR BLD AUTO: 29.3 % (ref 36–46)
HCT VFR BLD AUTO: 41.2 % (ref 36–46)
HGB BLD-MCNC: 10.1 G/DL (ref 12–16)
HGB BLD-MCNC: 13.1 G/DL (ref 12–16)
IMM GRANULOCYTES # BLD AUTO: 0.07 X10*3/UL (ref 0–0.5)
IMM GRANULOCYTES # BLD AUTO: 0.11 X10*3/UL (ref 0–0.5)
IMM GRANULOCYTES NFR BLD AUTO: 1 % (ref 0–0.9)
IMM GRANULOCYTES NFR BLD AUTO: 1.7 % (ref 0–0.9)
INR PPP: 1.1 (ref 0.9–1.2)
LYMPHOCYTES # BLD AUTO: 1.01 X10*3/UL (ref 0.8–3)
LYMPHOCYTES # BLD AUTO: 2.04 X10*3/UL (ref 0.8–3)
LYMPHOCYTES NFR BLD AUTO: 13.8 %
LYMPHOCYTES NFR BLD AUTO: 30.9 %
MCH RBC QN AUTO: 28.7 PG (ref 26–34)
MCH RBC QN AUTO: 30.1 PG (ref 26–34)
MCHC RBC AUTO-ENTMCNC: 31.8 G/DL (ref 32–36)
MCHC RBC AUTO-ENTMCNC: 34.5 G/DL (ref 32–36)
MCV RBC AUTO: 88 FL (ref 80–100)
MCV RBC AUTO: 90 FL (ref 80–100)
MONOCYTES # BLD AUTO: 0.49 X10*3/UL (ref 0.05–0.8)
MONOCYTES # BLD AUTO: 0.74 X10*3/UL (ref 0.05–0.8)
MONOCYTES NFR BLD AUTO: 10.1 %
MONOCYTES NFR BLD AUTO: 7.4 %
NEUTROPHILS # BLD AUTO: 3.45 X10*3/UL (ref 1.6–5.5)
NEUTROPHILS # BLD AUTO: 5.33 X10*3/UL (ref 1.6–5.5)
NEUTROPHILS NFR BLD AUTO: 52.1 %
NEUTROPHILS NFR BLD AUTO: 72.8 %
NRBC BLD-RTO: 0 /100 WBCS (ref 0–0)
NRBC BLD-RTO: 0 /100 WBCS (ref 0–0)
PLATELET # BLD AUTO: 179 X10*3/UL (ref 150–450)
PLATELET # BLD AUTO: 230 X10*3/UL (ref 150–450)
POTASSIUM SERPL-SCNC: 3.8 MMOL/L (ref 3.5–5.3)
POTASSIUM SERPL-SCNC: 4 MMOL/L (ref 3.5–5.3)
PROT SERPL-MCNC: 5.1 G/DL (ref 6.4–8.2)
PROT SERPL-MCNC: 6 G/DL (ref 6.4–8.2)
PROTHROMBIN TIME: 11.2 SECONDS (ref 9.3–12.7)
RBC # BLD AUTO: 3.35 X10*6/UL (ref 4–5.2)
RBC # BLD AUTO: 4.56 X10*6/UL (ref 4–5.2)
RH FACTOR (ANTIGEN D): NORMAL
SODIUM SERPL-SCNC: 138 MMOL/L (ref 136–145)
SODIUM SERPL-SCNC: 139 MMOL/L (ref 136–145)
WBC # BLD AUTO: 6.6 X10*3/UL (ref 4.4–11.3)
WBC # BLD AUTO: 7.3 X10*3/UL (ref 4.4–11.3)

## 2025-02-03 PROCEDURE — 2500000001 HC RX 250 WO HCPCS SELF ADMINISTERED DRUGS (ALT 637 FOR MEDICARE OP): Performed by: STUDENT IN AN ORGANIZED HEALTH CARE EDUCATION/TRAINING PROGRAM

## 2025-02-03 PROCEDURE — 2020000001 HC ICU ROOM DAILY

## 2025-02-03 PROCEDURE — 99285 EMERGENCY DEPT VISIT HI MDM: CPT | Mod: 25 | Performed by: EMERGENCY MEDICINE

## 2025-02-03 PROCEDURE — 70486 CT MAXILLOFACIAL W/O DYE: CPT

## 2025-02-03 PROCEDURE — 72170 X-RAY EXAM OF PELVIS: CPT

## 2025-02-03 PROCEDURE — 72128 CT CHEST SPINE W/O DYE: CPT | Mod: RCN | Performed by: RADIOLOGY

## 2025-02-03 PROCEDURE — 74177 CT ABD & PELVIS W/CONTRAST: CPT

## 2025-02-03 PROCEDURE — 82550 ASSAY OF CK (CPK): CPT | Performed by: STUDENT IN AN ORGANIZED HEALTH CARE EDUCATION/TRAINING PROGRAM

## 2025-02-03 PROCEDURE — 82306 VITAMIN D 25 HYDROXY: CPT

## 2025-02-03 PROCEDURE — 71045 X-RAY EXAM CHEST 1 VIEW: CPT | Performed by: RADIOLOGY

## 2025-02-03 PROCEDURE — 99285 EMERGENCY DEPT VISIT HI MDM: CPT | Mod: 25 | Performed by: STUDENT IN AN ORGANIZED HEALTH CARE EDUCATION/TRAINING PROGRAM

## 2025-02-03 PROCEDURE — 85730 THROMBOPLASTIN TIME PARTIAL: CPT | Performed by: STUDENT IN AN ORGANIZED HEALTH CARE EDUCATION/TRAINING PROGRAM

## 2025-02-03 PROCEDURE — 36415 COLL VENOUS BLD VENIPUNCTURE: CPT

## 2025-02-03 PROCEDURE — 86901 BLOOD TYPING SEROLOGIC RH(D): CPT

## 2025-02-03 PROCEDURE — G0390 TRAUMA RESPONS W/HOSP CRITI: HCPCS

## 2025-02-03 PROCEDURE — 71260 CT THORAX DX C+: CPT

## 2025-02-03 PROCEDURE — 72100 X-RAY EXAM L-S SPINE 2/3 VWS: CPT

## 2025-02-03 PROCEDURE — 85025 COMPLETE CBC W/AUTO DIFF WBC: CPT | Performed by: STUDENT IN AN ORGANIZED HEALTH CARE EDUCATION/TRAINING PROGRAM

## 2025-02-03 PROCEDURE — 70450 CT HEAD/BRAIN W/O DYE: CPT

## 2025-02-03 PROCEDURE — 74177 CT ABD & PELVIS W/CONTRAST: CPT | Performed by: RADIOLOGY

## 2025-02-03 PROCEDURE — 85025 COMPLETE CBC W/AUTO DIFF WBC: CPT

## 2025-02-03 PROCEDURE — 2500000004 HC RX 250 GENERAL PHARMACY W/ HCPCS (ALT 636 FOR OP/ED): Mod: JZ,TB | Performed by: STUDENT IN AN ORGANIZED HEALTH CARE EDUCATION/TRAINING PROGRAM

## 2025-02-03 PROCEDURE — 93005 ELECTROCARDIOGRAM TRACING: CPT

## 2025-02-03 PROCEDURE — 2500000002 HC RX 250 W HCPCS SELF ADMINISTERED DRUGS (ALT 637 FOR MEDICARE OP, ALT 636 FOR OP/ED): Performed by: STUDENT IN AN ORGANIZED HEALTH CARE EDUCATION/TRAINING PROGRAM

## 2025-02-03 PROCEDURE — 72125 CT NECK SPINE W/O DYE: CPT

## 2025-02-03 PROCEDURE — 96365 THER/PROPH/DIAG IV INF INIT: CPT | Mod: 59

## 2025-02-03 PROCEDURE — 76377 3D RENDER W/INTRP POSTPROCES: CPT

## 2025-02-03 PROCEDURE — 90715 TDAP VACCINE 7 YRS/> IM: CPT | Performed by: STUDENT IN AN ORGANIZED HEALTH CARE EDUCATION/TRAINING PROGRAM

## 2025-02-03 PROCEDURE — 99291 CRITICAL CARE FIRST HOUR: CPT | Performed by: STUDENT IN AN ORGANIZED HEALTH CARE EDUCATION/TRAINING PROGRAM

## 2025-02-03 PROCEDURE — 2500000004 HC RX 250 GENERAL PHARMACY W/ HCPCS (ALT 636 FOR OP/ED): Performed by: STUDENT IN AN ORGANIZED HEALTH CARE EDUCATION/TRAINING PROGRAM

## 2025-02-03 PROCEDURE — 73552 X-RAY EXAM OF FEMUR 2/>: CPT | Mod: BILATERAL PROCEDURE | Performed by: RADIOLOGY

## 2025-02-03 PROCEDURE — 80053 COMPREHEN METABOLIC PANEL: CPT | Performed by: STUDENT IN AN ORGANIZED HEALTH CARE EDUCATION/TRAINING PROGRAM

## 2025-02-03 PROCEDURE — 72128 CT CHEST SPINE W/O DYE: CPT

## 2025-02-03 PROCEDURE — 84484 ASSAY OF TROPONIN QUANT: CPT

## 2025-02-03 PROCEDURE — 72100 X-RAY EXAM L-S SPINE 2/3 VWS: CPT | Performed by: RADIOLOGY

## 2025-02-03 PROCEDURE — 2550000001 HC RX 255 CONTRASTS: Performed by: STUDENT IN AN ORGANIZED HEALTH CARE EDUCATION/TRAINING PROGRAM

## 2025-02-03 PROCEDURE — 99285 EMERGENCY DEPT VISIT HI MDM: CPT | Performed by: EMERGENCY MEDICINE

## 2025-02-03 PROCEDURE — 2550000001 HC RX 255 CONTRASTS: Performed by: EMERGENCY MEDICINE

## 2025-02-03 PROCEDURE — 96376 TX/PRO/DX INJ SAME DRUG ADON: CPT | Mod: 59

## 2025-02-03 PROCEDURE — 96366 THER/PROPH/DIAG IV INF ADDON: CPT

## 2025-02-03 PROCEDURE — 99223 1ST HOSP IP/OBS HIGH 75: CPT | Performed by: SURGERY

## 2025-02-03 PROCEDURE — 71045 X-RAY EXAM CHEST 1 VIEW: CPT

## 2025-02-03 PROCEDURE — 36415 COLL VENOUS BLD VENIPUNCTURE: CPT | Performed by: STUDENT IN AN ORGANIZED HEALTH CARE EDUCATION/TRAINING PROGRAM

## 2025-02-03 PROCEDURE — 72170 X-RAY EXAM OF PELVIS: CPT | Mod: FOREIGN READ | Performed by: RADIOLOGY

## 2025-02-03 PROCEDURE — 96375 TX/PRO/DX INJ NEW DRUG ADDON: CPT | Mod: 59

## 2025-02-03 PROCEDURE — 72131 CT LUMBAR SPINE W/O DYE: CPT | Mod: RCN

## 2025-02-03 PROCEDURE — 72131 CT LUMBAR SPINE W/O DYE: CPT | Mod: RCN | Performed by: RADIOLOGY

## 2025-02-03 PROCEDURE — 73552 X-RAY EXAM OF FEMUR 2/>: CPT | Mod: 50

## 2025-02-03 PROCEDURE — 86900 BLOOD TYPING SEROLOGIC ABO: CPT

## 2025-02-03 PROCEDURE — 90471 IMMUNIZATION ADMIN: CPT | Performed by: STUDENT IN AN ORGANIZED HEALTH CARE EDUCATION/TRAINING PROGRAM

## 2025-02-03 PROCEDURE — 85610 PROTHROMBIN TIME: CPT | Performed by: STUDENT IN AN ORGANIZED HEALTH CARE EDUCATION/TRAINING PROGRAM

## 2025-02-03 PROCEDURE — 96361 HYDRATE IV INFUSION ADD-ON: CPT

## 2025-02-03 PROCEDURE — 80053 COMPREHEN METABOLIC PANEL: CPT

## 2025-02-03 RX ORDER — DEXTROSE 50 % IN WATER (D50W) INTRAVENOUS SYRINGE
12.5
Status: DISCONTINUED | OUTPATIENT
Start: 2025-02-03 | End: 2025-02-07 | Stop reason: HOSPADM

## 2025-02-03 RX ORDER — OXYMETAZOLINE HCL 0.05 %
2 SPRAY, NON-AEROSOL (ML) NASAL ONCE
Status: COMPLETED | OUTPATIENT
Start: 2025-02-03 | End: 2025-02-03

## 2025-02-03 RX ORDER — NALOXONE HYDROCHLORIDE 0.4 MG/ML
0.2 INJECTION, SOLUTION INTRAMUSCULAR; INTRAVENOUS; SUBCUTANEOUS EVERY 5 MIN PRN
Status: DISCONTINUED | OUTPATIENT
Start: 2025-02-03 | End: 2025-02-07 | Stop reason: HOSPADM

## 2025-02-03 RX ORDER — LIDOCAINE 560 MG/1
1 PATCH PERCUTANEOUS; TOPICAL; TRANSDERMAL DAILY
Status: DISCONTINUED | OUTPATIENT
Start: 2025-02-04 | End: 2025-02-07 | Stop reason: HOSPADM

## 2025-02-03 RX ORDER — CEFTRIAXONE 1 G/50ML
1 INJECTION, SOLUTION INTRAVENOUS ONCE
Status: COMPLETED | OUTPATIENT
Start: 2025-02-03 | End: 2025-02-03

## 2025-02-03 RX ORDER — OXYCODONE HYDROCHLORIDE 5 MG/1
5 TABLET ORAL EVERY 4 HOURS PRN
Status: DISCONTINUED | OUTPATIENT
Start: 2025-02-03 | End: 2025-02-04

## 2025-02-03 RX ORDER — PSYLLIUM HUSK 0.4 G
1 CAPSULE ORAL 2 TIMES DAILY
Status: DISCONTINUED | OUTPATIENT
Start: 2025-02-03 | End: 2025-02-07 | Stop reason: HOSPADM

## 2025-02-03 RX ORDER — SILVER NITRATE 38.21; 12.74 MG/1; MG/1
1 STICK TOPICAL ONCE
Status: DISCONTINUED | OUTPATIENT
Start: 2025-02-03 | End: 2025-02-03 | Stop reason: HOSPADM

## 2025-02-03 RX ORDER — ENOXAPARIN SODIUM 100 MG/ML
30 INJECTION SUBCUTANEOUS EVERY 12 HOURS SCHEDULED
Status: DISCONTINUED | OUTPATIENT
Start: 2025-02-04 | End: 2025-02-07 | Stop reason: HOSPADM

## 2025-02-03 RX ORDER — DEXTROSE 50 % IN WATER (D50W) INTRAVENOUS SYRINGE
25
Status: DISCONTINUED | OUTPATIENT
Start: 2025-02-03 | End: 2025-02-07 | Stop reason: HOSPADM

## 2025-02-03 RX ORDER — PANTOPRAZOLE SODIUM 20 MG/1
20 TABLET, DELAYED RELEASE ORAL DAILY
Status: DISCONTINUED | OUTPATIENT
Start: 2025-02-04 | End: 2025-02-07 | Stop reason: HOSPADM

## 2025-02-03 RX ORDER — HYDROMORPHONE HYDROCHLORIDE 0.2 MG/ML
0.2 INJECTION INTRAMUSCULAR; INTRAVENOUS; SUBCUTANEOUS EVERY 4 HOURS PRN
Status: DISCONTINUED | OUTPATIENT
Start: 2025-02-03 | End: 2025-02-04

## 2025-02-03 RX ORDER — ACETAMINOPHEN 325 MG/1
650 TABLET ORAL EVERY 6 HOURS SCHEDULED
Status: DISCONTINUED | OUTPATIENT
Start: 2025-02-04 | End: 2025-02-04

## 2025-02-03 RX ORDER — BUSPIRONE HYDROCHLORIDE 10 MG/1
10 TABLET ORAL 3 TIMES DAILY
Status: DISCONTINUED | OUTPATIENT
Start: 2025-02-03 | End: 2025-02-07 | Stop reason: HOSPADM

## 2025-02-03 RX ORDER — SODIUM CHLORIDE, SODIUM LACTATE, POTASSIUM CHLORIDE, CALCIUM CHLORIDE 600; 310; 30; 20 MG/100ML; MG/100ML; MG/100ML; MG/100ML
100 INJECTION, SOLUTION INTRAVENOUS CONTINUOUS
Status: DISCONTINUED | OUTPATIENT
Start: 2025-02-03 | End: 2025-02-04

## 2025-02-03 RX ORDER — BUSPIRONE HYDROCHLORIDE 5 MG/1
10 TABLET ORAL 3 TIMES DAILY
Status: DISCONTINUED | OUTPATIENT
Start: 2025-02-03 | End: 2025-02-03 | Stop reason: HOSPADM

## 2025-02-03 RX ORDER — POLYETHYLENE GLYCOL 3350 17 G/17G
17 POWDER, FOR SOLUTION ORAL DAILY
Status: DISCONTINUED | OUTPATIENT
Start: 2025-02-04 | End: 2025-02-07 | Stop reason: HOSPADM

## 2025-02-03 RX ORDER — ONDANSETRON HYDROCHLORIDE 2 MG/ML
4 INJECTION, SOLUTION INTRAVENOUS EVERY 8 HOURS PRN
Status: DISCONTINUED | OUTPATIENT
Start: 2025-02-03 | End: 2025-02-07 | Stop reason: HOSPADM

## 2025-02-03 RX ORDER — SENNOSIDES 8.6 MG/1
8.6 TABLET ORAL NIGHTLY
Status: DISCONTINUED | OUTPATIENT
Start: 2025-02-03 | End: 2025-02-07 | Stop reason: HOSPADM

## 2025-02-03 RX ORDER — SERTRALINE HYDROCHLORIDE 50 MG/1
50 TABLET, FILM COATED ORAL DAILY
Status: DISCONTINUED | OUTPATIENT
Start: 2025-02-04 | End: 2025-02-07 | Stop reason: HOSPADM

## 2025-02-03 RX ORDER — OXYCODONE HYDROCHLORIDE 5 MG/1
10 TABLET ORAL EVERY 4 HOURS PRN
Status: DISCONTINUED | OUTPATIENT
Start: 2025-02-03 | End: 2025-02-04

## 2025-02-03 RX ORDER — FERROUS SULFATE 325(65) MG
65 TABLET ORAL EVERY OTHER DAY
Status: DISCONTINUED | OUTPATIENT
Start: 2025-02-04 | End: 2025-02-07 | Stop reason: HOSPADM

## 2025-02-03 RX ORDER — MORPHINE SULFATE 2 MG/ML
2 INJECTION, SOLUTION INTRAMUSCULAR; INTRAVENOUS ONCE
Status: COMPLETED | OUTPATIENT
Start: 2025-02-03 | End: 2025-02-03

## 2025-02-03 RX ORDER — SERTRALINE HYDROCHLORIDE 50 MG/1
50 TABLET, FILM COATED ORAL DAILY
Status: DISCONTINUED | OUTPATIENT
Start: 2025-02-03 | End: 2025-02-03 | Stop reason: HOSPADM

## 2025-02-03 RX ORDER — INSULIN LISPRO 100 [IU]/ML
0-5 INJECTION, SOLUTION INTRAVENOUS; SUBCUTANEOUS EVERY 4 HOURS
Status: DISCONTINUED | OUTPATIENT
Start: 2025-02-03 | End: 2025-02-04

## 2025-02-03 RX ORDER — LATANOPROST 50 UG/ML
1 SOLUTION/ DROPS OPHTHALMIC NIGHTLY
Status: DISCONTINUED | OUTPATIENT
Start: 2025-02-04 | End: 2025-02-07 | Stop reason: HOSPADM

## 2025-02-03 RX ORDER — ONDANSETRON 4 MG/1
4 TABLET, ORALLY DISINTEGRATING ORAL EVERY 8 HOURS PRN
Status: DISCONTINUED | OUTPATIENT
Start: 2025-02-03 | End: 2025-02-07 | Stop reason: HOSPADM

## 2025-02-03 RX ORDER — ONDANSETRON HYDROCHLORIDE 2 MG/ML
4 INJECTION, SOLUTION INTRAVENOUS ONCE
Status: COMPLETED | OUTPATIENT
Start: 2025-02-03 | End: 2025-02-03

## 2025-02-03 RX ADMIN — BUSPIRONE HYDROCHLORIDE 10 MG: 10 TABLET ORAL at 23:32

## 2025-02-03 RX ADMIN — ONDANSETRON 4 MG: 2 INJECTION INTRAMUSCULAR; INTRAVENOUS at 03:52

## 2025-02-03 RX ADMIN — MORPHINE SULFATE 2 MG: 2 INJECTION, SOLUTION INTRAMUSCULAR; INTRAVENOUS at 12:35

## 2025-02-03 RX ADMIN — ACETAMINOPHEN 650 MG: 325 TABLET ORAL at 23:31

## 2025-02-03 RX ADMIN — TETANUS TOXOID, REDUCED DIPHTHERIA TOXOID AND ACELLULAR PERTUSSIS VACCINE, ADSORBED 0.5 ML: 5; 2.5; 8; 8; 2.5 SUSPENSION INTRAMUSCULAR at 08:58

## 2025-02-03 RX ADMIN — SERTRALINE HYDROCHLORIDE 50 MG: 50 TABLET ORAL at 10:55

## 2025-02-03 RX ADMIN — SODIUM CHLORIDE 500 ML: 900 INJECTION, SOLUTION INTRAVENOUS at 03:50

## 2025-02-03 RX ADMIN — MORPHINE SULFATE 2 MG: 2 INJECTION, SOLUTION INTRAMUSCULAR; INTRAVENOUS at 04:02

## 2025-02-03 RX ADMIN — CEFTRIAXONE SODIUM 1 G: 1 INJECTION, SOLUTION INTRAVENOUS at 06:52

## 2025-02-03 RX ADMIN — BUSPIRONE HYDROCHLORIDE 10 MG: 5 TABLET ORAL at 10:55

## 2025-02-03 RX ADMIN — IOHEXOL 75 ML: 350 INJECTION, SOLUTION INTRAVENOUS at 05:56

## 2025-02-03 RX ADMIN — IOHEXOL 80 ML: 350 INJECTION, SOLUTION INTRAVENOUS at 15:07

## 2025-02-03 RX ADMIN — Medication 1 TABLET: at 23:31

## 2025-02-03 RX ADMIN — OXYMETAZOLINE HYDROCHLORIDE 2 SPRAY: 0.05 SPRAY NASAL at 05:11

## 2025-02-03 RX ADMIN — HYDROMORPHONE HYDROCHLORIDE 0.2 MG: 0.2 INJECTION, SOLUTION INTRAMUSCULAR; INTRAVENOUS; SUBCUTANEOUS at 23:33

## 2025-02-03 RX ADMIN — SODIUM CHLORIDE, POTASSIUM CHLORIDE, SODIUM LACTATE AND CALCIUM CHLORIDE 100 ML/HR: 600; 310; 30; 20 INJECTION, SOLUTION INTRAVENOUS at 22:00

## 2025-02-03 ASSESSMENT — PAIN - FUNCTIONAL ASSESSMENT
PAIN_FUNCTIONAL_ASSESSMENT: UNABLE TO SELF-REPORT
PAIN_FUNCTIONAL_ASSESSMENT: WONG-BAKER FACES
PAIN_FUNCTIONAL_ASSESSMENT: 0-10
PAIN_FUNCTIONAL_ASSESSMENT: WONG-BAKER FACES

## 2025-02-03 ASSESSMENT — ENCOUNTER SYMPTOMS
NECK STIFFNESS: 0
DIZZINESS: 0
ABDOMINAL PAIN: 0
WHEEZING: 0
UNEXPECTED WEIGHT CHANGE: 0
DYSPHORIC MOOD: 0
TREMORS: 0
SINUS PAIN: 1
CHEST TIGHTNESS: 0
FLANK PAIN: 0
MYALGIAS: 1
WOUND: 1
NECK PAIN: 0
JOINT SWELLING: 0
HYPERACTIVE: 0
FACIAL SWELLING: 1

## 2025-02-03 ASSESSMENT — LIFESTYLE VARIABLES
EVER FELT BAD OR GUILTY ABOUT YOUR DRINKING: NO
HAVE PEOPLE ANNOYED YOU BY CRITICIZING YOUR DRINKING: NO
EVER HAD A DRINK FIRST THING IN THE MORNING TO STEADY YOUR NERVES TO GET RID OF A HANGOVER: NO
TOTAL SCORE: 0
HAVE YOU EVER FELT YOU SHOULD CUT DOWN ON YOUR DRINKING: NO

## 2025-02-03 ASSESSMENT — PAIN SCALES - WONG BAKER
WONGBAKER_NUMERICALRESPONSE: HURTS LITTLE BIT
WONGBAKER_NUMERICALRESPONSE: HURTS EVEN MORE

## 2025-02-03 ASSESSMENT — PAIN SCALES - GENERAL
PAINLEVEL_OUTOF10: 2
PAINLEVEL_OUTOF10: 10 - WORST POSSIBLE PAIN
PAINLEVEL_OUTOF10: 6

## 2025-02-03 ASSESSMENT — PAIN DESCRIPTION - PAIN TYPE: TYPE: ACUTE PAIN

## 2025-02-03 ASSESSMENT — PAIN DESCRIPTION - LOCATION: LOCATION: NOSE

## 2025-02-03 NOTE — ED PROVIDER NOTES
"Patient signed out to me by off going provider, Dr. Kevin Vogel, at 6:14 AM in stable condition.    IN BRIEF:  89 y.o.female // pmhx HLD // p/w mechanical fall with obvious facial trauma  -high suspicion for facial trauma  -hypoxia on  3L NC    /60   Pulse 73   Temp 36.3 °C (97.3 °F) (Temporal)   Resp 20   Ht 1.676 m (5' 6\")   Wt 68.6 kg (151 lb 3.8 oz)   LMP  (LMP Unknown) Comment: Pt is 89 years old- last mentrauls cycle unknown  SpO2 (!) 91%   BMI 24.41 kg/m²     Diagnoses as of 02/05/25 1220   Fall, initial encounter   Closed head injury, initial encounter   Open fracture of nasal bone, initial encounter   Epistaxis   Closed [place] I fracture, initial encounter (Multi)   Closed fracture of multiple ribs of left side, initial encounter       Remaining work up:  Images Trauma imaging and Reassessment    Likely disposition Medicine Admission with alternative Discharge Home.    Physical Exam  Vitals and nursing note reviewed.   Constitutional:       Appearance: Normal appearance. She is normal weight.   HENT:      Nose:      Comments: Cessation of epistaxis     Mouth/Throat:      Mouth: Mucous membranes are moist.   Eyes:      Pupils: Pupils are equal, round, and reactive to light.   Cardiovascular:      Rate and Rhythm: Normal rate and regular rhythm.      Pulses: Normal pulses.   Pulmonary:      Effort: Pulmonary effort is normal.      Breath sounds: Normal breath sounds.   Skin:     General: Skin is warm and dry.   Neurological:      General: No focal deficit present.      Mental Status: She is alert and oriented to person, place, and time.         TRANSITION of CARE INTERVAL:  Reviewed current diagnostic workup notable for cervical anterior effacement likely chronic, multiple posterior left costal fractures involving 8-11, LeFort I fracture with by lateral nasal fractures.  Reviewed findings with patient significant other.  Discussed patient with facial surgery along with trauma surgery and will " accept patient to [facility] for further traumatic care and evaluation of injuries.  Patient will be transferred to [facility] for further care and family agreeable to plan of care.      FINAL IMPRESSION:  1. Fall, initial encounter        2. Closed head injury, initial encounter              FINAL DISPOSITION:  Transfer to [facility] [address] ED       Stephen Rivera MD  02/05/25 7848

## 2025-02-03 NOTE — PROGRESS NOTES
Pharmacy Medication History Review    Xiomy Rubio is a 89 y.o. female admitted for Facial Injury. Pharmacy reviewed the patient's [id number] medications and allergies for accuracy.    Medications ADDED:  N/A  Medications REMOVED:  Sennakot  Sertraline 25 mg (dose adjustment)    The list below reflects the updated PTA list.   Prior to Admission Medications   Prescriptions Last Dose Informant Patient Reported? Taking?   Oysco 500/D 500 mg-5 mcg (200 unit) tablet 2/2/2025 Evening  No Yes   Sig: TAKE 1 TABLET BY MOUTH TWICE A DAY   busPIRone (Buspar) 10 mg tablet 2/2/2025 Bedtime  No Yes   Sig: Take 1 tablet (10 mg) by mouth 3 times a day.   ferrous sulfate, 325 mg ferrous sulfate, tablet Past Week  No Yes   Sig: TAKE 1 TABLET BY MOUTH EVERY OTHER DAY   latanoprost (Xalatan) 0.005 % ophthalmic solution 2/2/2025 Evening  Yes Yes   Sig: Administer 1 drop into both eyes once daily at bedtime.   pantoprazole (ProtoNix) 20 mg EC tablet 2/2/2025 Morning  No Yes   Sig: TAKE 1 TABLET BY MOUTH EVERY DAY   sennosides (senna) 8.6 mg capsule   No No   Sig: Take 1 capsule (8.6 mg) by mouth once daily at bedtime.   sertraline (Zoloft) 25 mg tablet   No No   Sig: Take 1.5 tablets (37.5 mg) by mouth once daily.   sertraline (Zoloft) 50 mg tablet 2/2/2025 Morning  Yes Yes   Sig: Take 1 tablet (50 mg) by mouth once daily.      Facility-Administered Medications: None        The list below reflects the updated allergy list. Please review each documented allergy for additional clarification and justification.  Allergies  Reviewed by Shahla Maurice CPhT on 2/3/2025        Severity Reactions Comments    Adhesive Tape-silicones Medium Unknown     Other Medium Unknown tape    Cortisone Low Rash     Penicillin Low Rash     Prednisone Low Rash             Patient declines M2B at discharge.     Sources:   Pharmacy dispense history  Other     Additional Comments:  Pt's family member is at bedside, was able to confirm medications and  last doses.      SUJATA GARBER [facility]  Pharmacy Technician  02/03/25     Secure Chat preferred

## 2025-02-03 NOTE — ED PROVIDER NOTES
HPI   No chief complaint on file.      Patient is a 89-year-old female that presents to the emergency department for evaluation of a fall, facial injury.  Patient had an unwitnessed fall at home.  She reportedly was down for roughly 15 minutes.  She did hit her head and has had bleeding from the nose since that time.  Is unclear whether she lost consciousness or not.  Patient only complains of pain in the nose and neck at this time however is a very poor historian.  Patient's son reported the patient not on any blood thinners at this time.      History provided by:  Patient          Patient History   Past Medical History:   Diagnosis Date    Anxiety     Memory loss     Other conditions influencing health status 11/24/2017    History of cough    Pain in joints of unspecified hand 01/05/2016    Pain, hand joint    Personal history of diseases of the skin and subcutaneous tissue 11/24/2017    History of cellulitis    Personal history of Methicillin resistant Staphylococcus aureus infection 11/24/2017    History of methicillin resistant Staphylococcus aureus infection    Personal history of other diseases of the respiratory system 12/05/2017    History of acute bronchitis    Personal history of other specified conditions 05/04/2017    History of dizziness    Personal history of pneumonia (recurrent)     History of pneumonia     Past Surgical History:   Procedure Laterality Date    OTHER SURGICAL HISTORY  08/29/2017    Hip Surgery Left     Family History   Problem Relation Name Age of Onset    No Known Problems Mother      No Known Problems Father       Social History     Tobacco Use    Smoking status: Never    Smokeless tobacco: Never   Vaping Use    Vaping status: Never Used   Substance Use Topics    Alcohol use: Not Currently    Drug use: Not Currently       Physical Exam   ED Triage Vitals   Temp Pulse Resp BP   -- -- -- --      SpO2 Temp src Heart Rate Source Patient Position   -- -- -- --      BP Location FiO2 (%)      -- --       Physical Exam  Vitals and nursing note reviewed.   Constitutional:       General: She is not in acute distress.     Appearance: Normal appearance. She is not ill-appearing.   HENT:      Head: Normocephalic.      Comments: There is some swelling and mild deformity to the nose     Nose:      Comments: There is blood within the left nare with no active bleeding at this time.     Mouth/Throat:      Mouth: Mucous membranes are moist.      Comments: There is some blood in the posterior oropharynx as well as what appears to be several chipped teeth  Eyes:      Extraocular Movements: Extraocular movements intact.      Pupils: Pupils are equal, round, and reactive to light.   Cardiovascular:      Rate and Rhythm: Normal rate and regular rhythm.   Pulmonary:      Effort: Pulmonary effort is normal. No respiratory distress.      Breath sounds: No wheezing or rhonchi.   Abdominal:      General: Abdomen is flat.      Tenderness: There is no abdominal tenderness. There is no guarding or rebound.   Neurological:      Mental Status: She is alert. She is disoriented.      GCS: GCS eye subscore is 4. GCS verbal subscore is 4. GCS motor subscore is 6.     Recent Results (from the past 24 hours)   CBC and Auto Differential    Collection Time: 02/03/25  3:47 AM   Result Value Ref Range    WBC 6.6 4.4 - 11.3 x10*3/uL    nRBC 0.0 0.0 - 0.0 /100 WBCs    RBC 4.56 4.00 - 5.20 x10*6/uL    Hemoglobin 13.1 12.0 - 16.0 g/dL    Hematocrit 41.2 36.0 - 46.0 %    MCV 90 80 - 100 fL    MCH 28.7 26.0 - 34.0 pg    MCHC 31.8 (L) 32.0 - 36.0 g/dL    RDW 13.3 11.5 - 14.5 %    Platelets 230 150 - 450 x10*3/uL    Neutrophils % 52.1 40.0 - 80.0 %    Immature Granulocytes %, Automated 1.7 (H) 0.0 - 0.9 %    Lymphocytes % 30.9 13.0 - 44.0 %    Monocytes % 7.4 2.0 - 10.0 %    Eosinophils % 6.8 0.0 - 6.0 %    Basophils % 1.1 0.0 - 2.0 %    Neutrophils Absolute 3.45 1.60 - 5.50 x10*3/uL    Immature Granulocytes Absolute, Automated 0.11 0.00 - 0.50  x10*3/uL    Lymphocytes Absolute 2.04 0.80 - 3.00 x10*3/uL    Monocytes Absolute 0.49 0.05 - 0.80 x10*3/uL    Eosinophils Absolute 0.45 (H) 0.00 - 0.40 x10*3/uL    Basophils Absolute 0.07 0.00 - 0.10 x10*3/uL   Comprehensive Metabolic Panel    Collection Time: 02/03/25  3:47 AM   Result Value Ref Range    Glucose 163 (H) 74 - 99 mg/dL    Sodium 138 136 - 145 mmol/L    Potassium 3.8 3.5 - 5.3 mmol/L    Chloride 103 98 - 107 mmol/L    Bicarbonate 30 21 - 32 mmol/L    Anion Gap 9 (L) 10 - 20 mmol/L    Urea Nitrogen 22 6 - 23 mg/dL    Creatinine 0.59 0.50 - 1.05 mg/dL    eGFR 84 >60 mL/min/1.73m*2    Calcium 9.0 8.6 - 10.3 mg/dL    Albumin 3.8 3.4 - 5.0 g/dL    Alkaline Phosphatase 75 33 - 136 U/L    Total Protein 6.0 (L) 6.4 - 8.2 g/dL    AST 14 9 - 39 U/L    Bilirubin, Total 0.8 0.0 - 1.2 mg/dL    ALT 10 7 - 45 U/L   Protime-INR    Collection Time: 02/03/25  3:47 AM   Result Value Ref Range    Protime 11.2 9.3 - 12.7 seconds    INR 1.1 0.9 - 1.2   APTT    Collection Time: 02/03/25  3:47 AM   Result Value Ref Range    aPTT 25.3 22.0 - 32.5 seconds   Creatine Kinase    Collection Time: 02/03/25  3:47 AM   Result Value Ref Range    Creatine Kinase 100 0 - 215 U/L         ED Course & MDM   Diagnoses as of 02/03/25 0623   Fall, initial encounter   Closed head injury, initial encounter   Open fracture of nasal bone, initial encounter   Epistaxis                 No data recorded                                 Medical Decision Making  Patient is a 89-year-old female that presented to the emergency department for evaluation of a fall, facial injury.  Patient does have mild deformity to the nose with swelling as well as bruising over the bridge of the nose.  There is a large amount of clot in the left nostril which was able to be removed.  Patient having some oozing blood at that time however difficult to see exactly where the bleeding is coming from.  Cottonball soaked in Afrin was then inserted with significant improvement  of the bleeding.  Chest x-ray showed no obvious evidence of pneumonia, pneumothorax however patient was found to be hypoxic with an oxygen saturation at 85%.  Initially she was placed on a nonrebreather.  There is concern she may have aspirated some of the blood from her epistaxis.  CT scan of the head, cervical spine, maxillofacial bones was ordered along with CT scan of the chest with IV contrast given patient's fall and hypoxia.  Blood work was remarkable for normal white count of 6.6 with no left shift and I have low suspicion for infection at this time.  She does have normal CK of 100, normal electrolytes, normal kidney function.  She is reportedly at her mental baseline at this time.  Case signed out to oncoming physician pending CT imaging results prior to final disposition.        Procedure  Procedures     Kevin Vogel DO  02/03/25 0634

## 2025-02-03 NOTE — H&P
[facility]  TRAUMA SERVICE - HISTORY AND PHYSICAL / CONSULT    Patient Name: Xiomy Rubio  MRN: [id number]  Admit Date: 203  : 1931  AGE: 89 y.o.   GENDER: female  ==============================================================================  MECHANISM OF INJURY / CHIEF COMPLAINT:   89 y F GLF, in bathroom. Reports headstrike. Pt is hard of hearing which limits history.    LOC (yes/no?): Unknown  Anticoagulant / Anti-platelet Rx? (for what dx?): No  Referring Facility Name (N/A for scene EMR run):  [facility]    INJURIES:   Bilateral Nasal Bone fractures  Left 8-11th rib fractures  Stable T11 compression fracture  New severe L4 compression fracture    OTHER MEDICAL PROBLEMS:  Rheumatoid Arthritis  Hypertension  Osteoporosis    INCIDENTAL FINDINGS:  Enhancing right upper pole lesion, concerning for primary renal  Neoplasm  2. 3 mm indeterminate hypodense liver lesion     ==============================================================================  ADMISSION PLAN OF CARE:  Orthopedics consulted for New severe L4 compression fracture  -Appreciate recs  - Lovenox for DVT ppx   - PT/OT  Okay for ICU admission    Will go to unit for respiratory monitoring  Left 8-11th rib fractures  3L NC, 1000 on IS        ENT consulted for Bilateral Nasal Bone fractures  Appreciate recs     Pt discussed with Dr. Simerlink and Dr. Beckett    Dispo: Pt will go TICU for respiratory monitoring.    Walter Garduno DO  Trauma, Critical Care, and Acute Care Surgery  Floor: [id number]   TSICU: [id number]    ==============================================================================  PAST MEDICAL HISTORY:   PMH:   Past Medical History:   Diagnosis Date    Anxiety     Memory loss     Other conditions influencing health status 2017    History of cough    Pain in joints of unspecified hand 2016    Pain, hand joint    Personal history of diseases of the skin and subcutaneous tissue 2017     History of cellulitis    Personal history of Methicillin resistant Staphylococcus aureus infection 11/24/2017    History of methicillin resistant Staphylococcus aureus infection    Personal history of other diseases of the respiratory system 12/05/2017    History of acute bronchitis    Personal history of other specified conditions 05/04/2017    History of dizziness    Personal history of pneumonia (recurrent)     History of pneumonia       PSH:   Past Surgical History:   Procedure Laterality Date    OTHER SURGICAL HISTORY  08/29/2017    Hip Surgery Left     FH:   Family History   Problem Relation Name Age of Onset    No Known Problems Mother      No Known Problems Father       SOCIAL HISTORY:    Smoking:   Social History     Tobacco Use   Smoking Status Never   Smokeless Tobacco Never       Alcohol:   Social History     Substance and Sexual Activity   Alcohol Use Not Currently       Drug use:     MEDICATIONS:   Prior to Admission medications    Medication Sig Start Date End Date Taking? Authorizing Provider   busPIRone (Buspar) 10 mg tablet Take 1 tablet (10 mg) by mouth 3 times a day. 6/28/24 2/3/25  LEX Arenas   ferrous sulfate, 325 mg ferrous sulfate, tablet TAKE 1 TABLET BY MOUTH EVERY OTHER DAY 11/26/24 5/25/25  Lian Hernandez DO   latanoprost (Xalatan) 0.005 % ophthalmic solution Administer 1 drop into both eyes once daily at bedtime. 9/2/21   Historical Provider, MD Ibanez 500/D 500 mg-5 mcg (200 unit) tablet TAKE 1 TABLET BY MOUTH TWICE A DAY 1/13/25   Lian Hernandez DO   pantoprazole (ProtoNix) 20 mg EC tablet TAKE 1 TABLET BY MOUTH EVERY DAY 1/13/25   Lian Hernandez DO   sennosides (senna) 8.6 mg capsule Take 1 capsule (8.6 mg) by mouth once daily at bedtime. 10/2/24   Lian Hernandez DO   sertraline (Zoloft) 25 mg tablet Take 1.5 tablets (37.5 mg) by mouth once daily. 6/29/24 7/29/24  LEX Arenas   sertraline (Zoloft) 50 mg tablet Take 1 tablet (50 mg)  by mouth once daily.    Historical Provider, MD   sennosides (senna) 8.6 mg tablet Take 1 tablet by mouth once daily. Indications: constipation  Patient not taking: Reported on 11/13/2024  2/3/25  Historical Provider, MD     ALLERGIES:   Allergies   Allergen Reactions    Adhesive Tape-Silicones Unknown    Other Unknown     tape    Cortisone Rash    Penicillin Rash    Prednisone Rash       REVIEW OF SYSTEMS:  Review of Systems   Constitutional:  Negative for unexpected weight change.   HENT:  Positive for facial swelling, hearing loss and sinus pain.    Respiratory:  Negative for chest tightness and wheezing.    Cardiovascular:  Negative for chest pain and leg swelling.   Gastrointestinal:  Negative for abdominal pain.   Endocrine: Negative for cold intolerance and heat intolerance.   Genitourinary:  Negative for flank pain.   Musculoskeletal:  Positive for myalgias. Negative for joint swelling, neck pain and neck stiffness.   Skin:  Positive for wound.   Neurological:  Negative for dizziness and tremors.   Psychiatric/Behavioral:  Negative for dysphoric mood. The patient is not hyperactive.      PHYSICAL EXAM:  PRIMARY SURVEY:  Airway  Airway is patent.     Breathing  Breathing is normal. Right breath sounds are normal. Left breath sounds are normal.     Circulation  Cardiac rhythm is regular. Rate is regular.   Pulses  Radial: 2+ on the right; 2+ on the left.  Carotid: 2+ on the right; 2+ on the left.    Disability  Schuyler Coma Score  Eye:4   Verbal:5   Motor:6      15       Motor Strength   strength:  5/5 on the right  5/5 on the left  Dorsiflex strength:  5/5 on the right  5/5 on the left  Plantarflex strength:  5/5 on the right  5/5 on the left  The patient does not have a sensory deficit.       SECONDARY SURVEY/PHYSICAL EXAM:  Physical Exam  Constitutional:       Appearance: Normal appearance. She is normal weight.   HENT:      Head:      Comments: Traumatic. Bruising present underneath bilateral eyes and  above lip     Nose:      Comments: Blood in nares 3L nasal cannula     Mouth/Throat:      Mouth: Mucous membranes are moist.   Eyes:      Extraocular Movements: Extraocular movements intact.      Conjunctiva/sclera: Conjunctivae normal.      Pupils: Pupils are equal, round, and reactive to light.   Cardiovascular:      Rate and Rhythm: Normal rate and regular rhythm.      Pulses: Normal pulses.      Heart sounds: Normal heart sounds.   Pulmonary:      Breath sounds: Normal breath sounds.   Abdominal:      General: Abdomen is flat. Bowel sounds are normal.      Palpations: Abdomen is soft.   Musculoskeletal:         General: No tenderness. Normal range of motion.      Cervical back: No tenderness.   Skin:     General: Skin is warm.      Findings: Bruising present.   Neurological:      Mental Status: She is alert and oriented to person, place, and time. Mental status is at baseline.   Psychiatric:         Mood and Affect: Mood normal.         Behavior: Behavior normal.         Thought Content: Thought content normal.         Judgment: Judgment normal.       IMAGING SUMMARY:  (summary of findings, not a copy of dictation)  CT Head/Face: Multiple facial bone fractures including the anterior, medial, and posterolateral walls of the maxillary sinuses, right nasal bone, and possibly nasal septum. There is hemorrhage throughout the maxillary sinuses, nasal cavity, nasopharynx.  No CT evidence of acute intracranial hemorrhage or mass effect.   CT C-Spine: Moderate to severe multilevel cervical spondylosis with reversal the cervical lordosis. No acute fractures.  CT Chest: Minimally or nondisplaced fractures of multiple left posterior ribs and probable small contusion in the overlying chest wall. No pleural effusion or pneumothorax.  CT Abd/Pelvis: There are at least 8th-10th posterolateral minimally displaced rib fractures, which appear to be acute  CXR/PXR: Left basilar atelectasis. Stable mild interstitial prominence,  likely chronic. No consolidation      LABS:  Results from last 7 days   Lab Units 02/03/25  1535 02/03/25  0347   WBC AUTO x10*3/uL 7.3 6.6   HEMOGLOBIN g/dL 10.1* 13.1   HEMATOCRIT % 29.3* 41.2   PLATELETS AUTO x10*3/uL 179 230   NEUTROS PCT AUTO % 72.8 52.1   LYMPHS PCT AUTO % 13.8 30.9   MONOS PCT AUTO % 10.1 7.4   EOS PCT AUTO % 1.6 6.8     Results from last 7 days   Lab Units 02/03/25  0347   APTT seconds 25.3   INR  1.1     Results from last 7 days   Lab Units 02/03/25  1535 02/03/25  0347   SODIUM mmol/L 139 138   POTASSIUM mmol/L 4.0 3.8   CHLORIDE mmol/L 103 103   CO2 mmol/L 31 30   BUN mg/dL 24* 22   CREATININE mg/dL 0.55 0.59   CALCIUM mg/dL 8.6 9.0   PROTEIN TOTAL g/dL 5.1* 6.0*   BILIRUBIN TOTAL mg/dL 1.0 0.8   ALK PHOS U/L 64 75   ALT U/L 9 10   AST U/L 18 14   GLUCOSE mg/dL 103* 163*     Results from last 7 days   Lab Units 02/03/25  1535 02/03/25  0347   BILIRUBIN TOTAL mg/dL 1.0 0.8           I have reviewed all laboratory and imaging results ordered/pertinent for this encounter.

## 2025-02-03 NOTE — PROGRESS NOTES
Emergency Medicine Transition of Care Note.    I received Xiomy Rubio in signout from previous provider. Please see the previous ED provider note for all HPI, PE and MDM up to the time of sign-out. This is in addition to the primary record.    ED Course as of 02/03/25 2224   Mon Feb 03, 2025   0536 89-year-old female history of RA hypertension osteoarthritis extremely hard of hearing and recurrent falls presents as a trauma and ENT consult from [facility] after fall.  Patient states she was attempting to get up from bed to go to the bathroom and fell in her bedroom last evening.  She struck her face.  She denies antecedent chest pain.  She was transported to [facility] where they got a CT brain C-spine chest and an x-ray of the pelvis and chest.  Patient was found to have multiple nasal bone fractures left-sided rib fractures and a age-indeterminate T12 fracture.  Comes as a trauma consult given these findings.  On physical exam patient has stigmata of facial trauma with raccoon eyes and swelling of her mid face and upper lip.  She is protecting her airway and there is no stridor.  Ocular ocular motions are intact and there is no proptosis.  Patient has no cervical neck pain.  Patient has no thoracic neck pain on palpation.  Patient has clear breath sounds bilaterally though she does have an a systolic ejection murmur.  Patient's abdomen is soft and nontender and her pelvis is stable.  Patient presents with multiple facial fractures question of acute left-sided rib fractures and age-indeterminate thoracic fracture to see our trauma colleagues.  The facial fractures are certainly acute but patient does not have pain on the posterior thorax or the thoracic spine on my exam.  She is breathing well on room air.  Patient denies taking anticoagulation though we will review her ambulatory meds.  We will consult both our trauma and ENT colleagues [CM]   6399 Imaging shows severe L4 compression fracture that is new she also  has a stable T11 compression fracture compared to prior chest films as well as multiple rib fractures [TS]   1718 Spoke to orthospine regarding patient, ENT following.  Patient was admitted to trauma ICU [TS]      ED Course User Index  [CM] Lee Donnelly MD  [TS] Karlee Ortega MD         Diagnoses as of 02/03/25 2224   Closed fracture of nasal bone, initial encounter   Closed fracture of multiple ribs of left side, initial encounter     Medical Decision Making    Final diagnoses:   [S02.2XXA] Closed fracture of nasal bone, initial encounter   [S22.42XA] Closed fracture of multiple ribs of left side, initial encounter     At the time of sign out, vital signs were as follows:  Vitals:    02/03/25 1530   BP:    Pulse: 79   Resp: 12   Temp:    SpO2: (!) 88%     In brief Xiomy Rubio is an 89 y.o. female presenting for fall    Patient was signed out to me pending imaging and consulting team recommendations      Please see ED course for further details    Dispo:     Karlee Ortega MD  Emergency Medicine, PGY-2

## 2025-02-03 NOTE — ED TRIAGE NOTES
Pt sent from [facility] for trauma eval. S/P fall while using walker going to [facility] this am. Unknown if any LOC. +facial and rib fractures

## 2025-02-03 NOTE — PROGRESS NOTES
Expected Patient  Facility: [facility]  Service: face, trauma consult    HPI: 89-year-old female with multiple recurrent recent falls who described a mechanical fall forward with facial trauma found to have what appears to be a LeFort facial fracture, hypoxia on 3 L new oxygen requirement with stable airway, 3 posterior rib fractures, epistaxis found to be hemodynamically stable.  The epistaxis was following the facial injury did not precipitate it.    Patient presenting as trauma consult    [facility]

## 2025-02-03 NOTE — CONSULTS
Ear Nose & Throat Progress Note     Otolaryngology - Head and Neck Surgery Consultation Note    Reason For Consult  - Facial fxs  - Blood in the bilateral maxillary sinuses    History Of Present Illness  Xiomy Rubio is a 89 y.o. female hx of osteoporosis, arthritis, bilateral hearing loss, multiple mechanical falls presenting with an unwitnessed mechanical fall forward with facial injury. History was relayed from the ED team who received history from patient's family. Patient is very hard of hearing and a poor historian. Denies trauma, abuse. Patient is not on blood thinners and is uncertain if she lost consciousness.      Other injuries:  -Multiple left posterior rib fractures    Past Medical History  She has a past medical history of Anxiety, Memory loss, Other conditions influencing health status (11/24/2017), Pain in joints of unspecified hand (01/05/2016), Personal history of diseases of the skin and subcutaneous tissue (11/24/2017), Personal history of Methicillin resistant Staphylococcus aureus infection (11/24/2017), Personal history of other diseases of the respiratory system (12/05/2017), Personal history of other specified conditions (05/04/2017), and Personal history of pneumonia (recurrent).  Patient Active Problem List   Diagnosis    Abnormal uterine bleeding    Anxiety    Arthritis pain, hip    Atrophic vaginitis    Atypical mole    Bilateral hearing loss    Carpal tunnel syndrome    Constipation    Ecchymosis    Falls    Fatigue    Fracture of metacarpal bone of right hand    Hyperlipidemia    Left hip pain    Limb pain    Malaise and fatigue    Midline cystocele    Numbness and tingling in right hand    Osteoarthritis, hand    Osteoporosis with current pathological fracture    Prediabetes    Presence of pessary    Recurrent urinary tract infection    Skin mole    Skin yeast infection    Thoracic spine fracture (Multi)    Trigger finger, acquired    Urinary frequency    Abnormal gait     "Intertrochanteric fracture of femur    Numbness    Subtrochanteric fracture of left femur    Tiredness    Leg pain    Closed fracture of left femur with routine healing       Surgical History  She has a past surgical history that includes Other surgical history (08/29/2017).     Social History  She reports that she has never smoked. She has never used smokeless tobacco. She reports that she does not currently use alcohol. She reports that she does not currently use drugs.    Family History  Family History   Problem Relation Name Age of Onset    No Known Problems Mother      No Known Problems Father          Allergies  Adhesive tape-silicones, Other, Cortisone, Penicillin, and Prednisone    Review of Systems  A 12-point review of systems was performed and noted be negative except for that which was mentioned in the history of present illness     Last Recorded Vitals  Blood pressure 119/61, pulse 73, temperature 36.8 °C (98.2 °F), temperature source Oral, resp. rate 12, height 1.676 m (5' 6\"), weight 68.5 kg (151 lb), SpO2 (!) 92%.     Physical Exam:   Constitutional:  No acute distress  Voice:  No hoarseness or other abnormality  Respiration:  Breathing comfortably on 4 L nasal cannula (not on oxygen at baseline), no stridor  Cardiovascular:  No clubbing/cyanosis/edema in hands  Eyes:  EOM intact, sclera normal.  Bilateral infraorbital ecchymosis  Head and Face: Normocephalic, symmetric facial features, no masses or lesions,   Right Ear:  Normal external ear and significantly decreased hearing to voice.  Left Ear: Normal external ear and significantly decreased hearing to voice.  Nose:  External nose midline, swollen, anterior rhinoscopy with dried blood, no lesions, no septal hematoma  Oral Cavity/Oropharynx/Lips: Dried blood in the posterior oropharynx, normal mucous membranes, no masses or lesions  Neck/Lymph:  No LAD, no thyroid masses, trachea midline  Skin:  Neck skin is without scar or injury  Neuro:  Alert, " Cranial nerves II-XII grossly intact and symmetric bilaterally  Psych:  Alert, appropriate mood and affect      Recent Labs:  Results from last 7 days   Lab Units 02/03/25  0347   WBC AUTO x10*3/uL 6.6   HEMOGLOBIN g/dL 13.1   PLATELETS AUTO x10*3/uL 230      Results from last 7 days   Lab Units 02/03/25  0347   SODIUM mmol/L 138   POTASSIUM mmol/L 3.8   CHLORIDE mmol/L 103   CO2 mmol/L 30   BUN mg/dL 22   CREATININE mg/dL 0.59   GLUCOSE mg/dL 163*      Results from last 7 days   Lab Units 02/03/25  0347   INR  1.1   PROTIME seconds 11.2   APTT seconds 25.3        Imaging:  I personally reviewed the CT facial bone from 2/3/2025 and this is my impression:   -Bilateral maxillary sinus and nasal cavity opacification  -Left lateral pterygoid plate fracture, multiple maxillary sinus fractures, fractures transversing the maxilla  -Bilateral comminuted nasal fractures  -Orbital walls intact     Assessment/Plan   89-year-old female with history of osteoporosis and multiple mechanical falls.  Patient presenting to the ED for unwitnessed mechanical fall to face with multiple facial fractures.    Patient re-examined 2/4/25, no palate mobility. Fractures could be treated with surgical intervention, ORIF of maxilla. However, given absence of significant instability or symptoms and patient's age/comorbidities will plan for conservative management    - recommend soft diet for 2 weeks  - pain control  - follow up with [facility] in 2 weeks (discussed with Dr Lowe)    Katie Valdivia MD  Dept. of Otolaryngology - Head and Neck Surgery, PGY-1  ENT Consults: [id number]  ENT Overnight (5p-6a), and Weekends: [id number]  ENT Head and Neck Surgery Phone: [phone]  ENT Peds: [id number]  ENT Outpatient scheduling number: [phone]       ATTESTATION   I reviewed the resident/fellow's documentation and discussed the patient with the resident/fellow. I agree with the resident/fellow's medical decision making as documented in the note with my edits made  directly to the note.  Taylor North MD

## 2025-02-03 NOTE — ED PROVIDER NOTES
History of Present Illness     History provided by: Patient  Limitations to History: None  External Records Reviewed with Brief Summary:  Outside hospital discharge summary shows nasal fractures and posterior rib fractures.    HPI:  Xiomy Rubio is a 89 y.o. female with past medical history of RA, HTN, osteoporosis, osteoarthritis, history of left intertrochanteric fracture that presents emergency room for a fall and facial injury.  Patient is severely hard of hearing even with her earing aids in place.  From the history, patient states that she fell when she was in her bathroom and is likely mechanical.  States that she fell face forward but does not identify if she hit herself with something.  Patient denies any physical abuse or trauma.  Difficult to get whether or not she was having some chest pain, shortness of breath or feeling lightheaded however patient states that she has had previous falls in the past.  Patient was taken to outside hospital and she was found to have several nasal bone fracture. She was also found to have multiple left posterior rib fractures along with probable small contusion over the chest wall.  He also appears to be a severe indeterminate age compression deformity of T12.  Patient also has a hypodense lesion over the liver.  Patient is not on any anticoagulation.    Physical Exam   Triage vitals:  T 36.8 °C (98.2 °F)  HR 73  /61  RR 12  O2 (!) 92 % None (Room air)    General: Awake, alert, in no acute distress  Eyes: Gaze conjugate.  No scleral icterus or injection  HENT: Normo-cephalic, atraumatic. No stridor, patient has several chipped tooth and some blood at the posterior oropharynx, there is some dried blood over the left nare, some swelling and mild deformity of the nose, ocular motion intact, tracks, pupils equal and reactive 2-3, no proptosis seen on exam  CV: Regular rate, regular rhythm. Radial pulses 2+ bilaterally  Resp: Breathing non-labored, speaking in full  sentences.  Clear to auscultation bilaterally  GI: Soft, non-distended, non-tender. No rebound or guarding.  : Deferred  MSK/Extremities: No gross bony deformities. Moving all extremities, no tenderness palpation in any of the bilateral lower extremities, bilateral upper extremities, no C, T, L-spine tenderness pelvis is stable  Skin: Warm. Appropriate color, ecchymosis that seen over the left medial thigh, several areas of ecchymosis over the anterior nasal bone, raccoon eyes  Neuro: Alert. Oriented. Face symmetric. Speech is fluent.  Gross strength and sensation intact in b/l UE and LEs  Psych: Appropriate mood and affect    Medical Decision Making & ED Course   Medical Decision Makin y.o. female with past medical history of RA, HTN, osteoporosis, osteoarthritis, history of left intertrochanteric fracture that presents emergency room for a fall and facial injury.  Presents emergency room vitally stable, in no acute distress with exception for mild hypoxemia 92.  However on reevaluation, patient is satting at 96% on room air.  Patient is in any respiratory distress, no stridor, and is protecting her airway.  Patient at baseline is hard of hearing even with her hearing aids.  Physical exam reveals bilateral raccoon eyes, significant ecchymosis over the anterior nose and tenderness palpation over the nasal bridge.  Patient is not complaining of any chest pain, shortness of breath, abdominal pain.  The rest of the physical exam shows no acute injuries or deformities.  Patient does have a mild ecchymosis over the left hip.  There is some dried blood visualized over the left nare however no lacerations.  The outside hospital, they got a CT face, head, chest which showed multiple nasal bone fractures, left-sided rib fractures, age-indeterminate T12 fracture along with a hypodense lesion of the liver.  Trauma has been consulted along with ENT.  You are getting repeat labs of CBC, CMP, troponin and EKG.  To  complete the pan scan, will get CT abdomen pelvis with IV contrast, CT lumbar and thoracic spine.  Patient has been off to provider Dr. Ortega follow-up scans along with recommendations for trauma and ENT.  ----  Scoring Tools Utilized: None     Social Determinants of Health which Significantly Impact Care: None identified The following actions were taken to address these social determinants: None    EKG Independent Interpretation: EKG interpreted by myself. Please see ED Course for full interpretation.    Independent Result Review and Interpretation: Relevant laboratory and radiographic results were reviewed and independently interpreted by myself.  As necessary, they are commented on in the ED Course.    Chronic conditions affecting the patient's care: As documented above in [facility]    The patient was discussed with the following consultants/services: None    Care Considerations: As documented above in [facility]    ED Course:  ED Course as of 02/03/25 2336   Mon Feb 03, 2025   1446 89-year-old female history of RA hypertension osteoarthritis extremely hard of hearing and recurrent falls presents as a trauma and ENT consult from [facility] after fall.  Patient states she was attempting to get up from bed to go to the bathroom and fell in her bedroom last evening.  She struck her face.  She denies antecedent chest pain.  She was transported to [facility] where they got a CT brain C-spine chest and an x-ray of the pelvis and chest.  Patient was found to have multiple nasal bone fractures left-sided rib fractures and a age-indeterminate T12 fracture.  Comes as a trauma consult given these findings.  On physical exam patient has stigmata of facial trauma with raccoon eyes and swelling of her mid face and upper lip.  She is protecting her airway and there is no stridor.  Ocular ocular motions are intact and there is no proptosis.  Patient has no cervical neck pain.  Patient has no thoracic neck pain on palpation.  Patient has clear  breath sounds bilaterally though she does have an a systolic ejection murmur.  Patient's abdomen is soft and nontender and her pelvis is stable.  Patient presents with multiple facial fractures question of acute left-sided rib fractures and age-indeterminate thoracic fracture to see our trauma colleagues.  The facial fractures are certainly acute but patient does not have pain on the posterior thorax or the thoracic spine on my exam.  She is breathing well on room air.  Patient denies taking anticoagulation though we will review her ambulatory meds.  We will consult both our trauma and ENT colleagues [CM]   1645 Imaging shows severe L4 compression fracture that is new she also has a stable T11 compression fracture compared to prior chest films as well as multiple rib fractures [TS]   1718 Spoke to orthospine regarding patient, ENT following.  Patient was admitted to trauma ICU [TS]      ED Course User Index  [CM] Lee Donnelly MD  [TS] Karlee Ortega MD         Diagnoses as of 02/03/25 2336   Closed fracture of nasal bone, initial encounter   Closed fracture of multiple ribs of left side, initial encounter     Disposition   Patient was signed out to Dr. Turner at 7 am pending completion of their work-up.  Please see the next provider's transition of care note for the remainder of the patient's care.     Procedures   Procedures    Patient seen and discussed with ED attending physician.    She Aparicio MD  Emergency Medicine     She Aparicio MD  Resident  02/03/25 1540       She Aparicio MD  Resident  02/03/25 1543       She Aparicio MD  Resident  02/03/25 5896

## 2025-02-04 ENCOUNTER — APPOINTMENT (OUTPATIENT)
Dept: RADIOLOGY | Facility: HOSPITAL | Age: OVER 89
End: 2025-02-04
Payer: MEDICARE

## 2025-02-04 LAB
25(OH)D3 SERPL-MCNC: 38 NG/ML (ref 30–100)
ALBUMIN SERPL BCP-MCNC: 3.2 G/DL (ref 3.4–5)
ANION GAP BLDV CALCULATED.4IONS-SCNC: 1 MMOL/L (ref 10–25)
ANION GAP SERPL CALC-SCNC: 11 MMOL/L (ref 10–20)
APTT PPP: 30 SECONDS (ref 27–38)
ATRIAL RATE: 78 BPM
BASE EXCESS BLDV CALC-SCNC: 6.3 MMOL/L (ref -2–3)
BODY TEMPERATURE: 37 DEGREES CELSIUS
BUN SERPL-MCNC: 22 MG/DL (ref 6–23)
CA-I BLD-SCNC: 1.16 MMOL/L (ref 1.1–1.33)
CA-I BLDV-SCNC: 1.27 MMOL/L (ref 1.1–1.33)
CALCIUM SERPL-MCNC: 8.4 MG/DL (ref 8.6–10.6)
CHLORIDE BLDV-SCNC: 105 MMOL/L (ref 98–107)
CHLORIDE SERPL-SCNC: 103 MMOL/L (ref 98–107)
CO2 SERPL-SCNC: 29 MMOL/L (ref 21–32)
CREAT SERPL-MCNC: 0.45 MG/DL (ref 0.5–1.05)
EGFRCR SERPLBLD CKD-EPI 2021: 90 ML/MIN/1.73M*2
ERYTHROCYTE [DISTWIDTH] IN BLOOD BY AUTOMATED COUNT: 13.4 % (ref 11.5–14.5)
GLUCOSE BLD MANUAL STRIP-MCNC: 101 MG/DL (ref 74–99)
GLUCOSE BLD MANUAL STRIP-MCNC: 105 MG/DL (ref 74–99)
GLUCOSE BLD MANUAL STRIP-MCNC: 188 MG/DL (ref 74–99)
GLUCOSE BLD MANUAL STRIP-MCNC: 98 MG/DL (ref 74–99)
GLUCOSE BLDV-MCNC: 109 MG/DL (ref 74–99)
GLUCOSE SERPL-MCNC: 104 MG/DL (ref 74–99)
HCO3 BLDV-SCNC: 32.2 MMOL/L (ref 22–26)
HCT VFR BLD AUTO: 31.7 % (ref 36–46)
HCT VFR BLD EST: 32 % (ref 36–46)
HGB BLD-MCNC: 10.2 G/DL (ref 12–16)
HGB BLDV-MCNC: 10.6 G/DL (ref 12–16)
INHALED O2 CONCENTRATION: 36 %
INR PPP: 1.1 (ref 0.9–1.1)
LACTATE BLDV-SCNC: 0.8 MMOL/L (ref 0.4–2)
MAGNESIUM SERPL-MCNC: 1.84 MG/DL (ref 1.6–2.4)
MCH RBC QN AUTO: 29 PG (ref 26–34)
MCHC RBC AUTO-ENTMCNC: 32.2 G/DL (ref 32–36)
MCV RBC AUTO: 90 FL (ref 80–100)
NRBC BLD-RTO: 0 /100 WBCS (ref 0–0)
OXYHGB MFR BLDV: 78.6 % (ref 45–75)
P AXIS: 55 DEGREES
P OFFSET: 188 MS
P ONSET: 130 MS
PCO2 BLDV: 52 MM HG (ref 41–51)
PH BLDV: 7.4 PH (ref 7.33–7.43)
PHOSPHATE SERPL-MCNC: 3.8 MG/DL (ref 2.5–4.9)
PLATELET # BLD AUTO: 157 X10*3/UL (ref 150–450)
PO2 BLDV: 48 MM HG (ref 35–45)
POTASSIUM BLDV-SCNC: 3.8 MMOL/L (ref 3.5–5.3)
POTASSIUM SERPL-SCNC: 3.9 MMOL/L (ref 3.5–5.3)
PR INTERVAL: 180 MS
PROTHROMBIN TIME: 12.8 SECONDS (ref 9.8–12.8)
Q ONSET: 220 MS
QRS COUNT: 13 BEATS
QRS DURATION: 96 MS
QT INTERVAL: 384 MS
QTC CALCULATION(BAZETT): 437 MS
QTC FREDERICIA: 419 MS
R AXIS: -1 DEGREES
RBC # BLD AUTO: 3.52 X10*6/UL (ref 4–5.2)
SAO2 % BLDV: 80 % (ref 45–75)
SODIUM BLDV-SCNC: 134 MMOL/L (ref 136–145)
SODIUM SERPL-SCNC: 139 MMOL/L (ref 136–145)
T AXIS: 84 DEGREES
T OFFSET: 412 MS
VENTRICULAR RATE: 78 BPM
VIT B12 SERPL-MCNC: 274 PG/ML (ref 211–911)
WBC # BLD AUTO: 6.9 X10*3/UL (ref 4.4–11.3)

## 2025-02-04 PROCEDURE — 85730 THROMBOPLASTIN TIME PARTIAL: CPT | Performed by: STUDENT IN AN ORGANIZED HEALTH CARE EDUCATION/TRAINING PROGRAM

## 2025-02-04 PROCEDURE — 84132 ASSAY OF SERUM POTASSIUM: CPT | Performed by: STUDENT IN AN ORGANIZED HEALTH CARE EDUCATION/TRAINING PROGRAM

## 2025-02-04 PROCEDURE — 99222 1ST HOSP IP/OBS MODERATE 55: CPT | Performed by: INTERNAL MEDICINE

## 2025-02-04 PROCEDURE — 2500000005 HC RX 250 GENERAL PHARMACY W/O HCPCS: Performed by: STUDENT IN AN ORGANIZED HEALTH CARE EDUCATION/TRAINING PROGRAM

## 2025-02-04 PROCEDURE — 2500000001 HC RX 250 WO HCPCS SELF ADMINISTERED DRUGS (ALT 637 FOR MEDICARE OP): Performed by: PHYSICIAN ASSISTANT

## 2025-02-04 PROCEDURE — 2500000004 HC RX 250 GENERAL PHARMACY W/ HCPCS (ALT 636 FOR OP/ED): Performed by: STUDENT IN AN ORGANIZED HEALTH CARE EDUCATION/TRAINING PROGRAM

## 2025-02-04 PROCEDURE — 2500000001 HC RX 250 WO HCPCS SELF ADMINISTERED DRUGS (ALT 637 FOR MEDICARE OP): Performed by: STUDENT IN AN ORGANIZED HEALTH CARE EDUCATION/TRAINING PROGRAM

## 2025-02-04 PROCEDURE — 71045 X-RAY EXAM CHEST 1 VIEW: CPT

## 2025-02-04 PROCEDURE — 97530 THERAPEUTIC ACTIVITIES: CPT | Mod: GP

## 2025-02-04 PROCEDURE — 1100000001 HC PRIVATE ROOM DAILY

## 2025-02-04 PROCEDURE — 36415 COLL VENOUS BLD VENIPUNCTURE: CPT | Performed by: STUDENT IN AN ORGANIZED HEALTH CARE EDUCATION/TRAINING PROGRAM

## 2025-02-04 PROCEDURE — 82947 ASSAY GLUCOSE BLOOD QUANT: CPT

## 2025-02-04 PROCEDURE — 97166 OT EVAL MOD COMPLEX 45 MIN: CPT | Mod: GO

## 2025-02-04 PROCEDURE — 82435 ASSAY OF BLOOD CHLORIDE: CPT | Performed by: STUDENT IN AN ORGANIZED HEALTH CARE EDUCATION/TRAINING PROGRAM

## 2025-02-04 PROCEDURE — 82607 VITAMIN B-12: CPT | Performed by: PHYSICIAN ASSISTANT

## 2025-02-04 PROCEDURE — 97530 THERAPEUTIC ACTIVITIES: CPT | Mod: GO

## 2025-02-04 PROCEDURE — 76377 3D RENDER W/INTRP POSTPROCES: CPT

## 2025-02-04 PROCEDURE — 85027 COMPLETE CBC AUTOMATED: CPT | Performed by: STUDENT IN AN ORGANIZED HEALTH CARE EDUCATION/TRAINING PROGRAM

## 2025-02-04 PROCEDURE — 85610 PROTHROMBIN TIME: CPT | Performed by: STUDENT IN AN ORGANIZED HEALTH CARE EDUCATION/TRAINING PROGRAM

## 2025-02-04 PROCEDURE — 97161 PT EVAL LOW COMPLEX 20 MIN: CPT | Mod: GP

## 2025-02-04 PROCEDURE — 82306 VITAMIN D 25 HYDROXY: CPT | Performed by: PHYSICIAN ASSISTANT

## 2025-02-04 PROCEDURE — 82330 ASSAY OF CALCIUM: CPT | Performed by: STUDENT IN AN ORGANIZED HEALTH CARE EDUCATION/TRAINING PROGRAM

## 2025-02-04 PROCEDURE — 83735 ASSAY OF MAGNESIUM: CPT | Performed by: STUDENT IN AN ORGANIZED HEALTH CARE EDUCATION/TRAINING PROGRAM

## 2025-02-04 PROCEDURE — 2500000002 HC RX 250 W HCPCS SELF ADMINISTERED DRUGS (ALT 637 FOR MEDICARE OP, ALT 636 FOR OP/ED): Performed by: STUDENT IN AN ORGANIZED HEALTH CARE EDUCATION/TRAINING PROGRAM

## 2025-02-04 PROCEDURE — 99223 1ST HOSP IP/OBS HIGH 75: CPT | Performed by: NURSE PRACTITIONER

## 2025-02-04 PROCEDURE — 82810 BLOOD GASES O2 SAT ONLY: CPT | Performed by: STUDENT IN AN ORGANIZED HEALTH CARE EDUCATION/TRAINING PROGRAM

## 2025-02-04 RX ORDER — OXYCODONE HYDROCHLORIDE 5 MG/1
2.5 TABLET ORAL EVERY 6 HOURS PRN
Status: DISCONTINUED | OUTPATIENT
Start: 2025-02-04 | End: 2025-02-07 | Stop reason: HOSPADM

## 2025-02-04 RX ORDER — IBUPROFEN 200 MG
600 CAPSULE ORAL 2 TIMES DAILY
Status: DISCONTINUED | OUTPATIENT
Start: 2025-02-04 | End: 2025-02-07 | Stop reason: HOSPADM

## 2025-02-04 RX ORDER — MAGNESIUM SULFATE HEPTAHYDRATE 40 MG/ML
2 INJECTION, SOLUTION INTRAVENOUS ONCE
Status: COMPLETED | OUTPATIENT
Start: 2025-02-04 | End: 2025-02-04

## 2025-02-04 RX ORDER — OXYCODONE HYDROCHLORIDE 5 MG/1
5 TABLET ORAL EVERY 6 HOURS PRN
Status: DISCONTINUED | OUTPATIENT
Start: 2025-02-04 | End: 2025-02-07 | Stop reason: HOSPADM

## 2025-02-04 RX ORDER — ACETAMINOPHEN 325 MG/1
975 TABLET ORAL EVERY 8 HOURS SCHEDULED
Status: DISCONTINUED | OUTPATIENT
Start: 2025-02-04 | End: 2025-02-07 | Stop reason: HOSPADM

## 2025-02-04 RX ORDER — OXYCODONE HYDROCHLORIDE 5 MG/1
2.5 TABLET ORAL EVERY 4 HOURS PRN
Status: DISCONTINUED | OUTPATIENT
Start: 2025-02-04 | End: 2025-02-04

## 2025-02-04 RX ORDER — OXYCODONE HYDROCHLORIDE 5 MG/1
5 TABLET ORAL EVERY 4 HOURS PRN
Status: DISCONTINUED | OUTPATIENT
Start: 2025-02-04 | End: 2025-02-04

## 2025-02-04 RX ORDER — CHOLECALCIFEROL (VITAMIN D3) 25 MCG
2000 TABLET ORAL DAILY
Status: DISCONTINUED | OUTPATIENT
Start: 2025-02-04 | End: 2025-02-07 | Stop reason: HOSPADM

## 2025-02-04 RX ADMIN — MAGNESIUM SULFATE HEPTAHYDRATE 2 G: 40 INJECTION, SOLUTION INTRAVENOUS at 05:51

## 2025-02-04 RX ADMIN — BUSPIRONE HYDROCHLORIDE 10 MG: 10 TABLET ORAL at 09:03

## 2025-02-04 RX ADMIN — Medication 1 TABLET: at 09:03

## 2025-02-04 RX ADMIN — Medication 1 TABLET: at 21:06

## 2025-02-04 RX ADMIN — FERROUS SULFATE TAB 325 MG (65 MG ELEMENTAL FE) 325 MG: 325 (65 FE) TAB at 09:10

## 2025-02-04 RX ADMIN — POLYETHYLENE GLYCOL 3350 17 G: 17 POWDER, FOR SOLUTION ORAL at 09:03

## 2025-02-04 RX ADMIN — ENOXAPARIN SODIUM 30 MG: 100 INJECTION SUBCUTANEOUS at 09:03

## 2025-02-04 RX ADMIN — SENNOSIDES 8.6 MG: 8.6 TABLET ORAL at 21:06

## 2025-02-04 RX ADMIN — LIDOCAINE 4% 1 PATCH: 40 PATCH TOPICAL at 09:03

## 2025-02-04 RX ADMIN — SERTRALINE HYDROCHLORIDE 50 MG: 50 TABLET ORAL at 09:03

## 2025-02-04 RX ADMIN — Medication 2000 UNITS: at 18:11

## 2025-02-04 RX ADMIN — ENOXAPARIN SODIUM 30 MG: 100 INJECTION SUBCUTANEOUS at 21:06

## 2025-02-04 RX ADMIN — BUSPIRONE HYDROCHLORIDE 10 MG: 10 TABLET ORAL at 21:06

## 2025-02-04 RX ADMIN — BUSPIRONE HYDROCHLORIDE 10 MG: 10 TABLET ORAL at 16:00

## 2025-02-04 ASSESSMENT — PAIN - FUNCTIONAL ASSESSMENT
PAIN_FUNCTIONAL_ASSESSMENT: 0-10

## 2025-02-04 ASSESSMENT — COGNITIVE AND FUNCTIONAL STATUS - GENERAL
MOVING TO AND FROM BED TO CHAIR: A LOT
DAILY ACTIVITIY SCORE: 17
DRESSING REGULAR UPPER BODY CLOTHING: A LITTLE
TOILETING: A LOT
MOBILITY SCORE: 11
DRESSING REGULAR LOWER BODY CLOTHING: A LOT
MOVING FROM LYING ON BACK TO SITTING ON SIDE OF FLAT BED WITH BEDRAILS: A LOT
WALKING IN HOSPITAL ROOM: A LOT
HELP NEEDED FOR BATHING: A LOT
TURNING FROM BACK TO SIDE WHILE IN FLAT BAD: A LOT
STANDING UP FROM CHAIR USING ARMS: A LOT
CLIMB 3 TO 5 STEPS WITH RAILING: TOTAL

## 2025-02-04 ASSESSMENT — ACTIVITIES OF DAILY LIVING (ADL)
ADL_ASSISTANCE: INDEPENDENT
ADL_ASSISTANCE: INDEPENDENT
LACK_OF_TRANSPORTATION: NO
BATHING_ASSISTANCE: MODERATE

## 2025-02-04 ASSESSMENT — PAIN SCALES - GENERAL
PAINLEVEL_OUTOF10: 0 - NO PAIN

## 2025-02-04 NOTE — CONSULTS
Inpatient consult to Endocrinology  Consult performed by: Amauri Ross MD  Consult ordered by: Lee Donnelly MD  Reason for consult: osteopororsis with hx of fracture      History Of Present Illness  Xiomy Rubio is a 89 y.o. female with PMHx of anxiety, rheumatoid arthritis, osteoporosis, bilateral hearing loss, multiple mechanical falls presenting to hospital for unwitnessed mechanical fall in her bathroom resulting in fall on her face with facial injury and nasal bone fracture.  Patient was taken to outside hospital where imaging showed subacute fractures of ribs (8-10).  Note is made of stable T11 compression deformity since 2022, at that time patient was seen by PCP and was offered prolia/alendronate but patient never received treatment for osteoporosis. During current admission note is made of a new L4 compression fracture.    Endocrine service is consulted for management of osteoporosis.    Risk factors for osteoporosis  Age : 89 yr  Previous fragility fracture: yes T11 compression fracture, stable since 2022  Glucocorticoid use (by mouth for > 3 months)  Current smoker  Alcohol intake of > 3 drinks/day  Hx of RA: yes  IBD  Prolonged immobility  No Hx of Organ transplantation,  Type 1 Dm, Thyroid disorders  or COPD    Review of Systems   Fatigue:  Energy:  Appetite:  Change in weight:  Bone pain  SOB:  CP:  Constipation/Diarrhea:  Heat/cold intolerance  Night sweats:  Change in libido:  Muscle cramps/ weakness:  Nausea/Vomiting:  Abdominal pain:    Past Medical History  She has a past medical history of Anxiety, Memory loss, Other conditions influencing health status (11/24/2017), Pain in joints of unspecified hand (01/05/2016), Personal history of diseases of the skin and subcutaneous tissue (11/24/2017), Personal history of Methicillin resistant Staphylococcus aureus infection (11/24/2017), Personal history of other diseases of the respiratory system (12/05/2017), Personal history of other specified  "conditions (05/04/2017), and Personal history of pneumonia (recurrent).    Surgical History  She has a past surgical history that includes Other surgical history (08/29/2017).     Social History  She reports that she has never smoked. She has never used smokeless tobacco. She reports that she does not currently use alcohol. She reports that she does not currently use drugs.    Family History  Family History   Problem Relation Name Age of Onset    No Known Problems Mother      No Known Problems Father          Allergies  Adhesive tape-silicones, Other, Cortisone, Penicillin, and Prednisone  Current Outpatient Medications   Medication Instructions    busPIRone (BUSPAR) 10 mg, oral, 3 times daily    ferrous sulfate (325 mg ferrous sulfate) 325 mg, oral, Every other day    latanoprost (Xalatan) 0.005 % ophthalmic solution 1 drop, Nightly    Oysco 500/D 500 mg-5 mcg (200 unit) tablet 1 tablet, oral, 2 times daily    pantoprazole (PROTONIX) 20 mg, oral, Daily    senna 8.6 mg, oral, Nightly    sertraline (ZOLOFT) 37.5 mg, oral, Daily    sertraline (ZOLOFT) 50 mg, Daily         ROS, PMH, FH/SH, surgical history and allergies have been reviewed.  Last Recorded Vitals  Blood pressure 151/65, pulse 70, temperature 35.9 °C (96.6 °F), temperature source Temporal, resp. rate 17, height 1.676 m (5' 6\"), weight 70.3 kg (154 lb 15.7 oz), SpO2 99%.  Review of Systems  All systems reviewed with the patient and they were all negative, unless noted above.     Physical Exam   General: patient in NAD, patient is hard of hearing  HEENT: Bruises over face specifically around nose noted  Neck: trachea in midline, no thyromegaly or nodules  Resp: CTA B/L  CVS: normal s1 and s2  Abdomen: soft and non tender to palpation, BS+  Skin: warm, dry and intact  Neuro: AAO x3, DTR 2+  Psych: cooperative     Labs:   Latest Reference Range & Units 02/03/25 15:35   EGFR >60 mL/min/1.73m*2 86   Calcium 8.6 - 10.6 mg/dL 8.6   Albumin 3.4 - 5.0 g/dL 3.2 (L) "   Alkaline Phosphatase 33 - 136 U/L 64      Latest Reference Range & Units 02/04/25 02:26   PHOSPHORUS 2.5 - 4.9 mg/dL 3.8   CoCa: 9.2     Latest Reference Range & Units 02/03/25 15:35   Vitamin D, 25-Hydroxy, Total 30 - 100 ng/mL 34      Latest Reference Range & Units 11/20/23 14:39   Hemoglobin A1C see below % 5.7 (H)   Thyroid Stimulating Hormone 0.44 - 3.98 mIU/L 2.81       DEXA (8/31/22)  Femur measurements not performed due to presence of   hardware.  AP Spine L1-L2 : Bone Density: 0.824 g/cm2 . T Score: -2.9 . . Z Score: -0.8 .  Left Forearm Radius 33% : Bone Density: 0.438 g/cm2 . T Score: -3.8. Z Score: -0.1 .     CT LUMBAR SPINE WO IV CONTRAST; CT THORACIC SPINE WO IV CONTRAST;  2/3/2025      IMPRESSION:  1. Severe L4 compression fracture is new from the prior lumbar spine plain film series. Retropulsion of bony material combines with  degenerative changes to create severe central canal stenosis at the L3-4 subarticular level.  2. T11 compression fracture stable compared to the prior chest film.  3. There are fractures of the posterior 8th, 9th, and 10th ribs which appear to be subacute in nature.  4. There is severe left and moderately severe right foraminal stenosis at L4-5.        Assessment/Plan    Xiomy Rubio is a 89 y.o. female with PMHx of anxiety, rheumatoid arthritis, osteoporosis, bilateral hearing loss, multiple mechanical falls presenting to hospital for unwitnessed mechanical fall in her bathroom resulting in fall on her face with facial injury and nasal bone fracture.  Patient was taken to outside hospital where imaging showed subacute fractures of ribs (8-10).  Note is made of stable T11 compression deformity since 2022, at that time patient was seen by PCP and was offered prolia/alendronate but patient never received treatment for osteoporosis. During current admission note is made of a new L4 compression fracture.    Endocrine service is consulted for management of  osteoporosis.    Risk factors for osteoporosis  Age : 89 yr  Previous fragility fracture: yes T11 compression fracture, stable since 2022, T4 compression fracture (2025)  Glucocorticoid use (by mouth for > 3 months)  Patient denies tobacco or alcohol use  Hx of RA: yes  No Hx of Organ transplantation,  Type 1 Dm, Thyroid disorders  or COPD    # Osteoporosis in setting of multiple fragility fractures  Recommendations:  Start tablet calcium citrate 600 mg twice daily  Start capsule vitamin D 2000 units daily  Patient needs to establish with outpatient endocrinology for management of osteoporosis.  Patient will require repeat DEXA scan and discussion regarding starting Prolia/alendronate.  Plan conveyed to patient's son who was present at bedside during rounds. Patient lives in [facility] and would like to establish care with endocrinology near her home. Importance of following up with endocrinology and treatment of osteoporosis as outpatient was highlighted.   Recommended resistance exercises as tolerated.  Recommended diet rich in calcium.    Endocrinology service will sign off.  Recommendations communicated to primary team. Please reach out incase you have any questions or concerns.    The patient was seen and discussed with attending Dr. Cha.    Amauri Ross MD  Endocrinology fellow

## 2025-02-04 NOTE — PROGRESS NOTES
Occupational Therapy    Evaluation and Treatment    Patient Name: Xiomy Rubio  MRN: [id number]  Today's Date: 2/4/2025  Room: 16/16  Time Calculation  Start Time: 0915  Stop Time: 1005  Time Calculation (min): 50 min    Assessment  IP OT Assessment  OT Assessment: Pt is a 89 year old female who demonstrates decreased strength, balance, activity tolerance, coordination, and cognition, which impedes occupational performance.  Prognosis: Good  Barriers to Discharge Home: Cognition needs, Caregiver assistance, Physical needs  Caregiver Assistance: Caregiver assistance needed per identified barriers - however, level of patient's required assistance exceeds assistance available at home  Cognition Needs: 24hr supervision for safety awareness needed, Medication and/or medical management daily assist needed, Recollection or understanding of precautions/restrictions limited, Cognition-related high falls risk, Insight of patient limited regarding functional ability/needs  Physical Needs: Stair navigation into home limited by function/safety, 24hr mobility assistance needed, Intermittent ADL assistance needed, High falls risk due to function or environment  Evaluation/Treatment Tolerance: Patient tolerated treatment well  Medical Staff Made Aware: Yes  End of Session Communication: Bedside nurse  End of Session Patient Position: Up in chair, Alarm on    Plan:  Inpatient Plan  Treatment Interventions: ADL retraining, Functional transfer training, Endurance training, UE strengthening/ROM, Cognitive reorientation, Patient/family training, Equipment evaluation/education, Neuromuscular reeducation, Fine motor coordination activities, Compensatory technique education  OT Frequency: 4 times per week  OT Discharge Recommendations:  (LOW intensity with 24/7 assistance/supervision at home; MOD intensity if 24/7 is not available.)  Equipment Recommended upon Discharge: Wheeled walker  OT Recommended Transfer Status: Assist of 1  OT -  OK to Discharge: Yes  OT Assessment  OT Assessment Results: Decreased ADL status, Decreased safe judgment during ADL, Decreased cognition, Decreased endurance, Decreased functional mobility, Decreased gross motor control, Decreased IADLs, Decreased fine motor control  Prognosis: Good  Evaluation/Treatment Tolerance: Patient tolerated treatment well  Medical Staff Made Aware: Yes    Subjective   Current Problem:  1. Closed fracture of nasal bone, initial encounter        2. Closed fracture of multiple ribs of left side, initial encounter          General:  Reason for Referral: GLF in bathroom with headstrike. Injuries: 1. Bilateral Nasal Bone fractures  2. Left 8-11th rib fractures  3. Stable T11 compression fracture  4. New severe L4 compression fracture.  Past Medical History Relevant to Rehab: 1. Rheumatoid Arthritis  2. Hypertension  3. Osteoporosis  Prior to Session Communication: Bedside nurse  Patient Position Received: Bed, 3 rail up, Alarm on  Family/Caregiver Present: No  General Comment: Pt supine in bed on arrival, pleasant and agreeable but increased confusion with activity. On 2 L NC     Precautions:  Hearing/Visual Limitations: [place], has hearing aids but not working  Medical Precautions: Fall precautions  Post-Surgical Precautions: Spinal precautions    Vital Signs:   02/04/25 0915 02/04/25 1005   Vital Signs   Vitals Session Pre OT Post OT   Heart Rate 82 71   Resp 16 25   SpO2 96 % 95 %   /73 168/83   MAP (mmHg) 98  --    Vital Signs Comment  --  SBP increased to 170s-180s with activity; RN notified.       Pain:  Pain Assessment  Pain Assessment:  (0/10 at rest, Left shoulder pain with activity not quantified)    Lines/Tubes/Drains:  External Urinary Catheter Female (Active)   Number of days: 0       Objective   Cognition:  Overall Cognitive Status: Impaired  Orientation Level:  (Oriented to self, hospital and year. Reported month as April, disoriented to situation.)  Following Commands: Follows  one step commands with repetition (written instruction d/t [facility])  Cognition Comments: Difficult to differentiate cognitive deficits and hearing deficits. Pt incontinent of bladder on 1st STS trial and became emotional and fearful following this. Reporting desire to go home and be with family.  Memory: Exceptions to WFL  Short-Term Memory: Impaired  Working Memory: Impaired  Insight: Mild  Processing Speed: Delayed     Confusion Assessment Method (CAM)  Acute Onset and Fluctuating Course (1A):  (unable to accurately assess d/t hearing deficits depsite attempt with writing.)     Home Living:  Type of Home: House  Lives With:  (Son)  Home Adaptive Equipment:  (Rollator)  Home Layout: Able to live on main level with bedroom/bathroom  Home Access: Stairs to enter with rails  Entrance Stairs-Number of Steps: 2  Bathroom Shower/Tub: Tub/shower unit  Bathroom Equipment: Grab bars in shower, Shower chair with back     Prior Function:  Level of [place]: Independent with ADLs and functional transfers  ADL Assistance: Independent (reports with some difficulty)  Homemaking Assistance:  (reports she does light cooking; otherwise son performs IADLs for pt)  Ambulatory Assistance: Independent (reports using rollator)  Leisure: gardening  Prior Function Comments: reports 4 previous falls.     ADL:  Eating Assistance: Independent  Grooming Assistance: Modified independent (Device)  Grooming Deficit:  (brushed hair seated in chair)  Bathing Assistance: Moderate  UE Dressing Assistance: Minimal  LE Dressing Assistance: Moderate  LE Dressing Deficit: Don/doff R sock, Don/doff L sock  Toileting Assistance with Device: Maximal  Toileting Deficit: Incontinent (incontinent with each stand trial)    Activity Tolerance:  Endurance: Endurance does not limit participation in activity  Early Mobility/Exercise Safety Screen: Proceed with mobilization - No exclusion criteria met    Bed Mobility/Transfers: Bed Mobility  Bed Mobility: Yes  Bed  Mobility 1  Bed Mobility 1: Supine to sitting  Level of Assistance 1: Moderate assistance  Bed Mobility Comments 1: HOB elevated, increased time   and Transfers  Transfer: Yes  Transfer 1  Transfer From 1: Sit to  Transfer to 1: Stand  Transfer Device 1: Walker  Transfer Level of Assistance 1: Minimum assistance  Trials/Comments 1: preference to have both hands on FWW (anticipate since pt uses rollator at baseline)  Transfers 2  Transfer From 2: Bed to  Transfer to 2: Chair with arms  Transfer Device 2: Walker  Transfer Level of Assistance 2: Moderate assistance  Trials/Comments 2: increased time; fearful of falling    Sensation:  Light Touch: No apparent deficits    Strength:  Strength Comments: BUE >/=3/5     Coordination:  Coordination Comment: fearful of falling and with slow movements; BUE tremors present      Extremities:   RUE   RUE : Within Functional Limits, LUE   LUE: Within Functional Limits,       Treatment Completed on Evaluation    Therapy/Activity:     Therapeutic Activity  Therapeutic Activity Performed: Yes  Therapeutic Activity 1: Increased time for all communication via writing as pt severely [place].  Therapeutic Activity 2: Pt tolerated EOB sitting ~20 minutes with CS for safety.  Therapeutic Activity 3: Pt performed x3 STS trials with FWW and min A. Incontinent of bladder with each stand and very fearful of falling. Required max A for hygiene care and encouragement d/t fear of falling.    Outcome Measures: [facility] Daily Activity  Putting on and taking off regular lower body clothing: A lot  Bathing (including washing, rinsing, drying): A lot  Putting on and taking off regular upper body clothing: A little  Toileting, which includes using toilet, bedpan or urinal: A lot  Taking care of personal grooming such as brushing teeth: None  Eating Meals: None  Daily Activity - Total Score: 17        ICU Mobility Screen  Early Mobility/Exercise Safety Screen: Proceed with mobilization - No exclusion criteria  met  ICU Mobility Scale: Transferring bed to chair,          Education Documentation  Body Mechanics, taught by Shahla Velasquez OT at 2/4/2025 10:31 AM.  Learner: Patient  Readiness: Acceptance  Method: Explanation, Demonstration  Response: Needs Reinforcement  Comment: Limited by hearing deficits, OT goals/POC    Precautions, taught by Shahla Velasquez OT at 2/4/2025 10:31 AM.  Learner: Patient  Readiness: Acceptance  Method: Explanation, Demonstration  Response: Needs Reinforcement  Comment: Limited by hearing deficits, OT goals/POC    ADL Training, taught by Shahla Velasquez OT at 2/4/2025 10:31 AM.  Learner: Patient  Readiness: Acceptance  Method: Explanation, Demonstration  Response: Needs Reinforcement  Comment: Limited by hearing deficits, OT goals/POC    Education Comments  No comments found.        Goals:   Encounter Problems       Encounter Problems (Active)       ADLs       Patient with complete upper body dressing with stand by assist level of assistance donning and doffing all UE clothes.        Start:  02/04/25    Expected End:  02/18/25            Patient with complete lower body dressing with minimal level of assistance        Start:  02/04/25    Expected End:  02/18/25            Patient will complete toileting including hygiene clothing management/hygiene with minimal assist level of assistance.       Start:  02/04/25    Expected End:  02/18/25               BALANCE       Pt will increase dynamic standing tolerance to >10 min with supervision using LRAD during functional mobility/ADLs without LOB in order to improve activity tolerance and balance for self-care tasks.        Start:  02/04/25    Expected End:  02/18/25               COGNITION/SAFETY       Pt will be alert and oriented x 4 with min cues.        Start:  02/04/25    Expected End:  02/18/25               MOBILITY       Patient will perform Functional mobility max Household distances with supervision level of  assistance and least restrictive device in order to improve safety and functional mobility.       Start:  02/04/25    Expected End:  02/18/25               TRANSFERS       Patient will perform bed mobility modified independent level of assistance in order to improve safety and independence with mobility       Start:  02/04/25    Expected End:  02/18/25            Patient will complete functional transfer to toilet with least restrictive device with supervision level of assistance.       Start:  02/04/25    Expected End:  02/18/25 02/04/25 at 10:32 AM   Shahla Velasquez, OT   Rehab Office: [phone]

## 2025-02-04 NOTE — CARE PLAN
Problem: Fall/Injury  Goal: Not fall by end of shift  Outcome: Progressing  Goal: Be free from injury by end of the shift  Outcome: Progressing  Goal: Verbalize understanding of personal risk factors for fall in the hospital  Outcome: Progressing  Goal: Verbalize understanding of risk factor reduction measures to prevent injury from fall in the home  Outcome: Progressing  Goal: Use assistive devices by end of the shift  Outcome: Progressing  Goal: Pace activities to prevent fatigue by end of the shift  Outcome: Progressing     Problem: Skin  Goal: Decreased wound size/increased tissue granulation at next dressing change  Outcome: Progressing  Flowsheets (Taken 2/4/2025 1831)  Decreased wound size/increased tissue granulation at next dressing change:   Promote sleep for wound healing   Protective dressings over bony prominences  Goal: Participates in plan/prevention/treatment measures  Outcome: Progressing  Flowsheets (Taken 2/4/2025 1831)  Participates in plan/prevention/treatment measures:   Discuss with provider PT/OT consult   Elevate heels  Goal: Prevent/manage excess moisture  Outcome: Progressing  Flowsheets (Taken 2/4/2025 1831)  Prevent/manage excess moisture:   Cleanse incontinence/protect with barrier cream   Monitor for/manage infection if present   Follow provider orders for dressing changes   Use wicking fabric (obtain order)   Moisturize dry skin  Goal: Prevent/minimize sheer/friction injuries  Outcome: Progressing  Flowsheets (Taken 2/4/2025 1831)  Prevent/minimize sheer/friction injuries:   Complete micro-shifts as needed if patient unable. Adjust patient position to relieve pressure points, not a full turn   Increase activity/out of bed for meals   Use pull sheet   HOB 30 degrees or less   Turn/reposition every 2 hours/use positioning/transfer devices   Utilize specialty bed per algorithm  Goal: Promote/optimize nutrition  Outcome: Progressing  Flowsheets (Taken 2/4/2025 1831)  Promote/optimize  nutrition:   Assist with feeding   Monitor/record intake including meals   Consume > 50% meals/supplements  Goal: Promote skin healing  Outcome: Progressing  Flowsheets (Taken 2/4/2025 1831)  Promote skin healing:   Protective dressings over bony prominences   Turn/reposition every 2 hours/use positioning/transfer devices   Ensure correct size (line/device) and apply per  instructions   Assess skin/pad under line(s)/device(s)   Rotate device position/do not position patient on device

## 2025-02-04 NOTE — PROGRESS NOTES
[facility]  TRAUMA ICU - PROGRESS NOTE    Patient Name: Xiomy Rubio  MRN: [id number]  Admit Date: 203  : 1931  AGE: 89 y.o.   GENDER: female  ==============================================================================  MECHANISM OF INJURY / CHIEF COMPLAINT:   89 y F GLF, in bathroom. Reports headstrike. Pt is hard of hearing which limits history.     LOC (yes/no?): Unknown  Anticoagulant / Anti-platelet Rx? (for what dx?): No  Referring Facility Name (N/A for scene EMR run):  [facility]     INJURIES:   Bilateral Nasal Bone fractures  Left 8-11th rib fractures  Stable T11 compression fracture  New severe L4 compression fracture     OTHER MEDICAL PROBLEMS:  Rheumatoid Arthritis  Hypertension  Osteoporosis     INCIDENTAL FINDINGS:  Enhancing right upper pole lesion, concerning for primary renal  Neoplasm  2. 3 mm indeterminate hypodense liver lesion       ==============================================================================  TODAY'S ASSESSMENT AND PLAN OF CARE:  Xiomy Rubio is a 89 y.o. female in the ICU due to: q1hr neuro checks     NEURO/PAIN/SEDATION: anxiety   continue home buspar and zoloft   Tylenol scheduled with oxycodone PRN and lidocaine patches     RESPIRATORY: L 8-11 rib fx   Resp support as needed via NC     CARDIOVASC: HTN   PRN as needed anti hypertensives     GI: GERD  Continue home protonix and senna   RD     /FEN:   IVF 1L bolus    HEMATOLOGIC:   No concerns    ENDOCRINE:   ISS   Continue ferrous sulfate and calcium carb, consult endo for osteo     MUSCULOSKELETAL/SKIN: osteoporosis, RA   Appreciate ortho recs   PT/OT    AAT, no spine precautions   Upright XR when able L spine    Dr. Jhonatan roman in 2 weeks      INFECTIOUS DISEASE:   No abx indicated    GI PROPHYLAXIS: na    DVT PROPHYLAXIS: lovenox BID     DISPOSITION: continue ICU care   ==============================================================================  CHIEF COMPLAINT /  "OVERNIGHT EVENTS / HPI:   NAD, confused and deaf but able to direct and orient. Pain controlled on oral medications.     MEDICAL HISTORY / ROS:  Admission history and ROS reviewed. Pertinent changes as follows:      PHYSICAL EXAM:  Heart Rate:  [65-86]   Temperature:  [36.3 °C (97.3 °F)-36.8 °C (98.2 °F)]   Respirations:  [12-36]   BP: (103-189)/()   Height:  [167.6 cm (5' 6\")]   Weight:  [68.5 kg (151 lb)-68.6 kg (151 lb 3.8 oz)]   Pulse Ox:  [85 %-98 %]   Physical Exam  NAD, confused A&Ox2, hard of hearing   Bruised bilateral orbits, EOMI PERRL  RRR  Breathing comfortably on 3.5L NC   Nondistended nontender abdomen soft   Tender to bilateral chest on palpation   KRUEGER x 4    IMAGING SUMMARY:  (summary of new imaging findings, not a copy of dictation)  CT head: Multiple facial bone fractures including the anterior, medial, and  posterolateral walls of the maxillary sinuses, right nasal bone, and  possibly nasal septum  CT cervical: There is bulging disc and marginal osteophyte  which is greatest at C5-6 with moderate central canal narrowing and  some effacement of the ventral surface of the spinal cord. There is  mild to moderate central canal narrowing at C4-5.  CT chest: Severe indeterminate age compression deformity of T12.  CT pelvis: No definite acute displaced osseous pelvic fracture.  CT thoracic:   1. Severe L4 compression fracture is new from the prior lumbar spine  plain film series. Retropulsion of bony material combines with  degenerative changes to create severe central canal stenosis at the  L3-4 subarticular level.  2. T11 compression fracture stable compared to the prior chest film.  3. There are fractures of the posterior 8th, 9th, and 10th ribs which  appear to be subacute in nature.  4. There is severe left and moderately severe right foraminal  stenosis at L4-5.          LABS:  Results from last 7 days   Lab Units 02/03/25  1535 02/03/25  0347   WBC AUTO x10*3/uL 7.3 6.6   HEMOGLOBIN g/dL " 10.1* 13.1   HEMATOCRIT % 29.3* 41.2   PLATELETS AUTO x10*3/uL 179 230   NEUTROS PCT AUTO % 72.8 52.1   LYMPHS PCT AUTO % 13.8 30.9   MONOS PCT AUTO % 10.1 7.4   EOS PCT AUTO % 1.6 6.8     Results from last 7 days   Lab Units 02/03/25  0347   APTT seconds 25.3   INR  1.1     Results from last 7 days   Lab Units 02/03/25  1535 02/03/25  0347   SODIUM mmol/L 139 138   POTASSIUM mmol/L 4.0 3.8   CHLORIDE mmol/L 103 103   CO2 mmol/L 31 30   BUN mg/dL 24* 22   CREATININE mg/dL 0.55 0.59   CALCIUM mg/dL 8.6 9.0   PROTEIN TOTAL g/dL 5.1* 6.0*   BILIRUBIN TOTAL mg/dL 1.0 0.8   ALK PHOS U/L 64 75   ALT U/L 9 10   AST U/L 18 14   GLUCOSE mg/dL 103* 163*     Results from last 7 days   Lab Units 02/03/25  1535 02/03/25  0347   BILIRUBIN TOTAL mg/dL 1.0 0.8         I have reviewed all medications, laboratory results, and imaging pertinent for today's encounter.

## 2025-02-04 NOTE — CONSULTS
Inpatient consult to Geriatric Medicine  Consult performed by: FRANKY Serrano-CNP  Consult ordered by: Tono Oconnell PA-C  Reason for consult: dementia and goals of care          Primary Team: Trauma     Admit Date: 2/3/2025    Emergency Contact:   Extended Emergency Contact Information  Primary Emergency Contact: EMILY LINDQUIST  Mobile Phone: [phone]  Relation: Daughter  Secondary Emergency Contact: RAMANA RUBIO  Mobile Phone: [phone]  Relation: Son     Reason For Consult: dementia and goals of care    History Of Present Illness:  Xiomy Rubio is a 89 y.o. female with history of anxiety, dementia, osteoporosis, hypertension, arthritis, bilateral hearing loss and multiple falls presenting after an unwitnessed fall at home with head injury, unclear if she experienced loss of consciousness. She was initially seen at [facility] where she was found to have bilateral nasal bone fractures, multiple facial bone fractures, left sided 8-11 rib fractures and age-indeterminate T12 fracture as well as severe L 4 compression fracture and stable T11 compression fracture. Patient transferred to [facility] and admitted to TSICU.    History per patient:  Patient states she doesn't recall the circumstances of her fall, but acknowledges she frequently falls.     History per family/caregiver: (obtained in addition due to patient’s dementia, hearing loss)  Son, Ramana, reports patient has frequent falls. He some of them are due to vertigo and others are due to cognitive impairment contributing to poor safety awareness, such as forgetting to turn on lights when walking to the bathroom at night, forgetting to use her walker. Patient has worked with home PT in the past. Patient's PCP ordered outpatient physical therapy, but patient has not yet had a session.     Ramana states this fall occurred when patient got up in the middle of the night to have a snack, she sat at the kitchen table eating her snack, then fell asleep and fell on  the kitchen floor. Son heard the fall and patient calling out and called 911.     Son notes patient is more forgetful and repetitious. She has not had any work up for cognitive impairment. Son also reports patient has been having increased depression and anxiety symptoms since summer of 2024. She is followed by psychiatry and psychology for these symptoms.     What matters most to the patient:  Keeping her house clean     Prior to Admission Meds  Prior to Admission Medications   Prescriptions Last Dose Informant Patient Reported? Taking?   Oysco 500/D 500 mg-5 mcg (200 unit) tablet   No No   Sig: TAKE 1 TABLET BY MOUTH TWICE A DAY   busPIRone (Buspar) 10 mg tablet   No No   Sig: Take 1 tablet (10 mg) by mouth 3 times a day.   ferrous sulfate, 325 mg ferrous sulfate, tablet   No No   Sig: TAKE 1 TABLET BY MOUTH EVERY OTHER DAY   latanoprost (Xalatan) 0.005 % ophthalmic solution   Yes No   Sig: Administer 1 drop into both eyes once daily at bedtime.   pantoprazole (ProtoNix) 20 mg EC tablet   No No   Sig: TAKE 1 TABLET BY MOUTH EVERY DAY   sennosides (senna) 8.6 mg capsule   No No   Sig: Take 1 capsule (8.6 mg) by mouth once daily at bedtime.   sertraline (Zoloft) 25 mg tablet   No No   Sig: Take 1.5 tablets (37.5 mg) by mouth once daily.   sertraline (Zoloft) 50 mg tablet   Yes No   Sig: Take 1 tablet (50 mg) by mouth once daily.      Facility-Administered Medications: None        Current Meds in Hospital  Current Facility-Administered Medications   Medication Dose Route Frequency Provider Last Rate Last Admin    acetaminophen (Tylenol) tablet 650 mg  650 mg oral q6h [facility] Azucena Mcclelland MD   650 mg at 02/03/25 2331    busPIRone (Buspar) tablet 10 mg  10 mg oral TID Azucena Mcclelland MD   10 mg at 02/03/25 2332    calcium carbonate-vitamin D3 500 mg-5 mcg (200 unit) per tablet 1 tablet  1 tablet oral BID Azucena Mcclelland MD   1 tablet at 02/03/25 2331    dextrose 50 % injection 12.5 g  12.5 g intravenous q15 min PRDEEPTHI Zeng  PANDA Mcclelland MD        dextrose 50 % injection 25 g  25 g intravenous q15 min PRN Azucena Mcclelland MD        enoxaparin (Lovenox) syringe 30 mg  30 mg subcutaneous q12h GIANA Azucena Mcclelland MD        ferrous sulfate (325 mg ferrous sulfate) tablet 325 mg  65 mg of iron oral Every other day Azucena Mcclelland MD        glucagon (Glucagen) injection 1 mg  1 mg intramuscular q15 min PRN Azucena Mcclelland MD        glucagon (Glucagen) injection 1 mg  1 mg intramuscular q15 min PRN Azucena Mcclelland MD        HYDROmorphone PF (Dilaudid) injection 0.2 mg  0.2 mg intravenous q4h PRN Azucena Mcclelland MD   0.2 mg at 02/03/25 5803    latanoprost (Xalatan) 0.005 % ophthalmic solution 1 drop  1 drop Both Eyes Nightly Azucena Mcclelland MD        lidocaine 4 % patch 1 patch  1 patch transdermal Daily Azucena Mcclelland MD        naloxone (Narcan) injection 0.2 mg  0.2 mg intravenous q5 min PRN Azucena Mcclelland MD        ondansetron ODT (Zofran-ODT) disintegrating tablet 4 mg  4 mg oral q8h PRN Azucena Mcclelland MD        Or    ondansetron (Zofran) injection 4 mg  4 mg intravenous q8h PRN Azucena Mcclelland MD        oxyCODONE (Roxicodone) immediate release tablet 2.5 mg  2.5 mg oral q4h PRN Tono Oconnell PA-C        oxyCODONE (Roxicodone) immediate release tablet 5 mg  5 mg oral q4h PRN Tono Oconnell PA-C        oxygen (O2) therapy   inhalation Continuous PRN - O2/gases Azucena Mcclelland MD        pantoprazole (ProtoNix) EC tablet 20 mg  20 mg oral Daily Azucena Mcclelland MD        polyethylene glycol (Glycolax, Miralax) packet 17 g  17 g oral Daily Azucena Mcclelland MD        sennosides (Senokot) tablet 8.6 mg  8.6 mg oral Nightly Azucena Mcclelland MD        sertraline (Zoloft) tablet 50 mg  50 mg oral Daily Azucena Mcclelland MD            The patient's outpatient electronic medical record (EMR) is reviewed, notable information includes:    PCP follow up 11/13/24: Multiple falls, uses rollator. Notes dizziness when she rolls on her right side. Diagnosed with BPPV of right  ear- referred to PT. Concerns with constipation- medications adjusted. Anxiety: followed by psychology and psychaitry, sertraline decreased to 50 mg daily, continues on vitamin D and calcium supplement for osteoporosis     Seen in ED at [facility] on 9/25/24 for fall with hip pain- no fracture found and patient discharged       Past Medical History  Past Medical History:   Diagnosis Date    Anxiety     Memory loss     Other conditions influencing health status 11/24/2017    History of cough    Pain in joints of unspecified hand 01/05/2016    Pain, hand joint    Personal history of diseases of the skin and subcutaneous tissue 11/24/2017    History of cellulitis    Personal history of Methicillin resistant Staphylococcus aureus infection 11/24/2017    History of methicillin resistant Staphylococcus aureus infection    Personal history of other diseases of the respiratory system 12/05/2017    History of acute bronchitis    Personal history of other specified conditions 05/04/2017    History of dizziness    Personal history of pneumonia (recurrent)     History of pneumonia        Surgical History  Past Surgical History:   Procedure Laterality Date    OTHER SURGICAL HISTORY  08/29/2017    Hip Surgery Left        Family History  Her family history includes No Known Problems in her father and mother.     Social History  She reports that she has never smoked. She has never used smokeless tobacco. She reports that she does not currently use alcohol. She reports that she does not currently use drugs.  -Alcohol use: No  -Tobacco use: No  -Illicit drug use: No  -Exercise: tries to follow up with her physical therapy recommendations   -Spiritual needs: Mu-ism   -Marital Status:    Occupation: homemaker   Highest Level of Education: High School  Community Resources: none   [place]: No  Current living environment: lives with sonRamana, in single family home     Activities of Daily Living:  Basic ADLs: (I= independent,  A= assistance, D= dependent)  Bathing: A, Dressing: I, Toileting: I, Transferring: I, Continence: I, Feeding: I    [place] Index:  5  Instrumental ADLs: (I= independent, A= assistance, D= dependent)  Ability to use phone: A, Shopping: D, Cooking: D, Housekeeping: D, Laundry: D, Transportation: D, Medications: D, Handle Finances: D   [place] Scale:  0    Allergies  Adhesive tape-silicones, Other, Cortisone, Penicillin, and Prednisone    Review of Systems  Review of System:  Constitutional:   -Night sweats/fever: none   -Weight change: none     Integumentary: ecchymosis s/p fall     Ears, Nose, Throat:  -Hearing loss: severe hearing loss, wears hearing aids         Eyes:  -Vision loss: wears glasses     Cardiovascular:  -Chest Pain: denies  -Orthopnea: none   -Paroxysmal nocturnal dyspnea: none   -Edema: none     Respiratory:   -Dyspnea: none   -Wheezing: none     Gastrointestinal:           -Appetite: good   -Difficulty chewing/ swallowing: none   -Heartburn: none   -Bowels: history of constipation     Genitourinary:   -Incontinence: none   -Nocturia: 2-3x per night    Musculoskeletal:  -Mobility: uses walker or cane   -Pain: low back pain     Neurological:  -Confusion: +   -Falls: multiple   -Gait: unsteady   -Memory: impaired   -Tremor: none     Psychiatric:  -Mood: depression/anxiety   -Sleep: awakens frequently to void     Endocrine: osteoporosis     Hematologic/ Lymphatic: none     Allergic/ Immunologic: none      Documents on file and valid:  Advance Directive/Living Will: Yes   [facility] : [facility]  Other: Son and patient requests DNR/DNI   Code Status: Full Code      Confusion Assessment Method (CAM)  Not on file.    [place] Cognitive Assessment (MoCA)  Not on file.    Geriatric Depression Scale (GDS)  Not on file.    Mini-Cog (Early Dementia Detection)  Not on file.    [place]stein Mini-Mental State Exam (MMSE)  Not on file.    Patient Health Questionnaire (PHQ-9)  Not on file.      Physical Exam  HENT:      Nose: No rhinorrhea.      Mouth/Throat:      Mouth: Mucous membranes are moist.      Pharynx: Oropharynx is clear.   Eyes:      Extraocular Movements: Extraocular movements intact.      Conjunctiva/sclera: Conjunctivae normal.      Pupils: Pupils are equal, round, and reactive to light.   Cardiovascular:      Rate and Rhythm: Normal rate and regular rhythm.      Heart sounds: Murmur heard.   Pulmonary:      Effort: Pulmonary effort is normal.      Breath sounds: Normal breath sounds.   Abdominal:      General: Bowel sounds are normal. There is no distension.      Palpations: Abdomen is soft.      Tenderness: There is no abdominal tenderness.   Musculoskeletal:      Cervical back: Neck supple.      Right lower leg: No edema.      Left lower leg: No edema.   Neurological:      Mental Status: She is alert and oriented to person, place, and time.         Last Recorded Vitals      2/4/2025     2:00 AM 2/4/2025     3:00 AM 2/4/2025     4:00 AM 2/4/2025     5:00 AM 2/4/2025     6:00 AM 2/4/2025     7:00 AM 2/4/2025     8:00 AM   Vitals   Systolic 94 129 115 96 149 172 151   Diastolic 44 58 58 44 62 67 65   BP Location       Left arm   Heart Rate 60 68 62 60 63 78 70   Temp   36.3 °C (97.3 °F)    35.9 °C (96.6 °F)   Resp 14 18 13 14 15 15 17      Vitals:    02/03/25 2130   Weight: 70.3 kg (154 lb 15.7 oz)        Confusion Assessment Method(CAM) for diagnosis of delirium:    1.  Acute onset or fluctuating course: absent  2.  Inattention: absent  3.  Disorganized thinking: absent  4.  Altered level of consciousness: absent  CAM: negative    AT Score For Assessment of Delirium and Cognitive Impairment:    Alertness: 0  Normal(fully alert,but not agitated, throughout assessment)=0  Mild sleepiness for <10 seconds after walking, then normal=0  Clearly abnormal=4  2.  AMT4: 0  No mistakes=0  One mistake=1  Two or more mistakes/untestable=2  3.  Attention: 0  Achieves seven months or more  correctly=0  Starts but scores <7 months/ refuses to start=1  Untestable(cannot start because unwell, drowsy, inattentive)=2  4.  Acute: 0  No=0  Yes=4    Total Score: 0  4 or above: Possible delirium +/- cognitive impairment  1-3: Possible cognitive impairment  0: Delirium or severe cognitive impairment unlikely(but delirium still possible if (4) information incomplete)     Relevant Results  Lab Results   Component Value Date    TSH 2.81 11/20/2023    GHFZRHGJ22 484 11/20/2023    VITD25 34 02/03/2025    HGBA1C 5.7 (H) 11/20/2023     Results from last 7 days   Lab Units 02/04/25  0226 02/03/25  1535 02/03/25  0347   WBC AUTO x10*3/uL 6.9 7.3 6.6   HEMOGLOBIN g/dL 10.2* 10.1* 13.1   HEMATOCRIT % 31.7* 29.3* 41.2   ALT U/L  --  9 10   AST U/L  --  18 14   SODIUM mmol/L 139 139 138   POTASSIUM mmol/L 3.9 4.0 3.8   CHLORIDE mmol/L 103 103 103   CREATININE mg/dL 0.45* 0.55 0.59   BUN mg/dL 22 24* 22   CO2 mmol/L 29 31 30   INR  1.1  --  1.1       Recent Imaging Results      XR pelvis 1-2 views  Narrative: STUDY:  Pelvis Radiographs; 2/3/2025 AT 9:55 PM  INDICATION:  Fracture follow up; fall.  COMPARISON:  XR pelvis 10/2/2024, XR hip with pelvis 9/25/2024, XR pelvis  11/14/2023, XR pelvis 5/10/2023.  ACCESSION NUMBER(S):  [id number]  ORDERING CLINICIAN:  APPLE RIVAS  TECHNIQUE:  One view(s) of the pelvis.  FINDINGS:    The pelvic ring is intact.  There is no acute fracture.  Right hip  arthroplasty is noted.  Postoperative changes of left femoral neck and  intramedullary kael fixation of the left femur.  Urinary bladder is  distended with intravenous contrast.  Impression: No acute osseous abnormality.  Signed by Caden Orellana MD  XR femur 2 VW bilateral  Narrative: STUDY:  Bilateral Femur Radiographs; 2/3/2025 at 9:54 PM  INDICATION:  Fracture follow up; fall.  COMPARISON:  XR right hip with pelvis 9/25/2024, XR left femur 11/14/2023, XR left  femur 8/15/2023.  ACCESSION NUMBER(S):  [id number]  ORDERING  CLINICIAN:  APPLE RIVAS  TECHNIQUE:  Four view(s) of the right femur;   Four view(s) of the  left femur.  FINDINGS:    RIGHT FEMUR:  Right humeral plasty is noted without distinct evidence for hardware  complication.  There is no displaced fracture.  The alignment is  anatomic.  No soft tissue abnormality is seen.  LEFT FEMUR:  Postoperative changes of femoral neck and intramedullary kael fixation  of the left femur are demonstrated.  No distinct evidence for hardware  complication.  There is no displaced fracture.  The alignment is  anatomic.  No soft tissue abnormality is seen.  Impression: No acute osseous abnormality bilaterally.  Signed by Caden Orellana MD      Head/Brain Imaging  === Results for orders placed during the hospital encounter of 02/03/25 ===    CT head wo IV contrast [[id number]] 02/03/2025    Status: Normal  Multiple facial bone fractures including the anterior, medial, and  posterolateral walls of the maxillary sinuses, right nasal bone, and  possibly nasal septum. There is hemorrhage throughout the maxillary  sinuses, nasal cavity, nasopharynx. Small fluid levels are also  present within the sphenoid sinuses and ethmoid air cells. There is  no depressed calvarial fracture.    Diffuse age-related parenchymal volume loss and small-vessel ischemic  changes of the cerebral white matter.    No CT evidence of acute intracranial hemorrhage or mass effect.      MACRO:  None    Signed by: Sterling Rodriguez 2/3/2025 7:26 AM  Dictation workstation:   [id number]  No results found for this or any previous visit.        DATA:  EKG: QTC  No results found for this or any previous visit (from the past 4464 hours).  Anti-psychotics in 48 hours: none   Opioids/Benzodiazepines in 48 hours: hydromorphone   Anticholinergics on board:No  Restraints:No  Indwelling catheters:No  Last BM: none recorded   UO in 24 hours: 500 mL   Activity in the past 24 hours: out of bed to chair   Need for ambulatory devices: uses  walker and cane     Assessment/Plan     Xiomy Rubio is a 89 y.o. female with history of anxiety, dementia, osteoporosis, hypertension, arthritis, bilateral hearing loss and multiple falls presenting after an unwitnessed fall at home with head injury, unclear if she experienced loss of consciousness. She was initially seen at [facility] where she was found to have bilateral nasal bone fractures, multiple facial bone fractures, left sided 8-11 rib fractures and age-indeterminate T12 fracture as well as severe L 4 compression fracture and stable T11 compression fracture. Patient transferred to [facility] and admitted to TSICU. Patient being seen in geriatric consultation for dementia and goals of care discussion.     Principal Problem:    Closed fracture of nasal bone, initial encounter    1. Acute fall resulting in bilateral nasal bone fractures, left 8-11 th rib fractures, stable T11 compression fracture, new severe L4 compression fracture  - Continue acetaminophen 975 mg 3x/day  - Continue lidocaine patch   - Agree with oxycodone as needed for pain   - PT/OT   - Incentive spirometer   - ENT recommendations include soft diet and follow up in 2 weeks     2. Recurrent falls  - Has history of falls related to BPPV and dementia  - Uses walker and cane for ambulation  - PT/OT   - Recommend checking orthostatic vital signs  - Vitamin D level 34     3. Osteoporosis  - Vitamin D level 34  - Endocrinology has been consulted     4. Hearing impairment, bilateral  - Recommend using pocket talker     5. Constipation  - Has chronic constipation  - Agree with Senna and Miralax     6. Anxiety, depression  - Followed by psychiatry and psychology  - Managed with buspar 10 mg 3x/day and sertraline 50 mg, agree with continuing     7. Concern for cognitive impairment   - Son reports poor STM, repetition. She needs assistance with bathing and all IADLs. Hearing impairment is likely contributing to cognitive impairment   - No MoCA on file, will  attempt MoCA if patient without signs of delirium   - Recommend checking TSH and B12 (last checked in 2023)  - CAM negative, 4 AT: 0  - Patient is at risk for delirium due to underlying cognitive impairment and hearing impairment    Please consider the following general measures for minimizing delirium in a hospitalized patient:   -Bright lights during the day, keeps blinds up, switch all lights on   -avoid disturbances at night. Encourage at least 6 hours uninterrupted sleep. Consider d/c 4am vitals check  -avoid benzodiazepines, sedatives. Minimize opioids   -avoid anti-cholinergics    -use low dose olanzapine 5 mg PO (IM if PO not possible) only PRN severe agitation where pt exhibits volatile behavior and is a threat to self or others. EKGs to monitor QTc   -daily orientation to time and place by the staff   -out of bed to chair few hours everyday  - encourage stimulating activities during the day if possible     8. Goals of care  Met with patient and her son, Ramana, to discuss goals of care. Ramana is HCPOA. Patient an her son desire DNR/DNI. Maintaining function and quality of life are primary goals.     Care Transitions:  -Recommended level for discharge: Per PT/OT recommendations   -Home going considerations: lives with sonRamana   -Primary care physician: Lian Hernandez DO     Goals of Care:  -Health care power of : Ramana Rubio (son)   -Living will: yes   Code status: Patient's son states advanced directive is DNR/DNI     4M AGE-FRIENDLY INITIATIVE:  What matters most to patient: Keeping her house clean   Medications: none concerning   Mentation: CAM negative, 4 AT; 0   Mobility: Use walker and cane       Geriatric medicine will continue to follow the patient. Thank you for allowing geriatric medicine to be involved in the care of your patient. Geriatric medicine consultation team is available during work hours Monday through Friday. For any emergency issues requiring immediate assistance over the  weekend, please page Geriatrics pager [phone]    Consult Billing Time  Prep time on date of patient encounter(minutes): 30   Time directly with patient/family/caregiver(minutes): 30   Documentation time(minutes): 30   TOTAL TIME(minutes): 90     FRANKY Serrano-CNP

## 2025-02-04 NOTE — PROGRESS NOTES
[facility]  TRAUMA ICU - PROGRESS NOTE    Patient Name: Xiomy Rubio  MRN: [id number]  Admit Date: 203  : 1931  AGE: 89 y.o.   GENDER: female  ==============================================================================  MECHANISM OF INJURY / CHIEF COMPLAINT:   89 y F GLF, in bathroom.      LOC (yes/no?): Unknown  Anticoagulant / Anti-platelet Rx? (for what dx?): No  Referring Facility Name (N/A for scene EMR run):  [facility]     INJURIES:   Bilateral Nasal Bone fractures  Left 8-11th rib fractures  Stable T11 compression fracture  New severe L4 compression fracture     OTHER MEDICAL PROBLEMS:  Rheumatoid Arthritis  Hypertension  Osteoporosis  Anxiety  Memory loss  Gerd  Urinary Incontinence   Constipation     INCIDENTAL FINDINGS:  Enhancing right upper pole lesion, concerning for primary renal  Neoplasm  2. 3 mm indeterminate hypodense liver lesion       ==============================================================================  TODAY'S ASSESSMENT AND PLAN OF CARE:  Xiomy Rubio is a 89 y.o. female in the ICU due to respiratory watch    NEURO/PAIN/SEDATION: history of anxiety and depression  continue home buspar and zoloft   Post traumatic pain --> Tylenol scheduled with oxycodone PRN and lidocaine patches   Oxycodone decreased to 2.5 - 5mg q4h prn. Stop dilaudid    ENT;   Bilateral nasal bone, maxillary fractures. Lefort 2  - ent s/o: outpt fu 2 wks, non-op    RESPIRATORY: L 8-11 rib fx   Resp support as needed via NC   NC 3L wean Nc for O2 greater than 90%   RT consult and incentive spirometry   Pain management seen above  No need for acute pain or thoracic consults    CARDIOVASC: HTN   Not currently on medications at baseline, can consider starting new agent if systolic blood pressure sustains 170-180. Appreciate geriatrics input  Tyson heard on exam, history of murmur    GI: GERD  Continue home protonix and senna. Daily miralax  RD   Soft diet recommended  "    /FEN:   2g mag overnight  Creatinine and remainder of electrolytes remain within normal limits   Stop ivf    HEMATOLOGIC:   No concerns  Re-check CBC afternoon --> Hemoglobin drop from 13.1 to 10.2 from yesterday    ENDOCRINE:   ISS stopped due to stable labs, will continue blood checks with meals  Continue ferrous sulfate and calcium carb, consult endo for osteo     MUSCULOSKELETAL/SKIN: osteoporosis, RA   Appreciate ortho recs   PT/OT    AAT, no spine precautions   Upright XR obtained   Dr. Jhonatan roman in 2 weeks   -> endo consult for OP mgmnt     INFECTIOUS DISEASE:   No abx indicated    GI PROPHYLAXIS: na    DVT PROPHYLAXIS: lovenox BID     DISPOSITION: transfer to floor. Geriatrics involved    JUDI Hoffman-S2  Tono Oconnell PA-C    ==============================================================================  CHIEF COMPLAINT / OVERNIGHT EVENTS / HPI:   NAD, confused and deaf but able to direct and orient. Pain controlled on oral medications.     MEDICAL HISTORY / ROS:  Admission history and ROS reviewed. Pertinent changes as follows:      PHYSICAL EXAM:  Heart Rate:  [60-86]   Temp:  [36.3 °C (97.3 °F)-36.8 °C (98.2 °F)]   Resp:  [12-76]   BP: ()/()   Height:  [167.6 cm (5' 6\")]   Weight:  [68.5 kg (151 lb)-70.3 kg (154 lb 15.7 oz)]   SpO2:  [86 %-100 %]   Physical Exam  General; 89 year old well appearing with noted bruising on face  Skin; Moist mucous membranes  Neuro; NAD, confused with A&Ox2, hard of hearing   HENT: Bruised bilateral orbits, EOMI PERRL  CV; Heart murmur heard on exam  Resp; Breathing comfortably on 3L NC   GI; Nondistended nontender abdomen soft   MSK; Nontender to bilateral chest on palpation , KRUEGER x 4  ; +urine        IMAGING SUMMARY:  (summary of new imaging findings, not a copy of dictation)  CT head: Multiple facial bone fractures including the anterior, medial, and  posterolateral walls of the maxillary sinuses, right nasal bone, and  possibly nasal " septum  CT cervical: There is bulging disc and marginal osteophyte  which is greatest at C5-6 with moderate central canal narrowing and  some effacement of the ventral surface of the spinal cord. There is  mild to moderate central canal narrowing at C4-5.  CT chest: Severe indeterminate age compression deformity of T12.  CT pelvis: No definite acute displaced osseous pelvic fracture.  CT thoracic:   1. Severe L4 compression fracture is new from the prior lumbar spine  plain film series. Retropulsion of bony material combines with  degenerative changes to create severe central canal stenosis at the  L3-4 subarticular level.  2. T11 compression fracture stable compared to the prior chest film.  3. There are fractures of the posterior 8th, 9th, and 10th ribs which  appear to be subacute in nature.  4. There is severe left and moderately severe right foraminal  stenosis at L4-5.          LABS:  Results from last 7 days   Lab Units 02/04/25 0226 02/03/25  1535 02/03/25  0347   WBC AUTO x10*3/uL 6.9 7.3 6.6   HEMOGLOBIN g/dL 10.2* 10.1* 13.1   HEMATOCRIT % 31.7* 29.3* 41.2   PLATELETS AUTO x10*3/uL 157 179 230   NEUTROS PCT AUTO %  --  72.8 52.1   LYMPHS PCT AUTO %  --  13.8 30.9   MONOS PCT AUTO %  --  10.1 7.4   EOS PCT AUTO %  --  1.6 6.8     Results from last 7 days   Lab Units 02/04/25 0226 02/03/25  0347   APTT seconds 30 25.3   INR  1.1 1.1     Results from last 7 days   Lab Units 02/04/25 0226 02/03/25  1535 02/03/25  0347   SODIUM mmol/L 139 139 138   POTASSIUM mmol/L 3.9 4.0 3.8   CHLORIDE mmol/L 103 103 103   CO2 mmol/L 29 31 30   BUN mg/dL 22 24* 22   CREATININE mg/dL 0.45* 0.55 0.59   CALCIUM mg/dL 8.4* 8.6 9.0   PROTEIN TOTAL g/dL  --  5.1* 6.0*   BILIRUBIN TOTAL mg/dL  --  1.0 0.8   ALK PHOS U/L  --  64 75   ALT U/L  --  9 10   AST U/L  --  18 14   GLUCOSE mg/dL 104* 103* 163*     Results from last 7 days   Lab Units 02/03/25  1535 02/03/25  0347   BILIRUBIN TOTAL mg/dL 1.0 0.8         I have reviewed  all medications, laboratory results, and imaging pertinent for today's encounter.    Shanelle PAGE

## 2025-02-04 NOTE — CARE PLAN
Orthopaedic Surgery Spine Updated Care Plan Note    Upright radiographs reviewed with Dr. Israel and determined to be stable.    Recommendations:  - No acute orthopaedic spine intervention  - No heavy lifting, bending or twisting  - Patient should follow up w/ Dr. Israel in 4 weeks after discharge for follow-up appointment (patient may call [phone] to schedule).   - Orthopaedic Spine will continue to follow peripherally while in house    Eder Gallagher MD  Orthopedic Surgery PGY-2  [facility]  Pager: [phone]  Available by Epic Chat    On weekends and after 6PM:  At [facility]: Please reach out to the orthopaedic on-call resident ([phone])  At [facility]: Please reach out to the orthopaedic on-call DESHAWN or resident (please refer to Manea)

## 2025-02-04 NOTE — PROGRESS NOTES
Pt seen in ICU. Very [place] even with hearing aids  Events of last 24 hours reviewed  Labs and imaging reviewed. CXR today pending.  Recommendations from consultants   Face- no surgery   Spine- no surgery, no brace, no restrictions. Rec endo consult for osteopenia   [place]- pending    Endo- pending  Rib fractures likely old based on repeat imaging in ED    PT recs home with 24 hour care v. Facility.  Can likely transfer from ICU.    ICU care appreciated.

## 2025-02-04 NOTE — PROGRESS NOTES
Physical Therapy    Physical Therapy Evaluation and Treatment    Patient Name: Xiomy Rubio  MRN: [id number]  Department: [facility]  Room: 16/16-A  Today's Date: 2/4/2025   Time Calculation  Start Time: 1159  Stop Time: 1232  Time Calculation (min): 33 min    Assessment/Plan   PT Assessment  PT Assessment Results: Decreased endurance, Impaired balance, Decreased mobility, Decreased safety awareness, Impaired hearing  Rehab Prognosis: Good  Barriers to Discharge Home: Caregiver assistance (Pt requires 24/7 care)  Caregiver Assistance: Other (Comment) (Pt needs 24/7 care at home)  Evaluation/Treatment Tolerance: Other (Comment) (Limited by fear of falling, increased confusion, and [place])  Medical Staff Made Aware: Yes  Strengths: Support of Caregivers, Access to adaptive/assistive products, Housing layout, Living arrangement secure  Barriers to Participation: Ability to acquire knowledge  End of Session Communication: Bedside nurse  Assessment Comment: Pt varied from mod-A x2 to max-A x1 for 3 STS trials, and mod-A x1 for ambulation with walker.  Pt was able to ambulate ~ 20 ft, but had increased anxiety due to fear of falling and [place] to PT instructions.  Pt was primarily limited by cognitive deficits and required verbal/tactile cues.  Pt will benefit from skilled PT to return to PLOF.  End of Session Patient Position: Up in chair, Alarm on  IP OR SWING BED PT PLAN  Inpatient or Swing Bed: Inpatient  PT Plan  Treatment/Interventions: Bed mobility, Transfer training, Gait training, Stair training, Balance training, Endurance training, Therapeutic exercise, Therapeutic activity  PT Plan: Ongoing PT  PT Frequency: 5 times per week  PT Discharge Recommendations: Low intensity level of continued care (24 home health care, lives with son)  Equipment Recommended upon Discharge: Wheeled walker  PT Recommended Transfer Status: Assist x2  PT - OK to Discharge: Yes    Subjective   General Visit Information:  General  Reason for  Referral: GLF, in bathroom. Reports headstrike.  Injuries include: bilateral nasal bone fractures, left 8-11th rib fractures, stable T11 compression fracture, and new severe L4 compression fracture.  Past Medical History Relevant to Rehab: Rheumatoid arthritis, hypertension, osteoporosis, anxiety, memory loss  Family/Caregiver Present: Yes (Son present in room)  Prior to Session Communication: Bedside nurse  Patient Position Received: Up in chair, Alarm on  General Comment: Pt sitting up in chair upon arrival.  Pt is confused and [place], responded best with written communication.  Pt was willing to participiate in therapy today but became increasingly confused and anxious with activity.  On 2L NC.  Home Living:  Home Living  Type of Home: House  Lives With: Adult children (Son)  Home Adaptive Equipment: Other (Comment) (Rollator)  Home Layout: Able to live on main level with bedroom/bathroom  Home Access: Stairs to enter with rails  Entrance Stairs-Number of Steps: 2  Bathroom Shower/Tub: Tub/shower unit  Bathroom Equipment: Grab bars in shower, Shower chair with back  Prior Level of Function:  Prior Function Per Pt/Caregiver Report  Level of [place]: Independent with ADLs and functional transfers  Receives Help From: Family  ADL Assistance: Independent (Independent with some difficulty)  Ambulatory Assistance: Independent (Independent with rollator)  Vocational: Retired  Leisure: gardening  Prior Function Comments: reports 4 previous falls.  Precautions:  Precautions  Hearing/Visual Limitations: [place], has hearing aids but not working  Medical Precautions: Fall precautions  Post-Surgical Precautions: Spinal precautions      02/04/25 1159 02/04/25 1232   Vital Signs   Vitals Session Pre PT Post PT   Heart Rate 81 87   Resp 24 20   SpO2 95 % 98 %   BP (!) 122/107 141/57   MAP (mmHg) 114 81      Objective   Pain:  Pain Assessment  Pain Assessment: 0-10  0-10 (Numeric) Pain Score:  (Pt continuously reported pain from  initial injuries, but could not report current pain.)  Cognition:  Cognition  Overall Cognitive Status: Impaired  Orientation Level: Disoriented to place, Disoriented to time (Disoriented to city, hospital, and time.)  Following Commands: Follows one step commands with repetition (Increased time and repetition with written instructions, due to [place].)  Impulsive: Mildly  Processing Speed: Delayed    General Assessments:     Activity Tolerance  Endurance: Endurance does not limit participation in activity  Early Mobility/Exercise Safety Screen: Proceed with mobilization - No exclusion criteria met  Activity Tolerance Comments: Primarily limited by increased confusion and anxiety with ambulation due to [place] and fear of falling.    Sensation  Light Touch: No apparent deficits    Strength  Strength Comments: BLE >/= 3/5  Strength  Strength Comments: BLE >/= 3/5    Perception  Inattention/Neglect: Appears intact      Coordination  Movements are Fluid and Coordinated: Yes    Postural Control  Postural Control: Impaired  Posture Comment: Required verbal/tactile cues with STS trials due to [place].  Pt was retropulsive when standing due to fear of falling.         Static Standing Balance  Static Standing-Balance Support: Bilateral upper extremity supported  Static Standing-Level of Assistance: Moderate assistance  Static Standing-Comment/Number of Minutes: ~ 1 min  Functional Assessments:  Bed Mobility  Bed Mobility: No    Transfers  Transfer: Yes  Transfer 1  Transfer From 1: Sit to, Stand to  Transfer to 1: Stand, Sit  Technique 1: Sit to stand, Stand to sit  Transfer Device 1: Walker  Transfer Level of Assistance 1: Moderate assistance, Maximum assistance (mod-A x2, max-A x1)  Trials/Comments 1: mod-A x2 for first two STS trials, max-A x1 stand <> sit.    Ambulation/Gait Training  Ambulation/Gait Training Performed: Yes  Ambulation/Gait Training 1  Surface 1: Level tile  Device 1: Rolling walker  Assistance 1: Moderate  assistance (x1)  Quality of Gait 1: Narrow base of support, Decreased step length, Antalgic, Forward flexed posture (Uneven CARMEN, leaning forward onto walker)  Comments/Distance (ft) 1: ~ 20 ft total, w/2L supplemental O2.  Pt required cues for ambulation and path finding with walker.    Stairs  Stairs: No  Extremity/Trunk Assessments:  RLE   RLE : Within Functional Limits  LLE   LLE : Within Functional Limits  Outcome Measures:  [facility] Basic Mobility  Turning from your back to your side while in a flat bed without using bedrails: A lot  Moving from lying on your back to sitting on the side of a flat bed without using bedrails: A lot  Moving to and from bed to chair (including a wheelchair): A lot  Standing up from a chair using your arms (e.g. wheelchair or bedside chair): A lot  To walk in hospital room: A lot  Climbing 3-5 steps with railing: Total  Basic Mobility - Total Score: 11    FSS-ICU  Ambulation: Walks <50 feet with any assistance x1 or walks any distance with assistance x2 people  Rolling: Moderate assistance (performs 50 - 74% of task)  Sitting: Moderate assistance (performs 50 - 74% of task)  Transfer Sit-to-Stand: Moderate assistance (performs 50 - 74% of task)  Transfer Supine-to-Sit: Moderate assistance (performs 50 - 74% of task)  Total Score: 13      Early Mobility/Exercise Safety Screen: Proceed with mobilization - No exclusion criteria met  ICU Mobility Scale: Walking with assistance of 1 person [8]    Encounter Problems       Encounter Problems (Active)       Balance       Pt will score > 24/28 on the [place] Assessment to indicate low fall risk.       Start:  02/04/25    Expected End:  02/25/25               Mobility       STG - Patient will ambulate > 150 ft with LRAD and CGA.       Start:  02/04/25    Expected End:  02/25/25            Pt will perform all bed mobility with min-A.       Start:  02/04/25    Expected End:  02/25/25            STG - Patient will perform STS transfer with LRAD and  min-A.       Start:  02/04/25    Expected End:  02/25/25            STG - Patient will ascend and descend 2-3 steps mod-I and min-A.       Start:  02/04/25    Expected End:  02/25/25                   Education Documentation  Body Mechanics, taught by REGLA Topete at 2/4/2025  1:54 PM.  Learner: Family, Patient  Readiness: Acceptance  Method: Explanation  Response: Needs Reinforcement  Comment: PT POC    Mobility Training, taught by REGLA Topete at 2/4/2025  1:54 PM.  Learner: Family, Patient  Readiness: Acceptance  Method: Explanation  Response: Needs Reinforcement  Comment: PT POC    Education Comments  No comments found.      REGLA TOPETE

## 2025-02-04 NOTE — PROGRESS NOTES
02/04/25 1600   Discharge Planning   Living Arrangements Children   Support Systems Children   Assistance Needed Mostly independent prior to admission   Type of Residence Private residence   Home or Post Acute Services In home services   Expected Discharge Disposition Home H   Financial Resource Strain   How hard is it for you to pay for the very basics like food, housing, medical care, and heating? Not hard   Housing Stability   In the last 12 months, was there a time when you were not able to pay the mortgage or rent on time? N   At any time in the past 12 months, were you homeless or living in a shelter (including now)? N   Transportation Needs   In the past 12 months, has lack of transportation kept you from medical appointments or from getting medications? no   In the past 12 months, has lack of transportation kept you from meetings, work, or from getting things needed for daily living? No     TCC admission assessment completed. Explained role of TCC - verbalized understanding.     Demographics/Insurance: Confirmed in chart.   Living Environment: Lives at home with adult son.   Primary Support Person: DaughterMily, [phone]  PCP: Dr. Lian Hernandez  Recent Falls: Yes, multiple.   Assistive Devices/DME: Rollator  Dialysis: Denies   Transportation at Discharge: Family to transport home after hospital.  Pharmacy: [facility] in [place] on [address]  Concerns about discharge: None at this time. PT/OT recommending low intensity.     Tg Avina RN, TCC

## 2025-02-04 NOTE — CONSULTS
Orthopaedic Surgery Consult Note    HPI: 89F (osteoporosis with prior hip fx) presents after unwitnessed fall from home. Lives with son and walks with walker at baseline. Prior recurrent falls with known T11 compression deformity (stable on imaging today compared to 2022). Also with rib fxs 8-10 likely subacute.     Orthopaedic Problems/Injuries: L4 compression fx  Other Injuries: As above/face fxs    PMH: per above/EMR  PSH: per above/EMR  SocHx:      - Denies tobacco use      - Denies EtOH use      - Denies other drug use  FamHx:  Non-contributory to this patient's acute orthopaedic problem other than as mentioned in HPI  All: Reviewed in EMR  Meds: Reviewed in EMR    ROS      - 14 point ROS negative except as above    Physical Exam    - Constitutional: No acute distress, A&Ox2  - Eyes: EOM grossly intact  - Head/Neck: Trachea midline  - Respiratory/Thorax: NWOB  - Cardiovascular: RRR on peripheral palpation  - Gastrointestinal: Nondistended  - Psychological: Confused. Also hard of hearing with difficulty answering questions   - Skin: Warm and dry. Additional findings in musculoskeletal evaluation  - Musculoskeletal:  ---    C5: SILT   Deltoid 5/5 Left; 5/5 Right  C6: SILT   Wrist Ext: 5/5 Left; 5/5 Right  C7: SILT   Triceps: 5/5 Left; 5/5 Right  C8: SILT   Finger flexion: 5/5 Left; 5/5 Right  T1: SILT    Interossei: 5/5 Left; 5/5 Right    Bicep Reflex 2+   Bilaterally  BR Reflex 2+   Bilaterally  Triceps Reflex 2+   Bilaterally    L1: SILT       L2: SILT      Hip flexors 5/5 Left; 5/5 Right  L3: SILT      Knee extension 5/5 Left; 5/5 Right  L4: SILT      Tib Ant. (Dorsiflexion) 5/5 Left; 5/5 Right  L5: SILT      EHL5/5 Left; 5/5 Right  S1: SILT      Plantar flexion 5/5 Left; 5/5 Right    Patellar reflex: 2+   Bilaterally  Achilles reflex: 2+  Bilaterally    Negative Santiago bilaterally   Negative Babinski bilaterally  No clonus bilaterally      Results for orders placed or performed during the hospital encounter  of 02/03/25 (from the past 24 hours)   Troponin I, High Sensitivity   Result Value Ref Range    Troponin I, High Sensitivity 5 0 - 13 ng/L   Type and Screen   Result Value Ref Range    ABO TYPE B     Rh TYPE POS     ANTIBODY SCREEN NEG    CBC and Auto Differential   Result Value Ref Range    WBC 7.3 4.4 - 11.3 x10*3/uL    nRBC 0.0 0.0 - 0.0 /100 WBCs    RBC 3.35 (L) 4.00 - 5.20 x10*6/uL    Hemoglobin 10.1 (L) 12.0 - 16.0 g/dL    Hematocrit 29.3 (L) 36.0 - 46.0 %    MCV 88 80 - 100 fL    MCH 30.1 26.0 - 34.0 pg    MCHC 34.5 32.0 - 36.0 g/dL    RDW 13.3 11.5 - 14.5 %    Platelets 179 150 - 450 x10*3/uL    Neutrophils % 72.8 40.0 - 80.0 %    Immature Granulocytes %, Automated 1.0 (H) 0.0 - 0.9 %    Lymphocytes % 13.8 13.0 - 44.0 %    Monocytes % 10.1 2.0 - 10.0 %    Eosinophils % 1.6 0.0 - 6.0 %    Basophils % 0.7 0.0 - 2.0 %    Neutrophils Absolute 5.33 1.60 - 5.50 x10*3/uL    Immature Granulocytes Absolute, Automated 0.07 0.00 - 0.50 x10*3/uL    Lymphocytes Absolute 1.01 0.80 - 3.00 x10*3/uL    Monocytes Absolute 0.74 0.05 - 0.80 x10*3/uL    Eosinophils Absolute 0.12 0.00 - 0.40 x10*3/uL    Basophils Absolute 0.05 0.00 - 0.10 x10*3/uL   Comprehensive metabolic panel   Result Value Ref Range    Glucose 103 (H) 74 - 99 mg/dL    Sodium 139 136 - 145 mmol/L    Potassium 4.0 3.5 - 5.3 mmol/L    Chloride 103 98 - 107 mmol/L    Bicarbonate 31 21 - 32 mmol/L    Anion Gap 9 (L) 10 - 20 mmol/L    Urea Nitrogen 24 (H) 6 - 23 mg/dL    Creatinine 0.55 0.50 - 1.05 mg/dL    eGFR 86 >60 mL/min/1.73m*2    Calcium 8.6 8.6 - 10.6 mg/dL    Albumin 3.2 (L) 3.4 - 5.0 g/dL    Alkaline Phosphatase 64 33 - 136 U/L    Total Protein 5.1 (L) 6.4 - 8.2 g/dL    AST 18 9 - 39 U/L    Bilirubin, Total 1.0 0.0 - 1.2 mg/dL    ALT 9 7 - 45 U/L   Vitamin D 25-Hydroxy,Total (for eval of Vitamin D levels)   Result Value Ref Range    Vitamin D, 25-Hydroxy, Total 34 30 - 100 ng/mL       CT thoracic spine wo IV contrast   Final Result   1. Severe L4  compression fracture is new from the prior lumbar spine   plain film series. Retropulsion of bony material combines with   degenerative changes to create severe central canal stenosis at the   L3-4 subarticular level.   2. T11 compression fracture stable compared to the prior chest film.   3. There are fractures of the posterior 8th, 9th, and 10th ribs which   appear to be subacute in nature.   4. There is severe left and moderately severe right foraminal   stenosis at L4-5.        MACRO:   None        Signed by: Sohan Crowder 2/3/2025 4:25 PM   Dictation workstation:   [id number]      CT lumbar spine wo IV contrast   Final Result   1. Severe L4 compression fracture is new from the prior lumbar spine   plain film series. Retropulsion of bony material combines with   degenerative changes to create severe central canal stenosis at the   L3-4 subarticular level.   2. T11 compression fracture stable compared to the prior chest film.   3. There are fractures of the posterior 8th, 9th, and 10th ribs which   appear to be subacute in nature.   4. There is severe left and moderately severe right foraminal   stenosis at L4-5.        MACRO:   None        Signed by: Sohan Crowder 2/3/2025 4:25 PM   Dictation workstation:   [id number]      CT abdomen pelvis w IV contrast   Final Result   1.  There are at least 8th-10th posterolateral minimally displaced   rib fractures, which appear to be acute. Recommend complete   visualization with same-day CT chest for other possible rib fractures   beyond the field-of-view in this examination.   2. Bibasilar lung mosaic attenuation, which may be due to poor   inspiration in setting of rib fractures. Findings may also represent   small-vessel/airway disease, though less likely.   3. Enhancing right upper pole lesion, concerning for primary renal   neoplasm. This may be further evaluated with dedicated renal MR on a   nonemergent/outpatient basis.   4. Remaining findings as above.         I personally reviewed the images/study and agree with the findings as   stated by Kun Ho MD (Resident Physician). This study was   interpreted at [facility],   [place], [place].        MACRO:   None        Signed by: Simon Salas 2/3/2025 5:05 PM   Dictation workstation:   [id number]      XR pelvis 1-2 views    (Results Pending)   XR femur 2 VW bilateral    (Results Pending)   XR lumbar spine 2-3 views    (Results Pending)       Assessment:  89F (osteoporosis with prior hip fx) presents after unwitnessed fall from home. Lives with son and walks with walker at baseline. Prior recurrent falls with known T11 compression deformity (stable on imaging today compared to 2022). Also with rib fxs 8-10 likely subacute.     CT cervical spine also reviewed without injury but with severe evidence of joint degeneration/DDD, canal narrowing.  Given recurrent falls there is some concern for myelopathy, however she does not present with upper motor neuron signs or myelopathic symptoms.  She seems to have some degree of mechanical fall along with confusion.  At this time it is likely more beneficial to promote bone health in setting of recurrent falls and prior compression fracture.    Orthopaedic Problems/Injuries: L4 compression fx    Recommendations:  - No acute orthopaedic interventions  - PT/OT  - Endocrine referral for bone health with known osteoporosis   - Weight bearing status: AAT, no spine precautions  - Upright XR when able L spine   - Analgesia per ED/primary  - F/U with Dr. Israel in 2 weeks after discharged. Call [phone] to schedule appointment.  - Please don't hesitate to page with questions.    D/w Dr. Israel    This consult was seen and staffed within 30 minutes.    While admitted, this patient will be followed by the Ortho Spine Team. Please contact the residents listed below with any questions (available via Epic Chat weekdays). Please page [phone] (ortho on-call)  after 6pm and on weekends.      Ortho Spine  First Call: Eder Gallagher, PGY-2  Second Call: Mike Cuellar, PGY-4      6pm-6am M-F, holidays, weekends please contact on-call resident @ [phone] w/ urgent questions/concerns.    Pete Blake MD  Orthopedic Surgery, PGY-3  On-call Resident (on call pager [phone])

## 2025-02-04 NOTE — HOSPITAL COURSE
89 year old female after GLF in bathroom. Imaging showed bilateral nasal bone fractures, L 8-11th rib fractures, T11 compression fracture, and severe L4 compression fracture. Orthopedics was consulted and recommended no spinal precautions, and follow up with Dr. Israel in 2 weeks. ENT recommended soft diet for 2 weeks and to follow up in 2 weeks. Patient was admitted to the ICU for respiratory watch. Patient came to ICU on 4L NC and was able to be weaned to room air. RT consulted and incentive spirometry was initiated. No need for acute pain or thoracic consults. Patient does not complain of pain and has not had any doses of prn oxycodone 2.5-5mg. Patient does have lidocaine on L back side where fractured ribs are located. Patient has not needed any HTN prn medications. Geriatrics was consulted for further management of patient comorbidities. Home medications were resumed as clinically appropriate. Patient on lovenox BID as DVT prophylaxis. Patient was transferred to the trauma floor. PT/OT evaluated patient and recommended 24/7 home care at discharge. PT recommends patient is not safe to return home. Coordinated with family to facilitate safe discharge plan. Patient to follow up with ENT in 2 weeks as an outpatient for bilateral nasal bone and facial fractures. Patient to follow up with Dr. Israel for T11, L4 fractures. No spinal precautions recommended. Endocrinology was consulted for osteoporosis, and patient is to follow up as an outpatient for DEXA scan. PT re-evaluated patient and recommended SNF at discharge. Pt accepted to SNF on 2/7, at time of discharge medications continued, follow up appointments scheduled, working well with PT.

## 2025-02-05 LAB
ALBUMIN SERPL BCP-MCNC: 3.8 G/DL (ref 3.4–5)
ANION GAP SERPL CALC-SCNC: 14 MMOL/L (ref 10–20)
BUN SERPL-MCNC: 19 MG/DL (ref 6–23)
CALCIUM SERPL-MCNC: 9.1 MG/DL (ref 8.6–10.6)
CHLORIDE SERPL-SCNC: 102 MMOL/L (ref 98–107)
CO2 SERPL-SCNC: 27 MMOL/L (ref 21–32)
CREAT SERPL-MCNC: 0.51 MG/DL (ref 0.5–1.05)
EGFRCR SERPLBLD CKD-EPI 2021: 87 ML/MIN/1.73M*2
ERYTHROCYTE [DISTWIDTH] IN BLOOD BY AUTOMATED COUNT: 13.1 % (ref 11.5–14.5)
GLUCOSE SERPL-MCNC: 116 MG/DL (ref 74–99)
HCT VFR BLD AUTO: 33.4 % (ref 36–46)
HGB BLD-MCNC: 11.1 G/DL (ref 12–16)
MAGNESIUM SERPL-MCNC: 2.02 MG/DL (ref 1.6–2.4)
MCH RBC QN AUTO: 29.6 PG (ref 26–34)
MCHC RBC AUTO-ENTMCNC: 33.2 G/DL (ref 32–36)
MCV RBC AUTO: 89 FL (ref 80–100)
NRBC BLD-RTO: 0 /100 WBCS (ref 0–0)
PHOSPHATE SERPL-MCNC: 3.2 MG/DL (ref 2.5–4.9)
PLATELET # BLD AUTO: 211 X10*3/UL (ref 150–450)
POTASSIUM SERPL-SCNC: 3.8 MMOL/L (ref 3.5–5.3)
RBC # BLD AUTO: 3.75 X10*6/UL (ref 4–5.2)
SODIUM SERPL-SCNC: 139 MMOL/L (ref 136–145)
WBC # BLD AUTO: 7.4 X10*3/UL (ref 4.4–11.3)

## 2025-02-05 PROCEDURE — 2500000002 HC RX 250 W HCPCS SELF ADMINISTERED DRUGS (ALT 637 FOR MEDICARE OP, ALT 636 FOR OP/ED): Performed by: STUDENT IN AN ORGANIZED HEALTH CARE EDUCATION/TRAINING PROGRAM

## 2025-02-05 PROCEDURE — 2500000001 HC RX 250 WO HCPCS SELF ADMINISTERED DRUGS (ALT 637 FOR MEDICARE OP): Performed by: STUDENT IN AN ORGANIZED HEALTH CARE EDUCATION/TRAINING PROGRAM

## 2025-02-05 PROCEDURE — 1100000001 HC PRIVATE ROOM DAILY

## 2025-02-05 PROCEDURE — 97530 THERAPEUTIC ACTIVITIES: CPT | Mod: GP,CQ

## 2025-02-05 PROCEDURE — 85027 COMPLETE CBC AUTOMATED: CPT | Performed by: STUDENT IN AN ORGANIZED HEALTH CARE EDUCATION/TRAINING PROGRAM

## 2025-02-05 PROCEDURE — 97116 GAIT TRAINING THERAPY: CPT | Mod: GP,CQ

## 2025-02-05 PROCEDURE — 2500000005 HC RX 250 GENERAL PHARMACY W/O HCPCS: Performed by: STUDENT IN AN ORGANIZED HEALTH CARE EDUCATION/TRAINING PROGRAM

## 2025-02-05 PROCEDURE — 2500000004 HC RX 250 GENERAL PHARMACY W/ HCPCS (ALT 636 FOR OP/ED): Performed by: STUDENT IN AN ORGANIZED HEALTH CARE EDUCATION/TRAINING PROGRAM

## 2025-02-05 PROCEDURE — 99232 SBSQ HOSP IP/OBS MODERATE 35: CPT | Performed by: STUDENT IN AN ORGANIZED HEALTH CARE EDUCATION/TRAINING PROGRAM

## 2025-02-05 PROCEDURE — 80069 RENAL FUNCTION PANEL: CPT | Performed by: STUDENT IN AN ORGANIZED HEALTH CARE EDUCATION/TRAINING PROGRAM

## 2025-02-05 PROCEDURE — 83735 ASSAY OF MAGNESIUM: CPT | Performed by: STUDENT IN AN ORGANIZED HEALTH CARE EDUCATION/TRAINING PROGRAM

## 2025-02-05 PROCEDURE — 99232 SBSQ HOSP IP/OBS MODERATE 35: CPT | Performed by: NURSE PRACTITIONER

## 2025-02-05 PROCEDURE — 36415 COLL VENOUS BLD VENIPUNCTURE: CPT | Performed by: STUDENT IN AN ORGANIZED HEALTH CARE EDUCATION/TRAINING PROGRAM

## 2025-02-05 RX ORDER — LANOLIN ALCOHOL/MO/W.PET/CERES
1000 CREAM (GRAM) TOPICAL DAILY
Status: DISCONTINUED | OUTPATIENT
Start: 2025-02-05 | End: 2025-02-07 | Stop reason: HOSPADM

## 2025-02-05 RX ADMIN — ENOXAPARIN SODIUM 30 MG: 100 INJECTION SUBCUTANEOUS at 23:03

## 2025-02-05 RX ADMIN — SERTRALINE HYDROCHLORIDE 50 MG: 50 TABLET ORAL at 09:03

## 2025-02-05 RX ADMIN — SENNOSIDES 8.6 MG: 8.6 TABLET ORAL at 23:03

## 2025-02-05 RX ADMIN — POLYETHYLENE GLYCOL 3350 17 G: 17 POWDER, FOR SOLUTION ORAL at 09:02

## 2025-02-05 RX ADMIN — Medication 2000 UNITS: at 09:02

## 2025-02-05 RX ADMIN — ENOXAPARIN SODIUM 30 MG: 100 INJECTION SUBCUTANEOUS at 09:03

## 2025-02-05 RX ADMIN — ACETAMINOPHEN 975 MG: 325 TABLET ORAL at 23:03

## 2025-02-05 RX ADMIN — Medication 1 TABLET: at 23:03

## 2025-02-05 RX ADMIN — BUSPIRONE HYDROCHLORIDE 10 MG: 10 TABLET ORAL at 23:03

## 2025-02-05 RX ADMIN — Medication 1 TABLET: at 09:02

## 2025-02-05 RX ADMIN — PANTOPRAZOLE SODIUM 20 MG: 20 TABLET, DELAYED RELEASE ORAL at 05:10

## 2025-02-05 RX ADMIN — BUSPIRONE HYDROCHLORIDE 10 MG: 10 TABLET ORAL at 09:02

## 2025-02-05 RX ADMIN — CYANOCOBALAMIN TAB 1000 MCG 1000 MCG: 1000 TAB at 17:45

## 2025-02-05 RX ADMIN — OXYCODONE 5 MG: 5 TABLET ORAL at 03:13

## 2025-02-05 RX ADMIN — OXYCODONE 5 MG: 5 TABLET ORAL at 23:03

## 2025-02-05 RX ADMIN — ACETAMINOPHEN 975 MG: 325 TABLET ORAL at 05:10

## 2025-02-05 RX ADMIN — Medication 600 MG: at 23:03

## 2025-02-05 RX ADMIN — LATANOPROST 1 DROP: 50 SOLUTION/ DROPS OPHTHALMIC at 23:12

## 2025-02-05 RX ADMIN — LIDOCAINE 4% 1 PATCH: 40 PATCH TOPICAL at 09:02

## 2025-02-05 RX ADMIN — Medication 600 MG: at 09:02

## 2025-02-05 RX ADMIN — BUSPIRONE HYDROCHLORIDE 10 MG: 10 TABLET ORAL at 14:02

## 2025-02-05 SDOH — SOCIAL STABILITY: SOCIAL INSECURITY: ARE YOU OR HAVE YOU BEEN THREATENED OR ABUSED PHYSICALLY, EMOTIONALLY, OR SEXUALLY BY ANYONE?: NO

## 2025-02-05 SDOH — SOCIAL STABILITY: SOCIAL INSECURITY: DO YOU FEEL UNSAFE GOING BACK TO THE PLACE WHERE YOU ARE LIVING?: NO

## 2025-02-05 SDOH — SOCIAL STABILITY: SOCIAL INSECURITY: HAVE YOU HAD THOUGHTS OF HARMING ANYONE ELSE?: NO

## 2025-02-05 SDOH — SOCIAL STABILITY: SOCIAL INSECURITY: WERE YOU ABLE TO COMPLETE ALL THE BEHAVIORAL HEALTH SCREENINGS?: YES

## 2025-02-05 SDOH — SOCIAL STABILITY: SOCIAL INSECURITY: ARE THERE ANY APPARENT SIGNS OF INJURIES/BEHAVIORS THAT COULD BE RELATED TO ABUSE/NEGLECT?: NO

## 2025-02-05 SDOH — SOCIAL STABILITY: SOCIAL INSECURITY: DOES ANYONE TRY TO KEEP YOU FROM HAVING/CONTACTING OTHER FRIENDS OR DOING THINGS OUTSIDE YOUR HOME?: NO

## 2025-02-05 SDOH — SOCIAL STABILITY: SOCIAL INSECURITY: HAVE YOU HAD ANY THOUGHTS OF HARMING ANYONE ELSE?: NO

## 2025-02-05 SDOH — SOCIAL STABILITY: SOCIAL INSECURITY: HAS ANYONE EVER THREATENED TO HURT YOUR FAMILY OR YOUR PETS?: NO

## 2025-02-05 SDOH — SOCIAL STABILITY: SOCIAL INSECURITY: DO YOU FEEL ANYONE HAS EXPLOITED OR TAKEN ADVANTAGE OF YOU FINANCIALLY OR OF YOUR PERSONAL PROPERTY?: NO

## 2025-02-05 SDOH — SOCIAL STABILITY: SOCIAL INSECURITY: ABUSE: ADULT

## 2025-02-05 ASSESSMENT — PATIENT HEALTH QUESTIONNAIRE - PHQ9
1. LITTLE INTEREST OR PLEASURE IN DOING THINGS: NOT AT ALL
SUM OF ALL RESPONSES TO PHQ9 QUESTIONS 1 & 2: 0
2. FEELING DOWN, DEPRESSED OR HOPELESS: NOT AT ALL

## 2025-02-05 ASSESSMENT — COGNITIVE AND FUNCTIONAL STATUS - GENERAL
DAILY ACTIVITIY SCORE: 17
CLIMB 3 TO 5 STEPS WITH RAILING: TOTAL
MOBILITY SCORE: 12
HELP NEEDED FOR BATHING: A LOT
DRESSING REGULAR LOWER BODY CLOTHING: A LITTLE
TOILETING: A LOT
PERSONAL GROOMING: A LITTLE
MOVING TO AND FROM BED TO CHAIR: A LOT
TURNING FROM BACK TO SIDE WHILE IN FLAT BAD: A LITTLE
STANDING UP FROM CHAIR USING ARMS: A LOT
MOVING FROM LYING ON BACK TO SITTING ON SIDE OF FLAT BED WITH BEDRAILS: A LITTLE
STANDING UP FROM CHAIR USING ARMS: A LOT
MOBILITY SCORE: 13
CLIMB 3 TO 5 STEPS WITH RAILING: TOTAL
TURNING FROM BACK TO SIDE WHILE IN FLAT BAD: A LITTLE
DRESSING REGULAR UPPER BODY CLOTHING: A LITTLE
WALKING IN HOSPITAL ROOM: TOTAL
MOVING TO AND FROM BED TO CHAIR: A LOT
WALKING IN HOSPITAL ROOM: A LOT
MOVING FROM LYING ON BACK TO SITTING ON SIDE OF FLAT BED WITH BEDRAILS: A LITTLE

## 2025-02-05 ASSESSMENT — LIFESTYLE VARIABLES
HOW OFTEN DO YOU HAVE A DRINK CONTAINING ALCOHOL: PATIENT DECLINED
HOW OFTEN DO YOU HAVE 6 OR MORE DRINKS ON ONE OCCASION: PATIENT DECLINED
SKIP TO QUESTIONS 9-10: 0
AUDIT-C TOTAL SCORE: -1
HOW MANY STANDARD DRINKS CONTAINING ALCOHOL DO YOU HAVE ON A TYPICAL DAY: PATIENT DECLINED
AUDIT-C TOTAL SCORE: -1

## 2025-02-05 ASSESSMENT — PAIN SCALES - GENERAL
PAINLEVEL_OUTOF10: 7
PAINLEVEL_OUTOF10: 7
PAINLEVEL_OUTOF10: 0 - NO PAIN

## 2025-02-05 ASSESSMENT — PAIN DESCRIPTION - ORIENTATION: ORIENTATION: RIGHT

## 2025-02-05 ASSESSMENT — PAIN - FUNCTIONAL ASSESSMENT
PAIN_FUNCTIONAL_ASSESSMENT: WONG-BAKER FACES
PAIN_FUNCTIONAL_ASSESSMENT: 0-10
PAIN_FUNCTIONAL_ASSESSMENT: 0-10

## 2025-02-05 ASSESSMENT — PAIN SCALES - WONG BAKER: WONGBAKER_NUMERICALRESPONSE: NO HURT

## 2025-02-05 ASSESSMENT — PAIN DESCRIPTION - LOCATION: LOCATION: NOSE

## 2025-02-05 NOTE — PROGRESS NOTES
Medical team expressed concerns regarding patient discharging to home even with 24x7 supervision as she has a history of frequent falls. Unsafe for patient to return home. PT recommending low intensity if 24x7 supervision. OT recommending mod intensity in the event patient doesn't have 24x7 supervision. Attempted to call patient's daughter to discuss, but phone number listed in chart leads to message of being disconnected. Attempted to call patient's son. No answer, left voicemail with call back number. Tg Avina RN, TCC

## 2025-02-05 NOTE — PROGRESS NOTES
Physical Therapy    Physical Therapy Treatment    Patient Name: Xiomy Rubio  MRN: [id number]  Department: [place] [address]  Room: [facility]8019-A  Today's Date: 2/5/2025  Time Calculation  Start Time: 1405  Stop Time: 1434  Time Calculation (min): 29 min         Assessment/Plan   PT Assessment  PT Assessment Results: Decreased strength, Impaired balance, Decreased mobility, Decreased cognition, Impaired judgement, Decreased safety awareness  End of Session Communication: Bedside nurse  Assessment Comment: Pt ambulated 60' using FWW with Mod A x2 people, pt perseverating on fixing gown and required constant redirection to task and assist with FWW management.  End of Session Patient Position: Up in chair, Alarm on     PT Plan  Treatment/Interventions: Bed mobility, Transfer training, Gait training, Stair training, Balance training, Endurance training, Therapeutic exercise, Therapeutic activity  PT Plan: Ongoing PT  PT Frequency: 5 times per week  PT Discharge Recommendations: Low intensity level of continued care (24 home health care, lives with son)  Equipment Recommended upon Discharge: Wheeled walker  PT Recommended Transfer Status: Assist x2  PT - OK to Discharge: Yes      General Visit Information:   PT  Visit  PT Received On: 02/05/25  General  Family/Caregiver Present: Yes  Caregiver Feedback: Pt's son present, supportive, and engaged throughout treatment.  Prior to Session Communication: Bedside nurse  Patient Position Received: Bed, 3 rail up, Alarm on  General Comment: Pt supine in bed upon arrival. Pt pleasantly confused and agreeable to therapy. Pt perseverating on gown and tele cords throughout treatment.    Subjective   Precautions:  Precautions  Hearing/Visual Limitations: [place], has hearing aids but not working  Medical Precautions: Fall precautions  Post-Surgical Precautions: Spinal precautions            Objective   Pain:     Cognition:  Cognition  Overall Cognitive Status: Impaired  Coordination:       Activity  Tolerance:  Activity Tolerance  Endurance: Endurance does not limit participation in activity  Treatments:  Therapeutic Activity  Therapeutic Activity Performed: Yes  Therapeutic Activity 1: Pt completed static standing using FWW with Mod A, pt required constant redirection to task.  Therapeutic Activity 2: Pt completed bed mobility, functional transfers, and gait training.    Bed Mobility  Bed Mobility: Yes  Bed Mobility 1  Bed Mobility 1: Supine to sitting  Level of Assistance 1: Close supervision  Bed Mobility Comments 1: HOB elevated    Ambulation/Gait Training  Ambulation/Gait Training Performed: Yes  Ambulation/Gait Training 1  Surface 1: Level tile  Device 1: Rolling walker  Gait Support Devices: Gait belt  Assistance 1: Moderate assistance (x2)  Quality of Gait 1: Forward flexed posture, Soft knee(s), Narrow base of support, Inconsistent stride length, Decreased step length  Comments/Distance (ft) 1: 20' + 60' with seated rest between trials. Pt perseveration on gown and requires constant cuing to redirect pt to task and constant assist with FWW management  Transfers  Transfer: Yes  Transfer 1  Transfer From 1: Sit to, Stand to  Transfer to 1: Stand, Sit  Technique 1: Sit to stand, Stand to sit  Transfer Device 1: Walker  Transfer Level of Assistance 1: Moderate assistance  Trials/Comments 1: Cues for hand placement.    Outcome Measures:  [facility] Basic Mobility  Turning from your back to your side while in a flat bed without using bedrails: A little  Moving from lying on your back to sitting on the side of a flat bed without using bedrails: A little  Moving to and from bed to chair (including a wheelchair): A lot  Standing up from a chair using your arms (e.g. wheelchair or bedside chair): A lot  To walk in hospital room: A lot  Climbing 3-5 steps with railing: Total  Basic Mobility - Total Score: 13    Education Documentation  Mobility Training, taught by Kristina Mcdonouhg PTA at 2/5/2025  3:39 PM.  Learner:  Patient  Readiness: Acceptance  Method: Explanation  Response: Verbalizes Understanding  Comment: Pt educated in hand placement with STS transfers and hand placement on FWW.    Education Comments  No comments found.        OP EDUCATION:       Encounter Problems       Encounter Problems (Active)       Balance       Pt will score > 24/28 on the Tinetti Assessment to indicate low fall risk. (Progressing)       Start:  02/04/25    Expected End:  02/25/25               Mobility       STG - Patient will ambulate > 150 ft with LRAD and CGA. (Progressing)       Start:  02/04/25    Expected End:  02/25/25            Pt will perform all bed mobility with min-A. (Progressing)       Start:  02/04/25    Expected End:  02/25/25            STG - Patient will perform STS transfer with LRAD and min-A. (Progressing)       Start:  02/04/25    Expected End:  02/25/25            STG - Patient will ascend and descend 2-3 steps mod-I and min-A. (Progressing)       Start:  02/04/25    Expected End:  02/25/25               Pain - Adult

## 2025-02-05 NOTE — CARE PLAN
The patient's goals for the shift include      The clinical goals for the shift include Patient remain safe through end of shift

## 2025-02-05 NOTE — PROGRESS NOTES
[facility]  TRAUMA SERVICE - PROGRESS NOTE    Patient Name: Xiomy Rubio  MRN: [id number]  Admit Date: 203  : 1931  AGE: 89 y.o.   GENDER: female  ==============================================================================  MECHANISM OF INJURY:   89 y F GLF, in bathroom.      LOC (yes/no?): Unknown  Anticoagulant / Anti-platelet Rx? (for what dx?): No  Referring Facility Name (N/A for scene EMR run):  [facility]     INJURIES:   Bilateral Nasal Bone fractures  Left 8-11th rib fractures  Stable T11 compression fracture  New severe L4 compression fracture     OTHER MEDICAL PROBLEMS:  Rheumatoid Arthritis  Hypertension  Osteoporosis  Anxiety  Memory loss  Gerd  Urinary Incontinence   Constipation     INCIDENTAL FINDINGS:  Enhancing right upper pole lesion, concerning for primary renal  Neoplasm  2. 3 mm indeterminate hypodense liver lesion     ==============================================================================  TODAY'S ASSESSMENT AND PLAN OF CARE:  ## L 8-11 rib fx  Resp support as needed via NC   NC 3L wean Nc for O2 greater than 90%     - NC at bedside, pt had off when in room  RT consult and incentive spirometry   Pain management seen above  No need for acute pain or thoracic consults  Pain controlled with tylenol and lidocaine patches, prn oxy     ## bilateral nasal bone fx, maxillary fractures. Lefort 2  - follow up ENT 2 weeks outpatient    ## T11, L4 fractures  - PT/OT  - no spine precautions  - follow up Dr. Israel    ## osteoporosis  - endo consulted   - Vit D/Ca  - follow up outpatient for DEXA    ## HTN  - no home meds at baseline  - continue to monitor, consider adding anti-HTN if remains high    ## GERD  - continue home PPI     ## FEN/GI  - regular diet  - BR     ## DVT ppx  - lovenox  - SCDs    Dispo: continue RNF - PT recommending HHC, pt on evaluation today not safe to go home. Reaching out to family for safe discharge plan    Pt seen and  discussed with Dr. Charlton     Total face to face time spent with patient/family of 25 minutes, with >50% of the time spent discussing plan of care/management, counseling/educating on disease processes, explaining results of diagnostic testing.     Katie Junior PA-C  Trauma, Critical Care, Acute Care Surgery   Floor: [id number]  TSICU: [id number]     ==============================================================================  CHIEF COMPLAINT / OVERNIGHT EVENTS:   No acute events overnight. Pt this morning mildly agitated in room, stating she wants to go home, attempting to pull SCDs off. Patient very hard of hearing, explaining to patient that we need a safe place for her to discharge to. Pt states she has a lot of family and a son that lives with her. Attempted to contact son and daughter multiple times today with no answer.    MEDICAL HISTORY / ROS:  Admission history and ROS reviewed. Pertinent changes as follows:    PHYSICAL EXAM:  Heart Rate:  [67-87]   Temp:  [35.9 °C (96.6 °F)-37.2 °C (99 °F)]   Resp:  [16-25]   BP: (122-177)/()   SpO2:  [93 %-99 %]   Physical Exam  HENT:      Head: Normocephalic and atraumatic.      Mouth/Throat:      Mouth: Mucous membranes are dry.   Eyes:      Extraocular Movements: Extraocular movements intact.      Conjunctiva/sclera: Conjunctivae normal.   Pulmonary:      Effort: Pulmonary effort is normal. No respiratory distress.   Musculoskeletal:      Comments: Moving all extremities spontaneously    Skin:     General: Skin is warm and dry.   Neurological:      Mental Status: She is alert and oriented to person, place, and time.      Comments: GCS 14 (-1 for intermittent confusion)   Psychiatric:      Comments: Agitated, hard of hearing         IMAGING SUMMARY:  (summary of new imaging findings, not a copy of dictation)    LABS:  Results from last 7 days   Lab Units 02/04/25  0226 02/03/25  1535 02/03/25  0347   WBC AUTO x10*3/uL 6.9 7.3 6.6   HEMOGLOBIN g/dL 10.2* 10.1* 13.1    HEMATOCRIT % 31.7* 29.3* 41.2   PLATELETS AUTO x10*3/uL 157 179 230   NEUTROS PCT AUTO %  --  72.8 52.1   LYMPHS PCT AUTO %  --  13.8 30.9   MONOS PCT AUTO %  --  10.1 7.4   EOS PCT AUTO %  --  1.6 6.8     Results from last 7 days   Lab Units 02/04/25 0226 02/03/25  0347   APTT seconds 30 25.3   INR  1.1 1.1     Results from last 7 days   Lab Units 02/04/25 0226 02/03/25  1535 02/03/25  0347   SODIUM mmol/L 139 139 138   POTASSIUM mmol/L 3.9 4.0 3.8   CHLORIDE mmol/L 103 103 103   CO2 mmol/L 29 31 30   BUN mg/dL 22 24* 22   CREATININE mg/dL 0.45* 0.55 0.59   CALCIUM mg/dL 8.4* 8.6 9.0   PROTEIN TOTAL g/dL  --  5.1* 6.0*   BILIRUBIN TOTAL mg/dL  --  1.0 0.8   ALK PHOS U/L  --  64 75   ALT U/L  --  9 10   AST U/L  --  18 14   GLUCOSE mg/dL 104* 103* 163*     Results from last 7 days   Lab Units 02/03/25  1535 02/03/25 0347   BILIRUBIN TOTAL mg/dL 1.0 0.8           I have reviewed all medications, laboratory results, and imaging pertinent for today's encounter.

## 2025-02-05 NOTE — CARE PLAN
Problem: Fall/Injury  Goal: Not fall by end of shift  Outcome: Progressing  Goal: Be free from injury by end of the shift  Outcome: Progressing     Problem: Skin  Goal: Promote/optimize nutrition  Outcome: Progressing  Goal: Promote skin healing  Outcome: Progressing     Problem: Pain - Adult  Goal: Verbalizes/displays adequate comfort level or baseline comfort level  Outcome: Progressing     Problem: Safety - Adult  Goal: Free from fall injury  Outcome: Progressing     Problem: Discharge Planning  Goal: Discharge to home or other facility with appropriate resources  Outcome: Progressing   The patient's goals for the shift include defer    The clinical goals for the shift include pt will remain HDS

## 2025-02-05 NOTE — PROGRESS NOTES
Subjective   Seen today for follow up. Patient experiencing agitation this morning. She reports being worried her family doesn't know where she is. She reports some low back pain but no other complaints of pain. She denies dizziness. She denies chest pain/pressure. No cough/SOB. She ate well this morning.     Nurse reports patient is refusing her medications this morning and has been restless and difficult to redirect.        Objective     Current Facility-Administered Medications   Medication Dose Route Frequency Provider Last Rate Last Admin    acetaminophen (Tylenol) tablet 975 mg  975 mg oral q8h GIANA Mcclelland MD   975 mg at 02/05/25 0510    busPIRone (Buspar) tablet 10 mg  10 mg oral TID Azucena Mcclelladn MD   10 mg at 02/05/25 0902    calcium carbonate-vitamin D3 500 mg-5 mcg (200 unit) per tablet 1 tablet  1 tablet oral BID Azucena Mcclelland MD   1 tablet at 02/05/25 0902    calcium citrate (Calcitrate) tablet 600 mg  600 mg oral BID Azucena Mcclelland MD   600 mg at 02/05/25 0902    cholecalciferol (Vitamin D-3) tablet 2,000 Units  2,000 Units oral Daily Azucena Mcclelland MD   2,000 Units at 02/05/25 0902    dextrose 50 % injection 12.5 g  12.5 g intravenous q15 min PRN Azucena Mcclelland MD        dextrose 50 % injection 25 g  25 g intravenous q15 min PRN Azucena Mcclelland MD        enoxaparin (Lovenox) syringe 30 mg  30 mg subcutaneous q12h [facility] Azucena Mcclelland MD   30 mg at 02/05/25 0903    ferrous sulfate (325 mg ferrous sulfate) tablet 325 mg  65 mg of iron oral Every other day Azucena Mcclelland MD   325 mg at 02/04/25 0910    glucagon (Glucagen) injection 1 mg  1 mg intramuscular q15 min PRN Azucena Mcclelland MD        glucagon (Glucagen) injection 1 mg  1 mg intramuscular q15 min PRN Azucena Mcclelland MD        latanoprost (Xalatan) 0.005 % ophthalmic solution 1 drop  1 drop Both Eyes Nightly Azucena Mcclelland MD        lidocaine 4 % patch 1 patch  1 patch transdermal Daily Azucena Mcclelland MD   1 patch at 02/05/25 0902    naloxone  (Narcan) injection 0.2 mg  0.2 mg intravenous q5 min PRN Azucena Mcclelland MD        ondansetron ODT (Zofran-ODT) disintegrating tablet 4 mg  4 mg oral q8h PRN Azucena Mcclelland MD        Or    ondansetron (Zofran) injection 4 mg  4 mg intravenous q8h PRN Azucena Mcclelland MD        oxyCODONE (Roxicodone) immediate release tablet 2.5 mg  2.5 mg oral q6h PRN Azucena Mcclelland MD        oxyCODONE (Roxicodone) immediate release tablet 5 mg  5 mg oral q6h PRN Azucena Mcclelland MD   5 mg at 02/05/25 0313    oxygen (O2) therapy   inhalation Continuous PRN - O2/gases Azucena Mcclelland MD        pantoprazole (ProtoNix) EC tablet 20 mg  20 mg oral Daily Azucena Mcclelland MD   20 mg at 02/05/25 0510    polyethylene glycol (Glycolax, Miralax) packet 17 g  17 g oral Daily Azucena Mcclelland MD   17 g at 02/05/25 0902    sennosides (Senokot) tablet 8.6 mg  8.6 mg oral Nightly Azucena Mcclelland MD   8.6 mg at 02/04/25 2106    sertraline (Zoloft) tablet 50 mg  50 mg oral Daily Azucena Mcclelland MD   50 mg at 02/05/25 0903       Physical Exam  HENT:      Head:      Comments: Ecchymosis below both eyes and across nose and chin      Mouth/Throat:      Mouth: Mucous membranes are moist.   Eyes:      Pupils: Pupils are equal, round, and reactive to light.   Cardiovascular:      Rate and Rhythm: Normal rate and regular rhythm.      Heart sounds: Murmur heard.   Pulmonary:      Effort: Pulmonary effort is normal.      Breath sounds: Normal breath sounds.   Abdominal:      General: Bowel sounds are normal. There is no distension.      Palpations: Abdomen is soft.   Musculoskeletal:      Right lower leg: No edema.      Left lower leg: No edema.   Skin:     Findings: Bruising present.   Neurological:      Mental Status: She is alert.      Comments: Oriented to person, month and year. She is disoriented to place, day and date.    Psychiatric:         Attention and Perception: She is inattentive.         Mood and Affect: Mood is anxious.         Speech: Speech is  tangential.         Cognition and Memory: Cognition is impaired. Memory is impaired.         Confusion Assessment Method(CAM) for diagnosis of delirium:    1.  Acute onset or fluctuating course: present   2.  Inattention: present  3.  Disorganized thinking: present  4.  Altered level of consciousness: absent  CAM: positive     AT Score For Assessment of Delirium and Cognitive Impairment:    Alertness: 0  Normal(fully alert,but not agitated, throughout assessment)=0  Mild sleepiness for <10 seconds after walking, then normal=0  Clearly abnormal=4  2.  AMT4: 2  No mistakes=0  One mistake=1  Two or more mistakes/untestable=2  3.  Attention: 2  Achieves seven months or more correctly=0  Starts but scores <7 months/ refuses to start=1  Untestable(cannot start because unwell, drowsy, inattentive)=2  4.  Acute: 4  No=0  Yes=4    Total Score: 8  4 or above: Possible delirium +/- cognitive impairment  1-3: Possible cognitive impairment  0: Delirium or severe cognitive impairment unlikely(but delirium still possible if (4) information incomplete)      Last Recorded Vitals      2/4/2025     4:00 PM 2/4/2025     5:00 PM 2/4/2025     6:00 PM 2/4/2025     7:53 PM 2/4/2025    11:09 PM 2/5/2025     4:00 AM 2/5/2025     7:51 AM   Vitals   Systolic 136 142 125 152 177  165   Diastolic 66 69 58 56 73  72   BP Location Left arm   Left arm Left arm  Left arm   Heart Rate 78 77 80 81 71  71   Temp 36.4 °C (97.5 °F)   37.2 °C (99 °F) 36.3 °C (97.4 °F) --    Resp 16 23 23 16 16  18      Vitals:    02/03/25 2130   Weight: 70.3 kg (154 lb 15.7 oz)        Relevant Results  Lab Results   Component Value Date    TSH 2.81 11/20/2023    EDXGYFXQ05 274 02/04/2025    VITD25 38 02/04/2025    HGBA1C 5.7 (H) 11/20/2023     Results from last 7 days   Lab Units 02/05/25  0858 02/04/25  0226 02/03/25  1535 02/03/25  0347   WBC AUTO x10*3/uL 7.4 6.9 7.3 6.6   HEMOGLOBIN g/dL 11.1* 10.2* 10.1* 13.1   HEMATOCRIT % 33.4* 31.7* 29.3* 41.2   ALT U/L  --   --   9 10   AST U/L  --   --  18 14   SODIUM mmol/L  --  139 139 138   POTASSIUM mmol/L  --  3.9 4.0 3.8   CHLORIDE mmol/L  --  103 103 103   CREATININE mg/dL  --  0.45* 0.55 0.59   BUN mg/dL  --  22 24* 22   CO2 mmol/L  --  29 31 30   INR   --  1.1  --  1.1          ECG 12 Lead  Normal sinus rhythm  Left ventricular hypertrophy with repolarization abnormality ( R in aVL , Naif product )  Abnormal ECG  When compared with ECG of 16-JUN-2024 12:55,  Premature ventricular complexes are no longer Present  Nonspecific T wave abnormality, worse in Lateral leads    See ED provider note for full interpretation and clinical correlation  Confirmed by Malgorzata Shirley ([id number]) on 2/4/2025 7:28:35 PM  XR chest 1 view  Narrative: Interpreted By:  Kaiden Espana,  and Deborah Avery   STUDY:  XR CHEST 1 VIEW;  2/4/2025 11:17 am      INDICATION:  Signs/Symptoms:trauma.          COMPARISON:  Chest x-ray CT chest 02/03/2025      ACCESSION NUMBER(S):  [id number]      ORDERING CLINICIAN:  AURELIO PERSAUD      FINDINGS:  AP radiograph of the chest was provided.              CARDIOMEDIASTINAL SILHOUETTE:  Cardiomediastinal silhouette is stable in size and configuration.      LUNGS:  Prominent interstitial markings similar to the prior exam. There is  bibasilar atelectasis. No sizable pleural effusion or pneumothorax.      ABDOMEN:  No remarkable upper abdominal findings.      BONES:  No acute osseous changes.      Impression: 1.  Bibasilar atelectasis and prominent interstitial markings, likely  chronic lung disease. No focal consolidation, sizeable pleural  effusion or pneumothorax.      I personally reviewed the images/study and I agree with the findings  as stated by resident physician Dr. Bennie Bledsoe . This study  was interpreted at [facility],  [place], [place].      MACRO:  None      Signed by: Kaiden Espana 2/4/2025 12:13 PM  Dictation workstation:   [id number]  XR pelvis 1-2  views  Narrative: STUDY:  Pelvis Radiographs; 2/3/2025 AT 9:55 PM  INDICATION:  Fracture follow up; fall.  COMPARISON:  XR pelvis 10/2/2024, XR hip with pelvis 9/25/2024, XR pelvis  11/14/2023, XR pelvis 5/10/2023.  ACCESSION NUMBER(S):  [id number]  ORDERING CLINICIAN:  APPLE RIVAS  TECHNIQUE:  One view(s) of the pelvis.  FINDINGS:    The pelvic ring is intact.  There is no acute fracture.  Right hip  arthroplasty is noted.  Postoperative changes of left femoral neck and  intramedullary kael fixation of the left femur.  Urinary bladder is  distended with intravenous contrast.  Impression: No acute osseous abnormality.  Signed by Caden Orellana MD  XR femur 2 VW bilateral  Narrative: STUDY:  Bilateral Femur Radiographs; 2/3/2025 at 9:54 PM  INDICATION:  Fracture follow up; fall.  COMPARISON:  XR right hip with pelvis 9/25/2024, XR left femur 11/14/2023, XR left  femur 8/15/2023.  ACCESSION NUMBER(S):  [id number]  ORDERING CLINICIAN:  APPLE RIVAS  TECHNIQUE:  Four view(s) of the right femur;   Four view(s) of the  left femur.  FINDINGS:    RIGHT FEMUR:  Right humeral plasty is noted without distinct evidence for hardware  complication.  There is no displaced fracture.  The alignment is  anatomic.  No soft tissue abnormality is seen.  LEFT FEMUR:  Postoperative changes of femoral neck and intramedullary kael fixation  of the left femur are demonstrated.  No distinct evidence for hardware  complication.  There is no displaced fracture.  The alignment is  anatomic.  No soft tissue abnormality is seen.  Impression: No acute osseous abnormality bilaterally.  Signed by Caden Orellana MD          DATA:  EKG: QTC  Encounter Date: 02/03/25   ECG 12 Lead   Result Value    Ventricular Rate 78    Atrial Rate 78    WY Interval 180    QRS Duration 96    QT Interval 384    QTC Calculation(Bazett) 437    P Axis 55    R Axis -1    T Axis 84    QRS Count 13    Q Onset 220    P Onset 130    P Offset 188    T Offset 412    QTC  Anni 419    Narrative    Normal sinus rhythm  Left ventricular hypertrophy with repolarization abnormality ( R in aVL , [place] product )  Abnormal ECG  When compared with ECG of 16-JUN-2024 12:55,  Premature ventricular complexes are no longer Present  Nonspecific T wave abnormality, worse in Lateral leads      See ED provider note for full interpretation and clinical correlation  Confirmed by Malgorzata Shirley ([id number]) on 2/4/2025 7:28:35 PM      Anti-psychotics in 48 hours: none   Opioids/Benzodiazepines in 48 hours: oxycodone   Anticholinergics on board:No  Restraints:No  Indwelling catheters:No  Last BM: none recorded  UO in 24 hours: 900 mL   Activity in the past 24 hours: out of bed to chair   Need for ambulatory devices: rollator           Assessment/Plan     Xiomy Rubio is a 89 y.o. female with history of anxiety, dementia, osteoporosis, hypertension, arthritis, bilateral hearing loss and multiple falls presenting after an unwitnessed fall at home with head injury, unclear if she experienced loss of consciousness. She was initially seen at [facility] [place] where she was found to have bilateral nasal bone fractures, multiple facial bone fractures, left sided 8-11 rib fractures and age-indeterminate T12 fracture as well as severe L 4 compression fracture and stable T11 compression fracture. [place] [facility] [place]. Patient being seen in geriatric consultation for dementia and goals of care discussion.     Principal Problem:    Closed fracture of nasal bone, initial encounter    1. Bilateral nasal bone fractures, left 8-11 th rib fractures, stable T11 compression fracture, new severe L4 compression fracture  - Continue acetaminophen 975 mg 3x/day  - Continue lidocaine patch  - Continue oxycodone for breakthrough pain  - No acute orthopedic spine intervention indicated per orthopedic note  - PT/OT   - Continue soft diet     2. Recurrent falls  - Uses walker for ambulation  - History of  BPPV  - Recommend checking orthostatic vital signs when able     3. Anxiety, depression  - Increased agitation today  - Continue Buspar 10 mg 3x/day  - Continue Sertraline 50 mg daily  - Consider adding mirtazapine 7.5 at bedtime for sleep and anxiety  - Follows with psychiatry and psychology as outpatient     4. Concern for cognitive impairment  - CAM negative, 4 AT: 8  - TSH stable at 2.81  - B12 271, recommend beginning cyanocobalamin 1000 mcg by mouth daily      Please consider the following general measures for minimizing delirium in a hospitalized patient:   -Bright lights during the day, keeps blinds up, switch all lights on   -avoid disturbances at night. Encourage at least 6 hours uninterrupted sleep. Consider d/c 4am vitals check  -avoid benzodiazepines, sedatives. Minimize opioids   -avoid anti-cholinergics    -use low dose olanzapine 5 mg PO (IM if PO not possible) only PRN severe agitation where pt exhibits volatile behavior and is a threat to self or others. EKGs to monitor QTc   -daily orientation to time and place by the staff   -out of bed to chair few hours everyday  - encourage stimulating activities during the day if possible          4M AGE-FRIENDLY INITIATIVE:  What matters most to patient: Keeping her house clean   Medications: none concerning   Mentation: CAM positive, 4 AT: 8   Mobility: walker     Geriatric medicine will continue to follow the patient. Thank you for allowing geriatric medicine to be involved in the care of your patient. Geriatric medicine consultation team is available during work hours Monday through Friday. For any emergency issues requiring immediate assistance over the weekend, please page Geriatrics pager [phone]    Judit Fraire, APRN-CNP

## 2025-02-06 LAB
ALBUMIN SERPL BCP-MCNC: 3.7 G/DL (ref 3.4–5)
ANION GAP SERPL CALC-SCNC: 13 MMOL/L (ref 10–20)
BUN SERPL-MCNC: 17 MG/DL (ref 6–23)
CALCIUM SERPL-MCNC: 9.3 MG/DL (ref 8.6–10.6)
CHLORIDE SERPL-SCNC: 103 MMOL/L (ref 98–107)
CO2 SERPL-SCNC: 28 MMOL/L (ref 21–32)
CREAT SERPL-MCNC: 0.57 MG/DL (ref 0.5–1.05)
EGFRCR SERPLBLD CKD-EPI 2021: 85 ML/MIN/1.73M*2
GLUCOSE SERPL-MCNC: 168 MG/DL (ref 74–99)
MAGNESIUM SERPL-MCNC: 1.95 MG/DL (ref 1.6–2.4)
PHOSPHATE SERPL-MCNC: 3.5 MG/DL (ref 2.5–4.9)
POTASSIUM SERPL-SCNC: 3.6 MMOL/L (ref 3.5–5.3)
SODIUM SERPL-SCNC: 140 MMOL/L (ref 136–145)

## 2025-02-06 PROCEDURE — 36415 COLL VENOUS BLD VENIPUNCTURE: CPT | Performed by: STUDENT IN AN ORGANIZED HEALTH CARE EDUCATION/TRAINING PROGRAM

## 2025-02-06 PROCEDURE — 2500000001 HC RX 250 WO HCPCS SELF ADMINISTERED DRUGS (ALT 637 FOR MEDICARE OP): Performed by: STUDENT IN AN ORGANIZED HEALTH CARE EDUCATION/TRAINING PROGRAM

## 2025-02-06 PROCEDURE — 99232 SBSQ HOSP IP/OBS MODERATE 35: CPT | Performed by: STUDENT IN AN ORGANIZED HEALTH CARE EDUCATION/TRAINING PROGRAM

## 2025-02-06 PROCEDURE — 80069 RENAL FUNCTION PANEL: CPT | Performed by: STUDENT IN AN ORGANIZED HEALTH CARE EDUCATION/TRAINING PROGRAM

## 2025-02-06 PROCEDURE — 97530 THERAPEUTIC ACTIVITIES: CPT | Mod: GP,CQ

## 2025-02-06 PROCEDURE — 97116 GAIT TRAINING THERAPY: CPT | Mod: GP,CQ

## 2025-02-06 PROCEDURE — 99233 SBSQ HOSP IP/OBS HIGH 50: CPT | Performed by: INTERNAL MEDICINE

## 2025-02-06 PROCEDURE — 2500000004 HC RX 250 GENERAL PHARMACY W/ HCPCS (ALT 636 FOR OP/ED): Performed by: STUDENT IN AN ORGANIZED HEALTH CARE EDUCATION/TRAINING PROGRAM

## 2025-02-06 PROCEDURE — 2500000005 HC RX 250 GENERAL PHARMACY W/O HCPCS: Performed by: STUDENT IN AN ORGANIZED HEALTH CARE EDUCATION/TRAINING PROGRAM

## 2025-02-06 PROCEDURE — 1100000001 HC PRIVATE ROOM DAILY

## 2025-02-06 PROCEDURE — 83735 ASSAY OF MAGNESIUM: CPT | Performed by: STUDENT IN AN ORGANIZED HEALTH CARE EDUCATION/TRAINING PROGRAM

## 2025-02-06 PROCEDURE — 2500000002 HC RX 250 W HCPCS SELF ADMINISTERED DRUGS (ALT 637 FOR MEDICARE OP, ALT 636 FOR OP/ED): Performed by: STUDENT IN AN ORGANIZED HEALTH CARE EDUCATION/TRAINING PROGRAM

## 2025-02-06 RX ADMIN — FERROUS SULFATE TAB 325 MG (65 MG ELEMENTAL FE) 325 MG: 325 (65 FE) TAB at 08:36

## 2025-02-06 RX ADMIN — Medication 600 MG: at 08:36

## 2025-02-06 RX ADMIN — Medication 2000 UNITS: at 08:36

## 2025-02-06 RX ADMIN — Medication 600 MG: at 20:13

## 2025-02-06 RX ADMIN — ACETAMINOPHEN 975 MG: 325 TABLET ORAL at 21:36

## 2025-02-06 RX ADMIN — LATANOPROST 1 DROP: 50 SOLUTION/ DROPS OPHTHALMIC at 20:13

## 2025-02-06 RX ADMIN — BUSPIRONE HYDROCHLORIDE 10 MG: 10 TABLET ORAL at 16:05

## 2025-02-06 RX ADMIN — OXYCODONE 5 MG: 5 TABLET ORAL at 20:14

## 2025-02-06 RX ADMIN — ACETAMINOPHEN 975 MG: 325 TABLET ORAL at 06:45

## 2025-02-06 RX ADMIN — PANTOPRAZOLE SODIUM 20 MG: 20 TABLET, DELAYED RELEASE ORAL at 06:45

## 2025-02-06 RX ADMIN — CYANOCOBALAMIN TAB 1000 MCG 1000 MCG: 1000 TAB at 08:36

## 2025-02-06 RX ADMIN — POLYETHYLENE GLYCOL 3350 17 G: 17 POWDER, FOR SOLUTION ORAL at 08:36

## 2025-02-06 RX ADMIN — ACETAMINOPHEN 975 MG: 325 TABLET ORAL at 16:05

## 2025-02-06 RX ADMIN — ENOXAPARIN SODIUM 30 MG: 100 INJECTION SUBCUTANEOUS at 08:36

## 2025-02-06 RX ADMIN — Medication 1 TABLET: at 20:14

## 2025-02-06 RX ADMIN — BUSPIRONE HYDROCHLORIDE 10 MG: 10 TABLET ORAL at 08:36

## 2025-02-06 RX ADMIN — Medication 1 TABLET: at 08:36

## 2025-02-06 RX ADMIN — SERTRALINE HYDROCHLORIDE 50 MG: 50 TABLET ORAL at 08:36

## 2025-02-06 RX ADMIN — SENNOSIDES 8.6 MG: 8.6 TABLET ORAL at 20:13

## 2025-02-06 RX ADMIN — BUSPIRONE HYDROCHLORIDE 10 MG: 10 TABLET ORAL at 20:13

## 2025-02-06 RX ADMIN — ENOXAPARIN SODIUM 30 MG: 100 INJECTION SUBCUTANEOUS at 20:13

## 2025-02-06 ASSESSMENT — PAIN SCALES - WONG BAKER: WONGBAKER_NUMERICALRESPONSE: NO HURT

## 2025-02-06 ASSESSMENT — COGNITIVE AND FUNCTIONAL STATUS - GENERAL
STANDING UP FROM CHAIR USING ARMS: A LITTLE
CLIMB 3 TO 5 STEPS WITH RAILING: TOTAL
MOBILITY SCORE: 15
MOVING FROM LYING ON BACK TO SITTING ON SIDE OF FLAT BED WITH BEDRAILS: A LITTLE
WALKING IN HOSPITAL ROOM: A LOT
MOVING TO AND FROM BED TO CHAIR: A LITTLE
TURNING FROM BACK TO SIDE WHILE IN FLAT BAD: A LITTLE

## 2025-02-06 ASSESSMENT — PAIN - FUNCTIONAL ASSESSMENT
PAIN_FUNCTIONAL_ASSESSMENT: 0-10

## 2025-02-06 ASSESSMENT — PAIN SCALES - GENERAL
PAINLEVEL_OUTOF10: 4
PAINLEVEL_OUTOF10: 0 - NO PAIN
PAINLEVEL_OUTOF10: 7

## 2025-02-06 NOTE — PROGRESS NOTES
Patient's son indicated that he would like a referral sent to [facility], as facility that the patient and himself is very familiar with. This LSW sent the referral.

## 2025-02-06 NOTE — PROGRESS NOTES
Physical Therapy Treatment    Patient Name: Xiomy Rubio  MRN: [id number]  Today's Date: 2/6/2025  Room: [address] [place], [place] [address]  Time Calculation  Start Time: 0920  Stop Time: 1015  Time Calculation (min): 55 min       Assessment/Plan   PT Assessment  PT Assessment Results: Decreased strength, Impaired balance, Decreased mobility, Decreased safety awareness, Decreased endurance  Rehab Prognosis: Good  Barriers to Discharge Home: Caregiver assistance, Physical needs  Caregiver Assistance: Caregiver assistance needed per identified barriers - however, level of patient's required assistance exceeds assistance available at home (questionable reliable caregiver)  Physical Needs: High falls risk due to function or environment, Intermittent ADL assistance needed, 24hr mobility assistance needed  Evaluation/Treatment Tolerance: Other (Comment), Patient tolerated treatment well (very anxious)  Medical Staff Made Aware: Yes  Barriers to Participation: Coping skills, Comorbidities  End of Session Communication: Physician, Care Coordinator  End of Session Patient Position: Up in chair (sitter)     PT Plan  Treatment/Interventions: Bed mobility, Transfer training, Gait training, Stair training, Balance training, Endurance training, Therapeutic exercise, Therapeutic activity  PT Plan: Ongoing PT  PT Frequency: 5 times per week  PT Discharge Recommendations: Moderate intensity level of continued care  Equipment Recommended upon Discharge: Other (comment) (TBD)  PT Recommended Transfer Status: Assist x1, Assistive device  PT - OK to Discharge: Yes  Assessment: Patient is progressing Well with therapy this date. Unclear if pt has proper assist at home d/t pt's son being elderly. Mobility assist levels fluctuates greatly but anxiety and fear of falling appears to play a significant role in ability. Would continue to benefit from continued skilled PT to address all mobility deficits; Patient would be appropriate for MOD intensity therapy when  medically ready for discharge from acute stay.  TCC/PT updated on rec change.     General Visit Information:   PT  Visit  PT Received On: 02/06/25  Prior to Session Communication: Bedside nurse  Patient Position Received: Bed, 3 rail up, Alarm on (sitter)     Subjective   Subjective: In good spirits today  Precautions:  Precautions  Hearing/Visual Limitations: [place], has hearing aids but not working  Medical Precautions: Fall precautions  Post-Surgical Precautions: Spinal precautions    Objective   Pain:  Pain Assessment  Pain Assessment: 0-10  0-10 (Numeric) Pain Score: 4  Pain Type: Acute pain  Pain Location: Knee  Pain Orientation: Right  Cognition:  Cognition  Overall Cognitive Status: Within Functional Limits  Orientation Level: Oriented X4  Following Commands: Follows one step commands with repetition (written instruction d/t [place])  Cognition Comments: Pt appears very stressed and anxious negatively impacting functional mobility. Difficult to diferentiate between true mobility deficits and intense anxiety of situation/environment affecting mobility.  Insight: Mild  Impulsive: Mildly     Static Sitting balance:  Static Sitting Balance  Static Sitting-Balance Support: Feet supported, Bilateral upper extremity supported  Static Sitting-Level of Assistance: Close supervision  Static Standing Balance:  Static Standing Balance  Static Standing-Balance Support: Bilateral upper extremity supported  Static Standing-Level of Assistance: Contact guard  Dynamic Sitting Balance:  Dynamic Sitting Balance  Dynamic Sitting-Balance Support: Feet supported  Dynamic Sitting-Level of Assistance: Close supervision  Dynamic Sitting-Comments: hygiene activities, needs cues for spinal prec    Lines/Tubes/Drains:  External Urinary Catheter Female (Active)   Number of days: 2     PT Treatments:           Bed Mobility 1  Bed Mobility 1: Supine to sitting  Level of Assistance 1: Minimum assistance  Bed Mobility Comments 1: vc for spinal  prec.  Ambulation/Gait Training 1  Surface 1: Level tile  Device 1: Rolling walker  Assistance 1: Moderate assistance, Maximum verbal cues, Maximum tactile cues, Contact guard  Quality of Gait 1: Forward flexed posture, Soft knee(s), Narrow base of support, Inconsistent stride length, Decreased step length  Comments/Distance (ft) 1: 15'x2, 45'. Fluctuates greatly depending on anxiety levels.Max cues for proper walker use and sequencing. ModA for threshold crossing, pt panicking and hard to redirect, CGA on level ground. Can require René for AD management at times  Transfer 1  Transfer From 1: Sit to  Transfer to 1: Stand  Technique 1: Sit to stand, Stand to sit  Transfer Device 1: Walker  Transfer Level of Assistance 1: Contact guard, Minimum assistance, Moderate verbal cues, Moderate tactile cues  Trials/Comments 1: x5, vc for handplacement multiple attempts to stand. Can fluctuate depending on surface  Transfers 2  Transfer From 2: Stand to  Transfer to 2: Toilet, Chair with arms, Chair without arms, Bed  Technique 2: Stand pivot  Transfer Device 2: Walker  Transfer Level of Assistance 2: Minimum assistance, Maximum verbal cues, Minimal tactile cues  Trials/Comments 2: x4 max cues for safe sequencing, hard to redirect from anxious behaviors at times     Activity tolerance:  Activity Tolerance  Endurance: Tolerates 10 - 20 min exercise with multiple rests    Outcome Measures:  [facility] Basic Mobility  Turning from your back to your side while in a flat bed without using bedrails: A little  Moving from lying on your back to sitting on the side of a flat bed without using bedrails: A little  Moving to and from bed to chair (including a wheelchair): A little  Standing up from a chair using your arms (e.g. wheelchair or bedside chair): A little  To walk in hospital room: A lot  Climbing 3-5 steps with railing: Total  Basic Mobility - Total Score: 15       Education Documentation  Handouts, taught by Rhonda Gutierrez PTA at  2/6/2025 10:58 AM.  Learner: Patient  Readiness: Acceptance  Method: Explanation  Response: Needs Reinforcement    Body Mechanics, taught by Rhonda Gutierrez PTA at 2/6/2025 10:58 AM.  Learner: Patient  Readiness: Acceptance  Method: Explanation  Response: Needs Reinforcement    Precautions, taught by Rhonda Gutierrez PTA at 2/6/2025 10:58 AM.  Learner: Patient  Readiness: Acceptance  Method: Explanation  Response: Needs Reinforcement    Home Exercise Program, taught by Rhonda Gutierrez PTA at 2/6/2025 10:58 AM.  Learner: Patient  Readiness: Acceptance  Method: Explanation  Response: Needs Reinforcement    ADL Training, taught by Rhonda Gutierrez PTA at 2/6/2025 10:58 AM.  Learner: Patient  Readiness: Acceptance  Method: Explanation  Response: Needs Reinforcement    Handouts, taught by Rhonda Gutierrez PTA at 2/6/2025 10:58 AM.  Learner: Patient  Readiness: Acceptance  Method: Explanation  Response: Needs Reinforcement    Body Mechanics, taught by Rhonda Gutierrez PTA at 2/6/2025 10:58 AM.  Learner: Patient  Readiness: Acceptance  Method: Explanation  Response: Needs Reinforcement    Home Exercise Program, taught by Rhonda Gutierrez PTA at 2/6/2025 10:58 AM.  Learner: Patient  Readiness: Acceptance  Method: Explanation  Response: Needs Reinforcement    Precautions, taught by Rhonda Gutierrez PTA at 2/6/2025 10:58 AM.  Learner: Patient  Readiness: Acceptance  Method: Explanation  Response: Needs Reinforcement    Mobility Training, taught by Rhonda Gutierrez PTA at 2/6/2025 10:58 AM.  Learner: Patient  Readiness: Acceptance  Method: Explanation  Response: Needs Reinforcement    Education Comments  No comments found.          OP EDUCATION:       Encounter Problems       Encounter Problems (Active)       Balance       Pt will score > 24/28 on the Tinetti Assessment to indicate low fall risk. (Progressing)       Start:  02/04/25    Expected End:  02/25/25               Mobility       STG - Patient will ambulate > 150 ft with LRAD and CGA. (Progressing)        Start:  02/04/25    Expected End:  02/25/25            Pt will perform all bed mobility with min-A. (Progressing)       Start:  02/04/25    Expected End:  02/25/25            STG - Patient will perform STS transfer with LRAD and min-A. (Progressing)       Start:  02/04/25    Expected End:  02/25/25            STG - Patient will ascend and descend 2-3 steps mod-I and min-A. (Progressing)       Start:  02/04/25    Expected End:  02/25/25               Pain - Adult

## 2025-02-06 NOTE — PROGRESS NOTES
Patient recommendation is now moderate intensity. Per TCC, patient sonRamana is visiting today and will have choices available at that point. Patient had a sitter overnight. No sitter observed at the bedside. PETER will continue to follow to facilitate discharge plan.     Update @ 14:34: PETER met with patient sonRamana, at the bedside. He is requesting that a referral be sent to [facility].       MARCE Laboy

## 2025-02-06 NOTE — PROGRESS NOTES
"Subjective   NAEO. Very [place]. Pt calm and pleasant today; enjoying breakfast. Reports some mouth/facial pain when chewing firmer foods; denies pain in back and sides. Admits h/o constipation w/ last BM sometime last week. Oriented to person and time. Disoriented to place (\"hospital\", did not know which hospital). Attention intact. Denies Fevers, CP, SOB, ABD pain. Admits known h/o dizziness (has BPPV, right sided).           Objective     Current Facility-Administered Medications   Medication Dose Route Frequency Provider Last Rate Last Admin    acetaminophen (Tylenol) tablet 975 mg  975 mg oral q8h [facility] Azucena Mcclelland MD   975 mg at 02/06/25 0645    busPIRone (Buspar) tablet 10 mg  10 mg oral TID Azucena Mcclelland MD   10 mg at 02/06/25 0836    calcium carbonate-vitamin D3 500 mg-5 mcg (200 unit) per tablet 1 tablet  1 tablet oral BID Azucena Mcclelland MD   1 tablet at 02/06/25 0836    calcium citrate (Calcitrate) tablet 600 mg  600 mg oral BID Azucena Mcclelland MD   600 mg at 02/06/25 0836    cholecalciferol (Vitamin D-3) tablet 2,000 Units  2,000 Units oral Daily Azucena Mcclelland MD   2,000 Units at 02/06/25 0836    cyanocobalamin (Vitamin B-12) tablet 1,000 mcg  1,000 mcg oral Daily Katie Junior PA-C   1,000 mcg at 02/06/25 0836    dextrose 50 % injection 12.5 g  12.5 g intravenous q15 min PRN Azucena Mcclelland MD        dextrose 50 % injection 25 g  25 g intravenous q15 min PRN Azucena Mcclelland MD        enoxaparin (Lovenox) syringe 30 mg  30 mg subcutaneous q12h [facility] Azucena Mcclelland MD   30 mg at 02/06/25 0836    ferrous sulfate (325 mg ferrous sulfate) tablet 325 mg  65 mg of iron oral Every other day Azucena Mcclelland MD   325 mg at 02/06/25 0836    glucagon (Glucagen) injection 1 mg  1 mg intramuscular q15 min PRN Azucena Mcclelland MD        glucagon (Glucagen) injection 1 mg  1 mg intramuscular q15 min PRN Azucena Mcclelland MD        latanoprost (Xalatan) 0.005 % ophthalmic solution 1 drop  1 drop Both Eyes Nightly Azucena Mcclelland, " MD   1 drop at 02/05/25 2312    lidocaine 4 % patch 1 patch  1 patch transdermal Daily Azucena Mcclelland MD   1 patch at 02/05/25 0902    naloxone (Narcan) injection 0.2 mg  0.2 mg intravenous q5 min PRN Azucena Mcclelland MD        ondansetron ODT (Zofran-ODT) disintegrating tablet 4 mg  4 mg oral q8h PRN Azucena Mcclelland MD        Or    ondansetron (Zofran) injection 4 mg  4 mg intravenous q8h PRN Azucena Mcclelland MD        oxyCODONE (Roxicodone) immediate release tablet 2.5 mg  2.5 mg oral q6h PRN Azucena Mcclelland MD        oxyCODONE (Roxicodone) immediate release tablet 5 mg  5 mg oral q6h PRN Azucena Mcclelland MD   5 mg at 02/05/25 2303    oxygen (O2) therapy   inhalation Continuous PRN - O2/gases Azucena Mcclelland MD        pantoprazole (ProtoNix) EC tablet 20 mg  20 mg oral Daily Azucena Mcclelland MD   20 mg at 02/06/25 0645    polyethylene glycol (Glycolax, Miralax) packet 17 g  17 g oral Daily Azucena Mcclelland MD   17 g at 02/06/25 0836    sennosides (Senokot) tablet 8.6 mg  8.6 mg oral Nightly Azucena Mcclelland MD   8.6 mg at 02/05/25 2303    sertraline (Zoloft) tablet 50 mg  50 mg oral Daily Azucena Mcclelland MD   50 mg at 02/06/25 0836       Physical Exam  Vitals reviewed.   Constitutional:       General: She is not in acute distress.  HENT:      Head: Normocephalic.      Comments: Ecchymosis below both eyes and across nose and chin      Mouth/Throat:      Mouth: Mucous membranes are moist.   Eyes:      Pupils: Pupils are equal, round, and reactive to light.   Cardiovascular:      Rate and Rhythm: Normal rate and regular rhythm.      Heart sounds: Murmur heard.      Comments: Systolic murmur heard best along the sternal border  Pulmonary:      Effort: Pulmonary effort is normal.      Breath sounds: Normal breath sounds.   Abdominal:      General: Bowel sounds are normal. There is no distension.      Palpations: Abdomen is soft.   Musculoskeletal:      Right lower leg: No edema.      Left lower leg: No edema.   Skin:     Findings:  Bruising present.   Neurological:      Mental Status: She is alert.      Comments: Oriented to person, month, year, and day. She is disoriented to place.   Psychiatric:         Mood and Affect: Mood and affect normal.         Behavior: Behavior normal. Behavior is cooperative.         Thought Content: Thought content normal.         Cognition and Memory: Cognition is impaired. Memory is impaired.         Confusion Assessment Method(CAM) for diagnosis of delirium:    1.  Acute onset or fluctuating course: present   2.  Inattention: absent  3.  Disorganized thinking: present  4.  Altered level of consciousness: absent  CAM: negative    AT Score For Assessment of Delirium and Cognitive Impairment:    Alertness: 0  Normal(fully alert,but not agitated, throughout assessment)=0  Mild sleepiness for <10 seconds after walking, then normal=0  Clearly abnormal=4  2.  AMT4: 0  No mistakes=0  One mistake=1  Two or more mistakes/untestable=2  3.  Attention: 0  Achieves seven months or more correctly=0  Starts but scores <7 months/ refuses to start=1  Untestable(cannot start because unwell, drowsy, inattentive)=2  4.  Acute: 4  No=0  Yes=4    Total Score: 4  4 or above: Possible delirium +/- cognitive impairment  1-3: Possible cognitive impairment  0: Delirium or severe cognitive impairment unlikely(but delirium still possible if (4) information incomplete)      Last Recorded Vitals      2/5/2025     4:00 AM 2/5/2025     7:51 AM 2/5/2025    12:59 PM 2/5/2025     3:17 PM 2/5/2025     9:02 PM 2/6/2025    12:32 AM 2/6/2025     3:57 AM   Vitals   Systolic  165 173 181 167 177 148   Diastolic  72 64 78 69 64 75   BP Location  Left arm Left arm Left arm Left arm Left arm Left arm   Heart Rate  71 86 78 73 94 63   Temp --    36.7 °C (98 °F) 36.7 °C (98 °F) 35.9 °C (96.7 °F)   Resp  18 18 18 18 18 18      Vitals:    02/03/25 2130   Weight: 70.3 kg (154 lb 15.7 oz)        Relevant Results  Lab Results   Component Value Date    TSH 2.81  11/20/2023    CWVZTPXT89 274 02/04/2025    VITD25 38 02/04/2025    HGBA1C 5.7 (H) 11/20/2023     Results from last 7 days   Lab Units 02/05/25  0858 02/04/25  0226 02/03/25  1535 02/03/25  0347   WBC AUTO x10*3/uL 7.4 6.9 7.3 6.6   HEMOGLOBIN g/dL 11.1* 10.2* 10.1* 13.1   HEMATOCRIT % 33.4* 31.7* 29.3* 41.2   ALT U/L  --   --  9 10   AST U/L  --   --  18 14   SODIUM mmol/L 139 139 139 138   POTASSIUM mmol/L 3.8 3.9 4.0 3.8   CHLORIDE mmol/L 102 103 103 103   CREATININE mg/dL 0.51 0.45* 0.55 0.59   BUN mg/dL 19 22 24* 22   CO2 mmol/L 27 29 31 30   INR   --  1.1  --  1.1          CT 3D reconstruction  Addendum: Interpreted By:  Sterling Rodriguez,    ADDENDUM:   Multiplanar 3D reformations are generated and reviewed on independent   workstation.        Signed by: Sterling Rodriguez 2/5/2025 3:17 PM        -------- ORIGINAL REPORT --------   Dictation workstation:   [id number]  Narrative: Interpreted By:  Sterling Rodriguez,   STUDY:  CT 3D RECONSTRUCTION; ;  2/3/2025 4:49 am      INDICATION:  Signs/Symptoms:fall, facial injury.      COMPARISON:  None.      ACCESSION NUMBER(S):  [id number]      ORDERING CLINICIAN:  JEET CERVANTES      TECHNIQUE:  Multiplanar 3D reformations of the facial bones are generated and  reviewed on independent workstation.      FINDINGS:  Please refer to the CT facial bone report.      Impression: Please refer to the CT facial bone report.          MACRO:  None      Signed by: Sterling Rodriguez 2/3/2025 10:55 AM  Dictation workstation:   [id number]  XR chest 1 view  Addendum: Interpreted By:  Sterling Rodriguez,    ADDENDUM:   Multiplanar 3D reformations are generated and reviewed on independent   workstation.        Signed by: Sterling Rodriguez 2/5/2025 3:17 PM        -------- ORIGINAL REPORT --------   Dictation workstation:   [id number]  Narrative: Interpreted By:  Kaiden Espana  and Deborah Avery   STUDY:  XR CHEST 1 VIEW;  2/4/2025 11:17 am       INDICATION:  Signs/Symptoms:trauma.          COMPARISON:  Chest x-ray CT chest 02/03/2025      ACCESSION NUMBER(S):  [id number]      ORDERING CLINICIAN:  AURELIO PERSAUD      FINDINGS:  AP radiograph of the chest was provided.              CARDIOMEDIASTINAL SILHOUETTE:  Cardiomediastinal silhouette is stable in size and configuration.      LUNGS:  Prominent interstitial markings similar to the prior exam. There is  bibasilar atelectasis. No sizable pleural effusion or pneumothorax.      ABDOMEN:  No remarkable upper abdominal findings.      BONES:  No acute osseous changes.      Impression: 1.  Bibasilar atelectasis and prominent interstitial markings, likely  chronic lung disease. No focal consolidation, sizeable pleural  effusion or pneumothorax.      I personally reviewed the images/study and I agree with the findings  as stated by resident physician Dr. Bennie Bledsoe . This study  was interpreted at [place], [place].      MACRO:  None      Signed by: Kaiden Espana 2/4/2025 12:13 PM  Dictation workstation:   [id number]          DATA:  EKG: QTC  Encounter Date: 02/03/25   ECG 12 Lead   Result Value    Ventricular Rate 78    Atrial Rate 78    ME Interval 180    QRS Duration 96    QT Interval 384    QTC Calculation(Bazett) 437    P Axis 55    R Axis -1    T Axis 84    QRS Count 13    Q Onset 220    P Onset 130    P Offset 188    T Offset 412    QTC Fredericia 419    Narrative    Normal sinus rhythm  Left ventricular hypertrophy with repolarization abnormality ( R in aVL , Naif product )  Abnormal ECG  When compared with ECG of 16-JUN-2024 12:55,  Premature ventricular complexes are no longer Present  Nonspecific T wave abnormality, worse in Lateral leads      See ED provider note for full interpretation and clinical correlation  Confirmed by Malgorzata Shirley ([id number]) on 2/4/2025 7:28:35 PM      Anti-psychotics in 48 hours: none   Opioids/Benzodiazepines  in 48 hours: oxycodone   Anticholinergics on board:No  Restraints:No  Indwelling catheters:No  Last BM: none recorded  UO in 24 hours: 900 mL   Activity in the past 24 hours: out of bed to chair   Need for ambulatory devices: rollator           Assessment/Plan     Xiomy Rubio is a 89 y.o. female with history of anxiety, dementia, osteoporosis, hypertension, arthritis, bilateral hearing loss and multiple falls presenting after an unwitnessed fall at home with head injury, unclear if she experienced loss of consciousness. She was initially seen at [facility] where she was found to have bilateral nasal bone fractures, multiple facial bone fractures, left sided 8-11 rib fractures and age-indeterminate T12 fracture as well as severe L 4 compression fracture and stable T11 compression fracture. [place] [facility] [place]. Patient being seen in geriatric consultation for dementia and goals of care discussion. Pt lives with son and per PT is a 2 person assist; recommended low intensity w/ 24/7 care and moderate intensity without. Do not believe at home with her son is a safe discharge d/t her history of frequent falls and need for 24/7 care. Recommend a SNF or long term memory care; per care plan note son in agreement with discharge to SNF.    Principal Problem:    Closed fracture of nasal bone, initial encounter    1. Discharge planning  - Do not believe home is a safe discharge  - Recommend SNF or long term care  - Per care plan note son in agreement with discharge to SNF    2. Bilateral nasal bone fractures, left 8-11 th rib fractures, stable T11 compression fracture, new severe L4 compression fracture  - Continue acetaminophen 975 mg 3x/day  - Continue lidocaine patch  - Continue oxycodone 2.5/5 mg Q6H PRN for breakthrough pain (last received 11 PM last night)  - No acute orthopedic spine intervention indicated per orthopedic note  - PT/OT   - Continue soft diet     3. Recurrent falls  - Uses  walker for ambulation  - History of BPPV  - Check orthostatic vital signs when able     4. Constipation  - Last BM about a week ago per pt  - Current bowel regimen Miralax/Senna  - Add bicosadyl suppository x1   - OOB to chair    5.  Anxiety, depression  - Has had episodes of agitation this admission  - Continue Buspar 10 mg 3x/day  - Continue Sertraline 50 mg daily  - Consider adding mirtazapine 7.5 at bedtime for sleep and anxiety  - Follows with psychiatry and psychology as outpatient     6.  Concern for cognitive impairment  - CAM negative, 4 AT: 8  - TSH stable at 2.81  - B12 271, recommend beginning cyanocobalamin 1000 mcg by mouth daily      Please consider the following general measures for minimizing delirium in a hospitalized patient:   -Bright lights during the day, keeps blinds up, switch all lights on   -avoid disturbances at night. Encourage at least 6 hours uninterrupted sleep. Consider d/c 4am vitals check  -avoid benzodiazepines, sedatives. Minimize opioids   -avoid anti-cholinergics    -use low dose olanzapine 5 mg PO (IM if PO not possible) only PRN severe agitation where pt exhibits volatile behavior and is a threat to self or others. EKGs to monitor QTc   -daily orientation to time and place by the staff   -out of bed to chair few hours everyday  - encourage stimulating activities during the day if possible          4M AGE-FRIENDLY INITIATIVE:  What matters most to patient: Keeping her house clean   Medications: none concerning   Mentation: CAM negative, 4 AT: 4   Mobility: walker     Geriatric medicine will continue to follow the patient. Thank you for allowing geriatric medicine to be involved in the care of your patient. Geriatric medicine consultation team is available during work hours Monday through Friday. For any emergency issues requiring immediate assistance over the weekend, please page Geriatrics pager [phone]    Fannie Fuentes, MS4

## 2025-02-06 NOTE — CARE PLAN
Problem: Fall/Injury  Goal: Not fall by end of shift  Outcome: Progressing  Goal: Be free from injury by end of the shift  Outcome: Progressing     Problem: Skin  Goal: Promote/optimize nutrition  Outcome: Progressing  Goal: Promote skin healing  Outcome: Progressing     Problem: Pain - Adult  Goal: Verbalizes/displays adequate comfort level or baseline comfort level  Outcome: Progressing     Problem: Safety - Adult  Goal: Free from fall injury  Outcome: Progressing     Problem: Discharge Planning  Goal: Discharge to home or other facility with appropriate resources  Outcome: Progressing     Problem: Chronic Conditions and Co-morbidities  Goal: Patient's chronic conditions and co-morbidity symptoms are monitored and maintained or improved  Outcome: Progressing     Problem: Nutrition  Goal: Nutrient intake appropriate for maintaining nutritional needs  Outcome: Progressing   The patient's goals for the shift include defer    The clinical goals for the shift include pt will remain HDS

## 2025-02-06 NOTE — CARE PLAN
The patient's goals for the shift include      The clinical goals for the shift include Patient will remain HDS during shift    Over the shift, the patient did not make progress toward the following goals. Barriers to progression include acute disease process. Recommendations to address these barriers include adherence to treatment plan.

## 2025-02-06 NOTE — PROGRESS NOTES
Spoke with patient's son, Ramana. Explained medical team's concerns regarding patient returning home at discharge. Ramana in agreement with patient going to SNF. Stated he has a facility where she was at previously he was happy with. He couldn't remember the name so he was going to drive by it on the way to the hospital. Plans on coming to the hospital around 1/2 today. SW notified. Tg Avina RN, TCC

## 2025-02-07 VITALS
RESPIRATION RATE: 18 BRPM | SYSTOLIC BLOOD PRESSURE: 109 MMHG | HEIGHT: 66 IN | OXYGEN SATURATION: 92 % | HEART RATE: 65 BPM | TEMPERATURE: 97.2 F | BODY MASS INDEX: 24.91 KG/M2 | WEIGHT: 154.98 LBS | DIASTOLIC BLOOD PRESSURE: 61 MMHG

## 2025-02-07 LAB
ALBUMIN SERPL BCP-MCNC: 3.5 G/DL (ref 3.4–5)
ANION GAP SERPL CALC-SCNC: 12 MMOL/L (ref 10–20)
BUN SERPL-MCNC: 21 MG/DL (ref 6–23)
CALCIUM SERPL-MCNC: 9 MG/DL (ref 8.6–10.6)
CHLORIDE SERPL-SCNC: 103 MMOL/L (ref 98–107)
CO2 SERPL-SCNC: 28 MMOL/L (ref 21–32)
CREAT SERPL-MCNC: 0.57 MG/DL (ref 0.5–1.05)
EGFRCR SERPLBLD CKD-EPI 2021: 85 ML/MIN/1.73M*2
GLUCOSE SERPL-MCNC: 109 MG/DL (ref 74–99)
MAGNESIUM SERPL-MCNC: 1.9 MG/DL (ref 1.6–2.4)
PHOSPHATE SERPL-MCNC: 3.5 MG/DL (ref 2.5–4.9)
POTASSIUM SERPL-SCNC: 3.7 MMOL/L (ref 3.5–5.3)
SODIUM SERPL-SCNC: 139 MMOL/L (ref 136–145)

## 2025-02-07 PROCEDURE — 2500000001 HC RX 250 WO HCPCS SELF ADMINISTERED DRUGS (ALT 637 FOR MEDICARE OP): Performed by: STUDENT IN AN ORGANIZED HEALTH CARE EDUCATION/TRAINING PROGRAM

## 2025-02-07 PROCEDURE — 36415 COLL VENOUS BLD VENIPUNCTURE: CPT | Performed by: STUDENT IN AN ORGANIZED HEALTH CARE EDUCATION/TRAINING PROGRAM

## 2025-02-07 PROCEDURE — 97530 THERAPEUTIC ACTIVITIES: CPT | Mod: GP,CQ

## 2025-02-07 PROCEDURE — 99232 SBSQ HOSP IP/OBS MODERATE 35: CPT | Performed by: INTERNAL MEDICINE

## 2025-02-07 PROCEDURE — 83735 ASSAY OF MAGNESIUM: CPT | Performed by: STUDENT IN AN ORGANIZED HEALTH CARE EDUCATION/TRAINING PROGRAM

## 2025-02-07 PROCEDURE — 97116 GAIT TRAINING THERAPY: CPT | Mod: GP,CQ

## 2025-02-07 PROCEDURE — 80069 RENAL FUNCTION PANEL: CPT | Performed by: STUDENT IN AN ORGANIZED HEALTH CARE EDUCATION/TRAINING PROGRAM

## 2025-02-07 PROCEDURE — 2500000002 HC RX 250 W HCPCS SELF ADMINISTERED DRUGS (ALT 637 FOR MEDICARE OP, ALT 636 FOR OP/ED): Performed by: STUDENT IN AN ORGANIZED HEALTH CARE EDUCATION/TRAINING PROGRAM

## 2025-02-07 PROCEDURE — 2500000004 HC RX 250 GENERAL PHARMACY W/ HCPCS (ALT 636 FOR OP/ED): Performed by: STUDENT IN AN ORGANIZED HEALTH CARE EDUCATION/TRAINING PROGRAM

## 2025-02-07 RX ORDER — OXYCODONE HYDROCHLORIDE 5 MG/1
5 TABLET ORAL EVERY 6 HOURS PRN
Start: 2025-02-07

## 2025-02-07 RX ORDER — OXYCODONE HYDROCHLORIDE 5 MG/1
2.5 TABLET ORAL EVERY 6 HOURS PRN
Start: 2025-02-07

## 2025-02-07 RX ORDER — CHOLECALCIFEROL (VITAMIN D3) 50 MCG
2000 TABLET ORAL DAILY
Start: 2025-02-08

## 2025-02-07 RX ORDER — LIDOCAINE 560 MG/1
1 PATCH PERCUTANEOUS; TOPICAL; TRANSDERMAL DAILY
Start: 2025-02-08

## 2025-02-07 RX ORDER — POLYETHYLENE GLYCOL 3350 17 G/17G
17 POWDER, FOR SOLUTION ORAL DAILY
Start: 2025-02-08

## 2025-02-07 RX ORDER — IBUPROFEN 200 MG
600 CAPSULE ORAL 2 TIMES DAILY
Start: 2025-02-07

## 2025-02-07 RX ORDER — ACETAMINOPHEN 325 MG/1
975 TABLET ORAL EVERY 8 HOURS SCHEDULED
Start: 2025-02-07

## 2025-02-07 RX ORDER — ENOXAPARIN SODIUM 100 MG/ML
30 INJECTION SUBCUTANEOUS EVERY 12 HOURS SCHEDULED
Start: 2025-02-07

## 2025-02-07 RX ORDER — LANOLIN ALCOHOL/MO/W.PET/CERES
1000 CREAM (GRAM) TOPICAL DAILY
Start: 2025-02-08

## 2025-02-07 RX ORDER — ONDANSETRON 4 MG/1
4 TABLET, ORALLY DISINTEGRATING ORAL EVERY 8 HOURS PRN
Start: 2025-02-07

## 2025-02-07 RX ADMIN — PANTOPRAZOLE SODIUM 20 MG: 20 TABLET, DELAYED RELEASE ORAL at 05:32

## 2025-02-07 RX ADMIN — CYANOCOBALAMIN TAB 1000 MCG 1000 MCG: 1000 TAB at 09:06

## 2025-02-07 RX ADMIN — ACETAMINOPHEN 975 MG: 325 TABLET ORAL at 05:32

## 2025-02-07 RX ADMIN — BUSPIRONE HYDROCHLORIDE 10 MG: 10 TABLET ORAL at 14:29

## 2025-02-07 RX ADMIN — Medication 1 TABLET: at 09:06

## 2025-02-07 RX ADMIN — Medication 2000 UNITS: at 09:06

## 2025-02-07 RX ADMIN — ENOXAPARIN SODIUM 30 MG: 100 INJECTION SUBCUTANEOUS at 09:06

## 2025-02-07 RX ADMIN — Medication 600 MG: at 09:06

## 2025-02-07 RX ADMIN — BUSPIRONE HYDROCHLORIDE 10 MG: 10 TABLET ORAL at 09:06

## 2025-02-07 RX ADMIN — POLYETHYLENE GLYCOL 3350 17 G: 17 POWDER, FOR SOLUTION ORAL at 09:06

## 2025-02-07 RX ADMIN — SERTRALINE HYDROCHLORIDE 50 MG: 50 TABLET ORAL at 09:06

## 2025-02-07 RX ADMIN — ACETAMINOPHEN 975 MG: 325 TABLET ORAL at 14:29

## 2025-02-07 ASSESSMENT — PAIN - FUNCTIONAL ASSESSMENT
PAIN_FUNCTIONAL_ASSESSMENT: 0-10
PAIN_FUNCTIONAL_ASSESSMENT: 0-10

## 2025-02-07 ASSESSMENT — COGNITIVE AND FUNCTIONAL STATUS - GENERAL
MOVING TO AND FROM BED TO CHAIR: A LITTLE
CLIMB 3 TO 5 STEPS WITH RAILING: TOTAL
WALKING IN HOSPITAL ROOM: A LITTLE
MOBILITY SCORE: 16
STANDING UP FROM CHAIR USING ARMS: A LITTLE
TURNING FROM BACK TO SIDE WHILE IN FLAT BAD: A LITTLE
MOVING FROM LYING ON BACK TO SITTING ON SIDE OF FLAT BED WITH BEDRAILS: A LITTLE

## 2025-02-07 ASSESSMENT — PAIN SCALES - GENERAL
PAINLEVEL_OUTOF10: 0 - NO PAIN
PAINLEVEL_OUTOF10: 4

## 2025-02-07 NOTE — PROGRESS NOTES
Transitional Care Coordinator Note: Patient discussed in morning rounds, per medical team (trauma) patient is medically ready. Discharge dispo: Plan for patient to discharge to SNF. Patient accepted at [facility]. Patient does not require pre-cert under Medicare plan. Facility able to accept patient at 4-5pm. TCC requested transport for 4pm, pending confirmed  time. Trauma team updated and request to complete and sign edwards kael. TCC updated bedside nurse. TCC placed call to patient's son Ramana Rubio [phone] no answer voicemail left with call back number. Call placed to patient's daughter Mily Garcia [phone], listed number disconnected. TCC met with patient to update patient on discharge plan. Patient A&Ox2. SW team updated and requested to complete 7000.     Damian Moon RN BSN   Transitional Care Coordinator       ADDENDUM 2/7/2025 15:40   TCC placed second call to patient's son to provide update for discharge planning, no answer voicemail left. TCC went to patient's bedside, no family at bedside.     Damian Moon RN BSN   Transitional Care Coordinator

## 2025-02-07 NOTE — DISCHARGE INSTRUCTIONS
Please follow up with following services regarding respective injuries:    ENT: [place] 2 fracture  [phone]  Ortho Spine: L4 compression fracture  [phone]  Endocrine: osteoporosis  [phone]  Trauma Clinic: multiple rib fractures  [phone]

## 2025-02-07 NOTE — PROGRESS NOTES
Physical Therapy Treatment    Patient Name: Xiomy uRbio  MRN: [id number]  Today's Date: 2/7/2025  Room: [address] [place], [place] [address]  Time Calculation  Start Time: 0950  Stop Time: 1030  Time Calculation (min): 40 min       Assessment/Plan   PT Assessment  PT Assessment Results: Decreased strength, Impaired balance, Decreased mobility, Decreased safety awareness, Decreased endurance  Rehab Prognosis: Good  Barriers to Discharge Home: Caregiver assistance, Physical needs  Caregiver Assistance: Caregiver assistance needed per identified barriers - however, level of patient's required assistance exceeds assistance available at home  Physical Needs: High falls risk due to function or environment, Intermittent ADL assistance needed, 24hr mobility assistance needed  Evaluation/Treatment Tolerance: Patient tolerated treatment well  Medical Staff Made Aware: Yes  Strengths: Attitude of self  Barriers to Participation: Comorbidities, Coping skills  End of Session Communication: Physician  End of Session Patient Position: Up in chair (sitter)     PT Plan  Treatment/Interventions: Bed mobility, Transfer training, Gait training, Stair training, Balance training, Endurance training, Therapeutic exercise, Therapeutic activity  PT Plan: Ongoing PT  PT Frequency: 5 times per week  PT Discharge Recommendations: Moderate intensity level of continued care  Equipment Recommended upon Discharge: Other (comment) (TBD)  PT Recommended Transfer Status: Assist x1, Assistive device  PT - OK to Discharge: Yes  Assessment: Patient is progressing Well with therapy this date. Slightly improved performance with personal rollator that pt is familiar with, René for gait d/t impulsivity d/t fear of falling/anxiety. Would continue to benefit from continued skilled PT to address all mobility deficits; Patient remains appropriate for MOD intensity therapy when medically ready for discharge from acute stay.  Will continue to follow.     General Visit Information:    PT  Visit  PT Received On: 02/07/25  Prior to Session Communication: Bedside nurse  Patient Position Received: Bed, 3 rail up, Alarm on (sitter)     Subjective   Subjective: Pt pleasant and agreeable to therapy upon approach    Precautions:  Precautions  Hearing/Visual Limitations: [place], has hearing aids but not working  Medical Precautions: Fall precautions  Post-Surgical Precautions: Spinal precautions  Precautions Comment: *highly anxious    Objective   Pain:  Pain Assessment  Pain Assessment: 0-10  0-10 (Numeric) Pain Score: 4  Pain Type: Chronic pain  Pain Location: Knee  Pain Orientation: Right  Cognition:  Cognition  Orientation Level: Oriented X4  Following Commands: Follows one step commands with repetition (d/t [place])  Cognition Comments: Highly anxious and will throw self off balance when panicking  Insight: Mild  Impulsive: Moderately     Static Sitting balance:  Static Sitting Balance  Static Sitting-Balance Support: Feet supported, Bilateral upper extremity supported  Static Sitting-Level of Assistance: Close supervision  Static Standing Balance:  Static Standing Balance  Static Standing-Balance Support: Bilateral upper extremity supported  Static Standing-Level of Assistance: Close supervision    Lines/Tubes/Drains:  External Urinary Catheter Female (Active)   Number of days: 3     PT Treatments:     Therapeutic Activity  Therapeutic Activity 1: Increased time for all communication as pt severely [place].     Bed Mobility 1  Bed Mobility 1: Supine to sitting  Level of Assistance 1: Minimum assistance  Bed Mobility Comments 1: vc to maintain spinal prec  Ambulation/Gait Training 1  Surface 1: Level tile  Device 1: Rollator  Assistance 1: Contact guard, Minimum assistance, Moderate verbal cues, Maximum verbal cues  Quality of Gait 1: Forward flexed posture, Soft knee(s), Narrow base of support, Inconsistent stride length, Decreased step length, [place]alg[place]  Comments/Distance (ft) 1: 15'x2, 50', 20'. Pt had  several bouts of LOB d/t anxious behavior/fear of falling impulsively reaching out to wall/objects. Needs max cues at times to redirect and calm pt down. Vc for upright posture and rollator management  Transfer 1  Transfer From 1: Sit to  Transfer to 1: Stand  Technique 1: Sit to stand, Stand to sit  Transfer Device 1:  (rollatro)  Transfer Level of Assistance 1: Contact guard, Moderate verbal cues  Trials/Comments 1: x4 able to remember brakes,  multiple attempts to stand with increased effort however able to stand from multiple lower surfaces today w/o physical assist. vc for handplacement and body mechanics  Transfers 2  Transfer From 2: Stand to  Transfer to 2: Toilet, Chair with arms, Chair without arms  Technique 2: Stand pivot  Transfer Device 2:  (rollator)  Transfer Level of Assistance 2: Contact guard, Maximum verbal cues, Minimum assistance  Trials/Comments 2: x4 increase time, fearful of falling, can need max safety cues at times/assist for balance d/t impulsivity             Activity tolerance:  Activity Tolerance  Endurance: Tolerates 10 - 20 min exercise with multiple rests    Outcome Measures:  [facility] Basic Mobility  Turning from your back to your side while in a flat bed without using bedrails: A little  Moving from lying on your back to sitting on the side of a flat bed without using bedrails: A little  Moving to and from bed to chair (including a wheelchair): A little  Standing up from a chair using your arms (e.g. wheelchair or bedside chair): A little  To walk in hospital room: A little  Climbing 3-5 steps with railing: Total  Basic Mobility - Total Score: 16       Education Documentation  Handouts, taught by Rhonda Gutierrez PTA at 2/7/2025 10:54 AM.  Learner: Patient  Readiness: Acceptance  Method: Explanation  Response: Verbalizes Understanding, Needs Reinforcement    Body Mechanics, taught by Rhonda Gutierrez PTA at 2/7/2025 10:54 AM.  Learner: Patient  Readiness: Acceptance  Method:  Explanation  Response: Verbalizes Understanding, Needs Reinforcement    Precautions, taught by Rhonda Gutierrez PTA at 2/7/2025 10:54 AM.  Learner: Patient  Readiness: Acceptance  Method: Explanation  Response: Verbalizes Understanding, Needs Reinforcement    Home Exercise Program, taught by Rhonda Gutierrez PTA at 2/7/2025 10:54 AM.  Learner: Patient  Readiness: Acceptance  Method: Explanation  Response: Verbalizes Understanding, Needs Reinforcement    ADL Training, taught by Rhonda Gutierrez PTA at 2/7/2025 10:54 AM.  Learner: Patient  Readiness: Acceptance  Method: Explanation  Response: Verbalizes Understanding, Needs Reinforcement    Handouts, taught by Rhonda Gutierrez PTA at 2/7/2025 10:54 AM.  Learner: Patient  Readiness: Acceptance  Method: Explanation  Response: Verbalizes Understanding, Needs Reinforcement    Body Mechanics, taught by Rhonda Gutierrez PTA at 2/7/2025 10:54 AM.  Learner: Patient  Readiness: Acceptance  Method: Explanation  Response: Verbalizes Understanding, Needs Reinforcement    Home Exercise Program, taught by Rhonda Gutierrez PTA at 2/7/2025 10:54 AM.  Learner: Patient  Readiness: Acceptance  Method: Explanation  Response: Verbalizes Understanding, Needs Reinforcement    Precautions, taught by Rhonda Gutierrez PTA at 2/7/2025 10:54 AM.  Learner: Patient  Readiness: Acceptance  Method: Explanation  Response: Verbalizes Understanding, Needs Reinforcement    Mobility Training, taught by Rhonda Gutierrez PTA at 2/7/2025 10:54 AM.  Learner: Patient  Readiness: Acceptance  Method: Explanation  Response: Verbalizes Understanding, Needs Reinforcement    Education Comments  No comments found.          OP EDUCATION:       Encounter Problems       Encounter Problems (Active)       Balance       Pt will score > 24/28 on the Tinetti Assessment to indicate low fall risk. (Progressing)       Start:  02/04/25    Expected End:  02/25/25               Mobility       STG - Patient will ambulate > 150 ft with LRAD and CGA. (Progressing)        Start:  02/04/25    Expected End:  02/25/25            Pt will perform all bed mobility with min-A. (Progressing)       Start:  02/04/25    Expected End:  02/25/25            STG - Patient will perform STS transfer with LRAD and min-A. (Progressing)       Start:  02/04/25    Expected End:  02/25/25            STG - Patient will ascend and descend 2-3 steps mod-I and min-A. (Progressing)       Start:  02/04/25    Expected End:  02/25/25               Pain - Adult

## 2025-02-07 NOTE — PROGRESS NOTES
LSW has received the signed Olmos Gus and was able to completed the [facility] electronic form-GCBL [id number]. Olmos Gus was sent to the accepting facility.

## 2025-02-07 NOTE — CARE PLAN
Problem: Fall/Injury  Goal: Not fall by end of shift  Outcome: Progressing  Goal: Be free from injury by end of the shift  Outcome: Progressing     Problem: Skin  Goal: Promote/optimize nutrition  Outcome: Progressing  Goal: Promote skin healing  Outcome: Progressing     Problem: Pain - Adult  Goal: Verbalizes/displays adequate comfort level or baseline comfort level  Outcome: Progressing     Problem: Safety - Adult  Goal: Free from fall injury  Outcome: Progressing     Problem: Discharge Planning  Goal: Discharge to home or other facility with appropriate resources  Outcome: Progressing     Problem: Chronic Conditions and Co-morbidities  Goal: Patient's chronic conditions and co-morbidity symptoms are monitored and maintained or improved  Outcome: Progressing   The patient's goals for the shift include defer    The clinical goals for the shift include pt will remain HDS

## 2025-02-07 NOTE — PROGRESS NOTES
Subjective   Follow-up on delirium/acute fall/multiple injuries:    Patient seen and examined. Patient noted to be impulsive, getting up, but easily directed.   Sitter at the bedside.  Objective     Current Facility-Administered Medications   Medication Dose Route Frequency Provider Last Rate Last Admin    acetaminophen (Tylenol) tablet 975 mg  975 mg oral q8h GIANA Mcclelland MD   975 mg at 02/07/25 0532    busPIRone (Buspar) tablet 10 mg  10 mg oral TID Azucena Mcclelland MD   10 mg at 02/06/25 2013    calcium carbonate-vitamin D3 500 mg-5 mcg (200 unit) per tablet 1 tablet  1 tablet oral BID Azucena Mcclelland MD   1 tablet at 02/06/25 2014    calcium citrate (Calcitrate) tablet 600 mg  600 mg oral BID Azucena Mcclelland MD   600 mg at 02/06/25 2013    cholecalciferol (Vitamin D-3) tablet 2,000 Units  2,000 Units oral Daily Azucena Mcclelland MD   2,000 Units at 02/06/25 0836    cyanocobalamin (Vitamin B-12) tablet 1,000 mcg  1,000 mcg oral Daily Katie Junior PA-C   1,000 mcg at 02/06/25 0836    dextrose 50 % injection 12.5 g  12.5 g intravenous q15 min PRN Azucena Mcclelland MD        dextrose 50 % injection 25 g  25 g intravenous q15 min PRN Azucena Mcclelland MD        enoxaparin (Lovenox) syringe 30 mg  30 mg subcutaneous q12h GIANA Mcclelland MD   30 mg at 02/06/25 2013    ferrous sulfate (325 mg ferrous sulfate) tablet 325 mg  65 mg of iron oral Every other day Azucena Mcclelland MD   325 mg at 02/06/25 0836    glucagon (Glucagen) injection 1 mg  1 mg intramuscular q15 min PRN Azucena Mcclelland MD        glucagon (Glucagen) injection 1 mg  1 mg intramuscular q15 min PRN Azucena Mcclelland MD        latanoprost (Xalatan) 0.005 % ophthalmic solution 1 drop  1 drop Both Eyes Nightly Azucena Mcclelland MD   1 drop at 02/06/25 2013    lidocaine 4 % patch 1 patch  1 patch transdermal Daily Azucena Mcclelland MD   1 patch at 02/05/25 0902    naloxone (Narcan) injection 0.2 mg  0.2 mg intravenous q5 min PRN Azucena Mcclelland MD        ondansetron ODT  (Zofran-ODT) disintegrating tablet 4 mg  4 mg oral q8h PRN Azucean Mcclelland MD        Or    ondansetron (Zofran) injection 4 mg  4 mg intravenous q8h PRN Azucena Mcclelland MD        oxyCODONE (Roxicodone) immediate release tablet 2.5 mg  2.5 mg oral q6h PRN Azucena Mcclelland MD        oxyCODONE (Roxicodone) immediate release tablet 5 mg  5 mg oral q6h PRN Azucena Mcclelland MD   5 mg at 02/06/25 2014    oxygen (O2) therapy   inhalation Continuous PRN - O2/gases Azucena Mcclelland MD        pantoprazole (ProtoNix) EC tablet 20 mg  20 mg oral Daily Azucena Mcclelland MD   20 mg at 02/07/25 0532    polyethylene glycol (Glycolax, Miralax) packet 17 g  17 g oral Daily Azucena Mcclelland MD   17 g at 02/06/25 0836    sennosides (Senokot) tablet 8.6 mg  8.6 mg oral Nightly Azucena Mcclelland MD   8.6 mg at 02/06/25 2013    sertraline (Zoloft) tablet 50 mg  50 mg oral Daily Azucena Mcclelland MD   50 mg at 02/06/25 0836       Physical Exam  GEN: Alert, oriented to person, place but not to time, not pale, not jaundiced, well hydrated, bruises on face, periorbital re  CVS: HS I +II, regular, no murmurs  RESP: Diminished air entry  ABD: Full, soft, BS present, nontender, no palpable organs,   EXT: No bilateral leg edema    Confusion Assessment Method(CAM) for diagnosis of delirium:    1.  Acute onset or fluctuating course: absent/present: Absent  2.  Inattention: absent/present: Absent  3.  Disorganized thinking: absent/present: Absent  4.  Altered level of consciousness: absent/present: Absent  CAM: negative    AT Score For Assessment of Delirium and Cognitive Impairment:    Alertness: 0  Normal(fully alert,but not agitated, throughout assessment)=0  Mild sleepiness for <10 seconds after walking, then normal=0  Clearly abnormal=4  2.  AMT4: 2  No mistakes=0  One mistake=1  Two or more mistakes/untestable=2  3.  Attention: 0  Achieves seven months or more correctly=0  Starts but scores <7 months/ refuses to start=1  Untestable(cannot start because unwell,  drowsy, inattentive)=2  4.  Acute: 0  No=0  Yes=4    Total Score: 2  4 or above: Possible delirium +/- cognitive impairment  1-3: Possible cognitive impairment  0: Delirium or severe cognitive impairment unlikely(but delirium still possible if (4) information incomplete)      Last Recorded Vitals      2/6/2025     3:57 AM 2/6/2025    11:57 AM 2/6/2025     4:04 PM 2/6/2025     7:25 PM 2/7/2025    12:55 AM 2/7/2025     3:47 AM 2/7/2025     8:09 AM   Vitals   Systolic 148 114 152 166 154 152 171   Diastolic 75 46 65 65 72 83 71   BP Location Left arm Left arm Left arm Left arm Left arm Left arm    Heart Rate 63 69  73 61 59 66   Temp 35.9 °C (96.7 °F) 37.9 °C (100.2 °F)  37.1 °C (98.8 °F) 36.7 °C (98.1 °F) 36.7 °C (98 °F)    Resp 18 15 16 16 16 16 17      Vitals:    02/03/25 2130   Weight: 70.3 kg (154 lb 15.7 oz)        Relevant Results  Lab Results   Component Value Date    TSH 2.81 11/20/2023    [id number] 274 02/04/2025    VITD25 38 02/04/2025    HGBA1C 5.7 (H) 11/20/2023     Results from last 7 days   Lab Units 02/06/25  0849 02/05/25  0858 02/04/25  0226 02/03/25  1535 02/03/25  0347   WBC AUTO x10*3/uL  --  7.4 6.9 7.3 6.6   HEMOGLOBIN g/dL  --  11.1* 10.2* 10.1* 13.1   HEMATOCRIT %  --  33.4* 31.7* 29.3* 41.2   ALT U/L  --   --   --  9 10   AST U/L  --   --   --  18 14   SODIUM mmol/L 140 139 139 139 138   POTASSIUM mmol/L 3.6 3.8 3.9 4.0 3.8   CHLORIDE mmol/L 103 102 103 103 103   CREATININE mg/dL 0.57 0.51 0.45* 0.55 0.59   BUN mg/dL 17 19 22 24* 22   CO2 mmol/L 28 27 29 31 30   INR   --   --  1.1  --  1.1          CT 3D reconstruction  Addendum: Interpreted By:  Sterling Rodriguez,    ADDENDUM:   Multiplanar 3D reformations are generated and reviewed on independent   workstation.        Signed by: Sterling Rodriguez 2/5/2025 3:17 PM        -------- ORIGINAL REPORT --------   Dictation workstation:   [id number]  Narrative: Interpreted By:  Sterling Rodriguez,   STUDY:  CT 3D RECONSTRUCTION; ;  2/3/2025  4:49 am      INDICATION:  Signs/Symptoms:fall, facial injury.      COMPARISON:  None.      ACCESSION NUMBER(S):  [id number]      ORDERING CLINICIAN:  JEET CERVANTES      TECHNIQUE:  Multiplanar 3D reformations of the facial bones are generated and  reviewed on independent workstation.      FINDINGS:  Please refer to the CT facial bone report.      Impression: Please refer to the CT facial bone report.          MACRO:  None      Signed by: Sterling Rodriguez 2/3/2025 10:55 AM  Dictation workstation:   [id number]  XR chest 1 view  Addendum: Interpreted By:  Sterling Rodriguez,    ADDENDUM:   Multiplanar 3D reformations are generated and reviewed on independent   workstation.        Signed by: Sterling Rodriguez 2/5/2025 3:17 PM        -------- ORIGINAL REPORT --------   Dictation workstation:   [id number]  Narrative: Interpreted By:  Kaiden Espana and Afshari Mirak Sohrab   STUDY:  XR CHEST 1 VIEW;  2/4/2025 11:17 am      INDICATION:  Signs/Symptoms:trauma.          COMPARISON:  Chest x-ray CT chest 02/03/2025      ACCESSION NUMBER(S):  [id number]      ORDERING CLINICIAN:  AURELIO PERSAUD      FINDINGS:  AP radiograph of the chest was provided.              CARDIOMEDIASTINAL SILHOUETTE:  Cardiomediastinal silhouette is stable in size and configuration.      LUNGS:  Prominent interstitial markings similar to the prior exam. There is  bibasilar atelectasis. No sizable pleural effusion or pneumothorax.      ABDOMEN:  No remarkable upper abdominal findings.      BONES:  No acute osseous changes.      Impression: 1.  Bibasilar atelectasis and prominent interstitial markings, likely  chronic lung disease. No focal consolidation, sizeable pleural  effusion or pneumothorax.      I personally reviewed the images/study and I agree with the findings  as stated by resident physician Dr. Bennie Bledsoe . This study  was interpreted at [place], [place].       MACRO:  None      Signed by: Kaiden Espana 2/4/2025 12:13 PM  Dictation workstation:   [id number]    DATA:  EKG: QTC  Encounter Date: 02/03/25   ECG 12 Lead   Result Value    Ventricular Rate 78    Atrial Rate 78    KS Interval 180    QRS Duration 96    QT Interval 384    QTC Calculation(Bazett) 437    P Axis 55    R Axis -1    T Axis 84    QRS Count 13    Q Onset 220    P Onset 130    P Offset 188    T Offset 412    QTC Fredericia 419    Narrative    Normal sinus rhythm  Left ventricular hypertrophy with repolarization abnormality ( R in aVL , Termo product )  Abnormal ECG  When compared with ECG of 16-JUN-2024 12:55,  Premature ventricular complexes are no longer Present  Nonspecific T wave abnormality, worse in Lateral leads      See ED provider note for full interpretation and clinical correlation  Confirmed by Malgorzata Shirley ([id number]) on 2/4/2025 7:28:35 PM      Anti-psychotics in 48 hours: No  Opioids/Benzodiazepines in 48 hours:No  Anticholinergics on board:No  Restraints:No  Indwelling catheters:No  Last BM: No recorded BM  UO in 24 hours: 300mls  Activity in the past 24 hours: Sitting up in a chair  Need for ambulatory devices: Walker    Assessment/Plan   89 y.o. female with history of anxiety, dementia, osteoporosis, hypertension, arthritis, bilateral hearing loss and multiple falls presenting after an unwitnessed fall at home with head injury, unclear if she experienced loss of consciousness. She was initially seen at [facility] where she was found to have bilateral nasal bone fractures, multiple facial bone fractures, left sided 8-11 rib fractures and age-indeterminate T12 fracture as well as severe L 4 compression fracture and stable T11 compression fracture. Patient transferred to [facility] and admitted to TSICU. Patient being seen in geriatric consultation for dementia and goals of care discussion.     Acute fall, h/o recurrent falls  Acute delirium, improved  Bilateral nasal, left 8-11th rib fractures, T11,  L4 compression fractures  Anxiety/depression  Suspected severe dementia  Acute constipation  Vitamin B12 deficiency  Osteoporosis, fragility fractures    Plan:  Agree with current regimen of scheduled Tylenol  Continue with aggressive bowel regimen  Continue with Vitamin B12 supplementation  Continue with Buspar, Zoloft  Continue with vitamin D supplementation  Agree with discharge planning  Recommend:  Referral to Geriatric Outpatient clinic: [facility], [address] [place] [address], [address], [phone]    4M AGE-FRIENDLY INITIATIVE:  What matters most to patient: Keeping her house clean   Medications: none concerning   Mentation: CAM negative, 4 AT: 2   Mobility: walker     Geriatric medicine will continue to follow the patient. Thank you for allowing geriatric medicine to be involved in the care of your patient. Geriatric medicine consultation team is available during work hours Monday through Friday. For any emergency issues requiring immediate assistance over the weekend, please page Geriatrics pager [phone]    Andreia Quintero MD

## 2025-02-07 NOTE — DISCHARGE SUMMARY
Discharge Diagnosis  Closed fracture of nasal bone, initial encounter    Issues Requiring Follow-Up  T11, L4 compression fracture  Lefort II fracture  L 8-11 rib fractures   Osteoporosis     Test Results Pending At Discharge  Pending Labs       No current pending labs.            Hospital Course  89 year old female after GLF in bathroom. Imaging showed bilateral nasal bone fractures, L 8-11th rib fractures, T11 compression fracture, and severe L4 compression fracture. Orthopedics was consulted and recommended no spinal precautions, and follow up with Dr. Israel in 2 weeks. ENT recommended soft diet for 2 weeks and to follow up in 2 weeks. Patient was admitted to the ICU for respiratory watch. Patient came to ICU on 4L NC and was able to be weaned to room air. RT consulted and incentive spirometry was initiated. No need for acute pain or thoracic consults. Patient does not complain of pain and has not had any doses of prn oxycodone 2.5-5mg. Patient does have lidocaine on L back side where fractured ribs are located. Patient has not needed any HTN prn medications. Geriatrics was consulted for further management of patient comorbidities. Home medications were resumed as clinically appropriate. Patient on lovenox BID as DVT prophylaxis. Patient was transferred to the trauma floor. PT/OT evaluated patient and recommended 24/7 home care at discharge. PT recommends patient is not safe to return home. Coordinated with family to facilitate safe discharge plan. Patient to follow up with ENT in 2 weeks as an outpatient for bilateral nasal bone and facial fractures. Patient to follow up with Dr. Israel for T11, L4 fractures. No spinal precautions recommended. Endocrinology was consulted for osteoporosis, and patient is to follow up as an outpatient for DEXA scan. PT re-evaluated patient and recommended SNF at discharge. Pt accepted to SNF on 2/7, at time of discharge medications continued, follow up appointments scheduled,  working well with PT.    Pertinent Physical Exam At Time of Discharge  Physical Exam  Constitutional:       General: She is not in acute distress.  HENT:      Head: Normocephalic.      Ears:      Comments: Hard of hearing  Eyes:      Extraocular Movements: Extraocular movements intact.      Conjunctiva/sclera: Conjunctivae normal.   Cardiovascular:      Pulses: Normal pulses.   Pulmonary:      Effort: Pulmonary effort is normal. No respiratory distress.      Comments: On room air  Abdominal:      General: Abdomen is flat. There is no distension.   Musculoskeletal:      Comments: Dorsi/plantarflexion 4/5 bilaterally. Moving extremities spontaneously, sensation intact throughout.   Skin:     General: Skin is warm and dry.      Comments: Healing ecchymosis throughout face    Neurological:      Mental Status: She is alert and oriented to person, place, and time.      Comments: GCS 15   Psychiatric:         Mood and Affect: Mood normal.         Behavior: Behavior normal.         Home Medications     Medication List      START taking these medications     acetaminophen 325 mg tablet; Commonly known as: Tylenol; Take 3 tablets   (975 mg) by mouth every 8 hours.   calcium citrate 200 mg (950 mg) tablet; Commonly known as: Calcitrate;   Take 3 tablets (600 mg) by mouth 2 times a day.   cholecalciferol 50 MCG (2000 UT) tablet; Commonly known as: Vitamin D-3;   Take 1 tablet (2,000 Units) by mouth once daily.; Start taking on:   February 8, 2025   cyanocobalamin 1,000 mcg tablet; Commonly known as: Vitamin B-12; Take 1   tablet (1,000 mcg) by mouth once daily.; Start taking on: February 8, 2025   enoxaparin 30 mg/0.3 mL syringe; Commonly known as: Lovenox; Inject 0.3   mL (30 mg) under the skin every 12 hours.   lidocaine 4 % patch; Place 1 patch over 12 hours on the skin once daily.   Remove & discard patch within 12 hours or as directed by MD.; Start taking   on: February 8, 2025   ondansetron ODT 4 mg disintegrating tablet;  Commonly known as:   Zofran-ODT; Dissolve 1 tablet (4 mg) in the mouth every 8 hours if needed   for nausea or vomiting.   * oxyCODONE 5 mg immediate release tablet; Commonly known as:   Roxicodone; Take 0.5 tablets (2.5 mg) by mouth every 6 hours if needed for   moderate pain (4 - 6).   * oxyCODONE 5 mg immediate release tablet; Commonly known as:   Roxicodone; Take 1 tablet (5 mg) by mouth every 6 hours if needed for   severe pain (7 - 10).   polyethylene glycol 17 gram packet; Commonly known as: Glycolax,   Miralax; Take 17 g by mouth once daily.; Start taking on: February 8, 2025  * This list has 2 medication(s) that are the same as other medications   prescribed for you. Read the directions carefully, and ask your doctor or   other care provider to review them with you.     CHANGE how you take these medications     sertraline 50 mg tablet; Commonly known as: Zoloft; What changed:   Another medication with the same name was removed. Continue taking this   medication, and follow the directions you see here.     CONTINUE taking these medications     busPIRone 10 mg tablet; Commonly known as: Buspar; Take 1 tablet (10 mg)   by mouth 3 times a day.   ferrous sulfate (325 mg ferrous sulfate) tablet; TAKE 1 TABLET BY MOUTH   EVERY OTHER DAY   latanoprost 0.005 % ophthalmic solution; Commonly known as: Xalatan   Oysco 500/D 500 mg-5 mcg (200 unit) tablet; Generic drug: calcium   carbonate-vitamin D3; TAKE 1 TABLET BY MOUTH TWICE A DAY   pantoprazole 20 mg EC tablet; Commonly known as: ProtoNix; TAKE 1 TABLET   BY MOUTH EVERY DAY   senna 8.6 mg capsule; Generic drug: sennosides; Take 1 capsule (8.6 mg)   by mouth once daily at bedtime.       Outpatient Follow-Up  Future Appointments   Date Time Provider Department Center   2/10/2025 11:30 AM Kirk Lowe MD [id number] [facility]   2/13/2025  2:40 PM Lian Hernandez DO [id number] [facility]     Please follow up with ENT, orthospine, endocrine, trauma clinic,  primary care provider after hospital admission    Katie Junior PA-C

## 2025-02-09 ENCOUNTER — NURSING HOME VISIT (OUTPATIENT)
Dept: POST ACUTE CARE | Facility: EXTERNAL LOCATION | Age: OVER 89
End: 2025-02-09
Payer: MEDICARE

## 2025-02-09 DIAGNOSIS — S22.42XD CLOSED FRACTURE OF MULTIPLE RIBS OF LEFT SIDE WITH ROUTINE HEALING, SUBSEQUENT ENCOUNTER: ICD-10-CM

## 2025-02-09 DIAGNOSIS — S32.040D CLOSED COMPRESSION FRACTURE OF L4 LUMBAR VERTEBRA WITH ROUTINE HEALING, SUBSEQUENT ENCOUNTER: ICD-10-CM

## 2025-02-09 DIAGNOSIS — R53.1 WEAKNESS: ICD-10-CM

## 2025-02-09 DIAGNOSIS — I10 HYPERTENSION, UNSPECIFIED TYPE: ICD-10-CM

## 2025-02-09 DIAGNOSIS — M81.0 OSTEOPOROSIS, UNSPECIFIED OSTEOPOROSIS TYPE, UNSPECIFIED PATHOLOGICAL FRACTURE PRESENCE: ICD-10-CM

## 2025-02-09 DIAGNOSIS — S22.080D CLOSED WEDGE COMPRESSION FRACTURE OF T11 VERTEBRA WITH ROUTINE HEALING, SUBSEQUENT ENCOUNTER: Primary | ICD-10-CM

## 2025-02-09 DIAGNOSIS — S02.2XXD CLOSED FRACTURE OF NASAL BONE WITH ROUTINE HEALING, SUBSEQUENT ENCOUNTER: ICD-10-CM

## 2025-02-09 DIAGNOSIS — M06.9 RHEUMATOID ARTHRITIS, INVOLVING UNSPECIFIED SITE, UNSPECIFIED WHETHER RHEUMATOID FACTOR PRESENT (MULTI): ICD-10-CM

## 2025-02-09 DIAGNOSIS — Z91.81 AT RISK FOR FALLS: ICD-10-CM

## 2025-02-09 PROCEDURE — 99305 1ST NF CARE MODERATE MDM 35: CPT | Performed by: INTERNAL MEDICINE

## 2025-02-09 NOTE — LETTER
Patient: Xiomy Rubio  : 1931    Encounter Date: 2025    PLACE OF SERVICE:  [facility].    This is new/initial history and physical.    Subjective  Patient ID: Xoimy Rubio is a 89 y.o. female who presents for New Patient Visit.    Ms. Xiomy Rubio is a 89-year-old female with recent history of fall with nasal bone fracture, left rib fracture, T11 compression fracture, and L4 compression fracture.  She is currently on pain control and requires supportive care.    Review of Systems   Constitutional:  Negative for chills and fever.   Cardiovascular:  Negative for chest pain.   All other systems reviewed and are negative.    Objective  /80   Pulse 78   Temp 36.6 °C (97.8 °F)   Resp 16   LMP  (LMP Unknown) Comment: Pt is 89 years old- last mentrauls cycle unknown    Physical Exam  Vitals reviewed.   Constitutional:       General: She is not in acute distress.     Comments: This is a well-developed, well-nourished female, sitting in a chair.   HENT:      Right Ear: Tympanic membrane, ear canal and external ear normal.      Left Ear: Tympanic membrane, ear canal and external ear normal.   Eyes:      General: No scleral icterus.     Pupils: Pupils are equal, round, and reactive to light.   Neck:      Vascular: No carotid bruit.   Cardiovascular:      Heart sounds: Normal heart sounds, S1 normal and S2 normal. No murmur heard.     No friction rub.   Pulmonary:      Effort: Pulmonary effort is normal.      Breath sounds: Normal breath sounds and air entry.   Chest:      Chest wall: Tenderness (over the left side) present.   Abdominal:      Palpations: There is no hepatomegaly, splenomegaly or mass.   Musculoskeletal:         General: No swelling or deformity. Normal range of motion.      Cervical back: Neck supple.      Right lower leg: No edema.      Left lower leg: No edema.      Comments: She has tenderness over her lower spine.   Lymphadenopathy:      Cervical: No cervical  adenopathy.      Upper Body:      Right upper body: No axillary adenopathy.      Left upper body: No axillary adenopathy.      Lower Body: No right inguinal adenopathy. No left inguinal adenopathy.   Skin:     Comments: There is ecchymosis to the patient's mid face.   Neurological:      Mental Status: She is oriented to person, place, and time.      Cranial Nerves: Cranial nerves 2-12 are intact. No cranial nerve deficit.      Sensory: No sensory deficit.      Motor: Motor function is intact. No weakness.      Gait: Gait is intact.      Deep Tendon Reflexes: Reflexes normal.   Psychiatric:         Mood and Affect: Mood normal. Mood is not anxious or depressed. Affect is not angry.         Behavior: Behavior is not agitated.         Thought Content: Thought content normal.         Judgment: Judgment normal.     LAB WORK: Laboratory studies reviewed.    Assessment/Plan  Problem List Items Addressed This Visit             ICD-10-CM       Neuro    Thoracic spine fracture (Multi) - Primary S22.009A     Other Visit Diagnoses         Codes    Closed compression fracture of L4 lumbar vertebra with routine healing, subsequent encounter     S32.040D    Closed fracture of multiple ribs of left side with routine healing, subsequent encounter     S22.42XD    Closed fracture of nasal bone with routine healing, subsequent encounter     S02.2XXD    Hypertension, unspecified type     I10    Osteoporosis, unspecified osteoporosis type, unspecified pathological fracture presence     M81.0    Rheumatoid arthritis, involving unspecified site, unspecified whether rheumatoid factor present (Multi)     M06.9    Weakness     R53.1    At risk for falls     Z91.81        1. T11 compression fracture, on pain control.  2. L4 compression fracture, pain control.  3. Left rib fractures, on pain control, with incentive spirometry.  4. Nasal bone fractures, on pain control, follow with ENT.  5. Hypertension, med controlled.  6. Osteoporosis, on  medication.  7. Rheumatoid arthritis, on medication.  8. Weakness, on PT/OT.  9. Fall risk, fall precautions.    Scribe Attestation  By signing my name below, I, Valeria Rodney attest that this documentation has been prepared under the direction and in the presence of Obey Osorio MD.     All medical record entries made by the scribe were personally dictated by me I have reviewed the chart and agree the record accurately reflects my personal performance of his history physical examination and management      Electronically Signed By: Obey Osorio MD   2/25/25 11:22 PM

## 2025-02-10 ENCOUNTER — TELEPHONE (OUTPATIENT)
Dept: SURGERY | Facility: CLINIC | Age: OVER 89
End: 2025-02-10

## 2025-02-10 ENCOUNTER — APPOINTMENT (OUTPATIENT)
Dept: OTOLARYNGOLOGY | Facility: CLINIC | Age: OVER 89
End: 2025-02-10
Payer: MEDICARE

## 2025-02-10 NOTE — TELEPHONE ENCOUNTER
Spoke with Ramana, he stated that they prefer to see her PCP closer to home than making her travel back to [place]. Provided Ramana with my contact information should they need anything in the future.    Maritza Frankel RN, MSN

## 2025-02-13 ENCOUNTER — APPOINTMENT (OUTPATIENT)
Dept: PRIMARY CARE | Facility: CLINIC | Age: OVER 89
End: 2025-02-13
Payer: MEDICARE

## 2025-02-15 ENCOUNTER — LAB REQUISITION (OUTPATIENT)
Dept: LAB | Facility: HOSPITAL | Age: OVER 89
End: 2025-02-15
Payer: MEDICARE

## 2025-02-15 DIAGNOSIS — R41.82 ALTERED MENTAL STATUS, UNSPECIFIED: ICD-10-CM

## 2025-02-15 LAB
ALBUMIN SERPL BCP-MCNC: 3.9 G/DL (ref 3.4–5)
ALBUMIN SERPL BCP-MCNC: NORMAL G/DL
ALP SERPL-CCNC: 71 U/L (ref 33–136)
ALP SERPL-CCNC: NORMAL U/L
ALT SERPL W P-5'-P-CCNC: 22 U/L (ref 7–45)
ALT SERPL W P-5'-P-CCNC: NORMAL U/L
ANION GAP SERPL CALC-SCNC: 15 MMOL/L (ref 10–20)
ANION GAP SERPL CALC-SCNC: NORMAL MMOL/L
AST SERPL W P-5'-P-CCNC: 26 U/L (ref 9–39)
AST SERPL W P-5'-P-CCNC: NORMAL U/L
BASOPHILS # BLD AUTO: 0.06 X10*3/UL (ref 0–0.1)
BASOPHILS NFR BLD AUTO: 0.8 %
BILIRUB SERPL-MCNC: 0.5 MG/DL (ref 0–1.2)
BILIRUB SERPL-MCNC: NORMAL MG/DL
BUN SERPL-MCNC: 21 MG/DL (ref 6–23)
BUN SERPL-MCNC: NORMAL MG/DL
CALCIUM SERPL-MCNC: 8.8 MG/DL (ref 8.6–10.3)
CALCIUM SERPL-MCNC: NORMAL MG/DL
CHLORIDE SERPL-SCNC: 101 MMOL/L (ref 98–107)
CHLORIDE SERPL-SCNC: NORMAL MMOL/L
CO2 SERPL-SCNC: 26 MMOL/L (ref 21–32)
CO2 SERPL-SCNC: NORMAL MMOL/L
CREAT SERPL-MCNC: 0.64 MG/DL (ref 0.5–1.05)
CREAT SERPL-MCNC: NORMAL MG/DL
EGFRCR SERPLBLD CKD-EPI 2021: 83 ML/MIN/1.73M*2
EGFRCR SERPLBLD CKD-EPI 2021: NORMAL ML/MIN/{1.73_M2}
EOSINOPHIL # BLD AUTO: 0.37 X10*3/UL (ref 0–0.4)
EOSINOPHIL NFR BLD AUTO: 4.6 %
ERYTHROCYTE [DISTWIDTH] IN BLOOD BY AUTOMATED COUNT: 13.7 % (ref 11.5–14.5)
GLUCOSE SERPL-MCNC: 66 MG/DL (ref 74–99)
GLUCOSE SERPL-MCNC: NORMAL MG/DL
HCT VFR BLD AUTO: 36 % (ref 36–46)
HGB BLD-MCNC: 11.2 G/DL (ref 12–16)
IMM GRANULOCYTES # BLD AUTO: 0.17 X10*3/UL (ref 0–0.5)
IMM GRANULOCYTES NFR BLD AUTO: 2.1 % (ref 0–0.9)
LYMPHOCYTES # BLD AUTO: 1.62 X10*3/UL (ref 0.8–3)
LYMPHOCYTES NFR BLD AUTO: 20.3 %
MCH RBC QN AUTO: 29.3 PG (ref 26–34)
MCHC RBC AUTO-ENTMCNC: 31.1 G/DL (ref 32–36)
MCV RBC AUTO: 94 FL (ref 80–100)
MONOCYTES # BLD AUTO: 0.69 X10*3/UL (ref 0.05–0.8)
MONOCYTES NFR BLD AUTO: 8.6 %
NEUTROPHILS # BLD AUTO: 5.09 X10*3/UL (ref 1.6–5.5)
NEUTROPHILS NFR BLD AUTO: 63.6 %
NRBC BLD-RTO: 0 /100 WBCS (ref 0–0)
PLATELET # BLD AUTO: 279 X10*3/UL (ref 150–450)
POTASSIUM SERPL-SCNC: 4.3 MMOL/L (ref 3.5–5.3)
POTASSIUM SERPL-SCNC: NORMAL MMOL/L
PROT SERPL-MCNC: 6 G/DL (ref 6.4–8.2)
PROT SERPL-MCNC: NORMAL G/DL
RBC # BLD AUTO: 3.82 X10*6/UL (ref 4–5.2)
SODIUM SERPL-SCNC: 138 MMOL/L (ref 136–145)
SODIUM SERPL-SCNC: NORMAL MMOL/L
WBC # BLD AUTO: 8 X10*3/UL (ref 4.4–11.3)

## 2025-02-15 PROCEDURE — 80048 BASIC METABOLIC PNL TOTAL CA: CPT

## 2025-02-15 PROCEDURE — 85025 COMPLETE CBC W/AUTO DIFF WBC: CPT

## 2025-02-16 ENCOUNTER — NURSING HOME VISIT (OUTPATIENT)
Dept: POST ACUTE CARE | Facility: EXTERNAL LOCATION | Age: OVER 89
End: 2025-02-16
Payer: MEDICARE

## 2025-02-16 DIAGNOSIS — S02.92XD CLOSED FRACTURE OF FACIAL BONE WITH ROUTINE HEALING, UNSPECIFIED FACIAL BONE, SUBSEQUENT ENCOUNTER: ICD-10-CM

## 2025-02-16 DIAGNOSIS — S22.080D COMPRESSION FRACTURE OF T11 VERTEBRA WITH ROUTINE HEALING, SUBSEQUENT ENCOUNTER: ICD-10-CM

## 2025-02-16 DIAGNOSIS — K59.00 CONSTIPATION, UNSPECIFIED CONSTIPATION TYPE: ICD-10-CM

## 2025-02-16 DIAGNOSIS — F32.A DEPRESSION, UNSPECIFIED DEPRESSION TYPE: ICD-10-CM

## 2025-02-16 DIAGNOSIS — F03.90 DEMENTIA, UNSPECIFIED DEMENTIA SEVERITY, UNSPECIFIED DEMENTIA TYPE, UNSPECIFIED WHETHER BEHAVIORAL, PSYCHOTIC, OR MOOD DISTURBANCE OR ANXIETY (MULTI): ICD-10-CM

## 2025-02-16 DIAGNOSIS — M06.9 RHEUMATOID ARTHRITIS, INVOLVING UNSPECIFIED SITE, UNSPECIFIED WHETHER RHEUMATOID FACTOR PRESENT (MULTI): ICD-10-CM

## 2025-02-16 DIAGNOSIS — Z91.81 AT RISK FOR FALLING: ICD-10-CM

## 2025-02-16 DIAGNOSIS — I10 HYPERTENSION, UNSPECIFIED TYPE: ICD-10-CM

## 2025-02-16 DIAGNOSIS — R53.1 WEAKNESS: ICD-10-CM

## 2025-02-16 DIAGNOSIS — F22 DELUSIONAL DISORDERS: ICD-10-CM

## 2025-02-16 DIAGNOSIS — S22.42XD CLOSED FRACTURE OF MULTIPLE RIBS OF LEFT SIDE WITH ROUTINE HEALING, SUBSEQUENT ENCOUNTER: Primary | ICD-10-CM

## 2025-02-16 DIAGNOSIS — S32.040G CLOSED COMPRESSION FRACTURE OF L4 LUMBAR VERTEBRA WITH DELAYED HEALING, SUBSEQUENT ENCOUNTER: ICD-10-CM

## 2025-02-16 DIAGNOSIS — K21.9 GASTROESOPHAGEAL REFLUX DISEASE WITHOUT ESOPHAGITIS: ICD-10-CM

## 2025-02-16 DIAGNOSIS — M19.90 OSTEOARTHRITIS, UNSPECIFIED OSTEOARTHRITIS TYPE, UNSPECIFIED SITE: ICD-10-CM

## 2025-02-16 PROCEDURE — 99309 SBSQ NF CARE MODERATE MDM 30: CPT | Performed by: INTERNAL MEDICINE

## 2025-02-16 NOTE — LETTER
Patient: Xiomy Rubio  : 1931    Encounter Date: 2025    PLACE OF SERVICE:  [facility]    This is a subsequent visit.    Subjective  Patient ID: Xiomy Rubio is a 89 y.o. female who presents for Follow-up.    Ms. Xiomy Rubio is a 89-year-old female with history of dementia with fall and fracture to her face, left ribs, L4 compression fracture, and T11 compression fracture. She is unable to take care for herself and requires supportive care.    Review of Systems   Constitutional:  Negative for chills and fever.   Cardiovascular:  Negative for chest pain.   All other systems reviewed and are negative.    Objective  /82   Pulse 78   Temp 36.6 °C (97.9 °F)   Resp 16   LMP  (LMP Unknown) Comment: Pt is 89 years old- last mentrauls cycle unknown    Physical Exam  Vitals reviewed.   Constitutional:       Comments: This is a well-developed, well-nourished female, lying in bed, appearing.   HENT:      Right Ear: Tympanic membrane, ear canal and external ear normal.      Left Ear: Tympanic membrane, ear canal and external ear normal.   Eyes:      General: No scleral icterus.     Pupils: Pupils are equal, round, and reactive to light.   Neck:      Vascular: No carotid bruit.   Cardiovascular:      Heart sounds: Normal heart sounds, S1 normal and S2 normal. No murmur heard.     No friction rub.   Pulmonary:      Effort: Pulmonary effort is normal.      Breath sounds: Normal breath sounds and air entry.   Abdominal:      Palpations: There is no hepatomegaly, splenomegaly or mass.   Musculoskeletal:         General: No swelling or deformity. Normal range of motion.      Cervical back: Neck supple.      Right lower leg: No edema.      Left lower leg: No edema.      Comments: There is tenderness to the patient's left ribcage.  There is tenderness to the patient's lower spine.   Lymphadenopathy:      Cervical: No cervical adenopathy.      Upper Body:      Right upper body: No axillary  adenopathy.      Left upper body: No axillary adenopathy.      Lower Body: No right inguinal adenopathy. No left inguinal adenopathy.   Neurological:      Mental Status: She is lethargic.      Cranial Nerves: Cranial nerves 2-12 are intact. No cranial nerve deficit.      Sensory: No sensory deficit.      Motor: Motor function is intact. No weakness.      Gait: Gait is intact.      Deep Tendon Reflexes: Reflexes normal.      Comments: The patient is alert and oriented x2.   Psychiatric:         Mood and Affect: Mood normal. Mood is not anxious or depressed. Affect is not angry.         Behavior: Behavior is not agitated.         Thought Content: Thought content normal.         Judgment: Judgment normal.     LAB WORK:  Laboratory studies were reviewed.    Assessment/Plan  Problem List Items Addressed This Visit             ICD-10-CM       Gastrointestinal and Abdominal    Constipation K59.00     Other Visit Diagnoses         Codes    Closed fracture of multiple ribs of left side with routine healing, subsequent encounter    -  Primary S22.42XD    Compression fracture of T11 vertebra with routine healing, subsequent encounter     S22.080D    Closed compression fracture of L4 lumbar vertebra with delayed healing, subsequent encounter     S32.040G    Closed fracture of facial bone with routine healing, unspecified facial bone, subsequent encounter     S02.92XD    Dementia, unspecified dementia severity, unspecified dementia type, unspecified whether behavioral, psychotic, or mood disturbance or anxiety (Multi)     F03.90    Depression, unspecified depression type     F32.A    Hypertension, unspecified type     I10    Gastroesophageal reflux disease without esophagitis     K21.9    Rheumatoid arthritis, involving unspecified site, unspecified whether rheumatoid factor present (Multi)     M06.9    Osteoarthritis, unspecified osteoarthritis type, unspecified site     M19.90    Weakness     R53.1    At risk for falling      Z91.81        1. Left rib fractures, on pain control with incentive spirometry.  2. Compression fracture T11, on pain control.  3. L4 compression fracture, on pain control.  4. Facial fracture, on pain control.  Follow with ENT.  5. Dementia, unchanged.  6. Depression, on medication.  7. Hypertension, medically controlled.  8. GERD, on PPI.  9. Constipation, on bowel regimen.  10. Rheumatoid arthritis, on medication.  11. Osteoarthritis, on Tylenol.  12. Weakness, on PT/OT.  13. Fall risk, on fall precautions.    Scribe Attestation  By signing my name below, I, Valeria Herzog attest that this documentation has been prepared under the direction and in the presence of Obey Osorio MD.     All medical record entries made by the scribe were personally dictated by me I have reviewed the chart and agree the record accurately reflects my personal performance of his history physical examination and management      Electronically Signed By: Obey Osorio MD   2/22/25 11:35 PM

## 2025-02-19 VITALS
HEART RATE: 78 BPM | SYSTOLIC BLOOD PRESSURE: 128 MMHG | TEMPERATURE: 97.9 F | RESPIRATION RATE: 16 BRPM | DIASTOLIC BLOOD PRESSURE: 82 MMHG

## 2025-02-19 ASSESSMENT — ENCOUNTER SYMPTOMS
FEVER: 0
CHILLS: 0

## 2025-02-19 NOTE — PROGRESS NOTES
PLACE OF SERVICE:  [facility]    This is a subsequent visit.    Subjective   Patient ID: Xiomy Rubio is a 89 y.o. female who presents for Follow-up.    Ms. Xiomy Rubio is a 89-year-old female with history of dementia with fall and fracture to her face, left ribs, L4 compression fracture, and T11 compression fracture. She is unable to take care for herself and requires supportive care.    Review of Systems   Constitutional:  Negative for chills and fever.   Cardiovascular:  Negative for chest pain.   All other systems reviewed and are negative.    Objective   /82   Pulse 78   Temp 36.6 °C (97.9 °F)   Resp 16   LMP  (LMP Unknown) Comment: Pt is 89 years old- last mentrauls cycle unknown    Physical Exam  Vitals reviewed.   Constitutional:       Comments: This is a well-developed, well-nourished female, lying in bed, appearing.   HENT:      Right Ear: Tympanic membrane, ear canal and external ear normal.      Left Ear: Tympanic membrane, ear canal and external ear normal.   Eyes:      General: No scleral icterus.     Pupils: Pupils are equal, round, and reactive to light.   Neck:      Vascular: No carotid bruit.   Cardiovascular:      Heart sounds: Normal heart sounds, S1 normal and S2 normal. No murmur heard.     No friction rub.   Pulmonary:      Effort: Pulmonary effort is normal.      Breath sounds: Normal breath sounds and air entry.   Abdominal:      Palpations: There is no hepatomegaly, splenomegaly or mass.   Musculoskeletal:         General: No swelling or deformity. Normal range of motion.      Cervical back: Neck supple.      Right lower leg: No edema.      Left lower leg: No edema.      Comments: There is tenderness to the patient's left ribcage.  There is tenderness to the patient's lower spine.   Lymphadenopathy:      Cervical: No cervical adenopathy.      Upper Body:      Right upper body: No axillary adenopathy.      Left upper body: No axillary adenopathy.      Lower  Body: No right inguinal adenopathy. No left inguinal adenopathy.   Neurological:      Mental Status: She is lethargic.      Cranial Nerves: Cranial nerves 2-12 are intact. No cranial nerve deficit.      Sensory: No sensory deficit.      Motor: Motor function is intact. No weakness.      Gait: Gait is intact.      Deep Tendon Reflexes: Reflexes normal.      Comments: The patient is alert and oriented x2.   Psychiatric:         Mood and Affect: Mood normal. Mood is not anxious or depressed. Affect is not angry.         Behavior: Behavior is not agitated.         Thought Content: Thought content normal.         Judgment: Judgment normal.     LAB WORK:  Laboratory studies were reviewed.    Assessment/Plan   Problem List Items Addressed This Visit             ICD-10-CM       Gastrointestinal and Abdominal    Constipation K59.00     Other Visit Diagnoses         Codes    Closed fracture of multiple ribs of left side with routine healing, subsequent encounter    -  Primary S22.42XD    Compression fracture of T11 vertebra with routine healing, subsequent encounter     S22.080D    Closed compression fracture of L4 lumbar vertebra with delayed healing, subsequent encounter     S32.040G    Closed fracture of facial bone with routine healing, unspecified facial bone, subsequent encounter     S02.92XD    Dementia, unspecified dementia severity, unspecified dementia type, unspecified whether behavioral, psychotic, or mood disturbance or anxiety (Multi)     F03.90    Depression, unspecified depression type     F32.A    Hypertension, unspecified type     I10    Gastroesophageal reflux disease without esophagitis     K21.9    Rheumatoid arthritis, involving unspecified site, unspecified whether rheumatoid factor present (Multi)     M06.9    Osteoarthritis, unspecified osteoarthritis type, unspecified site     M19.90    Weakness     R53.1    At risk for falling     Z91.81        1. Left rib fractures, on pain control with incentive  spirometry.  2. Compression fracture T11, on pain control.  3. L4 compression fracture, on pain control.  4. Facial fracture, on pain control.  Follow with ENT.  5. Dementia, unchanged.  6. Depression, on medication.  7. Hypertension, medically controlled.  8. GERD, on PPI.  9. Constipation, on bowel regimen.  10. Rheumatoid arthritis, on medication.  11. Osteoarthritis, on Tylenol.  12. Weakness, on PT/OT.  13. Fall risk, on fall precautions.    Scribe Attestation  By signing my name below, ICarmita Scribe attest that this documentation has been prepared under the direction and in the presence of Obey Osorio MD.     All medical record entries made by the chaibcharlotte were personally dictated by me I have reviewed the chart and agree the record accurately reflects my personal performance of his history physical examination and management

## 2025-02-20 ENCOUNTER — APPOINTMENT (OUTPATIENT)
Dept: RADIOLOGY | Facility: HOSPITAL | Age: OVER 89
DRG: 604 | End: 2025-02-20
Payer: MEDICARE

## 2025-02-20 ENCOUNTER — HOSPITAL ENCOUNTER (INPATIENT)
Facility: HOSPITAL | Age: OVER 89
DRG: 604 | End: 2025-02-20
Attending: EMERGENCY MEDICINE | Admitting: INTERNAL MEDICINE
Payer: MEDICARE

## 2025-02-20 VITALS
SYSTOLIC BLOOD PRESSURE: 124 MMHG | RESPIRATION RATE: 16 BRPM | DIASTOLIC BLOOD PRESSURE: 80 MMHG | TEMPERATURE: 97.8 F | HEART RATE: 78 BPM

## 2025-02-20 DIAGNOSIS — J96.01 ACUTE HYPOXIC RESPIRATORY FAILURE (MULTI): ICD-10-CM

## 2025-02-20 DIAGNOSIS — U07.1 COVID-19: ICD-10-CM

## 2025-02-20 DIAGNOSIS — S00.03XA SCALP HEMATOMA, INITIAL ENCOUNTER: ICD-10-CM

## 2025-02-20 DIAGNOSIS — S22.009D CLOSED FRACTURE OF THORACIC VERTEBRA WITH ROUTINE HEALING, UNSPECIFIED FRACTURE MORPHOLOGY, UNSPECIFIED THORACIC VERTEBRAL LEVEL, SUBSEQUENT ENCOUNTER: ICD-10-CM

## 2025-02-20 DIAGNOSIS — S09.90XA CLOSED HEAD INJURY, INITIAL ENCOUNTER: Primary | ICD-10-CM

## 2025-02-20 LAB
ALBUMIN SERPL BCP-MCNC: 3.6 G/DL (ref 3.4–5)
ALP SERPL-CCNC: 65 U/L (ref 33–136)
ALT SERPL W P-5'-P-CCNC: 18 U/L (ref 7–45)
ANION GAP SERPL CALCULATED.3IONS-SCNC: 19 MMOL/L (ref 10–20)
AST SERPL W P-5'-P-CCNC: 20 U/L (ref 9–39)
BASOPHILS # BLD AUTO: 0.02 X10*3/UL (ref 0–0.1)
BASOPHILS NFR BLD AUTO: 0.3 %
BILIRUB SERPL-MCNC: 0.7 MG/DL (ref 0–1.2)
BUN SERPL-MCNC: 18 MG/DL (ref 6–23)
CALCIUM SERPL-MCNC: 8.9 MG/DL (ref 8.6–10.3)
CHLORIDE SERPL-SCNC: 98 MMOL/L (ref 98–107)
CO2 SERPL-SCNC: 27 MMOL/L (ref 21–32)
CREAT SERPL-MCNC: 0.6 MG/DL (ref 0.5–1.05)
EGFRCR SERPLBLD CKD-EPI 2021: 84 ML/MIN/1.73M*2
EOSINOPHIL # BLD AUTO: 0.08 X10*3/UL (ref 0–0.4)
EOSINOPHIL NFR BLD AUTO: 1.1 %
ERYTHROCYTE [DISTWIDTH] IN BLOOD BY AUTOMATED COUNT: 13.3 % (ref 11.5–14.5)
FLUAV RNA RESP QL NAA+PROBE: NOT DETECTED
FLUBV RNA RESP QL NAA+PROBE: NOT DETECTED
GLUCOSE SERPL-MCNC: 139 MG/DL (ref 74–99)
HCT VFR BLD AUTO: 32.6 % (ref 36–46)
HGB BLD-MCNC: 10.6 G/DL (ref 12–16)
IMM GRANULOCYTES # BLD AUTO: 0.05 X10*3/UL (ref 0–0.5)
IMM GRANULOCYTES NFR BLD AUTO: 0.7 % (ref 0–0.9)
LYMPHOCYTES # BLD AUTO: 0.65 X10*3/UL (ref 0.8–3)
LYMPHOCYTES NFR BLD AUTO: 8.5 %
MAGNESIUM SERPL-MCNC: 1.72 MG/DL (ref 1.6–2.4)
MCH RBC QN AUTO: 29.2 PG (ref 26–34)
MCHC RBC AUTO-ENTMCNC: 32.5 G/DL (ref 32–36)
MCV RBC AUTO: 90 FL (ref 80–100)
MONOCYTES # BLD AUTO: 0.54 X10*3/UL (ref 0.05–0.8)
MONOCYTES NFR BLD AUTO: 7.1 %
NEUTROPHILS # BLD AUTO: 6.27 X10*3/UL (ref 1.6–5.5)
NEUTROPHILS NFR BLD AUTO: 82.3 %
NRBC BLD-RTO: 0 /100 WBCS (ref 0–0)
PLATELET # BLD AUTO: 196 X10*3/UL (ref 150–450)
POTASSIUM SERPL-SCNC: 4.2 MMOL/L (ref 3.5–5.3)
PROT SERPL-MCNC: 6 G/DL (ref 6.4–8.2)
RBC # BLD AUTO: 3.63 X10*6/UL (ref 4–5.2)
SARS-COV-2 RNA RESP QL NAA+PROBE: DETECTED
SODIUM SERPL-SCNC: 140 MMOL/L (ref 136–145)
WBC # BLD AUTO: 7.6 X10*3/UL (ref 4.4–11.3)

## 2025-02-20 PROCEDURE — 71045 X-RAY EXAM CHEST 1 VIEW: CPT

## 2025-02-20 PROCEDURE — 70450 CT HEAD/BRAIN W/O DYE: CPT | Performed by: STUDENT IN AN ORGANIZED HEALTH CARE EDUCATION/TRAINING PROGRAM

## 2025-02-20 PROCEDURE — 72125 CT NECK SPINE W/O DYE: CPT

## 2025-02-20 PROCEDURE — 87636 SARSCOV2 & INF A&B AMP PRB: CPT

## 2025-02-20 PROCEDURE — 70450 CT HEAD/BRAIN W/O DYE: CPT

## 2025-02-20 PROCEDURE — 84075 ASSAY ALKALINE PHOSPHATASE: CPT

## 2025-02-20 PROCEDURE — 1100000001 HC PRIVATE ROOM DAILY

## 2025-02-20 PROCEDURE — 73564 X-RAY EXAM KNEE 4 OR MORE: CPT | Mod: RIGHT SIDE | Performed by: STUDENT IN AN ORGANIZED HEALTH CARE EDUCATION/TRAINING PROGRAM

## 2025-02-20 PROCEDURE — 73564 X-RAY EXAM KNEE 4 OR MORE: CPT | Mod: RT

## 2025-02-20 PROCEDURE — 71045 X-RAY EXAM CHEST 1 VIEW: CPT | Performed by: STUDENT IN AN ORGANIZED HEALTH CARE EDUCATION/TRAINING PROGRAM

## 2025-02-20 PROCEDURE — 99285 EMERGENCY DEPT VISIT HI MDM: CPT | Mod: 25 | Performed by: EMERGENCY MEDICINE

## 2025-02-20 PROCEDURE — 72125 CT NECK SPINE W/O DYE: CPT | Performed by: STUDENT IN AN ORGANIZED HEALTH CARE EDUCATION/TRAINING PROGRAM

## 2025-02-20 PROCEDURE — 36415 COLL VENOUS BLD VENIPUNCTURE: CPT

## 2025-02-20 PROCEDURE — 85025 COMPLETE CBC W/AUTO DIFF WBC: CPT

## 2025-02-20 PROCEDURE — 83735 ASSAY OF MAGNESIUM: CPT

## 2025-02-20 RX ORDER — LANOLIN ALCOHOL/MO/W.PET/CERES
1000 CREAM (GRAM) TOPICAL DAILY
Status: DISCONTINUED | OUTPATIENT
Start: 2025-02-21 | End: 2025-02-24 | Stop reason: HOSPADM

## 2025-02-20 RX ORDER — PSYLLIUM HUSK 0.4 G
1 CAPSULE ORAL 2 TIMES DAILY
Status: DISCONTINUED | OUTPATIENT
Start: 2025-02-20 | End: 2025-02-20 | Stop reason: SDUPTHER

## 2025-02-20 RX ORDER — POLYETHYLENE GLYCOL 3350 17 G/17G
17 POWDER, FOR SOLUTION ORAL DAILY
Status: DISCONTINUED | OUTPATIENT
Start: 2025-02-21 | End: 2025-02-24 | Stop reason: HOSPADM

## 2025-02-20 RX ORDER — OXYCODONE HYDROCHLORIDE 5 MG/1
2.5 TABLET ORAL EVERY 6 HOURS PRN
Status: DISCONTINUED | OUTPATIENT
Start: 2025-02-20 | End: 2025-02-24 | Stop reason: HOSPADM

## 2025-02-20 RX ORDER — POLYETHYLENE GLYCOL 3350 17 G/17G
17 POWDER, FOR SOLUTION ORAL DAILY
Status: DISCONTINUED | OUTPATIENT
Start: 2025-02-21 | End: 2025-02-20 | Stop reason: SDUPTHER

## 2025-02-20 RX ORDER — TALC
3 POWDER (GRAM) TOPICAL NIGHTLY PRN
Status: DISCONTINUED | OUTPATIENT
Start: 2025-02-20 | End: 2025-02-24 | Stop reason: HOSPADM

## 2025-02-20 RX ORDER — CHOLECALCIFEROL (VITAMIN D3) 50 MCG
2000 TABLET ORAL DAILY
Status: DISCONTINUED | OUTPATIENT
Start: 2025-02-21 | End: 2025-02-24 | Stop reason: HOSPADM

## 2025-02-20 RX ORDER — ENOXAPARIN SODIUM 100 MG/ML
30 INJECTION SUBCUTANEOUS EVERY 12 HOURS SCHEDULED
Status: DISCONTINUED | OUTPATIENT
Start: 2025-02-21 | End: 2025-02-24 | Stop reason: HOSPADM

## 2025-02-20 RX ORDER — PANTOPRAZOLE SODIUM 40 MG/10ML
40 INJECTION, POWDER, LYOPHILIZED, FOR SOLUTION INTRAVENOUS
Status: DISCONTINUED | OUTPATIENT
Start: 2025-02-21 | End: 2025-02-24 | Stop reason: HOSPADM

## 2025-02-20 RX ORDER — CHOLECALCIFEROL (VITAMIN D3) 50 MCG
2000 TABLET ORAL DAILY
Status: DISCONTINUED | OUTPATIENT
Start: 2025-02-21 | End: 2025-02-20 | Stop reason: SDUPTHER

## 2025-02-20 RX ORDER — PANTOPRAZOLE SODIUM 40 MG/1
40 TABLET, DELAYED RELEASE ORAL
Status: DISCONTINUED | OUTPATIENT
Start: 2025-02-21 | End: 2025-02-24 | Stop reason: HOSPADM

## 2025-02-20 RX ORDER — SERTRALINE HYDROCHLORIDE 50 MG/1
50 TABLET, FILM COATED ORAL DAILY
Status: DISCONTINUED | OUTPATIENT
Start: 2025-02-21 | End: 2025-02-24 | Stop reason: HOSPADM

## 2025-02-20 RX ORDER — ACETAMINOPHEN 160 MG/5ML
650 SOLUTION ORAL EVERY 4 HOURS PRN
Status: DISCONTINUED | OUTPATIENT
Start: 2025-02-20 | End: 2025-02-24 | Stop reason: HOSPADM

## 2025-02-20 RX ORDER — DOCUSATE SODIUM 100 MG/1
100 CAPSULE, LIQUID FILLED ORAL 2 TIMES DAILY
Status: DISCONTINUED | OUTPATIENT
Start: 2025-02-20 | End: 2025-02-24 | Stop reason: HOSPADM

## 2025-02-20 RX ORDER — LATANOPROST 50 UG/ML
1 SOLUTION/ DROPS OPHTHALMIC NIGHTLY
Status: DISCONTINUED | OUTPATIENT
Start: 2025-02-20 | End: 2025-02-24 | Stop reason: HOSPADM

## 2025-02-20 RX ORDER — DEXTROSE MONOHYDRATE, SODIUM CHLORIDE, AND POTASSIUM CHLORIDE 50; 1.49; 4.5 G/1000ML; G/1000ML; G/1000ML
50 INJECTION, SOLUTION INTRAVENOUS CONTINUOUS
Status: DISCONTINUED | OUTPATIENT
Start: 2025-02-20 | End: 2025-02-24 | Stop reason: HOSPADM

## 2025-02-20 RX ORDER — LIDOCAINE 560 MG/1
1 PATCH PERCUTANEOUS; TOPICAL; TRANSDERMAL DAILY
Status: DISCONTINUED | OUTPATIENT
Start: 2025-02-21 | End: 2025-02-24 | Stop reason: HOSPADM

## 2025-02-20 RX ORDER — ACETAMINOPHEN 650 MG/1
650 SUPPOSITORY RECTAL EVERY 4 HOURS PRN
Status: DISCONTINUED | OUTPATIENT
Start: 2025-02-20 | End: 2025-02-24 | Stop reason: HOSPADM

## 2025-02-20 RX ORDER — OXYCODONE HYDROCHLORIDE 5 MG/1
5 TABLET ORAL EVERY 6 HOURS PRN
Status: DISCONTINUED | OUTPATIENT
Start: 2025-02-20 | End: 2025-02-24 | Stop reason: HOSPADM

## 2025-02-20 RX ORDER — GUAIFENESIN/DEXTROMETHORPHAN 100-10MG/5
5 SYRUP ORAL EVERY 4 HOURS PRN
Status: DISCONTINUED | OUTPATIENT
Start: 2025-02-20 | End: 2025-02-24 | Stop reason: HOSPADM

## 2025-02-20 RX ORDER — ACETAMINOPHEN 325 MG/1
650 TABLET ORAL EVERY 4 HOURS PRN
Status: DISCONTINUED | OUTPATIENT
Start: 2025-02-20 | End: 2025-02-24 | Stop reason: HOSPADM

## 2025-02-20 RX ORDER — IPRATROPIUM BROMIDE AND ALBUTEROL SULFATE 2.5; .5 MG/3ML; MG/3ML
3 SOLUTION RESPIRATORY (INHALATION)
Status: DISCONTINUED | OUTPATIENT
Start: 2025-02-21 | End: 2025-02-21

## 2025-02-20 RX ORDER — BUSPIRONE HYDROCHLORIDE 10 MG/1
10 TABLET ORAL 3 TIMES DAILY
Status: DISCONTINUED | OUTPATIENT
Start: 2025-02-20 | End: 2025-02-24 | Stop reason: HOSPADM

## 2025-02-20 RX ORDER — ONDANSETRON 4 MG/1
4 TABLET, ORALLY DISINTEGRATING ORAL EVERY 8 HOURS PRN
Status: DISCONTINUED | OUTPATIENT
Start: 2025-02-20 | End: 2025-02-24 | Stop reason: HOSPADM

## 2025-02-20 RX ORDER — CALCIUM CARBONATE 500(1250)
1250 TABLET ORAL 2 TIMES DAILY
Status: DISCONTINUED | OUTPATIENT
Start: 2025-02-20 | End: 2025-02-24 | Stop reason: HOSPADM

## 2025-02-20 RX ORDER — FERROUS SULFATE 325(65) MG
65 TABLET ORAL EVERY OTHER DAY
Status: DISCONTINUED | OUTPATIENT
Start: 2025-02-21 | End: 2025-02-24 | Stop reason: HOSPADM

## 2025-02-20 RX ORDER — GUAIFENESIN 600 MG/1
600 TABLET, EXTENDED RELEASE ORAL EVERY 12 HOURS PRN
Status: DISCONTINUED | OUTPATIENT
Start: 2025-02-20 | End: 2025-02-24 | Stop reason: HOSPADM

## 2025-02-20 ASSESSMENT — COLUMBIA-SUICIDE SEVERITY RATING SCALE - C-SSRS
6. HAVE YOU EVER DONE ANYTHING, STARTED TO DO ANYTHING, OR PREPARED TO DO ANYTHING TO END YOUR LIFE?: NO
1. IN THE PAST MONTH, HAVE YOU WISHED YOU WERE DEAD OR WISHED YOU COULD GO TO SLEEP AND NOT WAKE UP?: NO
2. HAVE YOU ACTUALLY HAD ANY THOUGHTS OF KILLING YOURSELF?: NO

## 2025-02-20 ASSESSMENT — PAIN SCALES - GENERAL: PAINLEVEL_OUTOF10: 0 - NO PAIN

## 2025-02-20 ASSESSMENT — ENCOUNTER SYMPTOMS
CHILLS: 0
FEVER: 0

## 2025-02-20 ASSESSMENT — PAIN - FUNCTIONAL ASSESSMENT: PAIN_FUNCTIONAL_ASSESSMENT: 0-10

## 2025-02-20 NOTE — PROGRESS NOTES
PLACE OF SERVICE:  [facility].    This is new/initial history and physical.    Subjective   Patient ID: Xiomy Rubio is a 89 y.o. female who presents for New Patient Visit.    Ms. Xiomy Rubio is a 89-year-old female with recent history of fall with nasal bone fracture, left rib fracture, T11 compression fracture, and L4 compression fracture.  She is currently on pain control and requires supportive care.    Review of Systems   Constitutional:  Negative for chills and fever.   Cardiovascular:  Negative for chest pain.   All other systems reviewed and are negative.    Objective   /80   Pulse 78   Temp 36.6 °C (97.8 °F)   Resp 16   LMP  (LMP Unknown) Comment: Pt is 89 years old- last mentrauls cycle unknown    Physical Exam  Vitals reviewed.   Constitutional:       General: She is not in acute distress.     Comments: This is a well-developed, well-nourished female, sitting in a chair.   HENT:      Right Ear: Tympanic membrane, ear canal and external ear normal.      Left Ear: Tympanic membrane, ear canal and external ear normal.   Eyes:      General: No scleral icterus.     Pupils: Pupils are equal, round, and reactive to light.   Neck:      Vascular: No carotid bruit.   Cardiovascular:      Heart sounds: Normal heart sounds, S1 normal and S2 normal. No murmur heard.     No friction rub.   Pulmonary:      Effort: Pulmonary effort is normal.      Breath sounds: Normal breath sounds and air entry.   Chest:      Chest wall: Tenderness (over the left side) present.   Abdominal:      Palpations: There is no hepatomegaly, splenomegaly or mass.   Musculoskeletal:         General: No swelling or deformity. Normal range of motion.      Cervical back: Neck supple.      Right lower leg: No edema.      Left lower leg: No edema.      Comments: She has tenderness over her lower spine.   Lymphadenopathy:      Cervical: No cervical adenopathy.      Upper Body:      Right upper body: No axillary  adenopathy.      Left upper body: No axillary adenopathy.      Lower Body: No right inguinal adenopathy. No left inguinal adenopathy.   Skin:     Comments: There is ecchymosis to the patient's mid face.   Neurological:      Mental Status: She is oriented to person, place, and time.      Cranial Nerves: Cranial nerves 2-12 are intact. No cranial nerve deficit.      Sensory: No sensory deficit.      Motor: Motor function is intact. No weakness.      Gait: Gait is intact.      Deep Tendon Reflexes: Reflexes normal.   Psychiatric:         Mood and Affect: Mood normal. Mood is not anxious or depressed. Affect is not angry.         Behavior: Behavior is not agitated.         Thought Content: Thought content normal.         Judgment: Judgment normal.     LAB WORK: Laboratory studies reviewed.    Assessment/Plan   Problem List Items Addressed This Visit             ICD-10-CM       Neuro    Thoracic spine fracture (Multi) - Primary S22.009A     Other Visit Diagnoses         Codes    Closed compression fracture of L4 lumbar vertebra with routine healing, subsequent encounter     S32.040D    Closed fracture of multiple ribs of left side with routine healing, subsequent encounter     S22.42XD    Closed fracture of nasal bone with routine healing, subsequent encounter     S02.2XXD    Hypertension, unspecified type     I10    Osteoporosis, unspecified osteoporosis type, unspecified pathological fracture presence     M81.0    Rheumatoid arthritis, involving unspecified site, unspecified whether rheumatoid factor present (Multi)     M06.9    Weakness     R53.1    At risk for falls     Z91.81        1. T11 compression fracture, on pain control.  2. L4 compression fracture, pain control.  3. Left rib fractures, on pain control, with incentive spirometry.  4. Nasal bone fractures, on pain control, follow with ENT.  5. Hypertension, med controlled.  6. Osteoporosis, on medication.  7. Rheumatoid arthritis, on medication.  8. Weakness,  on PT/OT.  9. Fall risk, fall precautions.    Scribe Attestation  By signing my name below, I, Valeria Rodney attest that this documentation has been prepared under the direction and in the presence of Obey Osorio MD.     All medical record entries made by the scribe were personally dictated by me I have reviewed the chart and agree the record accurately reflects my personal performance of his history physical examination and management

## 2025-02-21 ENCOUNTER — APPOINTMENT (OUTPATIENT)
Dept: RADIOLOGY | Facility: HOSPITAL | Age: OVER 89
DRG: 604 | End: 2025-02-21
Payer: MEDICARE

## 2025-02-21 PROBLEM — U07.1 COVID-19: Status: ACTIVE | Noted: 2025-02-21

## 2025-02-21 PROBLEM — R09.02 HYPOXIA: Status: ACTIVE | Noted: 2025-02-21

## 2025-02-21 PROBLEM — M25.561 ACUTE PAIN OF RIGHT KNEE: Status: ACTIVE | Noted: 2025-02-21

## 2025-02-21 LAB
ALBUMIN SERPL BCP-MCNC: 3.5 G/DL (ref 3.4–5)
ALP SERPL-CCNC: 61 U/L (ref 33–136)
ALT SERPL W P-5'-P-CCNC: 14 U/L (ref 7–45)
ANION GAP SERPL CALCULATED.3IONS-SCNC: 12 MMOL/L (ref 10–20)
APPEARANCE UR: CLEAR
AST SERPL W P-5'-P-CCNC: 14 U/L (ref 9–39)
BILIRUB SERPL-MCNC: 0.8 MG/DL (ref 0–1.2)
BILIRUB UR STRIP.AUTO-MCNC: NEGATIVE MG/DL
BUN SERPL-MCNC: 15 MG/DL (ref 6–23)
CALCIUM SERPL-MCNC: 8.4 MG/DL (ref 8.6–10.3)
CHLORIDE SERPL-SCNC: 101 MMOL/L (ref 98–107)
CO2 SERPL-SCNC: 28 MMOL/L (ref 21–32)
COLOR UR: NORMAL
CREAT SERPL-MCNC: 0.58 MG/DL (ref 0.5–1.05)
EGFRCR SERPLBLD CKD-EPI 2021: 84 ML/MIN/1.73M*2
ERYTHROCYTE [DISTWIDTH] IN BLOOD BY AUTOMATED COUNT: 13.5 % (ref 11.5–14.5)
GLUCOSE SERPL-MCNC: 130 MG/DL (ref 74–99)
GLUCOSE UR STRIP.AUTO-MCNC: NORMAL MG/DL
HCT VFR BLD AUTO: 31.7 % (ref 36–46)
HGB BLD-MCNC: 10.1 G/DL (ref 12–16)
KETONES UR STRIP.AUTO-MCNC: NEGATIVE MG/DL
LEUKOCYTE ESTERASE UR QL STRIP.AUTO: NEGATIVE
MCH RBC QN AUTO: 28.8 PG (ref 26–34)
MCHC RBC AUTO-ENTMCNC: 31.9 G/DL (ref 32–36)
MCV RBC AUTO: 90 FL (ref 80–100)
NITRITE UR QL STRIP.AUTO: NEGATIVE
NRBC BLD-RTO: 0 /100 WBCS (ref 0–0)
PH UR STRIP.AUTO: 6.5 [PH]
PLATELET # BLD AUTO: 184 X10*3/UL (ref 150–450)
POTASSIUM SERPL-SCNC: 3.8 MMOL/L (ref 3.5–5.3)
PROT SERPL-MCNC: 5.8 G/DL (ref 6.4–8.2)
PROT UR STRIP.AUTO-MCNC: NEGATIVE MG/DL
RBC # BLD AUTO: 3.51 X10*6/UL (ref 4–5.2)
RBC # UR STRIP.AUTO: NEGATIVE MG/DL
SODIUM SERPL-SCNC: 137 MMOL/L (ref 136–145)
SP GR UR STRIP.AUTO: 1.02
UROBILINOGEN UR STRIP.AUTO-MCNC: NORMAL MG/DL
WBC # BLD AUTO: 6.7 X10*3/UL (ref 4.4–11.3)

## 2025-02-21 PROCEDURE — 80053 COMPREHEN METABOLIC PANEL: CPT | Performed by: INTERNAL MEDICINE

## 2025-02-21 PROCEDURE — 85027 COMPLETE CBC AUTOMATED: CPT | Performed by: INTERNAL MEDICINE

## 2025-02-21 PROCEDURE — 94760 N-INVAS EAR/PLS OXIMETRY 1: CPT

## 2025-02-21 PROCEDURE — 94664 DEMO&/EVAL PT USE INHALER: CPT

## 2025-02-21 PROCEDURE — 36415 COLL VENOUS BLD VENIPUNCTURE: CPT | Performed by: INTERNAL MEDICINE

## 2025-02-21 PROCEDURE — 9420000001 HC RT PATIENT EDUCATION 5 MIN

## 2025-02-21 PROCEDURE — 99222 1ST HOSP IP/OBS MODERATE 55: CPT | Performed by: INTERNAL MEDICINE

## 2025-02-21 PROCEDURE — 2500000004 HC RX 250 GENERAL PHARMACY W/ HCPCS (ALT 636 FOR OP/ED): Performed by: INTERNAL MEDICINE

## 2025-02-21 PROCEDURE — 2500000001 HC RX 250 WO HCPCS SELF ADMINISTERED DRUGS (ALT 637 FOR MEDICARE OP): Performed by: INTERNAL MEDICINE

## 2025-02-21 PROCEDURE — 94640 AIRWAY INHALATION TREATMENT: CPT

## 2025-02-21 PROCEDURE — 2500000002 HC RX 250 W HCPCS SELF ADMINISTERED DRUGS (ALT 637 FOR MEDICARE OP, ALT 636 FOR OP/ED): Performed by: INTERNAL MEDICINE

## 2025-02-21 PROCEDURE — 81003 URINALYSIS AUTO W/O SCOPE: CPT | Performed by: INTERNAL MEDICINE

## 2025-02-21 PROCEDURE — 97161 PT EVAL LOW COMPLEX 20 MIN: CPT | Mod: GP

## 2025-02-21 PROCEDURE — 97165 OT EVAL LOW COMPLEX 30 MIN: CPT | Mod: GO

## 2025-02-21 PROCEDURE — 73700 CT LOWER EXTREMITY W/O DYE: CPT | Mod: RT

## 2025-02-21 PROCEDURE — 1100000001 HC PRIVATE ROOM DAILY

## 2025-02-21 PROCEDURE — 2500000005 HC RX 250 GENERAL PHARMACY W/O HCPCS: Performed by: INTERNAL MEDICINE

## 2025-02-21 RX ORDER — IPRATROPIUM BROMIDE AND ALBUTEROL SULFATE 2.5; .5 MG/3ML; MG/3ML
3 SOLUTION RESPIRATORY (INHALATION)
Status: DISCONTINUED | OUTPATIENT
Start: 2025-02-21 | End: 2025-02-24 | Stop reason: HOSPADM

## 2025-02-21 RX ORDER — IPRATROPIUM BROMIDE AND ALBUTEROL SULFATE 2.5; .5 MG/3ML; MG/3ML
3 SOLUTION RESPIRATORY (INHALATION) EVERY 2 HOUR PRN
Status: DISCONTINUED | OUTPATIENT
Start: 2025-02-21 | End: 2025-02-24 | Stop reason: HOSPADM

## 2025-02-21 RX ADMIN — BUSPIRONE HYDROCHLORIDE 10 MG: 10 TABLET ORAL at 14:06

## 2025-02-21 RX ADMIN — CALCIUM 1250 MG: 500 TABLET ORAL at 20:36

## 2025-02-21 RX ADMIN — DEXTROSE MONOHYDRATE, SODIUM CHLORIDE, AND POTASSIUM CHLORIDE 50 ML/HR: 50; 4.5; 1.49 INJECTION, SOLUTION INTRAVENOUS at 21:59

## 2025-02-21 RX ADMIN — FERROUS SULFATE 325 MG: 325 TABLET, FILM COATED ORAL at 10:02

## 2025-02-21 RX ADMIN — DOCUSATE SODIUM 100 MG: 100 CAPSULE, LIQUID FILLED ORAL at 20:36

## 2025-02-21 RX ADMIN — LATANOPROST 1 DROP: 50 SOLUTION/ DROPS OPHTHALMIC at 01:00

## 2025-02-21 RX ADMIN — Medication 2 L/MIN: at 00:34

## 2025-02-21 RX ADMIN — Medication 3 L/MIN: at 14:12

## 2025-02-21 RX ADMIN — Medication 2000 UNITS: at 09:41

## 2025-02-21 RX ADMIN — ENOXAPARIN SODIUM 30 MG: 30 INJECTION SUBCUTANEOUS at 18:11

## 2025-02-21 RX ADMIN — IPRATROPIUM BROMIDE AND ALBUTEROL SULFATE 3 ML: 2.5; .5 SOLUTION RESPIRATORY (INHALATION) at 19:10

## 2025-02-21 RX ADMIN — SERTRALINE HYDROCHLORIDE 50 MG: 50 TABLET ORAL at 09:41

## 2025-02-21 RX ADMIN — CALCIUM 1250 MG: 500 TABLET ORAL at 09:41

## 2025-02-21 RX ADMIN — LATANOPROST 1 DROP: 50 SOLUTION/ DROPS OPHTHALMIC at 20:38

## 2025-02-21 RX ADMIN — CYANOCOBALAMIN TAB 1000 MCG 1000 MCG: 1000 TAB at 09:41

## 2025-02-21 RX ADMIN — BUSPIRONE HYDROCHLORIDE 10 MG: 10 TABLET ORAL at 09:41

## 2025-02-21 RX ADMIN — DOCUSATE SODIUM 100 MG: 100 CAPSULE, LIQUID FILLED ORAL at 09:41

## 2025-02-21 RX ADMIN — IPRATROPIUM BROMIDE AND ALBUTEROL SULFATE 3 ML: 2.5; .5 SOLUTION RESPIRATORY (INHALATION) at 14:00

## 2025-02-21 RX ADMIN — ENOXAPARIN SODIUM 30 MG: 30 INJECTION SUBCUTANEOUS at 06:51

## 2025-02-21 RX ADMIN — BUSPIRONE HYDROCHLORIDE 10 MG: 10 TABLET ORAL at 20:36

## 2025-02-21 RX ADMIN — DEXTROSE MONOHYDRATE, SODIUM CHLORIDE, AND POTASSIUM CHLORIDE 50 ML/HR: 50; 4.5; 1.49 INJECTION, SOLUTION INTRAVENOUS at 01:00

## 2025-02-21 RX ADMIN — PANTOPRAZOLE SODIUM 40 MG: 40 TABLET, DELAYED RELEASE ORAL at 06:51

## 2025-02-21 SDOH — SOCIAL STABILITY: SOCIAL INSECURITY: ARE THERE ANY APPARENT SIGNS OF INJURIES/BEHAVIORS THAT COULD BE RELATED TO ABUSE/NEGLECT?: UNABLE TO ASSESS

## 2025-02-21 SDOH — SOCIAL STABILITY: SOCIAL INSECURITY: HAVE YOU HAD THOUGHTS OF HARMING ANYONE ELSE?: NO

## 2025-02-21 SDOH — SOCIAL STABILITY: SOCIAL INSECURITY: HAVE YOU HAD ANY THOUGHTS OF HARMING ANYONE ELSE?: UNABLE TO ASSESS

## 2025-02-21 SDOH — SOCIAL STABILITY: SOCIAL INSECURITY: DOES ANYONE TRY TO KEEP YOU FROM HAVING/CONTACTING OTHER FRIENDS OR DOING THINGS OUTSIDE YOUR HOME?: UNABLE TO ASSESS

## 2025-02-21 SDOH — SOCIAL STABILITY: SOCIAL INSECURITY: HAS ANYONE EVER THREATENED TO HURT YOUR FAMILY OR YOUR PETS?: UNABLE TO ASSESS

## 2025-02-21 SDOH — SOCIAL STABILITY: SOCIAL INSECURITY: WITHIN THE LAST YEAR, HAVE YOU BEEN AFRAID OF YOUR PARTNER OR EX-PARTNER?: PATIENT UNABLE TO ANSWER

## 2025-02-21 SDOH — SOCIAL STABILITY: SOCIAL INSECURITY: WERE YOU ABLE TO COMPLETE ALL THE BEHAVIORAL HEALTH SCREENINGS?: NO

## 2025-02-21 SDOH — SOCIAL STABILITY: SOCIAL INSECURITY: DO YOU FEEL UNSAFE GOING BACK TO THE PLACE WHERE YOU ARE LIVING?: UNABLE TO ASSESS

## 2025-02-21 SDOH — ECONOMIC STABILITY: FOOD INSECURITY
WITHIN THE PAST 12 MONTHS, YOU WORRIED THAT YOUR FOOD WOULD RUN OUT BEFORE YOU GOT THE MONEY TO BUY MORE.: PATIENT UNABLE TO ANSWER

## 2025-02-21 SDOH — SOCIAL STABILITY: SOCIAL INSECURITY: ABUSE: ADULT

## 2025-02-21 SDOH — SOCIAL STABILITY: SOCIAL INSECURITY: DO YOU FEEL ANYONE HAS EXPLOITED OR TAKEN ADVANTAGE OF YOU FINANCIALLY OR OF YOUR PERSONAL PROPERTY?: UNABLE TO ASSESS

## 2025-02-21 SDOH — SOCIAL STABILITY: SOCIAL INSECURITY: ARE YOU OR HAVE YOU BEEN THREATENED OR ABUSED PHYSICALLY, EMOTIONALLY, OR SEXUALLY BY ANYONE?: UNABLE TO ASSESS

## 2025-02-21 SDOH — ECONOMIC STABILITY: INCOME INSECURITY
IN THE PAST 12 MONTHS HAS THE ELECTRIC, GAS, OIL, OR WATER COMPANY THREATENED TO SHUT OFF SERVICES IN YOUR HOME?: PATIENT UNABLE TO ANSWER

## 2025-02-21 ASSESSMENT — ENCOUNTER SYMPTOMS
CONFUSION: 0
VOMITING: 0
ARTHRALGIAS: 0
ADENOPATHY: 0
SLEEP DISTURBANCE: 0
UNEXPECTED WEIGHT CHANGE: 0
ABDOMINAL DISTENTION: 0
NUMBNESS: 0
JOINT SWELLING: 0
APNEA: 0
SEIZURES: 0
WHEEZING: 0
FEVER: 0
POLYPHAGIA: 0
DIAPHORESIS: 0
EYE ITCHING: 0
TROUBLE SWALLOWING: 0
EYE PAIN: 0
FACIAL SWELLING: 0
NERVOUS/ANXIOUS: 0
CHEST TIGHTNESS: 0
CHILLS: 0
FREQUENCY: 0
AGITATION: 0
CONSTIPATION: 0
EYE DISCHARGE: 0
DYSPHORIC MOOD: 0
RECTAL PAIN: 0
HEMATURIA: 0
APPETITE CHANGE: 0
VOICE CHANGE: 0
HYPERACTIVE: 0
TREMORS: 0
DIFFICULTY URINATING: 0
SPEECH DIFFICULTY: 0
ACTIVITY CHANGE: 0
WEAKNESS: 0
BRUISES/BLEEDS EASILY: 0
FLANK PAIN: 0
ABDOMINAL PAIN: 0
RHINORRHEA: 0
COLOR CHANGE: 0
SINUS PRESSURE: 0
PHOTOPHOBIA: 0
SINUS PAIN: 0
DIARRHEA: 0
PALPITATIONS: 0
MYALGIAS: 0
LIGHT-HEADEDNESS: 0
NECK STIFFNESS: 0
WOUND: 0
DIZZINESS: 0
NAUSEA: 0
COUGH: 0
EYE REDNESS: 0
HEADACHES: 1
FACIAL ASYMMETRY: 0
BLOOD IN STOOL: 0
SORE THROAT: 0
CHOKING: 0
DYSURIA: 0
BACK PAIN: 0
HALLUCINATIONS: 0
SHORTNESS OF BREATH: 0
DECREASED CONCENTRATION: 0
POLYDIPSIA: 0
NECK PAIN: 0
FATIGUE: 0
STRIDOR: 0
ANAL BLEEDING: 0

## 2025-02-21 ASSESSMENT — COGNITIVE AND FUNCTIONAL STATUS - GENERAL
HELP NEEDED FOR BATHING: A LOT
HELP NEEDED FOR BATHING: A LOT
DRESSING REGULAR UPPER BODY CLOTHING: A LOT
MOVING TO AND FROM BED TO CHAIR: A LITTLE
TURNING FROM BACK TO SIDE WHILE IN FLAT BAD: A LOT
HELP NEEDED FOR BATHING: A LOT
DRESSING REGULAR UPPER BODY CLOTHING: A LITTLE
DAILY ACTIVITIY SCORE: 12
MOVING TO AND FROM BED TO CHAIR: A LOT
EATING MEALS: A LOT
STANDING UP FROM CHAIR USING ARMS: A LITTLE
TOILETING: A LOT
MOBILITY SCORE: 13
TURNING FROM BACK TO SIDE WHILE IN FLAT BAD: A LOT
STANDING UP FROM CHAIR USING ARMS: A LOT
PERSONAL GROOMING: A LITTLE
EATING MEALS: A LITTLE
WALKING IN HOSPITAL ROOM: A LOT
PATIENT BASELINE BEDBOUND: NO
CLIMB 3 TO 5 STEPS WITH RAILING: TOTAL
PERSONAL GROOMING: A LITTLE
TOILETING: TOTAL
EATING MEALS: A LITTLE
MOVING FROM LYING ON BACK TO SITTING ON SIDE OF FLAT BED WITH BEDRAILS: A LOT
DAILY ACTIVITIY SCORE: 14
WALKING IN HOSPITAL ROOM: A LOT
MOBILITY SCORE: 12
TURNING FROM BACK TO SIDE WHILE IN FLAT BAD: A LOT
HELP NEEDED FOR BATHING: A LOT
DRESSING REGULAR LOWER BODY CLOTHING: A LOT
MOVING FROM LYING ON BACK TO SITTING ON SIDE OF FLAT BED WITH BEDRAILS: A LOT
MOVING FROM LYING ON BACK TO SITTING ON SIDE OF FLAT BED WITH BEDRAILS: A LOT
WALKING IN HOSPITAL ROOM: A LOT
MOBILITY SCORE: 12
DRESSING REGULAR LOWER BODY CLOTHING: A LOT
DRESSING REGULAR UPPER BODY CLOTHING: A LOT
WALKING IN HOSPITAL ROOM: A LOT
STANDING UP FROM CHAIR USING ARMS: A LOT
CLIMB 3 TO 5 STEPS WITH RAILING: A LOT
MOVING TO AND FROM BED TO CHAIR: A LOT
DRESSING REGULAR UPPER BODY CLOTHING: A LOT
TURNING FROM BACK TO SIDE WHILE IN FLAT BAD: A LOT
DRESSING REGULAR LOWER BODY CLOTHING: A LOT
DRESSING REGULAR LOWER BODY CLOTHING: A LOT
EATING MEALS: A LITTLE
CLIMB 3 TO 5 STEPS WITH RAILING: A LOT
TOILETING: A LOT
PERSONAL GROOMING: A LOT
MOBILITY SCORE: 12
MOVING FROM LYING ON BACK TO SITTING ON SIDE OF FLAT BED WITH BEDRAILS: A LOT
CLIMB 3 TO 5 STEPS WITH RAILING: A LOT
TOILETING: A LOT
DAILY ACTIVITIY SCORE: 14
PERSONAL GROOMING: A LITTLE
DAILY ACTIVITIY SCORE: 14
MOVING TO AND FROM BED TO CHAIR: A LOT
STANDING UP FROM CHAIR USING ARMS: A LOT

## 2025-02-21 ASSESSMENT — LIFESTYLE VARIABLES
HOW OFTEN DO YOU HAVE 6 OR MORE DRINKS ON ONE OCCASION: NEVER
HOW MANY STANDARD DRINKS CONTAINING ALCOHOL DO YOU HAVE ON A TYPICAL DAY: PATIENT DOES NOT DRINK
HOW OFTEN DO YOU HAVE A DRINK CONTAINING ALCOHOL: NEVER
AUDIT-C TOTAL SCORE: 0
SKIP TO QUESTIONS 9-10: 1
AUDIT-C TOTAL SCORE: 0

## 2025-02-21 ASSESSMENT — PAIN - FUNCTIONAL ASSESSMENT
PAIN_FUNCTIONAL_ASSESSMENT: 0-10
PAIN_FUNCTIONAL_ASSESSMENT: PAINAD (PAIN ASSESSMENT IN ADVANCED DEMENTIA SCALE)

## 2025-02-21 ASSESSMENT — ACTIVITIES OF DAILY LIVING (ADL)
JUDGMENT_ADEQUATE_SAFELY_COMPLETE_DAILY_ACTIVITIES: NO
BATHING: NEEDS ASSISTANCE
PATIENT'S MEMORY ADEQUATE TO SAFELY COMPLETE DAILY ACTIVITIES?: NO
DRESSING YOURSELF: NEEDS ASSISTANCE
FEEDING YOURSELF: INDEPENDENT
TOILETING: NEEDS ASSISTANCE
GROOMING: NEEDS ASSISTANCE
BATHING_ASSISTANCE: MAXIMAL
ADEQUATE_TO_COMPLETE_ADL: YES
HEARING - RIGHT EAR: DEAF
LACK_OF_TRANSPORTATION: PATIENT UNABLE TO ANSWER
WALKS IN HOME: NEEDS ASSISTANCE
HEARING - LEFT EAR: DEAF

## 2025-02-21 ASSESSMENT — PAIN SCALES - GENERAL
PAINLEVEL_OUTOF10: 0 - NO PAIN

## 2025-02-21 ASSESSMENT — PATIENT HEALTH QUESTIONNAIRE - PHQ9
2. FEELING DOWN, DEPRESSED OR HOPELESS: NOT AT ALL
1. LITTLE INTEREST OR PLEASURE IN DOING THINGS: NOT AT ALL
SUM OF ALL RESPONSES TO PHQ9 QUESTIONS 1 & 2: 0

## 2025-02-21 ASSESSMENT — PAIN SCALES - PAIN ASSESSMENT IN ADVANCED DEMENTIA (PAINAD)
CONSOLABILITY: NO NEED TO CONSOLE
BREATHING: NORMAL
BREATHING: NORMAL
TOTALSCORE: 0
BODYLANGUAGE: RELAXED
FACIALEXPRESSION: SMILING OR INEXPRESSIVE
BODYLANGUAGE: RELAXED
CONSOLABILITY: NO NEED TO CONSOLE
FACIALEXPRESSION: SMILING OR INEXPRESSIVE
TOTALSCORE: 0

## 2025-02-21 NOTE — CARE PLAN
Problem: Skin  Goal: Participates in plan/prevention/treatment measures  Outcome: Progressing  Flowsheets (Taken 2/21/2025 0126)  Participates in plan/prevention/treatment measures:   Increase activity/out of bed for meals   Discuss with provider PT/OT consult   Elevate heels  Goal: Prevent/manage excess moisture  Outcome: Progressing  Flowsheets (Taken 2/21/2025 0126)  Prevent/manage excess moisture:   Monitor for/manage infection if present   Cleanse incontinence/protect with barrier cream  Goal: Promote/optimize nutrition  Outcome: Progressing  Flowsheets (Taken 2/21/2025 0126)  Promote/optimize nutrition:   Monitor/record intake including meals   Consume > 50% meals/supplements   Offer water/supplements/favorite foods     The clinical goals for the shift include Rest, Monitor O2 status

## 2025-02-21 NOTE — H&P
History Of Present Illness  Xiomy Rubio is a 89 y.o. female presenting with fall at [facility] nursing home.  Patient is on Lovenox.  Patient had hematoma to the back of the head.  She was complaining of right knee pain.  Also found to have 88% room air and positive for COVID.  She was admitted for COVID and hypoxia and fall and head trauma to be monitored.  Patient this morning feels pretty good she just has cold-like symptoms has some chest congestion but does not feel any kind of respiratory distress.  She is hard of hearing.  History also taken by her son.  She lives with her son.  She had trauma few weeks ago where she had nasal bone fracture, left 8th-11th rib fracture, T11 compression fracture and severe L4 compression fracture.  She was sent to the rehab facility.  She is doing pretty good with her therapy.  Last 3 days she was having some congestion and some phlegm.  Yesterday she felt tired and went to sleep in the middle of the day per her son.  Patient is usually at her baseline in pretty good health.  She is only having some issues with anxiety but her main issue is balance and fall.  She has history of hip fracture and had history of hip replacement many years ago  She is non-smoker nondrinker     Past Medical History  Past Medical History:   Diagnosis Date    Anxiety     At risk for falling     Closed compression fracture of L4 lumbar vertebra with delayed healing, subsequent encounter     Closed wedge compression fracture of T11 vertebra (Multi)     Constipation     Dementia     Depression     Facial fracture (Multi)     GERD (gastroesophageal reflux disease)     Hypertension     L4 vertebral fracture (Multi)     Left rib fracture     Memory loss     Nasal bone fracture     Osteoporosis     Other conditions influencing health status 11/24/2017    History of cough    Pain in joints of unspecified hand 01/05/2016    Pain, hand joint    Personal history of diseases of the skin and subcutaneous  tissue 11/24/2017    History of cellulitis    Personal history of Methicillin resistant Staphylococcus aureus infection 11/24/2017    History of methicillin resistant Staphylococcus aureus infection    Personal history of other diseases of the respiratory system 12/05/2017    History of acute bronchitis    Personal history of other specified conditions 05/04/2017    History of dizziness    Personal history of pneumonia (recurrent)     History of pneumonia    Rheumatoid arthritis     T11 vertebral fracture (Multi)     Weakness        Surgical History  Past Surgical History:   Procedure Laterality Date    OTHER SURGICAL HISTORY  08/29/2017    Hip Surgery Left        Social History  She reports that she has never smoked. She has never used smokeless tobacco. She reports that she does not currently use drugs. She reports that she does not drink alcohol.    Family History  Family History   Problem Relation Name Age of Onset    No Known Problems Mother      No Known Problems Father          Allergies  Adhesive tape-silicones, Other, Cortisone, Penicillin, and Prednisone    Review of Systems   Constitutional:  Negative for activity change, appetite change, chills, diaphoresis, fatigue, fever and unexpected weight change.   HENT:  Negative for congestion, dental problem, drooling, ear discharge, ear pain, facial swelling, hearing loss, mouth sores, nosebleeds, postnasal drip, rhinorrhea, sinus pressure, sinus pain, sneezing, sore throat, tinnitus, trouble swallowing and voice change.    Eyes:  Negative for photophobia, pain, discharge, redness, itching and visual disturbance.   Respiratory:  Negative for apnea, cough, choking, chest tightness, shortness of breath, wheezing and stridor.    Cardiovascular:  Negative for chest pain, palpitations and leg swelling.   Gastrointestinal:  Negative for abdominal distention, abdominal pain, anal bleeding, blood in stool, constipation, diarrhea, nausea, rectal pain and vomiting.    Endocrine: Negative for cold intolerance, heat intolerance, polydipsia, polyphagia and polyuria.   Genitourinary:  Negative for decreased urine volume, difficulty urinating, dysuria, enuresis, flank pain, frequency, genital sores, hematuria and urgency.   Musculoskeletal:  Negative for arthralgias, back pain, gait problem, joint swelling, myalgias, neck pain and neck stiffness.   Skin:  Negative for color change, pallor, rash and wound.   Allergic/Immunologic: Negative for environmental allergies, food allergies and immunocompromised state.   Neurological:  Positive for headaches. Negative for dizziness, tremors, seizures, syncope, facial asymmetry, speech difficulty, weakness, light-headedness and numbness.   Hematological:  Negative for adenopathy. Does not bruise/bleed easily.   Psychiatric/Behavioral:  Negative for agitation, behavioral problems, confusion, decreased concentration, dysphoric mood, hallucinations, self-injury, sleep disturbance and suicidal ideas. The patient is not nervous/anxious and is not hyperactive.         Physical Exam  Vitals reviewed.   Constitutional:       Appearance: Normal appearance.   HENT:      Head: Normocephalic and atraumatic.      Right Ear: Tympanic membrane, ear canal and external ear normal.      Left Ear: Tympanic membrane, ear canal and external ear normal.      Nose: Nose normal.      Mouth/Throat:      Pharynx: Oropharynx is clear.   Eyes:      Extraocular Movements: Extraocular movements intact.      Conjunctiva/sclera: Conjunctivae normal.      Pupils: Pupils are equal, round, and reactive to light.   Cardiovascular:      Rate and Rhythm: Normal rate and regular rhythm.      Pulses: Normal pulses.      Heart sounds: Normal heart sounds.   Pulmonary:      Effort: Pulmonary effort is normal.      Breath sounds: Normal breath sounds. No wheezing.   Abdominal:      General: Abdomen is flat. Bowel sounds are normal.      Palpations: Abdomen is soft.   Musculoskeletal:  "     Cervical back: Normal range of motion and neck supple.   Skin:     General: Skin is warm and dry.   Neurological:      General: No focal deficit present.      Mental Status: She is alert and oriented to person, place, and time.   Psychiatric:         Mood and Affect: Mood normal.          Last Recorded Vitals  Blood pressure 135/78, pulse 79, temperature 36.3 °C (97.3 °F), temperature source Temporal, resp. rate 18, height 1.676 m (5' 6\"), weight 71 kg (156 lb 8.4 oz), SpO2 95%.    Relevant Results        Scheduled medications  busPIRone, 10 mg, oral, TID  calcium carbonate, 1,250 mg, oral, BID  cholecalciferol, 2,000 Units, oral, Daily  cyanocobalamin, 1,000 mcg, oral, Daily  docusate sodium, 100 mg, oral, BID  enoxaparin, 30 mg, subcutaneous, q12h GIANA  ferrous sulfate, 65 mg of iron, oral, Every other day  ipratropium-albuteroL, 3 mL, nebulization, TID  latanoprost, 1 drop, Both Eyes, Nightly  lidocaine, 1 patch, transdermal, Daily  pantoprazole, 40 mg, oral, Daily before breakfast   Or  pantoprazole, 40 mg, intravenous, Daily before breakfast  polyethylene glycol, 17 g, oral, Daily  sertraline, 50 mg, oral, Daily      Continuous medications  potassium wsptdpt-U7-4.45%NaCl, 50 mL/hr, Last Rate: 50 mL/hr (02/21/25 0100)      PRN medications  PRN medications: acetaminophen **OR** acetaminophen **OR** acetaminophen, benzocaine-menthol, dextromethorphan-guaifenesin, guaiFENesin, ipratropium-albuteroL, melatonin, ondansetron ODT, oxyCODONE, oxyCODONE, oxygen  Results for orders placed or performed during the hospital encounter of 02/20/25 (from the past 24 hours)   CBC and Auto Differential   Result Value Ref Range    WBC 7.6 4.4 - 11.3 x10*3/uL    nRBC 0.0 0.0 - 0.0 /100 WBCs    RBC 3.63 (L) 4.00 - 5.20 x10*6/uL    Hemoglobin 10.6 (L) 12.0 - 16.0 g/dL    Hematocrit 32.6 (L) 36.0 - 46.0 %    MCV 90 80 - 100 fL    MCH 29.2 26.0 - 34.0 pg    MCHC 32.5 32.0 - 36.0 g/dL    RDW 13.3 11.5 - 14.5 %    Platelets 196 150 - " 450 x10*3/uL    Neutrophils % 82.3 40.0 - 80.0 %    Immature Granulocytes %, Automated 0.7 0.0 - 0.9 %    Lymphocytes % 8.5 13.0 - 44.0 %    Monocytes % 7.1 2.0 - 10.0 %    Eosinophils % 1.1 0.0 - 6.0 %    Basophils % 0.3 0.0 - 2.0 %    Neutrophils Absolute 6.27 (H) 1.60 - 5.50 x10*3/uL    Immature Granulocytes Absolute, Automated 0.05 0.00 - 0.50 x10*3/uL    Lymphocytes Absolute 0.65 (L) 0.80 - 3.00 x10*3/uL    Monocytes Absolute 0.54 0.05 - 0.80 x10*3/uL    Eosinophils Absolute 0.08 0.00 - 0.40 x10*3/uL    Basophils Absolute 0.02 0.00 - 0.10 x10*3/uL   Comprehensive metabolic panel   Result Value Ref Range    Glucose 139 (H) 74 - 99 mg/dL    Sodium 140 136 - 145 mmol/L    Potassium 4.2 3.5 - 5.3 mmol/L    Chloride 98 98 - 107 mmol/L    Bicarbonate 27 21 - 32 mmol/L    Anion Gap 19 10 - 20 mmol/L    Urea Nitrogen 18 6 - 23 mg/dL    Creatinine 0.60 0.50 - 1.05 mg/dL    eGFR 84 >60 mL/min/1.73m*2    Calcium 8.9 8.6 - 10.3 mg/dL    Albumin 3.6 3.4 - 5.0 g/dL    Alkaline Phosphatase 65 33 - 136 U/L    Total Protein 6.0 (L) 6.4 - 8.2 g/dL    AST 20 9 - 39 U/L    Bilirubin, Total 0.7 0.0 - 1.2 mg/dL    ALT 18 7 - 45 U/L   Magnesium   Result Value Ref Range    Magnesium 1.72 1.60 - 2.40 mg/dL   Sars-CoV-2 and Influenza A/B PCR   Result Value Ref Range    Flu A Result Not Detected Not Detected    Flu B Result Not Detected Not Detected    Coronavirus 2019, PCR Detected (A) Not Detected   CBC   Result Value Ref Range    WBC 6.7 4.4 - 11.3 x10*3/uL    nRBC 0.0 0.0 - 0.0 /100 WBCs    RBC 3.51 (L) 4.00 - 5.20 x10*6/uL    Hemoglobin 10.1 (L) 12.0 - 16.0 g/dL    Hematocrit 31.7 (L) 36.0 - 46.0 %    MCV 90 80 - 100 fL    MCH 28.8 26.0 - 34.0 pg    MCHC 31.9 (L) 32.0 - 36.0 g/dL    RDW 13.5 11.5 - 14.5 %    Platelets 184 150 - 450 x10*3/uL   Comprehensive metabolic panel   Result Value Ref Range    Glucose 130 (H) 74 - 99 mg/dL    Sodium 137 136 - 145 mmol/L    Potassium 3.8 3.5 - 5.3 mmol/L    Chloride 101 98 - 107 mmol/L     Bicarbonate 28 21 - 32 mmol/L    Anion Gap 12 10 - 20 mmol/L    Urea Nitrogen 15 6 - 23 mg/dL    Creatinine 0.58 0.50 - 1.05 mg/dL    eGFR 84 >60 mL/min/1.73m*2    Calcium 8.4 (L) 8.6 - 10.3 mg/dL    Albumin 3.5 3.4 - 5.0 g/dL    Alkaline Phosphatase 61 33 - 136 U/L    Total Protein 5.8 (L) 6.4 - 8.2 g/dL    AST 14 9 - 39 U/L    Bilirubin, Total 0.8 0.0 - 1.2 mg/dL    ALT 14 7 - 45 U/L   Urinalysis with Reflex Culture and Microscopic   Result Value Ref Range    Color, Urine Light-Yellow Light-Yellow, Yellow, Dark-Yellow    Appearance, Urine Clear Clear    Specific Gravity, Urine 1.018 1.005 - 1.035    pH, Urine 6.5 5.0, 5.5, 6.0, 6.5, 7.0, 7.5, 8.0    Protein, Urine NEGATIVE NEGATIVE, 10 (TRACE), 20 (TRACE) mg/dL    Glucose, Urine Normal Normal mg/dL    Blood, Urine NEGATIVE NEGATIVE mg/dL    Ketones, Urine NEGATIVE NEGATIVE mg/dL    Bilirubin, Urine NEGATIVE NEGATIVE mg/dL    Urobilinogen, Urine Normal Normal mg/dL    Nitrite, Urine NEGATIVE NEGATIVE    Leukocyte Esterase, Urine NEGATIVE NEGATIVE     CT head wo IV contrast    Result Date: 2/20/2025  Interpreted By:  Jeffrey Payne, STUDY: CT HEAD WO IV CONTRAST; CT CERVICAL SPINE WO IV CONTRAST;  2/20/2025 8:07 pm   INDICATION: Signs/Symptoms:Unwitnessed fall, head injury on blood thinners.     COMPARISON: CT head and cervical spine dated 02/03/2025.   ACCESSION NUMBER(S): [id number]; [id number]   ORDERING CLINICIAN: EVER SCHAFER   TECHNIQUE: Noncontrast axial CT scan of head was performed, with coronal and sagittal reformats provided. The images were reviewed in bone, brain, blood and soft tissue windows.   Axial CT images of the cervical spine are obtained. Axial, coronal and sagittal reconstructions are provided for review.   FINDINGS: CT HEAD:   No hyperdense intracranial hemorrhage is present. There is no mass effect or midline shift.   Gray-white differentiation is intact, without evidence of CT apparent transcortical infarct. Low volume of patchy  attenuation changes are present in the periventricular and subcortical white matter of bilateral cerebral hemispheres, nonspecific findings favored to represent chronic sequela of microvascular disease.   No abnormal ventricular dilatation is identified. Basal cisterns are patent. No extra-axial collections are present.   Asymmetric soft tissue swelling and edema overlies the calvarial vertex, to the left of midline, without associated osseous injury. No skull fracture or other acute calvarial abnormality is present within limits of mild motion.   Mastoid air cells and middle ear cavities are clear.   There is near-complete opacification of the maxillary sinuses bilaterally, with hyperdense material, slightly increased from prior study on 02/03/2025, likely representing sequela of subacute maxillary fractures described on previous study. These fractures are not included in the field-of-view of the current exam.   CT C-SPINE:   Reversal normal lordotic curvature of the cervical spine is present with a 1-2 mm anterolisthesis of C3 on C4, similar to prior exam. No new spondylolisthesis is present.   Cervical vertebral body heights are preserved without compression fractures. Multilevel insufficiency endplate changes with Schmorl's nodes are present, similar to prior study. There is no evidence of acute trauma to the posterior elements of the cervical spine.   Craniocervical junction is intact, with degenerative changes present at the atlantoaxial articulation, similar to prior exam. Facet joints are intact without evidence of traumatic subluxation or perching.   Multilevel intervertebral disc height loss is present,, most pronounced with moderate to severe height loss at C4-C5 and C6-C7.   Multilevel spinal canal stenosis is evident, with moderate narrowing suspected at the level of C5-C6 due to combination of reversal of normal lordotic curvature of the cervical spine, disc osteophyte complex and ligamentum flavum  thickening. Mild spinal canal narrowing is likely present at C4-C5 and C6-C7 due to disc osteophyte complexes, similar to prior exam.   Multilevel neural foraminal stenosis due to hypertrophic uncovertebral and facet joint changes, with mild narrowing at several levels is also similar to prior study.   Prevertebral and paraspinal soft tissues do not demonstrate any new abnormality.       CT HEAD: 1. No evidence of hemorrhage, skull fracture, or other acute intracranial trauma/abnormality. 2. Scalp edema and swelling overlies the left calvarial vertex, without evidence of underlying osseous injury. 3. Near-complete opacification of the partially visualized maxillary sinuses bilaterally with a somewhat hyperdense material, somewhat increased from prior exam on 02/03/2025, likely secondary to previously described subacute maxillary fractures, which are not included in the field-of-view of the study. 4. Low volume of attenuation changes in the periventricular and subcortical white matter of bilateral cerebral hemispheres are nonspecific, and favored to represent chronic sequela of microvascular disease.   CT C-SPINE: 1. No evidence of acute trauma to the cervical spine. 2. Spondylotic changes of the cervical spine as detailed above, similar in appearance to prior study.   MACRO: None   Signed by: Jeffrey Payne 2/20/2025 8:45 PM Dictation workstation:   [id number]    CT cervical spine wo IV contrast    Result Date: 2/20/2025  Interpreted By:  Jeffrey Payne, STUDY: CT HEAD WO IV CONTRAST; CT CERVICAL SPINE WO IV CONTRAST;  2/20/2025 8:07 pm   INDICATION: Signs/Symptoms:Unwitnessed fall, head injury on blood thinners.     COMPARISON: CT head and cervical spine dated 02/03/2025.   ACCESSION NUMBER(S): [id number]; [id number]   ORDERING CLINICIAN: EVER SCHAFER   TECHNIQUE: Noncontrast axial CT scan of head was performed, with coronal and sagittal reformats provided. The images were reviewed in bone,  brain, blood and soft tissue windows.   Axial CT images of the cervical spine are obtained. Axial, coronal and sagittal reconstructions are provided for review.   FINDINGS: CT HEAD:   No hyperdense intracranial hemorrhage is present. There is no mass effect or midline shift.   Gray-white differentiation is intact, without evidence of CT apparent transcortical infarct. Low volume of patchy attenuation changes are present in the periventricular and subcortical white matter of bilateral cerebral hemispheres, nonspecific findings favored to represent chronic sequela of microvascular disease.   No abnormal ventricular dilatation is identified. Basal cisterns are patent. No extra-axial collections are present.   Asymmetric soft tissue swelling and edema overlies the calvarial vertex, to the left of midline, without associated osseous injury. No skull fracture or other acute calvarial abnormality is present within limits of mild motion.   Mastoid air cells and middle ear cavities are clear.   There is near-complete opacification of the maxillary sinuses bilaterally, with hyperdense material, slightly increased from prior study on 02/03/2025, likely representing sequela of subacute maxillary fractures described on previous study. These fractures are not included in the field-of-view of the current exam.   CT C-SPINE:   Reversal normal lordotic curvature of the cervical spine is present with a 1-2 mm anterolisthesis of C3 on C4, similar to prior exam. No new spondylolisthesis is present.   Cervical vertebral body heights are preserved without compression fractures. Multilevel insufficiency endplate changes with Schmorl's nodes are present, similar to prior study. There is no evidence of acute trauma to the posterior elements of the cervical spine.   Craniocervical junction is intact, with degenerative changes present at the atlantoaxial articulation, similar to prior exam. Facet joints are intact without evidence of  traumatic subluxation or perching.   Multilevel intervertebral disc height loss is present,, most pronounced with moderate to severe height loss at C4-C5 and C6-C7.   Multilevel spinal canal stenosis is evident, with moderate narrowing suspected at the level of C5-C6 due to combination of reversal of normal lordotic curvature of the cervical spine, disc osteophyte complex and ligamentum flavum thickening. Mild spinal canal narrowing is likely present at C4-C5 and C6-C7 due to disc osteophyte complexes, similar to prior exam.   Multilevel neural foraminal stenosis due to hypertrophic uncovertebral and facet joint changes, with mild narrowing at several levels is also similar to prior study.   Prevertebral and paraspinal soft tissues do not demonstrate any new abnormality.       CT HEAD: 1. No evidence of hemorrhage, skull fracture, or other acute intracranial trauma/abnormality. 2. Scalp edema and swelling overlies the left calvarial vertex, without evidence of underlying osseous injury. 3. Near-complete opacification of the partially visualized maxillary sinuses bilaterally with a somewhat hyperdense material, somewhat increased from prior exam on 02/03/2025, likely secondary to previously described subacute maxillary fractures, which are not included in the field-of-view of the study. 4. Low volume of attenuation changes in the periventricular and subcortical white matter of bilateral cerebral hemispheres are nonspecific, and favored to represent chronic sequela of microvascular disease.   CT C-SPINE: 1. No evidence of acute trauma to the cervical spine. 2. Spondylotic changes of the cervical spine as detailed above, similar in appearance to prior study.   MACRO: None   Signed by: Jeffrey Payne 2/20/2025 8:45 PM Dictation workstation:   [id number]    XR knee right 4+ views    Result Date: 2/20/2025  Interpreted By:  Poli Bautista, STUDY: XR KNEE RIGHT 4+ VIEWS; ;  2/20/2025 8:06 pm   INDICATION:  Signs/Symptoms:Fall, right knee pain.     COMPARISON: 04/09/2024   ACCESSION NUMBER(S): [id number]   ORDERING CLINICIAN: EVER SCHAFER   FINDINGS: Chondrocalcinosis. Mild tricompartmental osteoarthrosis. Small joint effusion. A linear lucency in the lateral patella is noted on oblique view.       Linear lucency in the patella on the oblique view concerning for acute fracture the is only a single view finding. CT would confirm if deemed clinically warranted. Correlate for patellar tenderness.     MACRO: None.   Signed by: Poli Bautista 2/20/2025 8:30 PM Dictation workstation:   [id number]    XR chest 1 view    Result Date: 2/20/2025  Interpreted By:  Poli Bautista, STUDY: XR CHEST 1 VIEW;  2/20/2025 8:06 pm   INDICATION: Signs/Symptoms:Cough, hypoxic.     COMPARISON: 04/20/2025   ACCESSION NUMBER(S): [id number]   ORDERING CLINICIAN: EVER SCHAFER   FINDINGS:     The heart is enlarged. The aorta is atherosclerotic. There is interstitial prominence bilaterally which is similar to improved from prior study. There is also significantly improved aeration of the left lung base. There is mild lingular atelectasis. There is no significant pleural effusion. No pneumothorax.       Improved aeration of the left lung base from prior study. Overall stable to improved mild generalized interstitial prominence that may likely relates to interstitial edema given cardiomegaly.     MACRO: None.   Signed by: Poli Bautista 2/20/2025 8:26 PM Dictation workstation:   [id number]        Assessment/Plan   Assessment & Plan  Closed head injury, initial encounter    COVID-19    Acute pain of right knee    Anxiety    Arthritis pain, hip    Falls    Hypoxia      Patient is clinically very stable  She has mild hypoxia  Treat with breathing treatment and oxygen  Had a long conversation with the son he does not want treatment with remdesivir at this point as patient   Will get CT of the knee to get further evaluation regarding  possible patellar fracture  Continue home medications  PT OT and  for discharge planning       I spent  minutes in the professional and overall care of this patient.      Yajaira Osorio MD

## 2025-02-21 NOTE — CONSULTS
Nutrition Progress Note    Consult for MST score 2 - unsure of any wt loss. Recent wt gain noted in wt hx possible r/t fluid. +1 BLE edema noted. Ate well this AM. Will defer assessment at this time. Please consult dietitian if pt's nutrition status declines during this admission.     Daily Weight  02/20/25 : 71 kg (156 lb 8.4 oz)  02/03/25 : 70.3 kg (154 lb 15.7 oz)  02/03/25 : 68.6 kg (151 lb 3.8 oz)  11/13/24 : 62.7 kg (138 lb 3.2 oz)  10/04/24 : 65.8 kg (145 lb)  09/25/24 : 67.1 kg (148 lb)  07/25/24 : 65.8 kg (145 lb)  07/02/24 : 66.2 kg (146 lb)  06/16/24 : 67.6 kg (149 lb 0.5 oz)  05/21/24 : 67.2 kg (148 lb 3.2 oz)

## 2025-02-21 NOTE — CARE PLAN
Problem: Safety - Adult  Goal: Free from fall injury  Outcome: Progressing     Problem: Pain - Adult  Goal: Verbalizes/displays adequate comfort level or baseline comfort level  Outcome: Progressing       The clinical goals for the shift include ensure pt safety      Over the shift, the patient did make progress toward the following goals. Recommendations to address these barriers include call light in reach, bed alarm activated, appropriately medicate pt.

## 2025-02-21 NOTE — ED PROVIDER NOTES
HPI   Chief Complaint   Patient presents with    Fall     Pt had unwitnessed fall at [facility]. Pt is on lovenox. Pt has hematoma to back of head. Pt complaining of right knee pain. Pt was 88% on room air upon arrival. Paperwork from nursing home does not indicate that pt normally wears oxygen and pt is confused at baseline to answer for herself. Pt has frequent falls and frequent ED visits. Pt placed on 2L and is now 96%.        HPI  Patient is a 89-year-old female with history of dementia, hypertension brought in by EMS for evaluation after an unwitnessed fall.  Patient was found by nursing staff down and had hit her head.  She per EMS did have a scalp hematoma and was also complaining of right knee pain on the way in.  She was conscious per EMS when found.  She is on Lovenox for blood thinning medication.  She is at her baseline mental status which is alert and oriented to person only.  She was incidentally found to be hypoxic upon arrival 88% on room air and does not require oxygen at baseline.  No reported sick contacts or recent illness.      Patient History   Past Medical History:   Diagnosis Date    Anxiety     At risk for falling     Closed compression fracture of L4 lumbar vertebra with delayed healing, subsequent encounter     Closed wedge compression fracture of T11 vertebra (Multi)     Constipation     Dementia     Depression     Facial fracture (Multi)     GERD (gastroesophageal reflux disease)     Hypertension     L4 vertebral fracture (Multi)     Left rib fracture     Memory loss     Nasal bone fracture     Osteoporosis     Other conditions influencing health status 11/24/2017    History of cough    Pain in joints of unspecified hand 01/05/2016    Pain, hand joint    Personal history of diseases of the skin and subcutaneous tissue 11/24/2017    History of cellulitis    Personal history of Methicillin resistant Staphylococcus aureus infection 11/24/2017    History of methicillin  resistant Staphylococcus aureus infection    Personal history of other diseases of the respiratory system 12/05/2017    History of acute bronchitis    Personal history of other specified conditions 05/04/2017    History of dizziness    Personal history of pneumonia (recurrent)     History of pneumonia    Rheumatoid arthritis     T11 vertebral fracture (Multi)     Weakness      Past Surgical History:   Procedure Laterality Date    OTHER SURGICAL HISTORY  08/29/2017    Hip Surgery Left     Family History   Problem Relation Name Age of Onset    No Known Problems Mother      No Known Problems Father       Social History     Tobacco Use    Smoking status: Never    Smokeless tobacco: Never   Vaping Use    Vaping status: Never Used   Substance Use Topics    Alcohol use: Never    Drug use: Not Currently       Physical Exam   ED Triage Vitals [02/20/25 1902]   Temperature Heart Rate Respirations BP   37 °C (98.6 °F) 79 15 140/65      Pulse Ox Temp src Heart Rate Source Patient Position   (!) 88 % -- -- --      BP Location FiO2 (%)     -- --       Physical Exam  Vitals and nursing note reviewed.   Constitutional:       General: She is not in acute distress.     Appearance: She is well-developed.   HENT:      Head: Normocephalic.      Comments: Left posterior occiput scalp hematoma approximately 4 x 4 cm  Eyes:      Conjunctiva/sclera: Conjunctivae normal.   Cardiovascular:      Rate and Rhythm: Normal rate and regular rhythm.      Heart sounds: No murmur heard.  Pulmonary:      Effort: Pulmonary effort is normal. No respiratory distress.      Breath sounds: Normal breath sounds.      Comments: Lungs clear to auscultation bilaterally  Abdominal:      Palpations: Abdomen is soft.      Tenderness: There is no abdominal tenderness.   Musculoskeletal:         General: No swelling.      Cervical back: Neck supple.   Skin:     General: Skin is warm and dry.      Capillary Refill: Capillary refill takes less than 2 seconds.    Neurological:      Mental Status: She is alert.   Psychiatric:         Mood and Affect: Mood normal.           ED Course & MDM   Diagnoses as of 02/20/25 2302   Closed head injury, initial encounter   Scalp hematoma, initial encounter   COVID-19   Acute hypoxic respiratory failure (Multi)                 No data recorded     Schuyler Coma Scale Score: 15 (02/20/25 1908 : Lilliana Bird, ESTEVAN)                           Medical Decision Making  Parts of this chart have been completed using voice recognition software. Please excuse any errors of transcription.  My thought process and reason for plan has been formulated from the time that I saw the patient until the time of disposition and is not specific to one specific moment during their visit and furthermore my MDM encompasses this entire chart and not only this text box.      HPI: Detailed above.    Exam: A medically appropriate exam performed, outlined above, given the known history and presentation.    History obtained from: Patient    Social Determinants of Health considered during this visit: Resides at skilled nursing facility    Medications given during visit:  Medications - No data to display     Diagnostic/tests  Labs Reviewed   CBC WITH AUTO DIFFERENTIAL - Abnormal       Result Value    WBC 7.6      nRBC 0.0      RBC 3.63 (*)     Hemoglobin 10.6 (*)     Hematocrit 32.6 (*)     MCV 90      MCH 29.2      MCHC 32.5      RDW 13.3      Platelets 196      Neutrophils % 82.3      Immature Granulocytes %, Automated 0.7      Lymphocytes % 8.5      Monocytes % 7.1      Eosinophils % 1.1      Basophils % 0.3      Neutrophils Absolute 6.27 (*)     Immature Granulocytes Absolute, Automated 0.05      Lymphocytes Absolute 0.65 (*)     Monocytes Absolute 0.54      Eosinophils Absolute 0.08      Basophils Absolute 0.02     COMPREHENSIVE METABOLIC PANEL - Abnormal    Glucose 139 (*)     Sodium 140      Potassium 4.2      Chloride 98      Bicarbonate 27      Anion Gap 19       Urea Nitrogen 18      Creatinine 0.60      eGFR 84      Calcium 8.9      Albumin 3.6      Alkaline Phosphatase 65      Total Protein 6.0 (*)     AST 20      Bilirubin, Total 0.7      ALT 18     SARS-COV-2 AND INFLUENZA A/B PCR - Abnormal    Flu A Result Not Detected      Flu B Result Not Detected      Coronavirus 2019, PCR Detected (*)     Narrative:     This assay is an FDA-cleared, in vitro diagnostic nucleic acid amplification test for the qualitative detection and differentiation of SARS CoV-2/ Influenza A/B from nasopharyngeal specimens collected from individuals with signs and symptoms of respiratory tract infections, and has been validated for use at [facility]. Negative results do not preclude COVID-19/ Influenza A/B infections and should not be used as the sole basis for diagnosis, treatment, or other management decisions. Testing for SARS CoV-2 is recommended only for patients who meet current clinical and/or epidemiological criteria defined by federal, state, or local public health directives.   MAGNESIUM - Normal    Magnesium 1.72        CT head wo IV contrast   Final Result   CT HEAD:   1. No evidence of hemorrhage, skull fracture, or other acute   intracranial trauma/abnormality.   2. Scalp edema and swelling overlies the left calvarial vertex,   without evidence of underlying osseous injury.   3. Near-complete opacification of the partially visualized maxillary   sinuses bilaterally with a somewhat hyperdense material, somewhat   increased from prior exam on 02/03/2025, likely secondary to   previously described subacute maxillary fractures, which are not   included in the field-of-view of the study.   4. Low volume of attenuation changes in the periventricular and   subcortical white matter of bilateral cerebral hemispheres are   nonspecific, and favored to represent chronic sequela of   microvascular disease.        CT C-SPINE:   1. No evidence of acute trauma to the cervical  spine.   2. Spondylotic changes of the cervical spine as detailed above,   similar in appearance to prior study.        MACRO:   None        Signed by: Jeffrey Payne 2/20/2025 8:45 PM   Dictation workstation:   [id number]      CT cervical spine wo IV contrast   Final Result   CT HEAD:   1. No evidence of hemorrhage, skull fracture, or other acute   intracranial trauma/abnormality.   2. Scalp edema and swelling overlies the left calvarial vertex,   without evidence of underlying osseous injury.   3. Near-complete opacification of the partially visualized maxillary   sinuses bilaterally with a somewhat hyperdense material, somewhat   increased from prior exam on 02/03/2025, likely secondary to   previously described subacute maxillary fractures, which are not   included in the field-of-view of the study.   4. Low volume of attenuation changes in the periventricular and   subcortical white matter of bilateral cerebral hemispheres are   nonspecific, and favored to represent chronic sequela of   microvascular disease.        CT C-SPINE:   1. No evidence of acute trauma to the cervical spine.   2. Spondylotic changes of the cervical spine as detailed above,   similar in appearance to prior study.        MACRO:   None        Signed by: Jeffrey Payne 2/20/2025 8:45 PM   Dictation workstation:   [id number]      XR chest 1 view   Final Result   Improved aeration of the left lung base from prior study. Overall   stable to improved mild generalized interstitial prominence that may   likely relates to interstitial edema given cardiomegaly.             MACRO:   None.        Signed by: Poli Bautista 2/20/2025 8:26 PM   Dictation workstation:   [id number]      XR knee right 4+ views   Final Result   Linear lucency in the patella on the oblique view concerning for   acute fracture the is only a single view finding. CT would confirm if   deemed clinically warranted. Correlate for patellar tenderness.              MACRO:   None.        Signed by: Poli Bautista 2/20/2025 8:30 PM   Dictation workstation:   [id number]           Considerations/further MDM:  Patient is a 89-year-old female presenting for evaluation of fall, hypoxia    I saw this patient in conjunction with Dr. Campbell    Patient awake alert in no apparent distress at baseline mental status upon arrival.  She has no evidence of airway compromise or respiratory distress but is incidentally found to be hypoxic at 88% on room air and was placed on 2 L nasal cannula with improvement.  She has no focal neurologic deficits on exam.  Per EMS she was complaining of right knee pain but has no obvious injuries or deformities no tenderness to palpation on exam.  She does have a a posterior scalp hematoma no active bleeding lacerations on exam.  CT head is without evidence of intracranial hemorrhage or skull fracture.  CT C-spine is without acute fracture or subluxation.  Laboratory workup is with evidence of COVID-19 which is likely contributing to the patient's hypoxia.  Chest x-ray is without evidence of pneumonia or pneumothorax.  X-ray of the right knee is with a mild linear lucency in the patella which may be concerning for a mild fracture but otherwise is unremarkable.  Given the patient's COVID-19 and acute hypoxic respiratory failure she is admitted to her primary care provider service for further management of symptoms and treatment of her COVID-19.          Procedure  Critical Care    Performed by: Megan Decker PA-C  Authorized by: Vaughn Campbell DO    Critical care provider statement:     Critical care time (minutes):  20    Critical care time was exclusive of:  Separately billable procedures and treating other patients    Critical care was necessary to treat or prevent imminent or life-threatening deterioration of the following conditions:  Respiratory failure    Critical care was time spent personally by me on the following activities:  Development of  treatment plan with patient or surrogate, examination of patient, obtaining history from patient or surrogate, ordering and performing treatments and interventions, ordering and review of laboratory studies, ordering and review of radiographic studies and pulse oximetry    Care discussed with: admitting provider         Megan Decker PA-C  02/20/25 [id number]

## 2025-02-21 NOTE — PROGRESS NOTES
02/21/25 1525   Discharge Planning   Living Arrangements Other (Comment)  ([place])   Support Systems Children;Family members   Assistance Needed dependent   Type of Residence Skilled nursing facility  (Return to [place])   Do you have animals or pets at home? No   Who is requesting discharge planning? Other (Comment)  (son)   Home or Post Acute Services Post acute facilities (Rehab/SNF/etc)   Type of Post Acute Facility Services Rehab   Expected Discharge Disposition SNF  ([place])   Does the patient need discharge transport arranged? Yes   RoundTrip coordination needed? Yes   Has discharge transport been arranged? No     Return to [facility] rehab per sonSohan, who came into hospital.  If here less than 72 hours can return on other qualifying stay.  No precert needed.  Daughter # is disconnected.   Can leave message on [employer] line phone.

## 2025-02-21 NOTE — PROGRESS NOTES
Physical Therapy    Physical Therapy Evaluation    Patient Name: Xiomy Rubio  MRN: [id number]  Department: [address]  Room: [address] [place], [place] [address]  Today's Date: 2/21/2025   Time Calculation  Start Time: 0850  Stop Time: 0859  Time Calculation (min): 9 min    Assessment/Plan   PT Assessment  PT Assessment Results: Decreased strength, Decreased endurance, Impaired balance, Decreased mobility, Decreased coordination, Decreased cognition, Impaired judgement, Decreased safety awareness, Impaired hearing  Rehab Prognosis: Good  Barriers to Discharge Home: Caregiver assistance, Physical needs, Cognition needs  Caregiver Assistance: Caregiver assistance needed per identified barriers - however, level of patient's required assistance exceeds assistance available at home  Cognition Needs: 24hr supervision for safety awareness needed  Physical Needs: Intermittent mobility assistance needed, Ambulating household distances limited by function/safety, High falls risk due to function or environment  Evaluation/Treatment Tolerance: Patient limited by fatigue  Medical Staff Made Aware: Yes  End of Session Communication: Bedside nurse  End of Session Patient Position: Up in chair, Alarm on        Assessment Comment: 89 y/o female who presented to ED after unwitnessed fall at facility. Pt is a poor historian and pleasantly confused at this time. Per EMR, pt was ambulatory and indep prior to fall on 2/3/25. Has been at SNF since. Pt is currently a falls risk due to impaired balance, decreased strength, decreased safety awareness and decreased activity tolerance. Would benefit from cont PT services to maximize safe mobility.        IP OR SWING BED PT PLAN  Inpatient or Swing Bed: Inpatient  PT Plan  Treatment/Interventions: Bed mobility, Transfer training, Gait training, Balance training, Neuromuscular re-education, Strengthening, Endurance training, Therapeutic exercise, Therapeutic activity  PT Plan: Ongoing PT  PT Frequency: 4 times per week  PT  Discharge Recommendations: Moderate intensity level of continued care  Equipment Recommended upon Discharge: Wheeled walker  PT Recommended Transfer Status: Assist x1, Assistive device  PT - OK to Discharge: Yes    Subjective   General Visit Information:  General  Reason for Referral: impaired mobility; unwitnessed fall, hypoxia, Covid-19  Past Medical History Relevant to Rehab: anxiety/depression, dementia, compression fxs, memory loss, osteoporosis  Family/Caregiver Present: No  Co-Treatment: OT  Co-Treatment Reason: safe OOB mobility  Prior to Session Communication: Bedside nurse  Patient Position Received: Bed, 3 rail up, Alarm on  Preferred Learning Style: visual, verbal  General Comment: 89 y/o female who presented to ED after unwitnessed fall. Pt cleared for mobility per nursing. Supine in bed upon arrival, HOB elevated. Agreeable to participate. PIV, 3 L O2  Home Living:  Home Living  Type of Home: House  Lives With: Adult children (son)  Home Adaptive Equipment: Other (Comment) (rollator)  Home Layout: Able to live on main level with bedroom/bathroom  Home Access: Stairs to enter with rails  Entrance Stairs-Number of Steps: 2 SHELBY  Bathroom Shower/Tub: Tub/shower unit  Bathroom Toilet: Standard  Bathroom Equipment: Grab bars in shower, Shower chair with back  Home Living Comments: Currently from [facility] after fall on 2/3/25.  Prior Level of Function:  Prior Function Per Pt/Caregiver Report  Level of [place]: Needs assistance with ADLs, Needs assistance with homemaking, Needs assistance with functional transfers  Receives Help From: Other (Comment) (care staff at SNF)  ADL Assistance:  (prior to 2/3/25 pt was IND with ADLS at home with some reported difficulty.  assume staff assist at SNF)  Homemaking Assistance:  (family was assisting at home)  Ambulatory Assistance:  (at home pt was IND with rollator; assume staff assist provided at SNF)  Vocational: Retired  Leisure: gardening  Prior Function  Comments: hx of frequent falls  Precautions:  Precautions  Hearing/Visual Limitations: [place], glasses  Medical Precautions: Fall precautions, Infection precautions, Oxygen therapy device and L/min  Precautions Comment: covid isolation          Objective   Pain:  Pain Assessment  Pain Assessment: 0-10  0-10 (Numeric) Pain Score: 0 - No pain  Cognition:  Cognition  Cognition Comments: able to follow simple, motor commands with cueing due to [place]. Pleasantly confused.  Processing Speed: Delayed    General Assessments:  General Observation  General Observation: pleasantly confused     Activity Tolerance  Endurance: Decreased tolerance for upright activites  Activity Tolerance Comments: easily fatigued    Sensation  Sensation Comment: light touch appears intact    Strength  Strength Comments: B LEs > 3/5 observed  Coordination  Coordination Comment: slow movements     Static Sitting Balance  Static Sitting-Level of Assistance: Close supervision  Static Sitting-Comment/Number of Minutes: sitting on EOB    Static Standing Balance  Static Standing-Level of Assistance: Minimum assistance  Static Standing-Comment/Number of Minutes: B hand held assist  Dynamic Standing Balance  Dynamic Standing-Comments: no major LOB, B hand held assist  Functional Assessments:  Bed Mobility 1  Bed Mobility 1: Supine to sitting  Level of Assistance 1: Minimum assistance  Bed Mobility Comments 1: HOB elevated    Transfer 1  Technique 1: Sit to stand, Stand to sit  Transfer Level of Assistance 1: Minimum assistance, +2, Arm in arm assistance  Trials/Comments 1: from elevated EOB, onto chair with arms  Transfers 2  Transfer From 2: Bed to  Transfer to 2: Chair with arms  Technique 2: Sit to stand, Stand to sit  Transfer Level of Assistance 2: Minimum assistance, +2, Arm in arm assistance, Moderate assistance  Trials/Comments 2: about 1 ft to bedside chair with B HHA, min assist x 2.       Extremity/Trunk Assessments:  RLE   RLE : Exceptions to  WFL  Strength RLE  RLE Overall Strength: Greater than or equal to 3/5 as evidenced by functional mobility  LLE   LLE : Exceptions to WFL  Strength LLE  LLE Overall Strength: Greater than or equal to 3/5 as evidenced by functional mobility  Outcome Measures:  [facility] Basic Mobility  Turning from your back to your side while in a flat bed without using bedrails: A lot  Moving from lying on your back to sitting on the side of a flat bed without using bedrails: A lot  Moving to and from bed to chair (including a wheelchair): A little  Standing up from a chair using your arms (e.g. wheelchair or bedside chair): A little  To walk in hospital room: A lot  Climbing 3-5 steps with railing: Total  Basic Mobility - Total Score: 13    Encounter Problems       Encounter Problems (Active)       Balance       dynamic (Progressing)       Start:  02/21/25    Expected End:  03/07/25       Pt will stand with UE support of walker, no LOB and supervision for > 3 minutes            Mobility       bed mobility (Progressing)       Start:  02/21/25    Expected End:  03/07/25       Pt will perform sup to/from sit transfer with supervision          ambulation (Progressing)       Start:  02/21/25    Expected End:  03/07/25       Pt will amb > 25  ft with wheeled walker and min assist            PT Transfers       sit to stand (Progressing)       Start:  02/21/25    Expected End:  03/07/25       Pt will perform sit to stand transfer with walker and supervision         bed to chair (Progressing)       Start:  02/21/25    Expected End:  03/07/25       Pt will perform bed to chair transfer with walker and supervision            Pain - Adult          Safety       LTG - Patient will utilize safety techniques (Progressing)       Start:  02/21/25    Expected End:  03/07/25                   Education Documentation  Body Mechanics, taught by Annemarie Nova, PT at 2/21/2025  1:48 PM.  Learner: Patient  Readiness: Acceptance  Method: Explanation  Response:  Needs Reinforcement  Comment: PT POC, falls risk    Precautions, taught by Annemarie Nova PT at 2/21/2025  1:48 PM.  Learner: Patient  Readiness: Acceptance  Method: Explanation  Response: Needs Reinforcement  Comment: PT POC, falls risk    Mobility Training, taught by Annemarie Nova PT at 2/21/2025  1:48 PM.  Learner: Patient  Readiness: Acceptance  Method: Explanation  Response: Needs Reinforcement  Comment: PT POC, falls risk    Education Comments  No comments found.

## 2025-02-21 NOTE — PROGRESS NOTES
Occupational Therapy    Evaluation    Patient Name: Xiomy Rubio  MRN: [id number]  Department: [address]  Room: [address] [place] [address]A  Today's Date: 2/21/2025  Time Calculation  Start Time: 0847  Stop Time: 0858  Time Calculation (min): 11 min    Assessment  IP OT Assessment  OT Assessment: 89 y/o female presenting from SNF s/p unwitnessed fall.  Admitted for mgmt of hypoxia, covid-19 infection. On eval she requires increased assist for xfers, mobility, and self care d/t decreased strength, balance, activity tolerance. Skilled OT services are required to maximize safety/IND prior to DC.  Prognosis: Good  Barriers to Discharge Home: Caregiver assistance, Physical needs  Caregiver Assistance: Caregiver assistance needed per identified barriers - however, level of patient's required assistance exceeds assistance available at home  Physical Needs: 24hr mobility assistance needed, 24hr ADL assistance needed, High falls risk due to function or environment  Evaluation/Treatment Tolerance: Patient tolerated treatment well  Medical Staff Made Aware: Yes  End of Session Communication: Bedside nurse  End of Session Patient Position: Up in chair, Alarm on  Plan:  Treatment Interventions: ADL retraining, Functional transfer training, UE strengthening/ROM, Endurance training, Patient/family training, Equipment evaluation/education, Neuromuscular reeducation, Compensatory technique education  OT Frequency: 3 times per week  OT Discharge Recommendations: Moderate intensity level of continued care  Equipment Recommended upon Discharge: Wheeled walker  OT Recommended Transfer Status: Assist of 2  OT - OK to Discharge: Yes    Subjective   Current Problem:  1. Closed head injury, initial encounter        2. Scalp hematoma, initial encounter        3. COVID-19        4. Acute hypoxic respiratory failure (Multi)          General:  General  Reason for Referral: Decreased ADLS, unwitnessed fall, hypoxia, Covid-19  Referred By: Dr. Osorio  Past Medical  History Relevant to Rehab: anxiety/depression, dementia, compression fxs, memory loss, osteoporosis  Family/Caregiver Present: No  Co-Treatment: PT  Co-Treatment Reason: for safe advancement of mobility  Prior to Session Communication: Bedside nurse  Patient Position Received: Bed, 3 rail up, Alarm on  Preferred Learning Style: visual, verbal  General Comment: Cleared by nsg, pt met in supine, agreeable to OT assessment  Precautions:  Hearing/Visual Limitations: + glasses. very [place]  Medical Precautions: Fall precautions, Infection precautions, Oxygen therapy device and L/min  Precautions Comment: on 3L O2 via NC    Pain:  Pain Assessment  Pain Assessment: 0-10  0-10 (Numeric) Pain Score: 0 - No pain    Objective   Cognition:  Overall Cognitive Status: Impaired  Orientation Level: Disoriented to place, Disoriented to time, Disoriented to situation  Following Commands: Follows multistep commands with repetition  Cognition Comments: pleasantly confused. poor historian. decreased situational awareness. Command following limited by hearing loss.    Home Living:  Type of Home: House  Lives With: Adult children (son)  Home Adaptive Equipment: Other (Comment) (rollao)  Home Layout: Able to live on main level with bedroom/bathroom  Home Access: Stairs to enter with rails  Entrance Stairs-Number of Steps: 2 SHELBY  Bathroom Shower/Tub: Tub/shower unit  Bathroom Toilet: Standard  Bathroom Equipment: Grab bars in shower, Shower chair with back  Home Living Comments: Patient is currently residing at [facility] for skilled rehab after suffering GLF at home on 2/3; went to [facility] for mgmt/treatment, then DC'd to SNF.     Prior Function:  Level of [place]: Needs assistance with ADLs, Needs assistance with homemaking, Needs assistance with functional transfers  Receives Help From: Other (Comment) (care staff at SNF)  ADL Assistance:  (prior to 2/3/25 pt was IND with ADLS at home with some reported difficulty.  assume staff assist  at SNF)  Homemaking Assistance:  (family was assisting at home)  Ambulatory Assistance:  (at home pt was IND with rollator; assume staff assist provided at SNF)  Vocational: Retired  Leisure: gardening  Prior Function Comments: hx of frequent falls; most recent was 2/3 where she was admitted to [facility] and then went to [place] for [facility], where she presents from now    ADL:  Eating Assistance: Stand by  Eating Deficit: Setup  Grooming Assistance: Stand by  Grooming Deficit: Setup  Bathing Assistance: Maximal  Bathing Deficit:  (anticipated for LB)  UE Dressing Assistance: Moderate  UE Dressing Deficit:  (gown mgmt)  LE Dressing Assistance: Maximal  LE Dressing Deficit: Don/doff R sock, Don/doff L sock  Toileting Assistance with Device: Total  Toileting Deficit: Urinary Catheter    Activity Tolerance:  Endurance: Decreased tolerance for upright activites  Activity Tolerance Comments: on 3L O2 via NC    Bed Mobility/Transfers:   Bed Mobility  Bed Mobility: Yes  Bed Mobility 1  Bed Mobility 1: Supine to sitting  Level of Assistance 1: Minimum assistance  Bed Mobility Comments 1: HOB significantly elevated. MIN A for trunk up    Transfers  Transfer: Yes  Transfer 1  Transfer From 1: Bed to  Transfer to 1: Stand  Technique 1: Sit to stand  Transfer Level of Assistance 1: Minimum assistance, +2, Arm in arm assistance  Trials/Comments 1: STS from EOB with MIN A x2 for safety/stability with b/l arm in arm assist  Transfers 2  Transfer From 2: Bed to  Transfer to 2: Chair with arms  Technique 2: Stand pivot  Transfer Level of Assistance 2: Minimum assistance, +2, Arm in arm assistance, Moderate assistance  Trials/Comments 2: cues on pacing, body positioning, and hand placement prior to descent to chair. min-mod A x2 for stability and controlled descent    Functional Mobility:  Functional Mobility  Functional Mobility Performed: No    Sitting Balance:  Static Sitting Balance  Static Sitting-Balance Support: Feet supported,  Bilateral upper extremity supported  Static Sitting-Level of Assistance: Close supervision  Static Sitting-Comment/Number of Minutes: EOB sitting    Vision: Vision - Basic Assessment  Current Vision: Wears glasses all the time  Sensation:  Light Touch: Not tested  Sensation Comment: light touch appears intact  Strength:  Strength Comments: BUEs at least >/= 3/5, observed functionally  Coordination:  Movements are Fluid and Coordinated: No  Upper Body Coordination: slightly impaired d/t weakness  Lower Body Coordination: slightly impaired d/t weakness   Hand Function:  Hand Function  Gross Grasp: Functional  Coordination: Functional  Extremities: RUE   RUE : Within Functional Limits and LUE   LUE: Within Functional Limits    Outcome Measures: [facility] Daily Activity  Putting on and taking off regular lower body clothing: A lot  Bathing (including washing, rinsing, drying): A lot  Putting on and taking off regular upper body clothing: A little  Toileting, which includes using toilet, bedpan or urinal: Total  Taking care of personal grooming such as brushing teeth: A little  Eating Meals: A little  Daily Activity - Total Score: 14      Education Documentation  Body Mechanics, taught by Davi Russo OT at 2/21/2025 10:26 AM.  Learner: Patient  Readiness: Acceptance  Method: Explanation  Response: Needs Reinforcement  Comment: fall precautions, safety techniques, OT POC    Precautions, taught by Davi Russo OT at 2/21/2025 10:26 AM.  Learner: Patient  Readiness: Acceptance  Method: Explanation  Response: Needs Reinforcement  Comment: fall precautions, safety techniques, OT POC    ADL Training, taught by Davi Russo OT at 2/21/2025 10:26 AM.  Learner: Patient  Readiness: Acceptance  Method: Explanation  Response: Needs Reinforcement  Comment: fall precautions, safety techniques, OT POC    Education Comments  No comments found.      Goals:   Encounter Problems       Encounter Problems (Active)       OT Goals       ADLs  (Progressing)       Start:  02/21/25    Expected End:  03/07/25       Patient will complete ADL tasks, with minimal assist  using AE as needed in order to increase patient's safety and independence with self-care tasks.           Functional Transfers (Progressing)       Start:  02/21/25    Expected End:  03/07/25       Patient will complete functional transfers with minimal assist  using least restrictive device, in order to increase patient's safety and independence with daily tasks.           Activity Tolerance (Progressing)       Start:  02/21/25    Expected End:  03/07/25       Patient will demonstrate the ability to participate in functional activity at least >/= 20 minutes in order to increase patient's safety and independence with daily tasks.

## 2025-02-21 NOTE — CARE PLAN
Problem: Pain - Adult  Goal: Verbalizes/displays adequate comfort level or baseline comfort level  2/21/2025 0043 by Gabriel Holman RN  Outcome: Progressing  Flowsheets (Taken 2/21/2025 0043)  Verbalizes/displays adequate comfort level or baseline comfort level:   Notify Licensed Independent Practitioner if interventions unsuccessful or patient reports new pain   Implement non-pharmacological measures as appropriate and evaluate response   Administer analgesics based on type and severity of pain and evaluate response   Assess pain using appropriate pain scale   Encourage patient to monitor pain and request assistance   Consider cultural and social influences on pain and pain management  2/21/2025 0042 by Gabriel Holman RN  Outcome: Progressing     Problem: Safety - Adult  Goal: Free from fall injury  Outcome: Progressing  Flowsheets (Taken 2/21/2025 0043)  Free from fall injury:   Based on caregiver fall risk screen, instruct family/caregiver to ask for assistance with transferring infant if caregiver noted to have fall risk factors   Instruct family/caregiver on patient safety     Problem: Discharge Planning  Goal: Discharge to home or other facility with appropriate resources  Outcome: Progressing  Flowsheets (Taken 2/21/2025 0043)  Discharge to home or other facility with appropriate resources:   Refer to discharge planning if patient needs post-hospital services based on physician order or complex needs related to functional status, cognitive ability or social support system   Identify discharge learning needs (meds, wound care, etc)   Arrange for needed discharge resources and transportation as appropriate   Identify barriers to discharge with patient and caregiver     Problem: Chronic Conditions and Co-morbidities  Goal: Patient's chronic conditions and co-morbidity symptoms are monitored and maintained or improved  Outcome: Progressing  Flowsheets (Taken 2/21/2025 0043)  Care Plan - Patient's Chronic  Conditions and Co-Morbidity Symptoms are Monitored and Maintained or Improved:   Update acute care plan with appropriate goals if chronic or comorbid symptoms are exacerbated and prevent overall improvement and discharge   Collaborate with multidisciplinary team to address chronic and comorbid conditions and prevent exacerbation or deterioration   Monitor and assess patient's chronic conditions and comorbid symptoms for stability, deterioration, or improvement    The clinical goals for the shift include  Pt to remain safe, manage [id number] issues

## 2025-02-22 PROBLEM — S82.001A FRACTURE OF RIGHT PATELLA: Status: ACTIVE | Noted: 2025-02-22

## 2025-02-22 PROBLEM — F22 DELUSIONAL DISORDERS: Status: ACTIVE | Noted: 2025-02-22

## 2025-02-22 LAB
ANION GAP SERPL CALCULATED.3IONS-SCNC: 11 MMOL/L (ref 10–20)
BUN SERPL-MCNC: 12 MG/DL (ref 6–23)
CALCIUM SERPL-MCNC: 8.3 MG/DL (ref 8.6–10.3)
CHLORIDE SERPL-SCNC: 104 MMOL/L (ref 98–107)
CO2 SERPL-SCNC: 27 MMOL/L (ref 21–32)
CREAT SERPL-MCNC: 0.55 MG/DL (ref 0.5–1.05)
EGFRCR SERPLBLD CKD-EPI 2021: 86 ML/MIN/1.73M*2
ERYTHROCYTE [DISTWIDTH] IN BLOOD BY AUTOMATED COUNT: 13.6 % (ref 11.5–14.5)
GLUCOSE SERPL-MCNC: 127 MG/DL (ref 74–99)
HCT VFR BLD AUTO: 30.8 % (ref 36–46)
HGB BLD-MCNC: 9.7 G/DL (ref 12–16)
MCH RBC QN AUTO: 28.9 PG (ref 26–34)
MCHC RBC AUTO-ENTMCNC: 31.5 G/DL (ref 32–36)
MCV RBC AUTO: 92 FL (ref 80–100)
NRBC BLD-RTO: 0 /100 WBCS (ref 0–0)
PLATELET # BLD AUTO: 172 X10*3/UL (ref 150–450)
POTASSIUM SERPL-SCNC: 3.6 MMOL/L (ref 3.5–5.3)
RBC # BLD AUTO: 3.36 X10*6/UL (ref 4–5.2)
SODIUM SERPL-SCNC: 138 MMOL/L (ref 136–145)
WBC # BLD AUTO: 6.5 X10*3/UL (ref 4.4–11.3)

## 2025-02-22 PROCEDURE — 2500000002 HC RX 250 W HCPCS SELF ADMINISTERED DRUGS (ALT 637 FOR MEDICARE OP, ALT 636 FOR OP/ED): Performed by: INTERNAL MEDICINE

## 2025-02-22 PROCEDURE — 85027 COMPLETE CBC AUTOMATED: CPT | Performed by: INTERNAL MEDICINE

## 2025-02-22 PROCEDURE — 1100000001 HC PRIVATE ROOM DAILY

## 2025-02-22 PROCEDURE — 82374 ASSAY BLOOD CARBON DIOXIDE: CPT | Performed by: INTERNAL MEDICINE

## 2025-02-22 PROCEDURE — 2500000005 HC RX 250 GENERAL PHARMACY W/O HCPCS: Performed by: INTERNAL MEDICINE

## 2025-02-22 PROCEDURE — 36415 COLL VENOUS BLD VENIPUNCTURE: CPT | Performed by: INTERNAL MEDICINE

## 2025-02-22 PROCEDURE — 97530 THERAPEUTIC ACTIVITIES: CPT | Mod: GO,CO

## 2025-02-22 PROCEDURE — 99233 SBSQ HOSP IP/OBS HIGH 50: CPT | Performed by: INTERNAL MEDICINE

## 2025-02-22 PROCEDURE — 2500000004 HC RX 250 GENERAL PHARMACY W/ HCPCS (ALT 636 FOR OP/ED): Performed by: INTERNAL MEDICINE

## 2025-02-22 PROCEDURE — 97535 SELF CARE MNGMENT TRAINING: CPT | Mod: GO,CO

## 2025-02-22 PROCEDURE — 94640 AIRWAY INHALATION TREATMENT: CPT

## 2025-02-22 PROCEDURE — 2500000001 HC RX 250 WO HCPCS SELF ADMINISTERED DRUGS (ALT 637 FOR MEDICARE OP): Performed by: INTERNAL MEDICINE

## 2025-02-22 RX ADMIN — PANTOPRAZOLE SODIUM 40 MG: 40 INJECTION, POWDER, FOR SOLUTION INTRAVENOUS at 06:03

## 2025-02-22 RX ADMIN — LATANOPROST 1 DROP: 50 SOLUTION/ DROPS OPHTHALMIC at 22:04

## 2025-02-22 RX ADMIN — DOCUSATE SODIUM 100 MG: 100 CAPSULE, LIQUID FILLED ORAL at 10:47

## 2025-02-22 RX ADMIN — BUSPIRONE HYDROCHLORIDE 10 MG: 10 TABLET ORAL at 10:46

## 2025-02-22 RX ADMIN — BUSPIRONE HYDROCHLORIDE 10 MG: 10 TABLET ORAL at 15:42

## 2025-02-22 RX ADMIN — DEXTROSE MONOHYDRATE, SODIUM CHLORIDE, AND POTASSIUM CHLORIDE 50 ML/HR: 50; 4.5; 1.49 INJECTION, SOLUTION INTRAVENOUS at 21:57

## 2025-02-22 RX ADMIN — SERTRALINE HYDROCHLORIDE 50 MG: 50 TABLET ORAL at 10:47

## 2025-02-22 RX ADMIN — CALCIUM 1250 MG: 500 TABLET ORAL at 10:47

## 2025-02-22 RX ADMIN — DOCUSATE SODIUM 100 MG: 100 CAPSULE, LIQUID FILLED ORAL at 21:57

## 2025-02-22 RX ADMIN — Medication 2000 UNITS: at 10:47

## 2025-02-22 RX ADMIN — IPRATROPIUM BROMIDE AND ALBUTEROL SULFATE 3 ML: 2.5; .5 SOLUTION RESPIRATORY (INHALATION) at 18:43

## 2025-02-22 RX ADMIN — ENOXAPARIN SODIUM 30 MG: 30 INJECTION SUBCUTANEOUS at 18:18

## 2025-02-22 RX ADMIN — Medication 3 L/MIN: at 08:49

## 2025-02-22 RX ADMIN — BUSPIRONE HYDROCHLORIDE 10 MG: 10 TABLET ORAL at 21:57

## 2025-02-22 RX ADMIN — ENOXAPARIN SODIUM 30 MG: 30 INJECTION SUBCUTANEOUS at 06:03

## 2025-02-22 RX ADMIN — Medication 3 L/MIN: at 13:46

## 2025-02-22 RX ADMIN — POLYETHYLENE GLYCOL 3350 17 G: 17 POWDER, FOR SOLUTION ORAL at 10:47

## 2025-02-22 RX ADMIN — CYANOCOBALAMIN TAB 1000 MCG 1000 MCG: 1000 TAB at 10:52

## 2025-02-22 RX ADMIN — CALCIUM 1250 MG: 500 TABLET ORAL at 21:57

## 2025-02-22 ASSESSMENT — ACTIVITIES OF DAILY LIVING (ADL)
HOME_MANAGEMENT_TIME_ENTRY: 25
BATHING_LEVEL_OF_ASSISTANCE: MINIMUM ASSISTANCE
BATHING_WHERE_ASSESSED: OTHER (COMMENT)

## 2025-02-22 ASSESSMENT — PAIN SCALES - PAIN ASSESSMENT IN ADVANCED DEMENTIA (PAINAD)
FACIALEXPRESSION: SMILING OR INEXPRESSIVE
CONSOLABILITY: NO NEED TO CONSOLE
TOTALSCORE: 0
BODYLANGUAGE: RELAXED
BREATHING: NORMAL

## 2025-02-22 ASSESSMENT — COGNITIVE AND FUNCTIONAL STATUS - GENERAL
PERSONAL GROOMING: A LITTLE
HELP NEEDED FOR BATHING: A LOT
DRESSING REGULAR UPPER BODY CLOTHING: A LITTLE
DRESSING REGULAR LOWER BODY CLOTHING: A LOT
EATING MEALS: A LITTLE
TOILETING: TOTAL
DAILY ACTIVITIY SCORE: 14

## 2025-02-22 ASSESSMENT — PAIN - FUNCTIONAL ASSESSMENT
PAIN_FUNCTIONAL_ASSESSMENT: PAINAD (PAIN ASSESSMENT IN ADVANCED DEMENTIA SCALE)
PAIN_FUNCTIONAL_ASSESSMENT: 0-10

## 2025-02-22 ASSESSMENT — PAIN SCALES - GENERAL: PAINLEVEL_OUTOF10: 0 - NO PAIN

## 2025-02-22 NOTE — PROGRESS NOTES
Occupational Therapy    OT Treatment    Patient Name: Xiomy Rubio  MRN: [id number]  Department: [address]  Room: [address] [place], [place] [address]  Today's Date: 2/22/2025  Time Calculation  Start Time: 1006  Stop Time: 1045  Time Calculation (min): 39 min        Assessment:  OT Assessment: Pt actively participating and progressing with POC.  Pt initially weeping upon entrace however engaged in self care task and transfers.Will continue to address remaining deficits with skilled OT intervention  Prognosis: Good  Barriers to Discharge Home: Caregiver assistance, Physical needs  Caregiver Assistance: Caregiver assistance needed per identified barriers - however, level of patient's required assistance exceeds assistance available at home  Physical Needs: 24hr mobility assistance needed, 24hr ADL assistance needed, High falls risk due to function or environment  Evaluation/Treatment Tolerance: Patient tolerated treatment well  Medical Staff Made Aware: Yes  End of Session Communication: Bedside nurse  End of Session Patient Position: Up in chair, Alarm on (call light in reach all needs met Pt watching TV)  OT Assessment Results: Decreased ADL status, Decreased upper extremity strength, Decreased cognition, Decreased endurance, Decreased functional mobility  Prognosis: Good  Evaluation/Treatment Tolerance: Patient tolerated treatment well  Medical Staff Made Aware: Yes  Strengths: Premorbid level of function  Barriers to Participation: Comorbidities  Plan:  Treatment Interventions: ADL retraining, Functional transfer training, Endurance training, Patient/family training, Compensatory technique education  OT Frequency: 3 times per week  OT Discharge Recommendations: Moderate intensity level of continued care  Equipment Recommended upon Discharge: Wheeled walker  OT Recommended Transfer Status: Assist of 1  OT - OK to Discharge: Yes  Treatment Interventions: ADL retraining, Functional transfer training, Endurance training, Patient/family  training, Compensatory technique education    Subjective   Previous Visit Info:  OT Last Visit  OT Received On: 02/22/25  General:  General  Reason for Referral: impaired mobility; unwitnessed fall, hypoxia, Covid-19  Referred By: Dr. Osorio  Past Medical History Relevant to Rehab: anxiety/depression, dementia, compression fxs, memory loss, osteoporosis  Prior to Session Communication: Bedside nurse  Patient Position Received: Up in chair, Alarm on  Preferred Learning Style: visual, verbal  General Comment: 89 y/o female who presented to ED after unwitnessed fall.  Pt cleared by Jody and is areeable toskilled OT intervention  up in chair upon arrival to room.  Precautions:  Hearing/Visual Limitations: very [place], glasses  Medical Precautions: Fall precautions, Infection precautions, Oxygen therapy device and L/min (3LO2 NC)  Precautions Comment: covid isolation    Pain:  Pain Assessment  Pain Assessment: 0-10  0-10 (Numeric) Pain Score: 0 - No pain    Objective    Cognition:  Cognition  Overall Cognitive Status: Impaired  Orientation Level: Disoriented to place, Disoriented to time, Disoriented to situation  Following Commands: Follows one step commands with increased time  Cognition Comments: able to follow simple, motor commands with cueing due to [place]. Pleasantly confused.  Processing Speed: Delayed  Coordination:  Movements are Fluid and Coordinated: No  Upper Body Coordination: slightly impaired d/t weakness  Lower Body Coordination: slightly impaired d/t weakness  Coordination Comment: slow movements  Activities of Daily Living:      Grooming  Grooming Level of Assistance: Close supervision  Grooming Where Assessed: Chair  Grooming Comments: brushing teeth, combing hair, washing face/hands    UE Bathing  UE Bathing Level of Assistance: Close supervision  UE Bathing Where Assessed: Other (Comment) (chair)  UE Bathing Comments: sponge bathing cues to advance task/sequence    LE Bathing  LE Bathing Level of Assistance:  Minimum assistance  LE Bathing Where Assessed: Other (Comment) (chair)  LE Bathing Comments: spoinge bathing assist B feet/kenneth care/buttocks stance BUE supported    UE Dressing  UE Dressing Level of Assistance: Minimum assistance  UE Dressing Where Assessed: Chair  UE Dressing Comments: assist doff/don gown L UE d/t IV cues sequencing    LE Dressing  LE Dressing: Yes  Pants Level of Assistance: Minimum assistance (Pt thread seated B feet bringing feet to self, stance over hips min A balance)  Sock Level of Assistance: Close supervision (doff/don socks)  Adult Briefs Level of Assistance: Moderate assistance (to fasten)  LE Dressing Where Assessed: Chair  LE Dressing Comments: educated Pt with adaptive techniques allow time to process       Functional Standing Tolerance:  Time: 1 minute  Activity: dressing  Functional Standing Tolerance Comments: UB support to secure surface Min A balance  Bed Mobility/Transfers:      Transfers  Transfer: Yes  Transfer 1  Transfer From 1: Chair with arms to  Transfer to 1: Stand  Technique 1: Sit to stand, Stand to sit  Transfer Device 1:  (BUE support)  Transfer Level of Assistance 1: Minimum assistance  Trials/Comments 1: allow time to process  Sitting Balance:  Dynamic Sitting Balance  Dynamic Sitting-Balance Support: Feet supported  Dynamic Sitting-Level of Assistance: Close supervision  Dynamic Sitting-Balance: Reaching for objects, Forward lean, Reaching across midline  Dynamic Sitting-Comments: with LB self care  Outcome Measures:[facility] Daily Activity  Putting on and taking off regular lower body clothing: A lot  Bathing (including washing, rinsing, drying): A lot  Putting on and taking off regular upper body clothing: A little  Toileting, which includes using toilet, bedpan or urinal: Total  Taking care of personal grooming such as brushing teeth: A little  Eating Meals: A little  Daily Activity - Total Score: 14    Education Documentation  Body Mechanics, taught by Augusta LAWRENCE  AMANDA Harrell at 2/22/2025 10:59 AM.  Learner: Patient  Readiness: Acceptance  Method: Explanation, Demonstration, Teach-back  Response: Needs Reinforcement  Comment: Instructed Pt with safe transfers, balance stategies, body mechanics, and adative ADL skills    Precautions, taught by AMANDA Uribe at 2/22/2025 10:59 AM.  Learner: Patient  Readiness: Acceptance  Method: Explanation, Demonstration, Teach-back  Response: Needs Reinforcement  Comment: Instructed Pt with safe transfers, balance stategies, body mechanics, and adative ADL skills    ADL Training, taught by AMANDA Uribe at 2/22/2025 10:59 AM.  Learner: Patient  Readiness: Acceptance  Method: Explanation, Demonstration, Teach-back  Response: Needs Reinforcement  Comment: Instructed Pt with safe transfers, balance stategies, body mechanics, and adative ADL skills    Education Comments  No comments found.        OP EDUCATION:       Goals:  Encounter Problems       Encounter Problems (Active)       OT Goals       ADLs (Progressing)       Start:  02/21/25    Expected End:  03/07/25       Patient will complete ADL tasks, with minimal assist  using AE as needed in order to increase patient's safety and independence with self-care tasks.           Functional Transfers (Progressing)       Start:  02/21/25    Expected End:  03/07/25       Patient will complete functional transfers with minimal assist  using least restrictive device, in order to increase patient's safety and independence with daily tasks.           Activity Tolerance (Progressing)       Start:  02/21/25    Expected End:  03/07/25       Patient will demonstrate the ability to participate in functional activity at least >/= 20 minutes in order to increase patient's safety and independence with daily tasks.

## 2025-02-22 NOTE — CARE PLAN
The patient's goals for the shift include  safety, monitor VS    The clinical goals for the shift include Pt confused, ADITYA    Problem: Pain - Adult  Goal: Verbalizes/displays adequate comfort level or baseline comfort level  Outcome: Progressing     Problem: Safety - Adult  Goal: Free from fall injury  Outcome: Progressing     Problem: Discharge Planning  Goal: Discharge to home or other facility with appropriate resources  Outcome: Progressing     Problem: Chronic Conditions and Co-morbidities  Goal: Patient's chronic conditions and co-morbidity symptoms are monitored and maintained or improved  Outcome: Progressing     Problem: Nutrition  Goal: Nutrient intake appropriate for maintaining nutritional needs  Outcome: Progressing     Problem: Skin  Goal: Participates in plan/prevention/treatment measures  Outcome: Progressing  Goal: Prevent/manage excess moisture  Outcome: Progressing  Flowsheets (Taken 2/22/2025 0016)  Prevent/manage excess moisture:   Moisturize dry skin   Cleanse incontinence/protect with barrier cream  Goal: Promote/optimize nutrition  Outcome: Progressing     Problem: Respiratory  Goal: No signs of respiratory distress (eg. Use of accessory muscles. Peds grunting)  Outcome: Progressing  Goal: Wean oxygen to maintain O2 saturation per order/standard this shift  Outcome: Progressing

## 2025-02-22 NOTE — CARE PLAN
The patient's goals for the shift include      The clinical goals for the shift include patient safety, monitor vitals    Problem: Pain - Adult  Goal: Verbalizes/displays adequate comfort level or baseline comfort level  2/22/2025 1501 by Gifty Peace RN  Outcome: Progressing  2/22/2025 1442 by Gifty Peace RN  Outcome: Progressing  2/22/2025 1440 by Gifty Peace RN  Outcome: Progressing

## 2025-02-22 NOTE — NURSING NOTE
Assumed care of patient, received report from off going RN. Patient resting in bed. Bed locked and low, call light & personal belongings in reach.

## 2025-02-22 NOTE — CARE PLAN
The patient's goals for the shift include      The clinical goals for the shift include safety, stable vs    Problem: Pain - Adult  Goal: Verbalizes/displays adequate comfort level or baseline comfort level  Outcome: Progressing     Problem: Safety - Adult  Goal: Free from fall injury  Outcome: Progressing     Problem: Discharge Planning  Goal: Discharge to home or other facility with appropriate resources  Outcome: Progressing     Problem: Chronic Conditions and Co-morbidities  Goal: Patient's chronic conditions and co-morbidity symptoms are monitored and maintained or improved  Outcome: Progressing     Problem: Nutrition  Goal: Nutrient intake appropriate for maintaining nutritional needs  Outcome: Progressing     Problem: Skin  Goal: Participates in plan/prevention/treatment measures  Outcome: Progressing  Goal: Prevent/manage excess moisture  Outcome: Progressing  Goal: Promote/optimize nutrition  Outcome: Progressing     Problem: Respiratory  Goal: No signs of respiratory distress (eg. Use of accessory muscles. Peds grunting)  Outcome: Progressing  Goal: Wean oxygen to maintain O2 saturation per order/standard this shift  Outcome: Progressing

## 2025-02-23 VITALS
SYSTOLIC BLOOD PRESSURE: 155 MMHG | HEIGHT: 66 IN | DIASTOLIC BLOOD PRESSURE: 66 MMHG | OXYGEN SATURATION: 92 % | RESPIRATION RATE: 17 BRPM | TEMPERATURE: 98.1 F | BODY MASS INDEX: 25.16 KG/M2 | WEIGHT: 156.53 LBS | HEART RATE: 75 BPM

## 2025-02-23 LAB
ANION GAP SERPL CALCULATED.3IONS-SCNC: 8 MMOL/L (ref 10–20)
BUN SERPL-MCNC: 15 MG/DL (ref 6–23)
CALCIUM SERPL-MCNC: 8.3 MG/DL (ref 8.6–10.3)
CHLORIDE SERPL-SCNC: 105 MMOL/L (ref 98–107)
CO2 SERPL-SCNC: 28 MMOL/L (ref 21–32)
CREAT SERPL-MCNC: 0.54 MG/DL (ref 0.5–1.05)
EGFRCR SERPLBLD CKD-EPI 2021: 86 ML/MIN/1.73M*2
ERYTHROCYTE [DISTWIDTH] IN BLOOD BY AUTOMATED COUNT: 13.4 % (ref 11.5–14.5)
GLUCOSE SERPL-MCNC: 118 MG/DL (ref 74–99)
HCT VFR BLD AUTO: 30 % (ref 36–46)
HGB BLD-MCNC: 9.6 G/DL (ref 12–16)
MCH RBC QN AUTO: 29.1 PG (ref 26–34)
MCHC RBC AUTO-ENTMCNC: 32 G/DL (ref 32–36)
MCV RBC AUTO: 91 FL (ref 80–100)
NRBC BLD-RTO: 0 /100 WBCS (ref 0–0)
PLATELET # BLD AUTO: 191 X10*3/UL (ref 150–450)
POTASSIUM SERPL-SCNC: 4 MMOL/L (ref 3.5–5.3)
RBC # BLD AUTO: 3.3 X10*6/UL (ref 4–5.2)
SODIUM SERPL-SCNC: 137 MMOL/L (ref 136–145)
WBC # BLD AUTO: 5.9 X10*3/UL (ref 4.4–11.3)

## 2025-02-23 PROCEDURE — 2500000001 HC RX 250 WO HCPCS SELF ADMINISTERED DRUGS (ALT 637 FOR MEDICARE OP): Performed by: INTERNAL MEDICINE

## 2025-02-23 PROCEDURE — 82374 ASSAY BLOOD CARBON DIOXIDE: CPT | Performed by: INTERNAL MEDICINE

## 2025-02-23 PROCEDURE — 36415 COLL VENOUS BLD VENIPUNCTURE: CPT | Performed by: INTERNAL MEDICINE

## 2025-02-23 PROCEDURE — 94760 N-INVAS EAR/PLS OXIMETRY 1: CPT

## 2025-02-23 PROCEDURE — 85027 COMPLETE CBC AUTOMATED: CPT | Performed by: INTERNAL MEDICINE

## 2025-02-23 PROCEDURE — 2500000004 HC RX 250 GENERAL PHARMACY W/ HCPCS (ALT 636 FOR OP/ED): Performed by: INTERNAL MEDICINE

## 2025-02-23 PROCEDURE — 1100000001 HC PRIVATE ROOM DAILY

## 2025-02-23 PROCEDURE — 2500000005 HC RX 250 GENERAL PHARMACY W/O HCPCS: Performed by: INTERNAL MEDICINE

## 2025-02-23 PROCEDURE — 2500000002 HC RX 250 W HCPCS SELF ADMINISTERED DRUGS (ALT 637 FOR MEDICARE OP, ALT 636 FOR OP/ED): Performed by: INTERNAL MEDICINE

## 2025-02-23 PROCEDURE — 99232 SBSQ HOSP IP/OBS MODERATE 35: CPT | Performed by: INTERNAL MEDICINE

## 2025-02-23 PROCEDURE — 94640 AIRWAY INHALATION TREATMENT: CPT

## 2025-02-23 RX ADMIN — ENOXAPARIN SODIUM 30 MG: 30 INJECTION SUBCUTANEOUS at 18:09

## 2025-02-23 RX ADMIN — BUSPIRONE HYDROCHLORIDE 10 MG: 10 TABLET ORAL at 21:37

## 2025-02-23 RX ADMIN — Medication 2000 UNITS: at 09:56

## 2025-02-23 RX ADMIN — IPRATROPIUM BROMIDE AND ALBUTEROL SULFATE 3 ML: 2.5; .5 SOLUTION RESPIRATORY (INHALATION) at 19:04

## 2025-02-23 RX ADMIN — DOCUSATE SODIUM 100 MG: 100 CAPSULE, LIQUID FILLED ORAL at 21:37

## 2025-02-23 RX ADMIN — CALCIUM 1250 MG: 500 TABLET ORAL at 09:56

## 2025-02-23 RX ADMIN — ENOXAPARIN SODIUM 30 MG: 30 INJECTION SUBCUTANEOUS at 06:15

## 2025-02-23 RX ADMIN — FERROUS SULFATE 325 MG: 325 TABLET, FILM COATED ORAL at 10:00

## 2025-02-23 RX ADMIN — BUSPIRONE HYDROCHLORIDE 10 MG: 10 TABLET ORAL at 14:52

## 2025-02-23 RX ADMIN — DOCUSATE SODIUM 100 MG: 100 CAPSULE, LIQUID FILLED ORAL at 09:56

## 2025-02-23 RX ADMIN — POLYETHYLENE GLYCOL 3350 17 G: 17 POWDER, FOR SOLUTION ORAL at 09:56

## 2025-02-23 RX ADMIN — SERTRALINE HYDROCHLORIDE 50 MG: 50 TABLET ORAL at 09:56

## 2025-02-23 RX ADMIN — BUSPIRONE HYDROCHLORIDE 10 MG: 10 TABLET ORAL at 09:56

## 2025-02-23 RX ADMIN — CALCIUM 1250 MG: 500 TABLET ORAL at 21:37

## 2025-02-23 RX ADMIN — CYANOCOBALAMIN TAB 1000 MCG 1000 MCG: 1000 TAB at 09:56

## 2025-02-23 RX ADMIN — Medication 2 L/MIN: at 08:32

## 2025-02-23 RX ADMIN — PANTOPRAZOLE SODIUM 40 MG: 40 TABLET, DELAYED RELEASE ORAL at 06:15

## 2025-02-23 RX ADMIN — DEXTROSE MONOHYDRATE, SODIUM CHLORIDE, AND POTASSIUM CHLORIDE 50 ML/HR: 50; 4.5; 1.49 INJECTION, SOLUTION INTRAVENOUS at 21:37

## 2025-02-23 RX ADMIN — IPRATROPIUM BROMIDE AND ALBUTEROL SULFATE 3 ML: 2.5; .5 SOLUTION RESPIRATORY (INHALATION) at 08:29

## 2025-02-23 ASSESSMENT — PAIN SCALES - GENERAL
PAINLEVEL_OUTOF10: 0 - NO PAIN

## 2025-02-23 ASSESSMENT — COGNITIVE AND FUNCTIONAL STATUS - GENERAL
STANDING UP FROM CHAIR USING ARMS: A LOT
MOVING TO AND FROM BED TO CHAIR: A LOT
TOILETING: A LOT
MOVING FROM LYING ON BACK TO SITTING ON SIDE OF FLAT BED WITH BEDRAILS: A LOT
EATING MEALS: A LOT
DRESSING REGULAR UPPER BODY CLOTHING: A LOT
DAILY ACTIVITIY SCORE: 12
HELP NEEDED FOR BATHING: A LOT
DRESSING REGULAR LOWER BODY CLOTHING: A LOT
TURNING FROM BACK TO SIDE WHILE IN FLAT BAD: A LOT
WALKING IN HOSPITAL ROOM: A LOT
MOBILITY SCORE: 12
CLIMB 3 TO 5 STEPS WITH RAILING: A LOT
PERSONAL GROOMING: A LOT

## 2025-02-23 ASSESSMENT — PAIN - FUNCTIONAL ASSESSMENT: PAIN_FUNCTIONAL_ASSESSMENT: 0-10

## 2025-02-23 NOTE — DISCHARGE INSTRUCTIONS
Orthopaedic Surgery:  Addison Escoto MD was your surgeon.    Weight bearing: weight bear as tolerated  Showering: may shower with assistance      Call for follow-up appointment in 4 weeks. [phone]    Call with any concerns.

## 2025-02-23 NOTE — CARE PLAN
The patient's goals for the shift include      The clinical goals for the shift include safety, monitor vitals, IV fluids      Problem: Pain - Adult  Goal: Verbalizes/displays adequate comfort level or baseline comfort level  Outcome: Progressing     Problem: Safety - Adult  Goal: Free from fall injury  Outcome: Progressing     Problem: Discharge Planning  Goal: Discharge to home or other facility with appropriate resources  Outcome: Progressing     Problem: Chronic Conditions and Co-morbidities  Goal: Patient's chronic conditions and co-morbidity symptoms are monitored and maintained or improved  Outcome: Progressing     Problem: Nutrition  Goal: Nutrient intake appropriate for maintaining nutritional needs  Outcome: Progressing     Problem: Skin  Goal: Participates in plan/prevention/treatment measures  Outcome: Progressing  Goal: Prevent/manage excess moisture  Outcome: Progressing  Goal: Promote/optimize nutrition  Outcome: Progressing     Problem: Respiratory  Goal: No signs of respiratory distress (eg. Use of accessory muscles. Peds grunting)  Outcome: Progressing  Goal: Wean oxygen to maintain O2 saturation per order/standard this shift  Outcome: Progressing

## 2025-02-23 NOTE — PROGRESS NOTES
"Xiomy Rubio is a 89 y.o. female on day 2 of admission presenting with Closed head injury, initial encounter.    Subjective   Patient is hard of hearing so difficult to communicate.  She is still requiring about 2 L of oxygen       Objective     Physical Exam  Vitals reviewed.   Constitutional:       Appearance: Normal appearance.   HENT:      Head: Normocephalic and atraumatic.      Right Ear: Tympanic membrane, ear canal and external ear normal.      Left Ear: Tympanic membrane, ear canal and external ear normal.      Nose: Nose normal.      Mouth/Throat:      Pharynx: Oropharynx is clear.   Eyes:      Extraocular Movements: Extraocular movements intact.      Conjunctiva/sclera: Conjunctivae normal.      Pupils: Pupils are equal, round, and reactive to light.   Cardiovascular:      Rate and Rhythm: Normal rate and regular rhythm.      Pulses: Normal pulses.      Heart sounds: Normal heart sounds.   Pulmonary:      Effort: Pulmonary effort is normal.      Breath sounds: Normal breath sounds.   Abdominal:      General: Abdomen is flat. Bowel sounds are normal.      Palpations: Abdomen is soft.   Musculoskeletal:      Cervical back: Normal range of motion and neck supple.   Skin:     General: Skin is warm and dry.   Neurological:      General: No focal deficit present.      Mental Status: She is alert and oriented to person, place, and time.   Psychiatric:         Mood and Affect: Mood normal.         Last Recorded Vitals  Blood pressure 142/72, pulse 78, temperature 36.8 °C (98.2 °F), temperature source Temporal, resp. rate 18, height 1.676 m (5' 6\"), weight 71 kg (156 lb 8.4 oz), SpO2 98%.  Intake/Output last 3 Shifts:  I/O last 3 completed shifts:  In: 1861.7 (26.2 mL/kg) [P.O.:1680; IV Piggyback:181.7]  Out: - (0 mL/kg)   Weight: 71 kg     Relevant Results               Scheduled medications  busPIRone, 10 mg, oral, TID  calcium carbonate, 1,250 mg, oral, BID  cholecalciferol, 2,000 Units, oral, " Daily  cyanocobalamin, 1,000 mcg, oral, Daily  docusate sodium, 100 mg, oral, BID  enoxaparin, 30 mg, subcutaneous, q12h GIANA  ferrous sulfate, 65 mg of iron, oral, Every other day  ipratropium-albuteroL, 3 mL, nebulization, TID  latanoprost, 1 drop, Both Eyes, Nightly  lidocaine, 1 patch, transdermal, Daily  pantoprazole, 40 mg, oral, Daily before breakfast   Or  pantoprazole, 40 mg, intravenous, Daily before breakfast  polyethylene glycol, 17 g, oral, Daily  sertraline, 50 mg, oral, Daily      Continuous medications  potassium ecwgkwb-B4-9.45%NaCl, 50 mL/hr, Last Rate: 50 mL/hr (02/22/25 0017)      PRN medications  PRN medications: acetaminophen **OR** acetaminophen **OR** acetaminophen, benzocaine-menthol, dextromethorphan-guaifenesin, guaiFENesin, ipratropium-albuteroL, melatonin, ondansetron ODT, oxyCODONE, oxyCODONE, oxygen  Results for orders placed or performed during the hospital encounter of 02/20/25 (from the past 24 hours)   CBC   Result Value Ref Range    WBC 6.5 4.4 - 11.3 x10*3/uL    nRBC 0.0 0.0 - 0.0 /100 WBCs    RBC 3.36 (L) 4.00 - 5.20 x10*6/uL    Hemoglobin 9.7 (L) 12.0 - 16.0 g/dL    Hematocrit 30.8 (L) 36.0 - 46.0 %    MCV 92 80 - 100 fL    MCH 28.9 26.0 - 34.0 pg    MCHC 31.5 (L) 32.0 - 36.0 g/dL    RDW 13.6 11.5 - 14.5 %    Platelets 172 150 - 450 x10*3/uL   Basic Metabolic Panel   Result Value Ref Range    Glucose 127 (H) 74 - 99 mg/dL    Sodium 138 136 - 145 mmol/L    Potassium 3.6 3.5 - 5.3 mmol/L    Chloride 104 98 - 107 mmol/L    Bicarbonate 27 21 - 32 mmol/L    Anion Gap 11 10 - 20 mmol/L    Urea Nitrogen 12 6 - 23 mg/dL    Creatinine 0.55 0.50 - 1.05 mg/dL    eGFR 86 >60 mL/min/1.73m*2    Calcium 8.3 (L) 8.6 - 10.3 mg/dL     CT knee right wo IV contrast    Result Date: 2/21/2025  Interpreted By:  Jez Gaitan, STUDY: CT KNEE RIGHT WO IV CONTRAST;  2/21/2025 7:12 pm   INDICATION: Signs/Symptoms:abn xray.   COMPARISON: Radiographs from 02/20/2025   ACCESSION NUMBER(S): [id number]    ORDERING CLINICIAN: YAJAIRA ALDRICH   TECHNIQUE: CT imaging of the right knee was obtained without administration of intravenous contrast medium. Coronal and sagittal reformatted images were performed. 3D reformatted images were created and reviewed.   FINDINGS: OSSEOUS STRUCTURES: There is a nondisplaced vertical fracture through the lateral aspect of the patella with intra-articular extension. No other fracture seen. The study is limited by severe osteopenia.   There is moderate cartilage loss with osteophytosis and sclerosis in all 3 compartments. There is severe chondrocalcinosis as well.   There is a small suprapatellar joint effusion   SOFT TISSUES: No significant muscle edema or hematoma seen. No other soft tissue abnormality.       Nondisplaced fracture through the patella in a vertical orientation. No other fracture seen. Moderate osteoarthritis. Severe chondrocalcinosis.     MACRO: None   Signed by: Jez Gaitan 2/21/2025 7:48 PM Dictation workstation:   [id number]                  Assessment/Plan   Assessment & Plan  Closed head injury, initial encounter    COVID-19    Acute pain of right knee    Anxiety    Arthritis pain, hip    Falls    Hypoxia    Fracture of right patella    CT noted nondisplaced fracture through the patella  Consult orthopedic  Hypoxia slowly improving  Wean down oxygen  COVID  Patient is clinically improving       I spent  minutes in the professional and overall care of this patient.      Yajaira Aldrich MD

## 2025-02-23 NOTE — CONSULTS
Reason For Consult  Right knee pain    History Of Present Illness  Xiomy Rubio is a 89 y.o. female presenting with fall.  History taken from the chart.  Patient hard of hearing.  Appears without a fall several weeks ago.  Was at rehab.  Not doing well.  Respiratory distress.  Admitted with COVID-positive.  Oxygen requirement.  Still complaining of some knee pain in addition to other issues.  CT scan of the knee was performed.  Identified a fracture.     Past Medical History  She has a past medical history of Anxiety, At risk for falling, Closed compression fracture of L4 lumbar vertebra with delayed healing, subsequent encounter, Closed wedge compression fracture of T11 vertebra (Multi), Constipation, Dementia, Depression, Facial fracture (Multi), GERD (gastroesophageal reflux disease), Hypertension, L4 vertebral fracture (Multi), Left rib fracture, Memory loss, Nasal bone fracture, Osteoporosis, Other conditions influencing health status (11/24/2017), Pain in joints of unspecified hand (01/05/2016), Personal history of diseases of the skin and subcutaneous tissue (11/24/2017), Personal history of Methicillin resistant Staphylococcus aureus infection (11/24/2017), Personal history of other diseases of the respiratory system (12/05/2017), Personal history of other specified conditions (05/04/2017), Personal history of pneumonia (recurrent), Rheumatoid arthritis, T11 vertebral fracture (Multi), and Weakness.    Surgical History  She has a past surgical history that includes Other surgical history (08/29/2017).     Social History  She reports that she has never smoked. She has never used smokeless tobacco. She reports that she does not currently use drugs. She reports that she does not drink alcohol.    Family History  Family History   Problem Relation Name Age of Onset    No Known Problems Mother      No Known Problems Father          Allergies  Adhesive tape-silicones, Other, Cortisone, Penicillin, and  "Prednisone    Review of Systems  Unable to obtain     Physical Exam  Patient observed sitting in a chair.  Seems to be able to straighten extend the right knee.     Last Recorded Vitals  Blood pressure 153/65, pulse 72, temperature 36.8 °C (98.2 °F), temperature source Temporal, resp. rate 18, height 1.676 m (5' 6\"), weight 71 kg (156 lb 8.4 oz), SpO2 97%.    Relevant Results    Reviewed CT scan of the right knee: Nondisplaced longitudinal fracture along the lateral facet.    Scheduled medications  busPIRone, 10 mg, oral, TID  calcium carbonate, 1,250 mg, oral, BID  cholecalciferol, 2,000 Units, oral, Daily  cyanocobalamin, 1,000 mcg, oral, Daily  docusate sodium, 100 mg, oral, BID  enoxaparin, 30 mg, subcutaneous, q12h GIANA  ferrous sulfate, 65 mg of iron, oral, Every other day  ipratropium-albuteroL, 3 mL, nebulization, TID  latanoprost, 1 drop, Both Eyes, Nightly  lidocaine, 1 patch, transdermal, Daily  pantoprazole, 40 mg, oral, Daily before breakfast   Or  pantoprazole, 40 mg, intravenous, Daily before breakfast  polyethylene glycol, 17 g, oral, Daily  sertraline, 50 mg, oral, Daily      Continuous medications  potassium mprddxr-E1-7.45%NaCl, 50 mL/hr, Last Rate: 50 mL/hr (02/22/25 4635)      PRN medications  PRN medications: acetaminophen **OR** acetaminophen **OR** acetaminophen, benzocaine-menthol, dextromethorphan-guaifenesin, guaiFENesin, ipratropium-albuteroL, melatonin, ondansetron ODT, oxyCODONE, oxyCODONE, oxygen  Results for orders placed or performed during the hospital encounter of 02/20/25 (from the past 24 hours)   CBC   Result Value Ref Range    WBC 5.9 4.4 - 11.3 x10*3/uL    nRBC 0.0 0.0 - 0.0 /100 WBCs    RBC 3.30 (L) 4.00 - 5.20 x10*6/uL    Hemoglobin 9.6 (L) 12.0 - 16.0 g/dL    Hematocrit 30.0 (L) 36.0 - 46.0 %    MCV 91 80 - 100 fL    MCH 29.1 26.0 - 34.0 pg    MCHC 32.0 32.0 - 36.0 g/dL    RDW 13.4 11.5 - 14.5 %    Platelets 191 150 - 450 x10*3/uL   Basic Metabolic Panel   Result Value Ref " Range    Glucose 118 (H) 74 - 99 mg/dL    Sodium 137 136 - 145 mmol/L    Potassium 4.0 3.5 - 5.3 mmol/L    Chloride 105 98 - 107 mmol/L    Bicarbonate 28 21 - 32 mmol/L    Anion Gap 8 (L) 10 - 20 mmol/L    Urea Nitrogen 15 6 - 23 mg/dL    Creatinine 0.54 0.50 - 1.05 mg/dL    eGFR 86 >60 mL/min/1.73m*2    Calcium 8.3 (L) 8.6 - 10.3 mg/dL        Assessment/Plan     1.  Right patella fracture: Stable appearance to the fracture.  Would recommend continue weightbearing as tolerated.  No immobilization needed.  Follow-up with me in 4 weeks in the office as needed.    Addison Escoto MD

## 2025-02-23 NOTE — CARE PLAN
The patient's goals for the shift include      The clinical goals for the shift include safety, monitor vitals, IV fluids      Problem: Skin  Goal: Participates in plan/prevention/treatment measures  2/23/2025 1836 by Gifty Peace RN  Outcome: Progressing  Flowsheets (Taken 2/23/2025 1836)  Participates in plan/prevention/treatment measures:   Increase activity/out of bed for meals   Elevate heels  2/23/2025 1836 by Gifty Peace RN  Outcome: Progressing  Flowsheets (Taken 2/23/2025 1836)  Participates in plan/prevention/treatment measures:   Increase activity/out of bed for meals   Elevate heels  Goal: Prevent/manage excess moisture  2/23/2025 1836 by Gifty Peace RN  Outcome: Progressing  Flowsheets (Taken 2/23/2025 1836)  Prevent/manage excess moisture: Cleanse incontinence/protect with barrier cream  2/23/2025 1836 by Gifty Peace RN  Outcome: Progressing  Flowsheets (Taken 2/23/2025 1836)  Prevent/manage excess moisture: Cleanse incontinence/protect with barrier cream  Goal: Promote/optimize nutrition  2/23/2025 1836 by Gifty Peace RN  Outcome: Progressing  Flowsheets (Taken 2/23/2025 1836)  Promote/optimize nutrition: Consume > 50% meals/supplements  2/23/2025 1836 by Gifty Peace RN  Outcome: Progressing  Flowsheets (Taken 2/23/2025 1836)  Promote/optimize nutrition: Consume > 50% meals/supplements

## 2025-02-23 NOTE — PROGRESS NOTES
"Xiomy Rubio is a 89 y.o. female on day 3 of admission presenting with Closed head injury, initial encounter.    Subjective   Patient is hard of hearing so difficult to communicate.  She is still requiring about 2 L of oxygen.  Complains of runny nose       Objective     Physical Exam  Vitals reviewed.   Constitutional:       Appearance: Normal appearance.   HENT:      Head: Normocephalic and atraumatic.      Right Ear: Tympanic membrane, ear canal and external ear normal.      Left Ear: Tympanic membrane, ear canal and external ear normal.      Nose: Nose normal.      Mouth/Throat:      Pharynx: Oropharynx is clear.   Eyes:      Extraocular Movements: Extraocular movements intact.      Conjunctiva/sclera: Conjunctivae normal.      Pupils: Pupils are equal, round, and reactive to light.   Cardiovascular:      Rate and Rhythm: Normal rate and regular rhythm.      Pulses: Normal pulses.      Heart sounds: Normal heart sounds.   Pulmonary:      Effort: Pulmonary effort is normal.      Breath sounds: Normal breath sounds.   Abdominal:      General: Abdomen is flat. Bowel sounds are normal.      Palpations: Abdomen is soft.   Musculoskeletal:      Cervical back: Normal range of motion and neck supple.   Skin:     General: Skin is warm and dry.   Neurological:      General: No focal deficit present.      Mental Status: She is alert and oriented to person, place, and time.   Psychiatric:         Mood and Affect: Mood normal.         Last Recorded Vitals  Blood pressure 137/65, pulse 73, temperature 36.5 °C (97.7 °F), temperature source Temporal, resp. rate 18, height 1.676 m (5' 6\"), weight 71 kg (156 lb 8.4 oz), SpO2 97%.  Intake/Output last 3 Shifts:  I/O last 3 completed shifts:  In: 2365.8 (33.3 mL/kg) [P.O.:940; IV Piggyback:1425.8]  Out: 0 (0 mL/kg)   Weight: 71 kg     Relevant Results               Scheduled medications  busPIRone, 10 mg, oral, TID  calcium carbonate, 1,250 mg, oral, BID  cholecalciferol, 2,000 " Units, oral, Daily  cyanocobalamin, 1,000 mcg, oral, Daily  docusate sodium, 100 mg, oral, BID  enoxaparin, 30 mg, subcutaneous, q12h GIANA  ferrous sulfate, 65 mg of iron, oral, Every other day  ipratropium-albuteroL, 3 mL, nebulization, TID  latanoprost, 1 drop, Both Eyes, Nightly  lidocaine, 1 patch, transdermal, Daily  pantoprazole, 40 mg, oral, Daily before breakfast   Or  pantoprazole, 40 mg, intravenous, Daily before breakfast  polyethylene glycol, 17 g, oral, Daily  sertraline, 50 mg, oral, Daily      Continuous medications  potassium qwkozho-U7-5.45%NaCl, 50 mL/hr, Last Rate: 50 mL/hr (02/22/25 2157)      PRN medications  PRN medications: acetaminophen **OR** acetaminophen **OR** acetaminophen, benzocaine-menthol, dextromethorphan-guaifenesin, guaiFENesin, ipratropium-albuteroL, melatonin, ondansetron ODT, oxyCODONE, oxyCODONE, oxygen  Results for orders placed or performed during the hospital encounter of 02/20/25 (from the past 24 hours)   CBC   Result Value Ref Range    WBC 5.9 4.4 - 11.3 x10*3/uL    nRBC 0.0 0.0 - 0.0 /100 WBCs    RBC 3.30 (L) 4.00 - 5.20 x10*6/uL    Hemoglobin 9.6 (L) 12.0 - 16.0 g/dL    Hematocrit 30.0 (L) 36.0 - 46.0 %    MCV 91 80 - 100 fL    MCH 29.1 26.0 - 34.0 pg    MCHC 32.0 32.0 - 36.0 g/dL    RDW 13.4 11.5 - 14.5 %    Platelets 191 150 - 450 x10*3/uL   Basic Metabolic Panel   Result Value Ref Range    Glucose 118 (H) 74 - 99 mg/dL    Sodium 137 136 - 145 mmol/L    Potassium 4.0 3.5 - 5.3 mmol/L    Chloride 105 98 - 107 mmol/L    Bicarbonate 28 21 - 32 mmol/L    Anion Gap 8 (L) 10 - 20 mmol/L    Urea Nitrogen 15 6 - 23 mg/dL    Creatinine 0.54 0.50 - 1.05 mg/dL    eGFR 86 >60 mL/min/1.73m*2    Calcium 8.3 (L) 8.6 - 10.3 mg/dL     CT knee right wo IV contrast    Result Date: 2/21/2025  Interpreted By:  Jez Gaitan, STUDY: CT KNEE RIGHT WO IV CONTRAST;  2/21/2025 7:12 pm   INDICATION: Signs/Symptoms:abn xray.   COMPARISON: Radiographs from 02/20/2025   ACCESSION NUMBER(S):  [id number]   ORDERING CLINICIAN: YAJAIRA ALDRICH   TECHNIQUE: CT imaging of the right knee was obtained without administration of intravenous contrast medium. Coronal and sagittal reformatted images were performed. 3D reformatted images were created and reviewed.   FINDINGS: OSSEOUS STRUCTURES: There is a nondisplaced vertical fracture through the lateral aspect of the patella with intra-articular extension. No other fracture seen. The study is limited by severe osteopenia.   There is moderate cartilage loss with osteophytosis and sclerosis in all 3 compartments. There is severe chondrocalcinosis as well.   There is a small suprapatellar joint effusion   SOFT TISSUES: No significant muscle edema or hematoma seen. No other soft tissue abnormality.       Nondisplaced fracture through the patella in a vertical orientation. No other fracture seen. Moderate osteoarthritis. Severe chondrocalcinosis.     MACRO: None   Signed by: Jez Gaitan 2/21/2025 7:48 PM Dictation workstation:   [id number]                  Assessment/Plan   Assessment & Plan  Closed head injury, initial encounter    COVID-19    Acute pain of right knee    Anxiety    Arthritis pain, hip    Falls    Hypoxia    Fracture of right patella    CT noted nondisplaced fracture through the patella  Orthopedic input noted  Stable appearance of fracture .  No immobilization needed  Hypoxia slowly improving  Wean down oxygen  COVID isolation  Patient is clinically stable  Plan to discharge back to rehab       I spent  minutes in the professional and overall care of this patient.      Yajaira Aldrich MD

## 2025-02-23 NOTE — CARE PLAN
The patient's goals for the shift include  safety and rest    The clinical goals for the shift include IV fluids, pain management, safety, and promote rest      02/23/25 at 1:09 AM - Patty Ohara RN

## 2025-02-24 VITALS
DIASTOLIC BLOOD PRESSURE: 67 MMHG | SYSTOLIC BLOOD PRESSURE: 151 MMHG | HEIGHT: 66 IN | OXYGEN SATURATION: 94 % | WEIGHT: 156.53 LBS | TEMPERATURE: 99.1 F | BODY MASS INDEX: 25.16 KG/M2 | RESPIRATION RATE: 18 BRPM | HEART RATE: 69 BPM

## 2025-02-24 LAB
ANION GAP SERPL CALCULATED.3IONS-SCNC: 9 MMOL/L (ref 10–20)
BUN SERPL-MCNC: 10 MG/DL (ref 6–23)
CALCIUM SERPL-MCNC: 8.6 MG/DL (ref 8.6–10.3)
CHLORIDE SERPL-SCNC: 105 MMOL/L (ref 98–107)
CO2 SERPL-SCNC: 26 MMOL/L (ref 21–32)
CREAT SERPL-MCNC: 0.52 MG/DL (ref 0.5–1.05)
EGFRCR SERPLBLD CKD-EPI 2021: 87 ML/MIN/1.73M*2
ERYTHROCYTE [DISTWIDTH] IN BLOOD BY AUTOMATED COUNT: 13.2 % (ref 11.5–14.5)
GLUCOSE SERPL-MCNC: 119 MG/DL (ref 74–99)
HCT VFR BLD AUTO: 31.3 % (ref 36–46)
HGB BLD-MCNC: 9.8 G/DL (ref 12–16)
MCH RBC QN AUTO: 28.9 PG (ref 26–34)
MCHC RBC AUTO-ENTMCNC: 31.3 G/DL (ref 32–36)
MCV RBC AUTO: 92 FL (ref 80–100)
NRBC BLD-RTO: 0 /100 WBCS (ref 0–0)
PLATELET # BLD AUTO: 194 X10*3/UL (ref 150–450)
POTASSIUM SERPL-SCNC: 4.3 MMOL/L (ref 3.5–5.3)
RBC # BLD AUTO: 3.39 X10*6/UL (ref 4–5.2)
SODIUM SERPL-SCNC: 136 MMOL/L (ref 136–145)
WBC # BLD AUTO: 5.2 X10*3/UL (ref 4.4–11.3)

## 2025-02-24 PROCEDURE — 99239 HOSP IP/OBS DSCHRG MGMT >30: CPT | Performed by: INTERNAL MEDICINE

## 2025-02-24 PROCEDURE — 2500000001 HC RX 250 WO HCPCS SELF ADMINISTERED DRUGS (ALT 637 FOR MEDICARE OP): Performed by: INTERNAL MEDICINE

## 2025-02-24 PROCEDURE — 82374 ASSAY BLOOD CARBON DIOXIDE: CPT | Performed by: INTERNAL MEDICINE

## 2025-02-24 PROCEDURE — 2500000005 HC RX 250 GENERAL PHARMACY W/O HCPCS: Performed by: INTERNAL MEDICINE

## 2025-02-24 PROCEDURE — 94640 AIRWAY INHALATION TREATMENT: CPT

## 2025-02-24 PROCEDURE — 85027 COMPLETE CBC AUTOMATED: CPT | Performed by: INTERNAL MEDICINE

## 2025-02-24 PROCEDURE — 94760 N-INVAS EAR/PLS OXIMETRY 1: CPT

## 2025-02-24 PROCEDURE — 36415 COLL VENOUS BLD VENIPUNCTURE: CPT | Performed by: INTERNAL MEDICINE

## 2025-02-24 PROCEDURE — 97116 GAIT TRAINING THERAPY: CPT | Mod: GP,CQ

## 2025-02-24 PROCEDURE — 97110 THERAPEUTIC EXERCISES: CPT | Mod: GP,CQ

## 2025-02-24 PROCEDURE — 2500000004 HC RX 250 GENERAL PHARMACY W/ HCPCS (ALT 636 FOR OP/ED): Performed by: INTERNAL MEDICINE

## 2025-02-24 PROCEDURE — 2500000002 HC RX 250 W HCPCS SELF ADMINISTERED DRUGS (ALT 637 FOR MEDICARE OP, ALT 636 FOR OP/ED): Performed by: INTERNAL MEDICINE

## 2025-02-24 RX ORDER — IPRATROPIUM BROMIDE AND ALBUTEROL SULFATE 2.5; .5 MG/3ML; MG/3ML
3 SOLUTION RESPIRATORY (INHALATION) EVERY 2 HOUR PRN
Qty: 180 ML | Refills: 11 | Status: SHIPPED | OUTPATIENT
Start: 2025-02-24

## 2025-02-24 RX ORDER — IPRATROPIUM BROMIDE AND ALBUTEROL SULFATE 2.5; .5 MG/3ML; MG/3ML
3 SOLUTION RESPIRATORY (INHALATION)
Start: 2025-02-24

## 2025-02-24 RX ADMIN — POLYETHYLENE GLYCOL 3350 17 G: 17 POWDER, FOR SOLUTION ORAL at 09:45

## 2025-02-24 RX ADMIN — BUSPIRONE HYDROCHLORIDE 10 MG: 10 TABLET ORAL at 09:45

## 2025-02-24 RX ADMIN — SERTRALINE HYDROCHLORIDE 50 MG: 50 TABLET ORAL at 09:45

## 2025-02-24 RX ADMIN — Medication 2000 UNITS: at 09:45

## 2025-02-24 RX ADMIN — Medication 3 MG: at 01:15

## 2025-02-24 RX ADMIN — DOCUSATE SODIUM 100 MG: 100 CAPSULE, LIQUID FILLED ORAL at 09:45

## 2025-02-24 RX ADMIN — LIDOCAINE 1 PATCH: 4 PATCH TOPICAL at 09:45

## 2025-02-24 RX ADMIN — ENOXAPARIN SODIUM 30 MG: 30 INJECTION SUBCUTANEOUS at 06:44

## 2025-02-24 RX ADMIN — IPRATROPIUM BROMIDE AND ALBUTEROL SULFATE 3 ML: 2.5; .5 SOLUTION RESPIRATORY (INHALATION) at 13:22

## 2025-02-24 RX ADMIN — CALCIUM 1250 MG: 500 TABLET ORAL at 09:45

## 2025-02-24 RX ADMIN — CYANOCOBALAMIN TAB 1000 MCG 1000 MCG: 1000 TAB at 09:45

## 2025-02-24 ASSESSMENT — COGNITIVE AND FUNCTIONAL STATUS - GENERAL
MOBILITY SCORE: 12
MOVING FROM LYING ON BACK TO SITTING ON SIDE OF FLAT BED WITH BEDRAILS: A LITTLE
TOILETING: A LOT
HELP NEEDED FOR BATHING: A LOT
MOVING FROM LYING ON BACK TO SITTING ON SIDE OF FLAT BED WITH BEDRAILS: A LOT
STANDING UP FROM CHAIR USING ARMS: A LOT
MOVING TO AND FROM BED TO CHAIR: A LOT
DRESSING REGULAR UPPER BODY CLOTHING: A LOT
PERSONAL GROOMING: A LOT
DRESSING REGULAR LOWER BODY CLOTHING: A LOT
TURNING FROM BACK TO SIDE WHILE IN FLAT BAD: A LOT
WALKING IN HOSPITAL ROOM: A LITTLE
DAILY ACTIVITIY SCORE: 13
STANDING UP FROM CHAIR USING ARMS: A LITTLE
TURNING FROM BACK TO SIDE WHILE IN FLAT BAD: A LITTLE
MOBILITY SCORE: 16
WALKING IN HOSPITAL ROOM: A LOT
MOVING TO AND FROM BED TO CHAIR: A LITTLE
CLIMB 3 TO 5 STEPS WITH RAILING: TOTAL
EATING MEALS: A LITTLE
CLIMB 3 TO 5 STEPS WITH RAILING: A LOT

## 2025-02-24 ASSESSMENT — PAIN - FUNCTIONAL ASSESSMENT: PAIN_FUNCTIONAL_ASSESSMENT: UNABLE TO SELF-REPORT

## 2025-02-24 ASSESSMENT — PAIN SCALES - GENERAL: PAINLEVEL_OUTOF10: 0 - NO PAIN

## 2025-02-24 NOTE — CARE PLAN
The patient's goals for the shift include      The clinical goals for the shift include safety, ivf      Problem: Pain - Adult  Goal: Verbalizes/displays adequate comfort level or baseline comfort level  Outcome: Progressing     Problem: Skin  Goal: Participates in plan/prevention/treatment measures  Outcome: Progressing  Goal: Prevent/manage excess moisture  Outcome: Progressing  Goal: Promote/optimize nutrition  Outcome: Progressing

## 2025-02-24 NOTE — NURSING NOTE
Patient was continent of urine all night. Patient gets up without calling for assistance, unsteady gait. Call light within reach, bed alarm on for patient safety.

## 2025-02-24 NOTE — PROGRESS NOTES
02/24/25 1204   Discharge Planning   Expected Discharge Disposition SNF  (Return to [facility])   Does the patient need discharge transport arranged? Yes   RoundTrip coordination needed? Yes   Has discharge transport been arranged? No     Notified Dr. Osorio at 9 am we need Nickolas Weber completed.  All other discharge has been done.    1210- Nickolas Weber completed.      1:45  time for Return to [facility].

## 2025-02-24 NOTE — CARE PLAN
Problem: Respiratory  Goal: No signs of respiratory distress (eg. Use of accessory muscles. Peds grunting)  Outcome: Progressing     Problem: Respiratory  Goal: Wean oxygen to maintain O2 saturation per order/standard this shift  Outcome: Met

## 2025-02-24 NOTE — PROGRESS NOTES
Physical Therapy    Physical Therapy Treatment    Patient Name: Xiomy Rubio  MRN: [id number]  Department: [address]  Room: [address] [place], [place] [address]  Today's Date: 2/24/2025  Time Calculation  Start Time: 0935  Stop Time: 1003  Time Calculation (min): 28 min         Assessment/Plan   PT Assessment  PT Assessment Results: Decreased strength, Decreased endurance, Impaired balance, Decreased mobility, Decreased coordination, Decreased cognition, Impaired judgement, Decreased safety awareness, Impaired hearing  Rehab Prognosis: Good  Barriers to Discharge Home: Caregiver assistance, Physical needs, Cognition needs  Caregiver Assistance: Caregiver assistance needed per identified barriers - however, level of patient's required assistance exceeds assistance available at home  Cognition Needs: 24hr supervision for safety awareness needed  Physical Needs: Intermittent mobility assistance needed, Ambulating household distances limited by function/safety, High falls risk due to function or environment  Evaluation/Treatment Tolerance: Patient limited by fatigue  Medical Staff Made Aware: Yes  Strengths: Premorbid level of function  Barriers to Participation: Comorbidities  End of Session Communication: Bedside nurse  Assessment Comment: Pt pleasant and cooperative, visual cues used throughout visit due to pt very [place]. Mild unsteadiness with ambulation trials. Pt is a falls risk. Would benefit from continued PT services to maximize safety and mobility.  End of Session Patient Position: Up in chair, Alarm on     PT Plan  Treatment/Interventions: Transfer training, Gait training, Balance training, Strengthening, Endurance training, Therapeutic exercise, Therapeutic activity  PT Plan: Ongoing PT  PT Frequency: 4 times per week  PT Discharge Recommendations: Moderate intensity level of continued care  Equipment Recommended upon Discharge: Wheeled walker  PT Recommended Transfer Status: Assist x1, Assistive device  PT - OK to Discharge:  "Yes      General Visit Information:   PT  Visit  PT Received On: 02/24/25  General  Reason for Referral: impaired mobility; unwitnessed fall, hypoxia, Covid-19  Referred By: Dr. Osorio  Past Medical History Relevant to Rehab: anxiety/depression, dementia, compression fxs, memory loss, osteoporosis  Prior to Session Communication: Bedside nurse  Patient Position Received: Up in bathroom  Preferred Learning Style: visual, verbal  General Comment: Cleared per nurse to see pt for PT.    Subjective   Precautions:  Precautions  Hearing/Visual Limitations: very [place], glasses  Medical Precautions: Fall precautions, Infection precautions  Precautions Comment: covid isolation    Objective   Pain:  Pain Assessment  Pain Assessment: Unable to self-report (Pt did state \"Oh my knee\" when ambulating.)    Cognition:  Cognition  Overall Cognitive Status: Impaired  Orientation Level: Disoriented to time, Disoriented to place, Disoriented to situation  Following Commands: Follows one step commands without difficulty (Visual cues used for therex, pt appeared very familiar.)     Treatments:  Therapeutic Exercise  Therapeutic Exercise Performed: Yes  Therapeutic Exercise Activity 1: Pt performed seated bilat LE ankle pumps, heel raises, LAQ, hip flexion, shagufta hip adduction and resisted hip abduction x15 reps each. Visual cues used for therex due to pt is very [place].    Ambulation/Gait Training  Ambulation/Gait Training Performed: Yes  Ambulation/Gait Training 1  Surface 1: Level tile  Device 1: Rolling walker  Assistance 1: Minimum assistance  Quality of Gait 1: Decreased step length, Inconsistent stride length, Diminished heel strike  Comments/Distance (ft) 1: Pt ambulated with RW ~15' x1 and ~35' x1 min assist of 1. Intermittent assist to manage RW. Slow pace, decreased bilat step height and length.    Transfer 1  Transfer From 1: Toilet to  Transfer to 1: Stand  Technique 1: Sit to stand  Transfer Device 1: Walker  Transfer Level of " Assistance 1: Contact guard  Transfers 2  Transfer From 2: Stand to  Transfer to 2: Sit, Chair with arms  Technique 2: Stand to sit  Transfer Device 2: Walker  Transfer Level of Assistance 2: Contact guard  Trials/Comments 2: x2 trials  Transfers 3  Transfer From 3: Chair with arms to  Transfer to 3: Stand  Technique 3: Sit to stand  Transfer Device 3: Walker  Transfer Level of Assistance 3: Contact guard    Outcome Measures:  [facility] Basic Mobility  Turning from your back to your side while in a flat bed without using bedrails: A little  Moving from lying on your back to sitting on the side of a flat bed without using bedrails: A little  Moving to and from bed to chair (including a wheelchair): A little  Standing up from a chair using your arms (e.g. wheelchair or bedside chair): A little  To walk in hospital room: A little  Climbing 3-5 steps with railing: Total  Basic Mobility - Total Score: 16    Education Documentation  Mobility Training, taught by Malgorzata Marino PTA at 2/24/2025 10:51 AM.  Learner: Patient  Readiness: Acceptance  Method: Explanation  Response: Needs Reinforcement  Comment: Safet with mobility    Education Comments  No comments found.        OP EDUCATION:       Encounter Problems       Encounter Problems (Active)       Balance       dynamic (Progressing)       Start:  02/21/25    Expected End:  03/07/25       Pt will stand with UE support of walker, no LOB and supervision for > 3 minutes            Mobility       bed mobility (Progressing)       Start:  02/21/25    Expected End:  03/07/25       Pt will perform sup to/from sit transfer with supervision          ambulation (Progressing)       Start:  02/21/25    Expected End:  03/07/25       Pt will amb > 25  ft with wheeled walker and min assist            PT Transfers       sit to stand (Progressing)       Start:  02/21/25    Expected End:  03/07/25       Pt will perform sit to stand transfer with walker and supervision         bed to chair  (Progressing)       Start:  02/21/25    Expected End:  03/07/25       Pt will perform bed to chair transfer with walker and supervision            Pain - Adult          Safety       LTG - Patient will utilize safety techniques (Progressing)       Start:  02/21/25    Expected End:  03/07/25

## 2025-02-25 ENCOUNTER — APPOINTMENT (OUTPATIENT)
Dept: ORTHOPEDIC SURGERY | Facility: CLINIC | Age: OVER 89
End: 2025-02-25
Payer: MEDICARE

## 2025-02-25 NOTE — PROGRESS NOTES
"Xiomy Rubio is a 89 y.o. female on day 4 of admission presenting with Closed head injury, initial encounter.    Subjective   Patient is hard of hearing so difficult to communicate.  She is off oxygen     Objective     Physical Exam  Vitals reviewed.   Constitutional:       Appearance: Normal appearance.   HENT:      Head: Normocephalic and atraumatic.      Right Ear: Tympanic membrane, ear canal and external ear normal.      Left Ear: Tympanic membrane, ear canal and external ear normal.      Nose: Nose normal.      Mouth/Throat:      Pharynx: Oropharynx is clear.   Eyes:      Extraocular Movements: Extraocular movements intact.      Conjunctiva/sclera: Conjunctivae normal.      Pupils: Pupils are equal, round, and reactive to light.   Cardiovascular:      Rate and Rhythm: Normal rate and regular rhythm.      Pulses: Normal pulses.      Heart sounds: Normal heart sounds.   Pulmonary:      Effort: Pulmonary effort is normal.      Breath sounds: Normal breath sounds.   Abdominal:      General: Abdomen is flat. Bowel sounds are normal.      Palpations: Abdomen is soft.   Musculoskeletal:      Cervical back: Normal range of motion and neck supple.   Skin:     General: Skin is warm and dry.   Neurological:      General: No focal deficit present.      Mental Status: She is alert and oriented to person, place, and time.   Psychiatric:         Mood and Affect: Mood normal.         Last Recorded Vitals  Blood pressure 151/67, pulse 69, temperature 37.3 °C (99.1 °F), temperature source Temporal, resp. rate 18, height 1.676 m (5' 6\"), weight 71 kg (156 lb 8.4 oz), SpO2 94%.  Intake/Output last 3 Shifts:  I/O last 3 completed shifts:  In: 2909.2 (41 mL/kg) [P.O.:1280; IV Piggyback:1629.2]  Out: 1250 (17.6 mL/kg) [Urine:1250 (0.5 mL/kg/hr)]  Weight: 71 kg     Relevant Results               Scheduled medications      Continuous medications      PRN medications    Results for orders placed or performed during the hospital " encounter of 02/20/25 (from the past 24 hours)   CBC   Result Value Ref Range    WBC 5.2 4.4 - 11.3 x10*3/uL    nRBC 0.0 0.0 - 0.0 /100 WBCs    RBC 3.39 (L) 4.00 - 5.20 x10*6/uL    Hemoglobin 9.8 (L) 12.0 - 16.0 g/dL    Hematocrit 31.3 (L) 36.0 - 46.0 %    MCV 92 80 - 100 fL    MCH 28.9 26.0 - 34.0 pg    MCHC 31.3 (L) 32.0 - 36.0 g/dL    RDW 13.2 11.5 - 14.5 %    Platelets 194 150 - 450 x10*3/uL   Basic Metabolic Panel   Result Value Ref Range    Glucose 119 (H) 74 - 99 mg/dL    Sodium 136 136 - 145 mmol/L    Potassium 4.3 3.5 - 5.3 mmol/L    Chloride 105 98 - 107 mmol/L    Bicarbonate 26 21 - 32 mmol/L    Anion Gap 9 (L) 10 - 20 mmol/L    Urea Nitrogen 10 6 - 23 mg/dL    Creatinine 0.52 0.50 - 1.05 mg/dL    eGFR 87 >60 mL/min/1.73m*2    Calcium 8.6 8.6 - 10.3 mg/dL     No results found.                 Assessment/Plan   Assessment & Plan  Closed head injury, initial encounter    COVID-19    Acute pain of right knee    Anxiety    Arthritis pain, hip    Falls    Hypoxia    Fracture of right patella    CT noted nondisplaced fracture through the patella  Orthopedic input noted  Stable appearance of fracture .  No immobilization needed  Hypoxia resolved  COVID isolation  Patient is clinically stable  Plan to discharge back to rehab  Discharge papers done       I spent  minutes in the professional and overall care of this patient.      Yajaira Osorio MD

## 2025-02-25 NOTE — DISCHARGE SUMMARY
Discharge Diagnosis  Closed head injury, initial encounter    Issues Requiring Follow-Up  COVID  Hypoxia  Right knee patella fracture    Test Results Pending At Discharge  Pending Labs       Order Current Status    Extra Urine Gray Tube Collected (02/21/25 0623)    Urinalysis with Reflex Culture and Microscopic In process            Hospital Course   Xiomy Rubio is a 89 y.o. female presenting with fall at [facility].  Patient is on Lovenox.  Patient had hematoma to the back of the head.  She was complaining of right knee pain.  Also found to have 88% room air and positive for COVID.  She was admitted for COVID and hypoxia and fall and head trauma to be monitored.  Patient this morning feels pretty good she just has cold-like symptoms has some chest congestion but does not feel any kind of respiratory distress.  She is hard of hearing.  History also taken by her son.  She lives with her son.  She had trauma few weeks ago where she had nasal bone fracture, left 8th-11th rib fracture, T11 compression fracture and severe L4 compression fracture.  She was sent to the rehab facility.  She is doing pretty good with her therapy.  Last 3 days she was having some congestion and some phlegm.  Yesterday she felt tired and went to sleep in the middle of the day per her son.  Patient is usually at her baseline in pretty good health.  She is only having some issues with anxiety but her main issue is balance and fall.  She has history of hip fracture and had history of hip replacement many years ago  She is non-smoker nondrinker  Patient was not symptomatic with the COVID except for some hypoxia.  Discussed with the son and decided not to give her remdesivir history supportive care.  Patient did improve and was off oxygen at discharge.  Will wait for x-ray knee showing patella fracture CT done which confirmed patellar fracture.  Orthopedic consulted in the said patient does not need any treatment and she could  be weightbearing  Discharge back to the facility for further rehab  Pertinent Physical Exam At Time of Discharge  Physical Exam  Vitals reviewed.   Constitutional:       Appearance: Normal appearance.   HENT:      Head: Normocephalic and atraumatic.      Right Ear: Tympanic membrane, ear canal and external ear normal.      Left Ear: Tympanic membrane, ear canal and external ear normal.      Nose: Nose normal.      Mouth/Throat:      Pharynx: Oropharynx is clear.   Eyes:      Extraocular Movements: Extraocular movements intact.      Conjunctiva/sclera: Conjunctivae normal.      Pupils: Pupils are equal, round, and reactive to light.   Cardiovascular:      Rate and Rhythm: Normal rate and regular rhythm.      Pulses: Normal pulses.      Heart sounds: Normal heart sounds.   Pulmonary:      Effort: Pulmonary effort is normal.      Breath sounds: Normal breath sounds.   Abdominal:      General: Abdomen is flat. Bowel sounds are normal.      Palpations: Abdomen is soft.   Musculoskeletal:      Cervical back: Normal range of motion and neck supple.   Skin:     General: Skin is warm and dry.   Neurological:      General: No focal deficit present.      Mental Status: She is alert and oriented to person, place, and time.   Psychiatric:         Mood and Affect: Mood normal.         Home Medications     Medication List      START taking these medications     * ipratropium-albuteroL 0.5-2.5 mg/3 mL nebulizer solution; Commonly   known as: Duo-Neb; Take 3 mL by nebulization 3 times a day.   * ipratropium-albuteroL 0.5-2.5 mg/3 mL nebulizer solution; Commonly   known as: Duo-Neb; Take 3 mL by nebulization every 2 hours if needed for   wheezing or shortness of breath.   oxygen gas therapy; Commonly known as: O2; Inhale 2 L/min once every 24   hours.  * This list has 2 medication(s) that are the same as other medications   prescribed for you. Read the directions carefully, and ask your doctor or   other care provider to review them  with you.     CONTINUE taking these medications     acetaminophen 325 mg tablet; Commonly known as: Tylenol; Take 3 tablets   (975 mg) by mouth every 8 hours.   busPIRone 10 mg tablet; Commonly known as: Buspar; Take 1 tablet (10 mg)   by mouth 3 times a day.   calcium citrate 200 mg (950 mg) tablet; Commonly known as: Calcitrate;   Take 3 tablets (600 mg) by mouth 2 times a day.   cholecalciferol 50 MCG (2000 UT) tablet; Commonly known as: Vitamin D-3;   Take 1 tablet (2,000 Units) by mouth once daily.   cyanocobalamin 1,000 mcg tablet; Commonly known as: Vitamin B-12; Take 1   tablet (1,000 mcg) by mouth once daily.   enoxaparin 30 mg/0.3 mL syringe; Commonly known as: Lovenox; Inject 0.3   mL (30 mg) under the skin every 12 hours.   ferrous sulfate tablet; TAKE 1 TABLET BY MOUTH EVERY OTHER DAY   latanoprost 0.005 % ophthalmic solution; Commonly known as: Xalatan   lidocaine 4 % patch; Place 1 patch over 12 hours on the skin once daily.   Remove & discard patch within 12 hours or as directed by MD.   ondansetron ODT 4 mg disintegrating tablet; Commonly known as:   Zofran-ODT; Dissolve 1 tablet (4 mg) in the mouth every 8 hours if needed   for nausea or vomiting.   * oxyCODONE 5 mg immediate release tablet; Commonly known as:   Roxicodone; Take 0.5 tablets (2.5 mg) by mouth every 6 hours if needed for   moderate pain (4 - 6).   * oxyCODONE 5 mg immediate release tablet; Commonly known as:   Roxicodone; Take 1 tablet (5 mg) by mouth every 6 hours if needed for   severe pain (7 - 10).   Oysco 500/D 500 mg-5 mcg (200 unit) tablet; Generic drug: calcium   carbonate-vitamin D3; TAKE 1 TABLET BY MOUTH TWICE A DAY   pantoprazole 20 mg EC tablet; Commonly known as: ProtoNix; TAKE 1 TABLET   BY MOUTH EVERY DAY   polyethylene glycol 17 gram packet; Commonly known as: Glycolax,   Miralax; Take 17 g by mouth once daily.   senna 8.6 mg capsule; Generic drug: sennosides; Take 1 capsule (8.6 mg)   by mouth once daily at  bedtime.   sertraline 50 mg tablet; Commonly known as: Zoloft  * This list has 2 medication(s) that are the same as other medications   prescribed for you. Read the directions carefully, and ask your doctor or   other care provider to review them with you.       Outpatient Follow-Up  No future appointments.    Yajaira Osorio MD

## 2025-03-08 ENCOUNTER — NURSING HOME VISIT (OUTPATIENT)
Dept: POST ACUTE CARE | Facility: EXTERNAL LOCATION | Age: OVER 89
End: 2025-03-08
Payer: MEDICARE

## 2025-03-08 DIAGNOSIS — Z91.81 AT RISK FOR FALLING: ICD-10-CM

## 2025-03-08 DIAGNOSIS — S22.080D COMPRESSION FRACTURE OF T11 VERTEBRA WITH ROUTINE HEALING, SUBSEQUENT ENCOUNTER: ICD-10-CM

## 2025-03-08 DIAGNOSIS — M19.90 OSTEOARTHRITIS, UNSPECIFIED OSTEOARTHRITIS TYPE, UNSPECIFIED SITE: ICD-10-CM

## 2025-03-08 DIAGNOSIS — F03.90 DEMENTIA, UNSPECIFIED DEMENTIA SEVERITY, UNSPECIFIED DEMENTIA TYPE, UNSPECIFIED WHETHER BEHAVIORAL, PSYCHOTIC, OR MOOD DISTURBANCE OR ANXIETY (MULTI): ICD-10-CM

## 2025-03-08 DIAGNOSIS — K59.00 CONSTIPATION, UNSPECIFIED CONSTIPATION TYPE: ICD-10-CM

## 2025-03-08 DIAGNOSIS — M06.9 RHEUMATOID ARTHRITIS, INVOLVING UNSPECIFIED SITE, UNSPECIFIED WHETHER RHEUMATOID FACTOR PRESENT (MULTI): ICD-10-CM

## 2025-03-08 DIAGNOSIS — R53.1 WEAKNESS: ICD-10-CM

## 2025-03-08 DIAGNOSIS — S02.92XD CLOSED FRACTURE OF FACIAL BONE WITH ROUTINE HEALING, UNSPECIFIED FACIAL BONE, SUBSEQUENT ENCOUNTER: ICD-10-CM

## 2025-03-08 DIAGNOSIS — S32.040G CLOSED COMPRESSION FRACTURE OF L4 LUMBAR VERTEBRA WITH DELAYED HEALING, SUBSEQUENT ENCOUNTER: ICD-10-CM

## 2025-03-08 DIAGNOSIS — F32.A DEPRESSION, UNSPECIFIED DEPRESSION TYPE: ICD-10-CM

## 2025-03-08 DIAGNOSIS — S09.90XA CLOSED HEAD INJURY, INITIAL ENCOUNTER: Primary | ICD-10-CM

## 2025-03-08 DIAGNOSIS — I10 HYPERTENSION, UNSPECIFIED TYPE: ICD-10-CM

## 2025-03-08 DIAGNOSIS — K21.9 GASTROESOPHAGEAL REFLUX DISEASE WITHOUT ESOPHAGITIS: ICD-10-CM

## 2025-03-08 DIAGNOSIS — S22.42XD CLOSED FRACTURE OF MULTIPLE RIBS OF LEFT SIDE WITH ROUTINE HEALING, SUBSEQUENT ENCOUNTER: ICD-10-CM

## 2025-03-08 PROCEDURE — 99309 SBSQ NF CARE MODERATE MDM 30: CPT | Performed by: INTERNAL MEDICINE

## 2025-03-08 NOTE — LETTER
Patient: Xiomy Rubio  : 1931    Encounter Date: 2025    PLACE OF SERVICE:  [facility].    This is a subsequent visit.    Subjective  Patient ID: Xiomy Rubio is a 89 y.o. female who presents for Follow-up.    Ms. Xiomy Rubio is a 89-year-old female with history of dementia with recent fall with closed head injury.  She was evaluated in the emergency room.  She suffers from a prior fracture to her face, as well as L4 compression fracture, T11 fracture, and left rib fracture.  She is unable to care for herself and requires supportive care.    Review of Systems   Constitutional:  Negative for chills and fever.   Cardiovascular:  Negative for chest pain.   All other systems reviewed and are negative.    Objective  /82   Pulse 80   Temp 36.6 °C (97.8 °F)   Resp 16   LMP  (LMP Unknown) Comment: Pt is 89 years old- last mentrauls cycle unknown    Physical Exam  Vitals reviewed.   Constitutional:       General: She is not in acute distress.     Comments: This is a well-developed and well-nourished female, sitting in a chair.   HENT:      Right Ear: Tympanic membrane, ear canal and external ear normal.      Left Ear: Tympanic membrane, ear canal and external ear normal.   Eyes:      General: No scleral icterus.     Pupils: Pupils are equal, round, and reactive to light.   Neck:      Vascular: No carotid bruit.   Cardiovascular:      Heart sounds: Normal heart sounds, S1 normal and S2 normal. No murmur heard.     No friction rub.   Pulmonary:      Effort: Pulmonary effort is normal.      Breath sounds: Normal breath sounds and air entry.   Chest:      Chest wall: Tenderness (left side) present.   Abdominal:      Palpations: There is no hepatomegaly, splenomegaly or mass.   Musculoskeletal:         General: No swelling or deformity. Normal range of motion.      Cervical back: Neck supple.      Right lower leg: No edema.      Left lower leg: No edema.   Lymphadenopathy:       Cervical: No cervical adenopathy.      Upper Body:      Right upper body: No axillary adenopathy.      Left upper body: No axillary adenopathy.      Lower Body: No right inguinal adenopathy. No left inguinal adenopathy.   Neurological:      Mental Status: She is alert.      Cranial Nerves: Cranial nerves 2-12 are intact. No cranial nerve deficit.      Sensory: No sensory deficit.      Motor: Motor function is intact. No weakness.      Gait: Gait is intact.      Deep Tendon Reflexes: Reflexes normal.      Comments: The patient is oriented x2.   Psychiatric:         Mood and Affect: Mood normal. Mood is not anxious or depressed. Affect is not angry.         Behavior: Behavior is not agitated.         Thought Content: Thought content normal.         Judgment: Judgment normal.     LAB WORK: Laboratory studies were reviewed.    Assessment/Plan  Problem List Items Addressed This Visit             ICD-10-CM       Gastrointestinal and Abdominal    Constipation K59.00       Neuro    Closed head injury, initial encounter - Primary S09.90XA     Other Visit Diagnoses         Codes    Closed fracture of facial bone with routine healing, unspecified facial bone, subsequent encounter     S02.92XD    Closed fracture of multiple ribs of left side with routine healing, subsequent encounter     S22.42XD    Closed compression fracture of L4 lumbar vertebra with delayed healing, subsequent encounter     S32.040G    Compression fracture of T11 vertebra with routine healing, subsequent encounter     S22.080D    Hypertension, unspecified type     I10    Depression, unspecified depression type     F32.A    Dementia, unspecified dementia severity, unspecified dementia type, unspecified whether behavioral, psychotic, or mood disturbance or anxiety (Multi)     F03.90    Rheumatoid arthritis, involving unspecified site, unspecified whether rheumatoid factor present (Multi)     M06.9    Gastroesophageal reflux disease without esophagitis      K21.9    Osteoarthritis, unspecified osteoarthritis type, unspecified site     M19.90    Weakness     R53.1    At risk for falling     Z91.81        1. Recent closed head injury, continue to monitor.  2. Facial fracture, follow with ENT.  3. Left rib fractures, on pain control with incentive spirometry.  4. L4 compression fracture, on pain control.  5. Compression fracture of T11, on pain control.  6. Hypertension, med controlled.  7. Depression, on medication.  8. Dementia, unchanged.  9. Rheumatoid arthritis, on medication.  10. GERD, on PPI.  11. Constipation, on bowel regimen.  12. Osteoarthritis, on Tylenol.  13. Weakness, on PT/OT.  14. Fall risk, on fall precautions.    Scribe Attestation  By signing my name below, I, Valeria Rodney attest that this documentation has been prepared under the direction and in the presence of Obey Osorio MD.     All medical record entries made by the scribe were personally dictated by me I have reviewed the chart and agree the record accurately reflects my personal performance of his history physical examination and management      Electronically Signed By: Obey Osorio MD   3/12/25  1:19 AM

## 2025-03-10 VITALS
HEART RATE: 80 BPM | SYSTOLIC BLOOD PRESSURE: 126 MMHG | DIASTOLIC BLOOD PRESSURE: 82 MMHG | RESPIRATION RATE: 16 BRPM | TEMPERATURE: 97.8 F

## 2025-03-10 ASSESSMENT — ENCOUNTER SYMPTOMS
CHILLS: 0
FEVER: 0

## 2025-03-10 NOTE — PROGRESS NOTES
PLACE OF SERVICE:  [facility].    This is a subsequent visit.    Subjective   Patient ID: Xiomy Rubio is a 89 y.o. female who presents for Follow-up.    Ms. Xiomy Rubio is a 89-year-old female with history of dementia with recent fall with closed head injury.  She was evaluated in the emergency room.  She suffers from a prior fracture to her face, as well as L4 compression fracture, T11 fracture, and left rib fracture.  She is unable to care for herself and requires supportive care.    Review of Systems   Constitutional:  Negative for chills and fever.   Cardiovascular:  Negative for chest pain.   All other systems reviewed and are negative.    Objective   /82   Pulse 80   Temp 36.6 °C (97.8 °F)   Resp 16   LMP  (LMP Unknown) Comment: Pt is 89 years old- last mentrauls cycle unknown    Physical Exam  Vitals reviewed.   Constitutional:       General: She is not in acute distress.     Comments: This is a well-developed and well-nourished female, sitting in a chair.   HENT:      Right Ear: Tympanic membrane, ear canal and external ear normal.      Left Ear: Tympanic membrane, ear canal and external ear normal.   Eyes:      General: No scleral icterus.     Pupils: Pupils are equal, round, and reactive to light.   Neck:      Vascular: No carotid bruit.   Cardiovascular:      Heart sounds: Normal heart sounds, S1 normal and S2 normal. No murmur heard.     No friction rub.   Pulmonary:      Effort: Pulmonary effort is normal.      Breath sounds: Normal breath sounds and air entry.   Chest:      Chest wall: Tenderness (left side) present.   Abdominal:      Palpations: There is no hepatomegaly, splenomegaly or mass.   Musculoskeletal:         General: No swelling or deformity. Normal range of motion.      Cervical back: Neck supple.      Right lower leg: No edema.      Left lower leg: No edema.   Lymphadenopathy:      Cervical: No cervical adenopathy.      Upper Body:      Right upper body:  No axillary adenopathy.      Left upper body: No axillary adenopathy.      Lower Body: No right inguinal adenopathy. No left inguinal adenopathy.   Neurological:      Mental Status: She is alert.      Cranial Nerves: Cranial nerves 2-12 are intact. No cranial nerve deficit.      Sensory: No sensory deficit.      Motor: Motor function is intact. No weakness.      Gait: Gait is intact.      Deep Tendon Reflexes: Reflexes normal.      Comments: The patient is oriented x2.   Psychiatric:         Mood and Affect: Mood normal. Mood is not anxious or depressed. Affect is not angry.         Behavior: Behavior is not agitated.         Thought Content: Thought content normal.         Judgment: Judgment normal.     LAB WORK: Laboratory studies were reviewed.    Assessment/Plan   Problem List Items Addressed This Visit             ICD-10-CM       Gastrointestinal and Abdominal    Constipation K59.00       Neuro    Closed head injury, initial encounter - Primary S09.90XA     Other Visit Diagnoses         Codes    Closed fracture of facial bone with routine healing, unspecified facial bone, subsequent encounter     S02.92XD    Closed fracture of multiple ribs of left side with routine healing, subsequent encounter     S22.42XD    Closed compression fracture of L4 lumbar vertebra with delayed healing, subsequent encounter     S32.040G    Compression fracture of T11 vertebra with routine healing, subsequent encounter     S22.080D    Hypertension, unspecified type     I10    Depression, unspecified depression type     F32.A    Dementia, unspecified dementia severity, unspecified dementia type, unspecified whether behavioral, psychotic, or mood disturbance or anxiety (Multi)     F03.90    Rheumatoid arthritis, involving unspecified site, unspecified whether rheumatoid factor present (Multi)     M06.9    Gastroesophageal reflux disease without esophagitis     K21.9    Osteoarthritis, unspecified osteoarthritis type, unspecified site      M19.90    Weakness     R53.1    At risk for falling     Z91.81        1. Recent closed head injury, continue to monitor.  2. Facial fracture, follow with ENT.  3. Left rib fractures, on pain control with incentive spirometry.  4. L4 compression fracture, on pain control.  5. Compression fracture of T11, on pain control.  6. Hypertension, med controlled.  7. Depression, on medication.  8. Dementia, unchanged.  9. Rheumatoid arthritis, on medication.  10. GERD, on PPI.  11. Constipation, on bowel regimen.  12. Osteoarthritis, on Tylenol.  13. Weakness, on PT/OT.  14. Fall risk, on fall precautions.  15. Patient would benefit with a Walker  Scribe Attestation  By signing my name below, I, Valeria Rodney attest that this documentation has been prepared under the direction and in the presence of Obey Osorio MD.     All medical record entries made by the chaibcharlotte were personally dictated by me I have reviewed the chart and agree the record accurately reflects my personal performance of his history physical examination and management

## 2025-03-16 ENCOUNTER — NURSING HOME VISIT (OUTPATIENT)
Dept: POST ACUTE CARE | Facility: EXTERNAL LOCATION | Age: OVER 89
End: 2025-03-16
Payer: MEDICARE

## 2025-03-16 DIAGNOSIS — M06.9 RHEUMATOID ARTHRITIS, INVOLVING UNSPECIFIED SITE, UNSPECIFIED WHETHER RHEUMATOID FACTOR PRESENT (MULTI): ICD-10-CM

## 2025-03-16 DIAGNOSIS — R53.1 WEAKNESS: ICD-10-CM

## 2025-03-16 DIAGNOSIS — S09.90XA CLOSED HEAD INJURY, INITIAL ENCOUNTER: ICD-10-CM

## 2025-03-16 DIAGNOSIS — S32.040G CLOSED COMPRESSION FRACTURE OF L4 LUMBAR VERTEBRA WITH DELAYED HEALING, SUBSEQUENT ENCOUNTER: Primary | ICD-10-CM

## 2025-03-16 DIAGNOSIS — I10 HYPERTENSION, UNSPECIFIED TYPE: ICD-10-CM

## 2025-03-16 DIAGNOSIS — F32.A DEPRESSION, UNSPECIFIED DEPRESSION TYPE: ICD-10-CM

## 2025-03-16 DIAGNOSIS — S22.080D COMPRESSION FRACTURE OF T11 VERTEBRA WITH ROUTINE HEALING, SUBSEQUENT ENCOUNTER: ICD-10-CM

## 2025-03-16 DIAGNOSIS — S22.42XD CLOSED FRACTURE OF MULTIPLE RIBS OF LEFT SIDE WITH ROUTINE HEALING, SUBSEQUENT ENCOUNTER: ICD-10-CM

## 2025-03-16 DIAGNOSIS — M19.90 OSTEOARTHRITIS, UNSPECIFIED OSTEOARTHRITIS TYPE, UNSPECIFIED SITE: ICD-10-CM

## 2025-03-16 DIAGNOSIS — K21.9 GASTROESOPHAGEAL REFLUX DISEASE WITHOUT ESOPHAGITIS: ICD-10-CM

## 2025-03-16 DIAGNOSIS — K59.00 CONSTIPATION, UNSPECIFIED CONSTIPATION TYPE: ICD-10-CM

## 2025-03-16 DIAGNOSIS — F03.90 DEMENTIA, UNSPECIFIED DEMENTIA SEVERITY, UNSPECIFIED DEMENTIA TYPE, UNSPECIFIED WHETHER BEHAVIORAL, PSYCHOTIC, OR MOOD DISTURBANCE OR ANXIETY (MULTI): ICD-10-CM

## 2025-03-16 DIAGNOSIS — S02.92XD CLOSED FRACTURE OF FACIAL BONE WITH ROUTINE HEALING, UNSPECIFIED FACIAL BONE, SUBSEQUENT ENCOUNTER: ICD-10-CM

## 2025-03-16 DIAGNOSIS — Z91.81 AT RISK FOR FALLING: ICD-10-CM

## 2025-03-16 PROCEDURE — 99309 SBSQ NF CARE MODERATE MDM 30: CPT | Performed by: INTERNAL MEDICINE

## 2025-03-16 NOTE — LETTER
Patient: Ximoy Rubio  : 1931    Encounter Date: 2025    PLACE OF SERVICE:  [facility]    This is a subsequent visit.    Subjective  Patient ID: Xiomy Rubio is a 89 y.o. female who presents for Follow-up.    Ms. Xiomy Rubio is a 89-year-old female with history of dementia with recent fall with closed head injury.  She has had recent falls, suffering left rib fractures, facial fracture and L4 compression fracture as well as fracture T11.  She is unable to care for herself and requires supportive care.    Review of Systems   Constitutional:  Negative for chills and fever.   Cardiovascular:  Negative for chest pain.   All other systems reviewed and are negative.    Objective  /78   Pulse 78   Temp 36.6 °C (97.9 °F)   Resp 16   LMP  (LMP Unknown) Comment: Pt is 89 years old- last mentrauls cycle unknown    Physical Exam  Vitals reviewed.   Constitutional:       General: She is not in acute distress.     Comments: This is a well-developed and well-nourished female, sitting in a chair.   HENT:      Right Ear: Tympanic membrane, ear canal and external ear normal.      Left Ear: Tympanic membrane, ear canal and external ear normal.   Eyes:      General: No scleral icterus.     Pupils: Pupils are equal, round, and reactive to light.   Neck:      Vascular: No carotid bruit.   Cardiovascular:      Heart sounds: Normal heart sounds, S1 normal and S2 normal. No murmur heard.     No friction rub.   Pulmonary:      Effort: Pulmonary effort is normal.      Breath sounds: Normal breath sounds and air entry.   Abdominal:      Palpations: There is no hepatomegaly, splenomegaly or mass.   Musculoskeletal:         General: No swelling or deformity. Normal range of motion.      Cervical back: Neck supple.      Right lower leg: No edema.      Left lower leg: No edema.   Lymphadenopathy:      Cervical: No cervical adenopathy.      Upper Body:      Right upper body: No axillary adenopathy.       Left upper body: No axillary adenopathy.      Lower Body: No right inguinal adenopathy. No left inguinal adenopathy.   Neurological:      Mental Status: She is alert.      Cranial Nerves: Cranial nerves 2-12 are intact. No cranial nerve deficit.      Sensory: No sensory deficit.      Motor: Motor function is intact. No weakness.      Gait: Gait is intact.      Deep Tendon Reflexes: Reflexes normal.      Comments: The patient is oriented x2.   Psychiatric:         Mood and Affect: Mood normal. Mood is not anxious or depressed. Affect is not angry.         Behavior: Behavior is not agitated.         Thought Content: Thought content normal.         Judgment: Judgment normal.     LAB WORK:  Laboratory studies were reviewed.    Assessment/Plan  Problem List Items Addressed This Visit             ICD-10-CM    Constipation K59.00    Closed head injury, initial encounter S09.90XA     Other Visit Diagnoses         Codes    Closed compression fracture of L4 lumbar vertebra with delayed healing, subsequent encounter    -  Primary S32.040G    Compression fracture of T11 vertebra with routine healing, subsequent encounter     S22.080D    Closed fracture of multiple ribs of left side with routine healing, subsequent encounter     S22.42XD    Closed fracture of facial bone with routine healing, unspecified facial bone, subsequent encounter     S02.92XD    Dementia, unspecified dementia severity, unspecified dementia type, unspecified whether behavioral, psychotic, or mood disturbance or anxiety (Multi)     F03.90    Depression, unspecified depression type     F32.A    Hypertension, unspecified type     I10    Rheumatoid arthritis, involving unspecified site, unspecified whether rheumatoid factor present (Multi)     M06.9    Gastroesophageal reflux disease without esophagitis     K21.9    Osteoarthritis, unspecified osteoarthritis type, unspecified site     M19.90    Weakness     R53.1    At risk for falling     Z91.81        1.  L4 compression fracture, on pain control.  2. Compression fracture T11, on pain control.  3. Left rib fractures, on pain control with incentive spirometry.  4. Facial fracture.  Follow ENT.  5. Recent closed head injury.  Continue to monitor.  6. Dementia, unchanged.   7. Depression, on medication.  8. Hypertension, medically controlled.  9. Constipation, on bowel regimen.  10. Rheumatoid arthritis, on medication.  11. GERD, on PPI.  12. Osteoarthritis, on Tylenol.  13. Weakness, on PT/OT.  14. Fall risk, on fall precautions.    Scribe Attestation  By signing my name below, I, Valeria Herzog attest that this documentation has been prepared under the direction and in the presence of Obey Osorio MD.     All medical record entries made by the scribe were personally dictated by me I have reviewed the chart and agree the record accurately reflects my personal performance of his history physical examination and management      Electronically Signed By: Obey Osorio MD   3/19/25  1:05 AM

## 2025-03-17 VITALS
RESPIRATION RATE: 16 BRPM | HEART RATE: 78 BPM | TEMPERATURE: 97.9 F | SYSTOLIC BLOOD PRESSURE: 126 MMHG | DIASTOLIC BLOOD PRESSURE: 78 MMHG

## 2025-03-17 ASSESSMENT — ENCOUNTER SYMPTOMS
FEVER: 0
CHILLS: 0

## 2025-03-17 NOTE — PROGRESS NOTES
PLACE OF SERVICE:  [facility]    This is a subsequent visit.    Subjective   Patient ID: Xiomy Rubio is a 89 y.o. female who presents for Follow-up.    Ms. Xiomy Rubio is a 89-year-old female with history of dementia with recent fall with closed head injury.  She has had recent falls, suffering left rib fractures, facial fracture and L4 compression fracture as well as fracture T11.  She is unable to care for herself and requires supportive care.    Review of Systems   Constitutional:  Negative for chills and fever.   Cardiovascular:  Negative for chest pain.   All other systems reviewed and are negative.    Objective   /78   Pulse 78   Temp 36.6 °C (97.9 °F)   Resp 16   LMP  (LMP Unknown) Comment: Pt is 89 years old- last mentrauls cycle unknown    Physical Exam  Vitals reviewed.   Constitutional:       General: She is not in acute distress.     Comments: This is a well-developed and well-nourished female, sitting in a chair.   HENT:      Right Ear: Tympanic membrane, ear canal and external ear normal.      Left Ear: Tympanic membrane, ear canal and external ear normal.   Eyes:      General: No scleral icterus.     Pupils: Pupils are equal, round, and reactive to light.   Neck:      Vascular: No carotid bruit.   Cardiovascular:      Heart sounds: Normal heart sounds, S1 normal and S2 normal. No murmur heard.     No friction rub.   Pulmonary:      Effort: Pulmonary effort is normal.      Breath sounds: Normal breath sounds and air entry.   Abdominal:      Palpations: There is no hepatomegaly, splenomegaly or mass.   Musculoskeletal:         General: No swelling or deformity. Normal range of motion.      Cervical back: Neck supple.      Right lower leg: No edema.      Left lower leg: No edema.   Lymphadenopathy:      Cervical: No cervical adenopathy.      Upper Body:      Right upper body: No axillary adenopathy.      Left upper body: No axillary adenopathy.      Lower Body: No right  inguinal adenopathy. No left inguinal adenopathy.   Neurological:      Mental Status: She is alert.      Cranial Nerves: Cranial nerves 2-12 are intact. No cranial nerve deficit.      Sensory: No sensory deficit.      Motor: Motor function is intact. No weakness.      Gait: Gait is intact.      Deep Tendon Reflexes: Reflexes normal.      Comments: The patient is oriented x2.   Psychiatric:         Mood and Affect: Mood normal. Mood is not anxious or depressed. Affect is not angry.         Behavior: Behavior is not agitated.         Thought Content: Thought content normal.         Judgment: Judgment normal.     LAB WORK:  Laboratory studies were reviewed.    Assessment/Plan   Problem List Items Addressed This Visit             ICD-10-CM    Constipation K59.00    Closed head injury, initial encounter S09.90XA     Other Visit Diagnoses         Codes    Closed compression fracture of L4 lumbar vertebra with delayed healing, subsequent encounter    -  Primary S32.040G    Compression fracture of T11 vertebra with routine healing, subsequent encounter     S22.080D    Closed fracture of multiple ribs of left side with routine healing, subsequent encounter     S22.42XD    Closed fracture of facial bone with routine healing, unspecified facial bone, subsequent encounter     S02.92XD    Dementia, unspecified dementia severity, unspecified dementia type, unspecified whether behavioral, psychotic, or mood disturbance or anxiety (Multi)     F03.90    Depression, unspecified depression type     F32.A    Hypertension, unspecified type     I10    Rheumatoid arthritis, involving unspecified site, unspecified whether rheumatoid factor present (Multi)     M06.9    Gastroesophageal reflux disease without esophagitis     K21.9    Osteoarthritis, unspecified osteoarthritis type, unspecified site     M19.90    Weakness     R53.1    At risk for falling     Z91.81        1. L4 compression fracture, on pain control.  2. Compression fracture  T11, on pain control.  3. Left rib fractures, on pain control with incentive spirometry.  4. Facial fracture.  Follow ENT.  5. Recent closed head injury.  Continue to monitor.  6. Dementia, unchanged.   7. Depression, on medication.  8. Hypertension, medically controlled.  9. Constipation, on bowel regimen.  10. Rheumatoid arthritis, on medication.  11. GERD, on PPI.  12. Osteoarthritis, on Tylenol.  13. Weakness, on PT/OT.  14. Fall risk, on fall precautions.    Scribe Attestation  By signing my name below, ICarmita Scribe attest that this documentation has been prepared under the direction and in the presence of Obey Osorio MD.     All medical record entries made by the scribe were personally dictated by me I have reviewed the chart and agree the record accurately reflects my personal performance of his history physical examination and management

## 2025-03-20 ENCOUNTER — HOME HEALTH ADMISSION (OUTPATIENT)
Dept: HOME HEALTH SERVICES | Facility: HOME HEALTH | Age: OVER 89
End: 2025-03-20
Payer: MEDICARE

## 2025-03-20 ENCOUNTER — DOCUMENTATION (OUTPATIENT)
Dept: HOME HEALTH SERVICES | Facility: HOME HEALTH | Age: OVER 89
End: 2025-03-20
Payer: MEDICARE

## 2025-03-20 NOTE — HH CARE COORDINATION
Home Care received a Referral for Nursing, Physical Therapy, and Occupational Therapy. We have processed the referral for a Start of Care on 03/21-03/22.     If you have any questions or concerns, please feel free to contact us at [phone]. Follow the prompts, enter your five digit zip code, and you will be directed to your care team on E[facility] 1.

## 2025-03-21 ENCOUNTER — PATIENT OUTREACH (OUTPATIENT)
Dept: PRIMARY CARE | Facility: CLINIC | Age: OVER 89
End: 2025-03-21
Payer: MEDICARE

## 2025-03-21 DIAGNOSIS — Z09 HOSPITAL DISCHARGE FOLLOW-UP: ICD-10-CM

## 2025-03-21 DIAGNOSIS — S09.90XA CLOSED HEAD INJURY, INITIAL ENCOUNTER: ICD-10-CM

## 2025-03-21 NOTE — PROGRESS NOTES
"TCM completed 03/21/25   Discharge Facility:  [facility]  Discharge Diagnosis: Closed head injury, initial encounter,  COVID-19, Acute pain of right knee, Falls, Hypoxia, Fracture of right patella  Admission Date: 2/20/25  Discharge Date: 2/24/25   SNF= [facility] from 2/24/25- 3/20/24.    PCP Appointment Date: 3/31/25  Specialist Appointment Date: n/a      Hospital Encounter and Summary Linked: Yes  ED to Hosp-Admission (Discharged) with Yajaira Osorio MD; Vaughn Campbell DO (02/20/2025)                        --See discharge assessment below for further details--    Wrap Up  Wrap Up Additional Comments: TCM initial outreach post discharge SNF completed successfully. Spoke with the patients son/primary caregiver Ramana who states the patient is \"doing good \" today. Patient lives with her son. Ramana denied any acute changes/concerns in the patient's condition since leaving the SNF. Ramana confirmed he received the patient's discharge summary and has all medications needed in the home for the patient. TCM phone number was provided to the patient/caregiver, with the patient/caregiver encouraged to call back with any non-emergent questions or concerns. Ramana verbalized his understanding and states he has no questions or concerns at this time, but will call back if needed. Ramana already called the PCP office and scheduled the patients hosp discharge appt- confirmed the patient will keep this appt. (3/21/2025 12:48 PM)  Call End Time: 1243 (3/21/2025 12:48 PM)    Engagement  Call Start Time: 1242 (3/21/2025 12:48 PM)    Medications  Medications reviewed with patient/caregiver?: No (Spoke with the patients son Ramana) (3/21/2025 12:48 PM)  Is the patient having any side effects they believe may be caused by any medication additions or changes?: No (3/21/2025 12:48 PM)  Does the patient have all medications ordered at discharge?: Yes (3/21/2025 12:48 PM)  Care Management Interventions: No intervention needed " (3/21/2025 12:48 PM)  Prescription Comments: START taking these medications     * ipratropium-albuteroL 0.5-2.5 mg/3 mL nebulizer solution; Commonly   known as: Duo-Neb; Take 3 mL by nebulization 3 times a day.   * ipratropium-albuteroL 0.5-2.5 mg/3 mL nebulizer solution; Commonly   known as: Duo-Neb; Take 3 mL by nebulization every 2 hours if needed for   wheezing or shortness of breath.   oxygen gas therapy; Commonly known as: O2; Inhale 2 L/min once every 24   hours. (3/21/2025 12:48 PM)  Is the patient taking all medications as directed (includes completed medication regime)?: Yes (3/21/2025 12:48 PM)  Care Management Interventions: Provided patient education (3/21/2025 12:48 PM)  Medication Comments: See medication list. (3/21/2025 12:48 PM)    Appointments  Does the patient have a primary care provider?: Yes (3/21/2025 12:48 PM)  Care Management Interventions: Verified appointment date/time/provider (3/21/2025 12:48 PM)  Has the patient kept scheduled appointments due by today?: Not applicable (3/21/2025 12:48 PM)  Care Management Interventions: Advised patient to keep appointment; Educated on importance of keeping appointment (3/21/2025 12:48 PM)    Self Management  What is the home health agency?: [facility] (3/21/2025 12:48 PM)  Has home health visited the patient within 72 hours of discharge?: Call prior to 72 hours (3/21/2025 12:48 PM)  What Durable Medical Equipment (DME) was ordered?: n/a (3/21/2025 12:48 PM)    Patient Teaching  Does the patient have access to their discharge instructions?: Yes (3/21/2025 12:48 PM)  Care Management Interventions: Reviewed instructions with patient (3/21/2025 12:48 PM)  What is the patient's perception of their health status since discharge?: Improving (3/21/2025 12:48 PM)  Is the patient/caregiver able to teach back the hierarchy of who to call/visit for symptoms/problems? PCP, Specialist, Home Health nurse, Urgent Care, ED, 911: Yes (3/21/2025 12:48 PM)  Patient/Caregiver  Education Comments: Patient amina Boles denied any questions, concerns or needs from TCM or the patients PCP at time of outreach. (3/21/2025 12:48 PM)

## 2025-03-24 ENCOUNTER — HOME CARE VISIT (OUTPATIENT)
Dept: HOME HEALTH SERVICES | Facility: HOME HEALTH | Age: OVER 89
End: 2025-03-24
Payer: MEDICARE

## 2025-03-24 VITALS
DIASTOLIC BLOOD PRESSURE: 54 MMHG | HEART RATE: 66 BPM | RESPIRATION RATE: 16 BRPM | OXYGEN SATURATION: 95 % | TEMPERATURE: 99 F | SYSTOLIC BLOOD PRESSURE: 116 MMHG

## 2025-03-24 PROCEDURE — 169592 NO-PAY CLAIM PROCEDURE

## 2025-03-24 PROCEDURE — G0299 HHS/HOSPICE OF RN EA 15 MIN: HCPCS | Mod: HHH

## 2025-03-24 ASSESSMENT — ENCOUNTER SYMPTOMS
PAIN LOCATION: RIGHT HIP
PAIN LOCATION - PAIN FREQUENCY: INTERMITTENT
LOWEST PAIN SEVERITY IN PAST 24 HOURS: 2/10
MUSCLE WEAKNESS: 1
HIGHEST PAIN SEVERITY IN PAST 24 HOURS: 5/10
PERSON REPORTING PAIN: PATIENT
APPETITE LEVEL: GOOD
DIZZINESS: 1
PAIN LOCATION - PAIN DURATION: ONGOING
PAIN: 1
LAST BOWEL MOVEMENT: 67287
PAIN LOCATION - PAIN SEVERITY: 3/10
CHANGE IN APPETITE: UNCHANGED
SUBJECTIVE PAIN PROGRESSION: UNCHANGED

## 2025-03-24 ASSESSMENT — ACTIVITIES OF DAILY LIVING (ADL)
ENTERING_EXITING_HOME: MINIMUM ASSIST
AMBULATION ASSISTANCE: STAND BY ASSIST
CURRENT_FUNCTION: STAND BY ASSIST

## 2025-03-25 ENCOUNTER — HOME CARE VISIT (OUTPATIENT)
Dept: HOME HEALTH SERVICES | Facility: HOME HEALTH | Age: OVER 89
End: 2025-03-25
Payer: MEDICARE

## 2025-03-25 VITALS
SYSTOLIC BLOOD PRESSURE: 120 MMHG | OXYGEN SATURATION: 96 % | DIASTOLIC BLOOD PRESSURE: 58 MMHG | HEART RATE: 72 BPM | RESPIRATION RATE: 18 BRPM

## 2025-03-25 PROCEDURE — G0151 HHCP-SERV OF PT,EA 15 MIN: HCPCS | Mod: HHH

## 2025-03-25 ASSESSMENT — ACTIVITIES OF DAILY LIVING (ADL)
AMBULATION ASSISTANCE ON FLAT SURFACES: 1
OASIS_M1830: 03
AMBULATION_DISTANCE/DURATION_TOLERATED: 40 FT

## 2025-03-25 ASSESSMENT — ENCOUNTER SYMPTOMS
PERSON REPORTING PAIN: PATIENT
DENIES PAIN: 1

## 2025-03-26 ENCOUNTER — HOME CARE VISIT (OUTPATIENT)
Dept: HOME HEALTH SERVICES | Facility: HOME HEALTH | Age: OVER 89
End: 2025-03-26
Payer: MEDICARE

## 2025-03-26 PROCEDURE — G0152 HHCP-SERV OF OT,EA 15 MIN: HCPCS | Mod: HHH

## 2025-03-26 ASSESSMENT — ENCOUNTER SYMPTOMS
PAIN: 1
PAIN LOCATION: BACK
PAIN SEVERITY GOAL: 0/10
PERSON REPORTING PAIN: PATIENT
HIGHEST PAIN SEVERITY IN PAST 24 HOURS: 4/10
LOWEST PAIN SEVERITY IN PAST 24 HOURS: 4/10
SUBJECTIVE PAIN PROGRESSION: UNCHANGED

## 2025-03-26 ASSESSMENT — ACTIVITIES OF DAILY LIVING (ADL)
GROOMING ASSESSED: 1
BATHING ASSESSED: 1
AMBULATION ASSISTANCE: 1
CURRENT_FUNCTION: SUPERVISION
PREPARING MEALS: NEEDS ASSISTANCE
TOILETING: 1
BATHING_CURRENT_FUNCTION: MINIMUM ASSIST
PHYSICAL TRANSFERS ASSESSED: 1
GROOMING_CURRENT_FUNCTION: SUPERVISION
AMBULATION ASSISTANCE: SUPERVISION
DRESSING_UB_CURRENT_FUNCTION: SUPERVISION
DRESSING_LB_CURRENT_FUNCTION: SUPERVISION
TOILETING: MINIMUM ASSIST

## 2025-03-28 ENCOUNTER — HOME CARE VISIT (OUTPATIENT)
Dept: HOME HEALTH SERVICES | Facility: HOME HEALTH | Age: OVER 89
End: 2025-03-28
Payer: MEDICARE

## 2025-03-28 VITALS
TEMPERATURE: 97.3 F | HEART RATE: 74 BPM | RESPIRATION RATE: 18 BRPM | OXYGEN SATURATION: 98 % | SYSTOLIC BLOOD PRESSURE: 118 MMHG | DIASTOLIC BLOOD PRESSURE: 62 MMHG

## 2025-03-28 PROCEDURE — G0157 HHC PT ASSISTANT EA 15: HCPCS | Mod: CQ,HHH

## 2025-03-28 ASSESSMENT — ENCOUNTER SYMPTOMS
HIGHEST PAIN SEVERITY IN PAST 24 HOURS: 7/10
PAIN LOCATION: RIGHT HIP
PAIN SEVERITY GOAL: 0/10
PAIN LOCATION - EXACERBATING FACTORS: ACTIVITY
PAIN LOCATION - PAIN FREQUENCY: INTERMITTENT
PAIN: 1
PERSON REPORTING PAIN: PATIENT
PAIN LOCATION - PAIN DURATION: CHRONIC
LOWEST PAIN SEVERITY IN PAST 24 HOURS: 4/10
PAIN LOCATION - RELIEVING FACTORS: REST, MEDICATION
SUBJECTIVE PAIN PROGRESSION: UNCHANGED
PAIN LOCATION - PAIN QUALITY: ACHE
PAIN LOCATION - PAIN SEVERITY: 5/10

## 2025-03-31 ENCOUNTER — APPOINTMENT (OUTPATIENT)
Dept: PRIMARY CARE | Facility: CLINIC | Age: OVER 89
End: 2025-03-31
Payer: MEDICARE

## 2025-03-31 VITALS
HEIGHT: 66 IN | BODY MASS INDEX: 22.63 KG/M2 | HEART RATE: 66 BPM | OXYGEN SATURATION: 96 % | SYSTOLIC BLOOD PRESSURE: 110 MMHG | DIASTOLIC BLOOD PRESSURE: 52 MMHG | WEIGHT: 140.8 LBS

## 2025-03-31 DIAGNOSIS — S02.2XXD CLOSED FRACTURE OF NASAL BONE WITH ROUTINE HEALING, SUBSEQUENT ENCOUNTER: ICD-10-CM

## 2025-03-31 DIAGNOSIS — M81.0 OSTEOPOROSIS, UNSPECIFIED OSTEOPOROSIS TYPE, UNSPECIFIED PATHOLOGICAL FRACTURE PRESENCE: ICD-10-CM

## 2025-03-31 DIAGNOSIS — S02.2XXA CLOSED FRACTURE OF NASAL BONE, INITIAL ENCOUNTER: ICD-10-CM
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
## 2025-03-31 DIAGNOSIS — Z09 HOSPITAL DISCHARGE FOLLOW-UP: Primary | ICD-10-CM

## 2025-03-31 DIAGNOSIS — S22.41XD CLOSED FRACTURE OF MULTIPLE RIBS OF RIGHT SIDE WITH ROUTINE HEALING, SUBSEQUENT ENCOUNTER: ICD-10-CM

## 2025-03-31 PROCEDURE — G8433 SCR FOR DEP NOT CPT DOC RSN: HCPCS | Performed by: STUDENT IN AN ORGANIZED HEALTH CARE EDUCATION/TRAINING PROGRAM

## 2025-03-31 PROCEDURE — 99495 TRANSJ CARE MGMT MOD F2F 14D: CPT | Performed by: STUDENT IN AN ORGANIZED HEALTH CARE EDUCATION/TRAINING PROGRAM

## 2025-03-31 PROCEDURE — 1123F ACP DISCUSS/DSCN MKR DOCD: CPT | Performed by: STUDENT IN AN ORGANIZED HEALTH CARE EDUCATION/TRAINING PROGRAM

## 2025-03-31 PROCEDURE — 1159F MED LIST DOCD IN RCRD: CPT | Performed by: STUDENT IN AN ORGANIZED HEALTH CARE EDUCATION/TRAINING PROGRAM

## 2025-03-31 PROCEDURE — 1036F TOBACCO NON-USER: CPT | Performed by: STUDENT IN AN ORGANIZED HEALTH CARE EDUCATION/TRAINING PROGRAM

## 2025-03-31 PROCEDURE — 1157F ADVNC CARE PLAN IN RCRD: CPT | Performed by: STUDENT IN AN ORGANIZED HEALTH CARE EDUCATION/TRAINING PROGRAM

## 2025-03-31 RX ORDER — PSYLLIUM HUSK 0.4 G
1 CAPSULE ORAL 2 TIMES DAILY
Qty: 180 TABLET | Refills: 1 | Status: SHIPPED | OUTPATIENT
Start: 2025-03-31

## 2025-03-31 NOTE — PROGRESS NOTES
"  Patient: Xiomy Rubio  : 1931  PCP: Lian Hernandez DO  MRN: [id number]  Program: Transitional Care Management  Status: Enrolled  Effective Dates: 3/21/2025 - present  Responsible Staff: Ginger Sanchez  Social Drivers to be Addressed: Financial Resource Strain, Physical Activity, Stress, Transportation Needs    Pt was discharged on 3/20/25     Xiomy Rubio is a 89 y.o. female presenting today for follow-up after being discharged from the hospital 11 days ago. The main problem requiring admission was fall. The discharge summary and/or Transitional Care Management documentation was reviewed. Medication reconciliation was performed as indicated via the \"Pete as Reviewed\" timestamp.     Xiomy Rubio was contacted by Transitional Care Management services two days after her discharge. This encounter and supporting documentation was reviewed.    Pt was admitted to   Due to Fall Pt got up in the middle of the night 1am. She got a snack, and fell asleep at the table, fell over and hit her face.  few weeks ago where she had nasal bone fracture, left 8th-11th rib fracture, T11 compression fracture and severe L4 compression fracture    Pt was admitted from - from the [facility]  Head injury with hematoma. Found to have an oxygen saturation of 88% and diagnosed with COVID-19  Right knee patella fracture  Hypoxia resolved      Comminuted compression fracture of L4 with similar alignment when  compared to the preceding CT.  2. Similar chronic T11 compression fracture.  Severe right lumbar stenosis was L4-5    Injuries from fall   -B/l nasal bone fractures   -fracture plane traversing the maxillary sinuses  and inferior nasal septu  -Nondisplaced or minimally displaced fractures of the  posterior left 8th through 11th ribs    /52   Pulse 66   Ht 1.676 m (5' 6\")   Wt 63.9 kg (140 lb 12.8 oz)   LMP  (LMP Unknown) Comment: Pt is 89 years old- last mentrauls cycle unknown  SpO2 96%  "  BMI 22.73 kg/m²     Physical Exam  Vitals reviewed.   Constitutional:       Appearance: Normal appearance.   Cardiovascular:      Rate and Rhythm: Normal rate and regular rhythm.      Heart sounds: Murmur heard.   Pulmonary:      Effort: Pulmonary effort is normal. No respiratory distress.      Breath sounds: Normal breath sounds. No wheezing.   Musculoskeletal:      Cervical back: Neck supple.      Left lower leg: No edema.   Neurological:      Mental Status: She is alert. Mental status is at baseline.      Gait: Gait abnormal (widened).         The complexity of medical decision making for this patient's transitional care is moderate.    Assessment/Plan   Diagnoses and all orders for this visit:  Fall, initial encounter  -     Referral to Physical Therapy; Future  Hospital discharge follow-up  -     Referral to Physical Therapy; Future  Osteoporosis, unspecified osteoporosis type, unspecified pathological fracture presence  -     calcium carbonate-vitamin D3 (Oysco 500/D) 500 mg-5 mcg (200 unit) tablet; Take 1 tablet by mouth 2 times a day.    Hospital Discharge follow Up  Pt is doing well with pain management with tylenol   Pt has one more at home physical therapy will transfer to outpt physical therapy  Ambulating with pain in the right hip  Injuries from fall   -B/l nasal bone fractures   -fracture plane traversing the maxillary sinuses  and inferior nasal septum  -Nondisplaced or minimally displaced fractures of the  posterior left 8th through 11th ribs    Constipation   Pt is not consistent with her miralax would like to try tablets   Senna continue      Anxiety  Patient is following with both psychology and a psychiatrist  Due to patient's adverse reactions she has been reduced back to her Zoloft 50 mg daily  Continue buspar 10mg TID  There has been improvement to her adverse side effects.      GERD prophylaxis  Pantoprazole was refilled     Osteoporosis  Her vitamin D, calcium supplementation to be  continued   Discussed previously other medication such as prolia for which pt has declined.      Constipation  Patient to continue with as needed laxative  Encourage daily prune juice or an increase in fiber     Prediabetes  A1c 5.7% 1 year     Pt to fu in 1-2 months

## 2025-04-02 ENCOUNTER — HOME CARE VISIT (OUTPATIENT)
Dept: HOME HEALTH SERVICES | Facility: HOME HEALTH | Age: OVER 89
End: 2025-04-02
Payer: MEDICARE

## 2025-04-02 VITALS
RESPIRATION RATE: 18 BRPM | HEART RATE: 78 BPM | DIASTOLIC BLOOD PRESSURE: 72 MMHG | OXYGEN SATURATION: 98 % | SYSTOLIC BLOOD PRESSURE: 122 MMHG | TEMPERATURE: 97.2 F

## 2025-04-02 VITALS
RESPIRATION RATE: 18 BRPM | DIASTOLIC BLOOD PRESSURE: 56 MMHG | SYSTOLIC BLOOD PRESSURE: 112 MMHG | TEMPERATURE: 98.7 F | HEART RATE: 78 BPM

## 2025-04-02 PROCEDURE — G0157 HHC PT ASSISTANT EA 15: HCPCS | Mod: CQ,HHH

## 2025-04-02 PROCEDURE — G0299 HHS/HOSPICE OF RN EA 15 MIN: HCPCS | Mod: HHH

## 2025-04-02 ASSESSMENT — ENCOUNTER SYMPTOMS
PERSON REPORTING PAIN: PATIENT
DYSPNEA ACTIVITY LEVEL: AFTER AMBULATING MORE THAN 20 FT
APPETITE LEVEL: GOOD
LAST BOWEL MOVEMENT: 67297
DIZZINESS: 1
CHANGE IN APPETITE: UNCHANGED
DENIES PAIN: 1
SHORTNESS OF BREATH: 1
PAIN LOCATION - PAIN DURATION: ONGOING
PAIN: 1
PERSON REPORTING PAIN: PATIENT
MUSCLE WEAKNESS: 1
PAIN LOCATION - EXACERBATING FACTORS: WALKING
PAIN LOCATION - PAIN FREQUENCY: INTERMITTENT
PAIN LOCATION: RIGHT HIP

## 2025-04-02 ASSESSMENT — ACTIVITIES OF DAILY LIVING (ADL)
CURRENT_FUNCTION: STAND BY ASSIST
AMBULATION ASSISTANCE: STAND BY ASSIST

## 2025-04-04 ENCOUNTER — HOME CARE VISIT (OUTPATIENT)
Dept: HOME HEALTH SERVICES | Facility: HOME HEALTH | Age: OVER 89
End: 2025-04-04
Payer: MEDICARE

## 2025-04-07 ENCOUNTER — HOME CARE VISIT (OUTPATIENT)
Dept: HOME HEALTH SERVICES | Facility: HOME HEALTH | Age: OVER 89
End: 2025-04-07
Payer: MEDICARE

## 2025-04-07 PROCEDURE — G0299 HHS/HOSPICE OF RN EA 15 MIN: HCPCS | Mod: HHH

## 2025-04-08 ENCOUNTER — HOME CARE VISIT (OUTPATIENT)
Dept: HOME HEALTH SERVICES | Facility: HOME HEALTH | Age: OVER 89
End: 2025-04-08
Payer: MEDICARE

## 2025-04-08 PROCEDURE — G0151 HHCP-SERV OF PT,EA 15 MIN: HCPCS | Mod: HHH

## 2025-04-08 ASSESSMENT — ENCOUNTER SYMPTOMS
PAIN: 1
PERSON REPORTING PAIN: PATIENT

## 2025-04-08 ASSESSMENT — ACTIVITIES OF DAILY LIVING (ADL)
OASIS_M1830: 02
HOME_HEALTH_OASIS: 01

## 2025-04-09 VITALS
DIASTOLIC BLOOD PRESSURE: 64 MMHG | SYSTOLIC BLOOD PRESSURE: 140 MMHG | TEMPERATURE: 98.4 F | OXYGEN SATURATION: 95 % | HEART RATE: 64 BPM

## 2025-04-09 ASSESSMENT — ENCOUNTER SYMPTOMS
CHANGE IN APPETITE: UNCHANGED
DIZZINESS: 1
PERSON REPORTING PAIN: PATIENT
APPETITE LEVEL: GOOD
MUSCLE WEAKNESS: 1
LAST BOWEL MOVEMENT: 67302
DENIES PAIN: 1
DESCRIPTION OF MEMORY LOSS: SHORT TERM

## 2025-04-09 ASSESSMENT — ACTIVITIES OF DAILY LIVING (ADL)
AMBULATION ASSISTANCE: STAND BY ASSIST
CURRENT_FUNCTION: STAND BY ASSIST

## 2025-04-14 ENCOUNTER — APPOINTMENT (OUTPATIENT)
Dept: PHYSICAL THERAPY | Facility: CLINIC | Age: OVER 89
End: 2025-04-14
Payer: MEDICARE

## 2025-04-15 ENCOUNTER — PATIENT OUTREACH (OUTPATIENT)
Dept: PRIMARY CARE | Facility: CLINIC | Age: OVER 89
End: 2025-04-15
Payer: MEDICARE

## 2025-04-15 NOTE — PROGRESS NOTES
Unable to reach patient for TCM 14 day post discharge outreach.  If no voicemail available, call attempts x 2 were made to contact the patient to assist with any questions or concerns patient may have. TCM to reassess patient needs 30 days post discharge.

## 2025-04-16 NOTE — PROGRESS NOTES
Post-Op Assessment Note    CV Status:  Stable  Pain Score: 0    Pain management: adequate       Mental Status:  Alert and awake   Hydration Status:  Euvolemic   PONV Controlled:  Controlled   Airway Patency:  Patent     Post Op Vitals Reviewed: Yes    No anethesia notable event occurred.    Staff: CRNA, Anesthesiologist           Last Filed PACU Vitals:  Vitals Value Taken Time   Temp     Pulse 83    /87    Resp 20    SpO2 96                [facility]  TRAUMA SERVICE - PROGRESS NOTE    Patient Name: Xiomy Rubio  MRN: [id number]  Admit Date: 203  : 1931  AGE: 89 y.o.   GENDER: female  ==============================================================================  MECHANISM OF INJURY:   89 y F GLF, in bathroom.      LOC (yes/no?): Unknown  Anticoagulant / Anti-platelet Rx? (for what dx?): No  Referring Facility Name (N/A for scene EMR run):  [facility]     INJURIES:   Bilateral Nasal Bone fractures  Left 8-11th rib fractures  Stable T11 compression fracture  New severe L4 compression fracture     OTHER MEDICAL PROBLEMS:  Rheumatoid Arthritis  Hypertension  Osteoporosis  Anxiety  Memory loss  Gerd  Urinary Incontinence   Constipation     INCIDENTAL FINDINGS:  Enhancing right upper pole lesion, concerning for primary renal  Neoplasm  2. 3 mm indeterminate hypodense liver lesion     ==============================================================================  TODAY'S ASSESSMENT AND PLAN OF CARE:  ## L 8-11 rib fx  Resp support as needed via NC   On room air, goal to maintain SpO2 > 92%    - NC at bedside, pt had off when in room  RT consult and incentive spirometry   No need for acute pain or thoracic consults  Pain controlled with tylenol and lidocaine patches, prn oxy     ## bilateral nasal bone fx, maxillary fractures. Lefort 2  - follow up ENT 2 weeks outpatient    ## T11, L4 fractures  - PT/OT  - no spine precautions  - follow up Dr. Israel    ## osteoporosis  - endo consulted   - Vit D/Ca  - follow up outpatient for DEXA    ## HTN  - no home meds at baseline  - continue to monitor, consider adding anti-HTN if remains high    ## GERD  - continue home PPI     ## FEN/GI  - regular diet  - BR     ## DVT ppx  - lovenox  - SCDs    Dispo: continue RNF - [facility] note states they spoke with son via telephone, son in agreement for patient to go to SNF. PT recommending today 2/6 SNF    Pt discussed with Dr. Charlton      Total face to face time spent with patient/family of 25 minutes, with >50% of the time spent discussing plan of care/management, counseling/educating on disease processes, explaining results of diagnostic testing.     Katie Junior PA-C  Trauma, Critical Care, Acute Care Surgery   Floor: [id number]  TSICU: [id number]     ==============================================================================  CHIEF COMPLAINT / OVERNIGHT EVENTS:   No acute events overnight. Pt this morning sitting in chair in her room. Answering questions appropriately, hard of hearing. Notes right knee hurts when she walked with PT this morning    MEDICAL HISTORY / ROS:  Admission history and ROS reviewed. Pertinent changes as follows:    PHYSICAL EXAM:  Heart Rate:  [71-94]   Temp:  [36.7 °C (98 °F)]   Resp:  [18]   BP: (165-181)/(64-78)   SpO2:  [92 %-96 %]   Physical Exam  Vitals reviewed.   HENT:      Head: Normocephalic and atraumatic.      Mouth/Throat:      Mouth: Mucous membranes are dry.   Eyes:      Extraocular Movements: Extraocular movements intact.      Conjunctiva/sclera: Conjunctivae normal.   Cardiovascular:      Rate and Rhythm: Normal rate.   Pulmonary:      Effort: Pulmonary effort is normal. No respiratory distress.      Comments: On room air  Abdominal:      General: Abdomen is flat. There is no distension.   Musculoskeletal:      Comments: Dorsi/plantarflexion 5/5 bilaterally. Moving all extremities spontaneously. No tenderness to palpation of right knee   Skin:     General: Skin is warm and dry.      Comments: Ecchymosis throughout face    Neurological:      Mental Status: She is alert and oriented to person, place, and time.      Comments: GCS 14 (-1 for intermittent confusion)   Psychiatric:         Mood and Affect: Mood normal.         Behavior: Behavior normal.      Comments: Pleasant          IMAGING SUMMARY:  (summary of new imaging findings, not a copy of dictation)    LABS:  Results from last 7 days   Lab Units  02/05/25  0858 02/04/25 0226 02/03/25  1535 02/03/25  0347   WBC AUTO x10*3/uL 7.4 6.9 7.3 6.6   HEMOGLOBIN g/dL 11.1* 10.2* 10.1* 13.1   HEMATOCRIT % 33.4* 31.7* 29.3* 41.2   PLATELETS AUTO x10*3/uL 211 157 179 230   NEUTROS PCT AUTO %  --   --  72.8 52.1   LYMPHS PCT AUTO %  --   --  13.8 30.9   MONOS PCT AUTO %  --   --  10.1 7.4   EOS PCT AUTO %  --   --  1.6 6.8     Results from last 7 days   Lab Units 02/04/25 0226 02/03/25 0347   APTT seconds 30 25.3   INR  1.1 1.1     Results from last 7 days   Lab Units 02/05/25  0858 02/04/25 0226 02/03/25 1535 02/03/25 0347   SODIUM mmol/L 139 139 139 138   POTASSIUM mmol/L 3.8 3.9 4.0 3.8   CHLORIDE mmol/L 102 103 103 103   CO2 mmol/L 27 29 31 30   BUN mg/dL 19 22 24* 22   CREATININE mg/dL 0.51 0.45* 0.55 0.59   CALCIUM mg/dL 9.1 8.4* 8.6 9.0   PROTEIN TOTAL g/dL  --   --  5.1* 6.0*   BILIRUBIN TOTAL mg/dL  --   --  1.0 0.8   ALK PHOS U/L  --   --  64 75   ALT U/L  --   --  9 10   AST U/L  --   --  18 14   GLUCOSE mg/dL 116* 104* 103* 163*     Results from last 7 days   Lab Units 02/03/25 1535 02/03/25 0347   BILIRUBIN TOTAL mg/dL 1.0 0.8           I have reviewed all medications, laboratory results, and imaging pertinent for today's encounter.

## 2025-04-22 ENCOUNTER — TELEPHONE (OUTPATIENT)
Dept: PRIMARY CARE | Facility: CLINIC | Age: OVER 89
End: 2025-04-22
Payer: MEDICARE

## 2025-04-23 ENCOUNTER — EVALUATION (OUTPATIENT)
Dept: PHYSICAL THERAPY | Facility: CLINIC | Age: OVER 89
End: 2025-04-23
Payer: MEDICARE

## 2025-04-23 DIAGNOSIS — Z09 HOSPITAL DISCHARGE FOLLOW-UP: ICD-10-CM
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
## 2025-04-23 DIAGNOSIS — R26.81 UNSTEADY GAIT: Primary | ICD-10-CM

## 2025-04-23 PROCEDURE — 97163 PT EVAL HIGH COMPLEX 45 MIN: CPT | Mod: GP | Performed by: PHYSICAL THERAPIST

## 2025-04-23 NOTE — PROGRESS NOTES
Physical Therapy Evaluation    Patient Name: Xiomy Rubio  MRN: [id number]  Date: 4/23/2025  Time Calculation  Start Time: 1130  Stop Time: 1220  Time Calculation (min): 50 min            INSURANCE:  Visit Number: 1  Insurance Type: Payor: MEDICARE / Plan: MEDICARE PART A AND B / Product Type: *No Product type* / NO AUTH / $257 DEDUCT not met / $0 used 2025 PT/ST / [place] VISITS / Per RTE.  2)  [employer] - NO AUTH / $1000 OOP MET, 100% COVERAGE after [facility] payment / $100,000 max coverage - $46861.17 used / 12V soft max / 0 used     CMS Re-Certification Period: 4/23/25 to 7/22/25    CURRENT PROBLEMS:   1. Unsteady gait  Follow Up In Physical Therapy      2. Fall, initial encounter  Referral to Physical Therapy    Follow Up In Physical Therapy      3. Hospital discharge follow-up  Referral to Physical Therapy    Follow Up In Physical Therapy          PRECAUTIONS:  Fall risk,   PMH: (pertinent to PT evaluation)  Hearing loss, hip arthritis, hip pain, osteoporosis with pathological fracture, fractured right patella, carpal tunnel syndrome, thoracic spine fracture, numbness, closed head injury, hypoxia, fatigue  *See Epic EMR for complete list.      Medical History Form: Reviewed (scanned into chart)  B hearing loss,     SPECIAL CONCERNS:   [place]  She gets dizzy when she gets out of bed.   Dizzy when she rolls to the R.      SUBJECTIVE  The pt got up in the middle of the night for snack and after the snack fell asleep in her chair.  She then fell out of the chair with multiple injuries.  She was in the hospital for 3-4 days for trauma.  Discharged to a rehab center .  She sustained another fall and went to the hospital.  Where she was found to have COVID (mild). She then went back to rehab on isolation.  In rehab 5-6 weeks in total.  Home care was for 2 weeks due to her doing well.      She had a fall last week and is bruised up when her foot got tangled in the rollator when she was turning. The R hip hurts when she is walking.   She now has significant bruising on her back.      Her knees bother some times.  She gets tired.    She has been using a 4WW full time.      Per her son, she is here to get stronger and work on balance   (This is the second time getting home care and has kept doing exercises since.)    PAIN:    Current  0 /10  RANGE: 0 /10  to  2-4  /10  Location: R hip  Description:  achy , inconsistent  Aggravating: walking, mornings  Reducing: sitting       OUTCOME MEASURE  Other Measures  Other Outcome Measures: WOMAC 49/96        HOME LIVING:  The pt lives in a multi story home with bed on first floor with 1 steps to enter in [facility] with handles.  Lives with son     PRIOR LEVEL OF FUNCTION  Walks with a  4 wheeled Rolator    OBJECTIVE:  Lower Extremity     ROM                                    R                       L          Hip flexion                  WFL   WFL  Hip extension              lacks full extension   lacks full extension  Knee flex                      WFL   WFL  Knee ext                lacks full extension   lacks full extension    Dorsiflexion                 -10 degrees  -10 degrees        MMT:      R                      L   Hip flexion                 4/5   4/5  Knee flex             4+/5   4+/5  Knee ext            5- /5   4/5   Dorsiflexion                  4+ /5   4+/5    FLEXIBILITY:                        R                L  Nasim                          ( + )                ( + )  Hamstrings ( at 90/90)      --40                -35    COORDINATION:  Time for 10 toe taps   R  3 seconds   /   L  3 seconds  Rapid alternating movement - 10 toe taps each in 7 seconds     BALANCE:  Static - poor - unable to stand without holding rolator       POSTURE:  Flexed     BED MOBILITY:  Sit to supine:    SBA  Supine to sit:   SBA  Supine:  painful to lay with hips and knees extended     TRANSFERS:  Sit to stand: requires SBA, with cues for B UE to push up vs pulling on rolator  - slow and cautious  Stand to  sit:requires SBA, with  B UE to control- slow and cautious  Bed to/from chair:- slow and cautious  with Rolator    GAIT  Distance: 30 ft observed  Assistive device:  Rolator  Assistance needed: close SBA  Deviations:  +-slow and cautious  +very short step length on B LE  + Knee flexion in mid stance of the B LE    TREATMENTS:  EDUCATION:  Pt educated in:   current status, general goals, treatment plan   safety with use of rolator  Exercise without causing pain - if painful use smaller motion.  Given stp-light analogy  Orthostatic hypotension and safety practices when first sitting or standing    Home Exercise program:  Per home care (standing at the sink)     ASSESSMENT  Pt is a 89 y.o. Female who presents with impairments of R hip  pain, reduced strength, reduced ROM/flexibility, reduced coordination, and soft tissue restriction    These impairments have led to functional limitations including   falls, difficulty with transfers, standing, stairs, reduced balance with high fall risk, and  walking.     Pt would benefit from skilled physical therapy intervention to improve above impairments and facilitate return to function.    Complexity of Evaluation:   high  Based on the history including personal factors and/or comorbidities, examination of body systems including body structures and function, activity limitations, and/or participation restrictions, as well as clinical presentation, patient meets criteria for a high complexity evaluation.      Rehab potential:  Good to improve     GOALS:  Pt stated goal:  Improve flexibility, strength, balance, and coordination    Short term goals:  + Increase strength by 1/3 grade  + Increase ankle df  ROM by 5  degrees  + Reduce pain by 2 points  + Independent in a basic HEP  + able to stand 10 seconds without UE support  + assess balance on Tinetti  Perform  ocular motor testing    Long term goals:  +   Improve ROM of hip and knee extension to 0       +   Improve ankle  dorsiflexion ROM to 0  +   Improve LE strength to 5/5 as needed for transfers and gait  +   5/5 ankle strength as needed for balance and postural sway  +   Improve balance by 5 points  on the Tinetti Balance scale as needed for reduced fall risk  +   Ambulate with on level surfaces  with Rolator with increased foot clearance and strep length and no loss of balance.  +   Ascend and descend curb steps with SBA and  assistance of device with good control for safety in the community.  +  Reduce reported dizziness  with position changes  +   Independent  with son in a HEP for self-management of symptoms and continued safety     PLAN     Skilled physical therapy 2 times per week for 12 weeks  Treatment may include:  Therapeutic Exercise (88045)  Therapeutic Activity (18964)  Manual therapy (16147)  Neuro-muscular re-education (32258)  Gait Training (88382)  Ultrasound (55805)  Unattended Electrical Stimulation (/06375)  Light therapy (64090)      The pt agreed with above stated treatment plan and general goals.

## 2025-04-29 ENCOUNTER — PATIENT OUTREACH (OUTPATIENT)
Dept: PRIMARY CARE | Facility: CLINIC | Age: OVER 89
End: 2025-04-29
Payer: MEDICARE

## 2025-04-29 NOTE — PROGRESS NOTES
Unable to reach patient for 30 day post discharge follow up call.   If no voicemail available call attempts x 2 were made to contact the patient to assist with any questions or concerns patient may have. Patient has met target of no readmission for 30 days post hospital discharge and is graduated from Transitional Care Management program at this time.

## 2025-04-30 ENCOUNTER — TREATMENT (OUTPATIENT)
Dept: PHYSICAL THERAPY | Facility: CLINIC | Age: OVER 89
End: 2025-04-30
Payer: MEDICARE
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
## 2025-04-30 DIAGNOSIS — Z09 HOSPITAL DISCHARGE FOLLOW-UP: ICD-10-CM

## 2025-04-30 DIAGNOSIS — R26.81 UNSTEADY GAIT: ICD-10-CM

## 2025-04-30 PROCEDURE — 97110 THERAPEUTIC EXERCISES: CPT | Mod: GP,CQ

## 2025-04-30 NOTE — PROGRESS NOTES
Physical TherapyTreatment    Patient Name: Xiomy Rubio  MRN: [id number]  Date: 4/30/2025  Time Calculation  Start Time: 0325  Stop Time: 0400  Time Calculation (min): 35 min       PT Therapeutic Procedures Time Entry  Therapeutic Exercise Time Entry: 30    INSURANCE:  Visit Number: 2  Insurance Type: Payor: MEDICARE / Plan: MEDICARE PART A AND B / Product Type: *No Product type* / NO AUTH / $257 DEDUCT not met / $0 used 2025 PT/ST / [place] VISITS / Per RTE.  2)  [employer] - NO AUTH / $1000 OOP MET, 100% COVERAGE after MC payment / $100,000 max coverage - $44349.17 used / 12V soft max / 0 used     CMS Re-Certification Period: 4/23/25 to 7/22/25    CURRENT PROBLEMS:   1. Unsteady gait  Follow Up In Physical Therapy      2. Fall, initial encounter  Referral to Physical Therapy    Follow Up In Physical Therapy      3. Hospital discharge follow-up  Referral to Physical Therapy    Follow Up In Physical Therapy          PRECAUTIONS:  Fall risk,   PMH: (pertinent to PT evaluation)  Hearing loss, hip arthritis, hip pain, osteoporosis with pathological fracture, fractured right patella, carpal tunnel syndrome, thoracic spine fracture, numbness, closed head injury, hypoxia, fatigue  *See Epic EMR for complete list.      Medical History Form: Reviewed (scanned into chart)  B hearing loss,     SPECIAL CONCERNS:   [place]  She gets dizzy when she gets out of bed.   Dizzy when she rolls to the R.      SUBJECTIVE:  c/o discomfort across B LS area and R hip           4-23-25:  The pt got up in the middle of the night for snack and after the snack fell asleep in her chair.  She then fell out of the chair with multiple injuries.  She was in the hospital for 3-4 days for trauma.  Discharged to a rehab center .  She sustained another fall and went to the hospital.  Where she was found to have COVID (mild). She then went back to rehab on isolation.  In rehab 5-6 weeks in total.  Home care was for 2 weeks due to her doing well.      She had a  fall last week and is bruised up when her foot got tangled in the rollator when she was turning. The R hip hurts when she is walking.  She now has significant bruising on her back.      Her knees bother some times.  She gets tired.    She has been using a 4WW full time.      Per her son, she is here to get stronger and work on balance   (This is the second time getting home care and has kept doing exercises since.)    PAIN:    Current  0 /10  RANGE: 0 /10  to  2-4  /10  Location: R hip  Description:  achy , inconsistent  Aggravating: walking, mornings  Reducing: sitting       OUTCOME MEASURE           HOME LIVING:  The pt lives in a multi story home with bed on first floor with 1 steps to enter in [facility] with handles.  Lives with son     PRIOR LEVEL OF FUNCTION  Walks with a  4 wheeled Rolator    OBJECTIVE:  COORDINATION:  Time for 10 toe taps   R  3 seconds   /   L  3 seconds  Rapid alternating movement - 10 toe taps each in 7 seconds     BALANCE:  Static - poor - unable to stand without holding rolator     POSTURE:  Flexed   GAIT  Distance: 30 ft observed  Assistive device:  Rolator  Assistance needed: close SBA  Deviations:  +-slow and cautious  +very short step length on B LE  + Knee flexion in mid stance of the B LE    TREATMENTS: 30 min    Seated LE exercise for strengthening   LAQ 1 x10 with 0 #  Marching 1 x10 0 #  Hamstring curl  1 x10  Abduction with GTB theraband  1 x10  ball squeeze  1 x10  ankle df/pf  1 x10   Rocker board  Wobble board     Standing:  with UE support for strengthening:   inside parallel bars  Hip abduction 1 x10 with 0 #  Hamstring curl 1 x10 with 0 #  Small squat 1 x10 ---DNP------  Heel raise  1 x10  Toe raise1 x10     Supine exercise for LE strengthening:  +SAQ 1 x10 with 0 #      Inside parallel bars: side stepping --slight discomfort noted R hip with this act.          EDUCATION:  Pt educated in:   current status, general goals, treatment plan   safety with use of  rolator  Exercise without causing pain - if painful use smaller motion.  Given stp-light analogy  Orthostatic hypotension and safety practices when first sitting or standing    Home Exercise program:  Per home care (standing at the sink)     ASSESSMENT:     Session shortened due to fatigue and discomfort , applied HP to LS area sitting following session      Pt is a 89 y.o. Female who presents with impairments of R hip  pain, reduced strength, reduced ROM/flexibility, reduced coordination, and soft tissue restriction    These impairments have led to functional limitations including   falls, difficulty with transfers, standing, stairs, reduced balance with high fall risk, and  walking.     Pt would benefit from skilled physical therapy intervention to improve above impairments and facilitate return to function.    Complexity of Evaluation:   high  Based on the history including personal factors and/or comorbidities, examination of body systems including body structures and function, activity limitations, and/or participation restrictions, as well as clinical presentation, patient meets criteria for a high complexity evaluation.      Rehab potential:  Good to improve     GOALS:  Pt stated goal:  Improve flexibility, strength, balance, and coordination    Short term goals:  + Increase strength by 1/3 grade  + Increase ankle df  ROM by 5  degrees  + Reduce pain by 2 points  + Independent in a basic HEP  + able to stand 10 seconds without UE support  + assess balance on Tinetti  Perform  ocular motor testing    Long term goals:  +   Improve ROM of hip and knee extension to 0       +   Improve ankle dorsiflexion ROM to 0  +   Improve LE strength to 5/5 as needed for transfers and gait  +   5/5 ankle strength as needed for balance and postural sway  +   Improve balance by 5 points  on the Tinetti Balance scale as needed for reduced fall risk  +   Ambulate with on level surfaces  with Rolator with increased foot clearance  and strep length and no loss of balance.  +   Ascend and descend curb steps with SBA and  assistance of device with good control for safety in the community.  +  Reduce reported dizziness  with position changes  +   Independent  with son in a HEP for self-management of symptoms and continued safety     PLAN     Skilled physical therapy 2 times per week for 12 weeks  Treatment may include:  Therapeutic Exercise (97273)  Therapeutic Activity (98421)  Manual therapy (00124)  Neuro-muscular re-education (24921)  Gait Training (92096)  Ultrasound (57727)  Unattended Electrical Stimulation (/99788)  Light therapy (03649)      The pt agreed with above stated treatment plan and general goals.

## 2025-05-06 ENCOUNTER — TREATMENT (OUTPATIENT)
Dept: PHYSICAL THERAPY | Facility: CLINIC | Age: OVER 89
End: 2025-05-06
Payer: MEDICARE
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
## 2025-05-06 DIAGNOSIS — R26.81 UNSTEADY GAIT: ICD-10-CM

## 2025-05-06 DIAGNOSIS — Z09 HOSPITAL DISCHARGE FOLLOW-UP: ICD-10-CM

## 2025-05-06 PROCEDURE — 97110 THERAPEUTIC EXERCISES: CPT | Mod: GP | Performed by: PHYSICAL THERAPIST

## 2025-05-06 PROCEDURE — 97140 MANUAL THERAPY 1/> REGIONS: CPT | Mod: GP | Performed by: PHYSICAL THERAPIST

## 2025-05-06 NOTE — PROGRESS NOTES
Physical TherapyTreatment    Patient Name: Xiomy Rubio  MRN: [id number]  Date: 5/6/2025  Time Calculation  Start Time: 1300  Stop Time: 1345  Time Calculation (min): 45 min       PT Therapeutic Procedures Time Entry  Manual Therapy Time Entry: 20  Therapeutic Exercise Time Entry: 25    INSURANCE:  Visit Number: 3  Insurance Type: Payor: MEDICARE / Plan: MEDICARE PART A AND B / Product Type: *No Product type* / NO AUTH / $257 DEDUCT not met / $0 used 2025 PT/ST / [place] VISITS / Per RTE.  2)  [employer] - NO AUTH / $1000 OOP MET, 100% COVERAGE after [facility] payment / $100,000 max coverage - $95020.17 used / 12V soft max / 0 used     CMS Re-Certification Period: 4/23/25 to 7/22/25    CURRENT PROBLEMS:   1. Unsteady gait  Follow Up In Physical Therapy      2. Fall, initial encounter  Referral to Physical Therapy    Follow Up In Physical Therapy      3. Hospital discharge follow-up  Referral to Physical Therapy    Follow Up In Physical Therapy          PRECAUTIONS:  Fall risk,   PMH: (pertinent to PT evaluation)  Hearing loss, hip arthritis, hip pain, osteoporosis with pathological fracture, fractured right patella, carpal tunnel syndrome, thoracic spine fracture, numbness, closed head injury, hypoxia, fatigue  *See Epic EMR for complete list.      Medical History Form: Reviewed (scanned into chart)  B hearing loss,     SPECIAL CONCERNS:   [place]  She gets dizzy when she gets out of bed.   Dizzy when she rolls to the R.      SUBJECTIVE:   The pt states her R hip hurts a little.  She does her exercises at home. Standing on her tip toes hurts.    During treatment, she wonders why she gets tight.            4-23-25:  The pt got up in the middle of the night for snack and after the snack fell asleep in her chair.  She then fell out of the chair with multiple injuries.  She was in the hospital for 3-4 days for trauma.  Discharged to a rehab center .  She sustained another fall and went to the hospital.  Where she was found to have COVID  (mild). She then went back to rehab on isolation.  In rehab 5-6 weeks in total.  Home care was for 2 weeks due to her doing well.      She had a fall last week and is bruised up when her foot got tangled in the rollator when she was turning. The R hip hurts when she is walking.  She now has significant bruising on her back.      Her knees bother some times.  She gets tired.    She has been using a 4WW full time.      Per her son, she is here to get stronger and work on balance   (This is the second time getting home care and has kept doing exercises since.)    PAIN:    Current:  mild in L hip    PRIOR LEVEL OF FUNCTION  Walks with a  4 wheeled Rolator    OBJECTIVE:  Enters PT with 4 WW with seat    BALANCE:  Static - poor - unable to stand without holding rolator     POSTURE:  Flexed     GAIT  Distance: 30 ft observed  Assistive device:  Rolator  Assistance needed: close SBA  Deviations:  +-slow and cautious  +very short step length on B LE  + Knee flexion in mid stance of the B LE    TREATMENTS: 30 min    Seated LE exercise for strengthening   LAQ 1 x10 with 1 #  Marching 1 x10 1 #  Hamstring curl  1 x10  Abduction with dk BTB theraband  1 x15  ball squeeze  1 x15  ankle df/pf  1 x20    Standing:  with UE support for strengthening:   inside parallel bars  Hip abduction 1 x10 with 1 #     Supine exercise for LE strengthening:  +SAQ 1 x10 with 0 #      CGA needed with transfers and bed mobility.      Manual  STM to R ITB, quad and hamstring  Manual stretch of hamstring R  Manual stretch of gastroc        EDUCATION:  NEW  Discussed with PT and son:  Standing periodically to reduce tightness  Stretching without pain  Progress with added resistance  PRIOR   current status, general goals, treatment plan   safety with use of rolator  Exercise without causing pain - if painful use smaller motion.  Given stp-light analogy  Orthostatic hypotension and safety practices when first sitting or standing    Home Exercise  program:  Per home care (standing at the sink)     ASSESSMENT:     Good tolerance of treatment, but part of treatment was passive with stretching in supine and manual to R upper leg  Able to progress seated exercises with resistance, but painful with standing exercise, likely due to the stretch  R foot had decreased clearance at the end of session suggesting fatigue.    EVAL:  Pt is a 89 y.o. Female who presents with impairments of R hip  pain, reduced strength, reduced ROM/flexibility, reduced coordination, and soft tissue restriction    These impairments have led to functional limitations including   falls, difficulty with transfers, standing, stairs, reduced balance with high fall risk, and  walking.     Pt would benefit from skilled physical therapy intervention to improve above impairments and facilitate return to function.    Complexity of Evaluation:   high  Based on the history including personal factors and/or comorbidities, examination of body systems including body structures and function, activity limitations, and/or participation restrictions, as well as clinical presentation, patient meets criteria for a high complexity evaluation.      Rehab potential:  Good to improve     GOALS:  Pt stated goal:  Improve flexibility, strength, balance, and coordination    Short term goals:  + Increase strength by 1/3 grade  + Increase ankle df  ROM by 5  degrees  + Reduce pain by 2 points  + Independent in a basic HEP  + able to stand 10 seconds without UE support  + assess balance on Tinetti  Perform  ocular motor testing    Long term goals:  +   Improve ROM of hip and knee extension to 0       +   Improve ankle dorsiflexion ROM to 0  +   Improve LE strength to 5/5 as needed for transfers and gait  +   5/5 ankle strength as needed for balance and postural sway  +   Improve balance by 5 points  on the Tinetti Balance scale as needed for reduced fall risk  +   Ambulate with on level surfaces  with Rolator with  increased foot clearance and strep length and no loss of balance.  +   Ascend and descend curb steps with SBA and  assistance of device with good control for safety in the community.  +  Reduce reported dizziness  with position changes  +   Independent  with son in a HEP for self-management of symptoms and continued safety     PLAN     Skilled physical therapy 2 times per week for 12 weeks  Treatment may include:  Therapeutic Exercise (29910)  Therapeutic Activity (87734)  Manual therapy (73066)  Neuro-muscular re-education (37636)  Gait Training (47601)  Ultrasound (50216)  Unattended Electrical Stimulation (/61637)  Light therapy (88817)      The pt agreed with above stated treatment plan and general goals.

## 2025-05-08 ENCOUNTER — TREATMENT (OUTPATIENT)
Dept: PHYSICAL THERAPY | Facility: CLINIC | Age: OVER 89
End: 2025-05-08
Payer: MEDICARE

## 2025-05-08 DIAGNOSIS — R26.81 UNSTEADY GAIT: ICD-10-CM

## 2025-05-08 DIAGNOSIS — Z09 HOSPITAL DISCHARGE FOLLOW-UP: ICD-10-CM
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
## 2025-05-08 PROCEDURE — 97140 MANUAL THERAPY 1/> REGIONS: CPT | Mod: GP | Performed by: PHYSICAL THERAPIST

## 2025-05-08 PROCEDURE — 97110 THERAPEUTIC EXERCISES: CPT | Mod: GP | Performed by: PHYSICAL THERAPIST

## 2025-05-08 NOTE — PROGRESS NOTES
Physical TherapyTreatment    Patient Name: Xiomy Rubio  MRN: [id number]  Date: 5/8/2025               INSURANCE:  Visit Number: 4  Insurance Type: Payor: MEDICARE / Plan: MEDICARE PART A AND B / Product Type: *No Product type* / NO AUTH / $257 DEDUCT not met / $0 used 2025 PT/ST / [place] VISITS / Per RTE.  2)  [employer] - NO AUTH / $1000 OOP MET, 100% COVERAGE after [facility] payment / $100,000 max coverage - $33236.17 used / 12V soft max / 0 used     CMS Re-Certification Period: 4/23/25 to 7/22/25    CURRENT PROBLEMS:   1. Unsteady gait  Follow Up In Physical Therapy      2. Fall, initial encounter  Referral to Physical Therapy    Follow Up In Physical Therapy      3. Hospital discharge follow-up  Referral to Physical Therapy    Follow Up In Physical Therapy          PRECAUTIONS:  Fall risk,   PMH: (pertinent to PT evaluation)  Hearing loss, hip arthritis, hip pain, osteoporosis with pathological fracture, fractured right patella, carpal tunnel syndrome, thoracic spine fracture, numbness, closed head injury, hypoxia, fatigue  *See Epic EMR for complete list.      Medical History Form: Reviewed (scanned into chart)  B hearing loss,     SPECIAL CONCERNS:   [place]  She gets dizzy when she gets out of bed.   Dizzy when she rolls to the R.      SUBJECTIVE:   The pt states her R hip and back hurt a little.  Her R knee bothered her when she stood up to come into therapy.            4-23-25:  The pt got up in the middle of the night for snack and after the snack fell asleep in her chair.  She then fell out of the chair with multiple injuries.  She was in the hospital for 3-4 days for trauma.  Discharged to a rehab center .  She sustained another fall and went to the hospital.  Where she was found to have COVID (mild). She then went back to rehab on isolation.  In rehab 5-6 weeks in total.  Home care was for 2 weeks due to her doing well.      She had a fall last week and is bruised up when her foot got tangled in the rollator when she  was turning. The R hip hurts when she is walking.  She now has significant bruising on her back.      Her knees bother some times.  She gets tired.    She has been using a 4WW full time.      Per her son, she is here to get stronger and work on balance   (This is the second time getting home care and has kept doing exercises since.)    PAIN:    Current:  mild in L hip    PRIOR LEVEL OF FUNCTION  Walks with a  4 wheeled Rolator    OBJECTIVE:  Enters PT with 4 WW with seat    BALANCE:  Static - poor - unable to stand without holding rolator     POSTURE:  Flexed     GAIT  Distance: 30 ft observed  Assistive device:  Rolator  Assistance needed: close SBA  Deviations:  +-slow and cautious  +very short step length on B LE  + Knee flexion in mid stance of the B LE    TREATMENTS: 30 min  Manual  STM to lumbar, lateral and anterior hip, and medial knee  Between exercises    Therapeutic exercise  Seated LE exercise for strengthening   LAQ 1 x15 with 1 #  Marching 1 x20 1 #  Hamstring curl   with mint green TB1 x10 each  Abduction with dk BTB theraband  x1 mintue  ball squeeze  x1 minute  ankle df/pf  1 x20    Standing:  with UE support for strengthening:   inside parallel bars  Marching with R only 2x5  Toe raise     Supine exercise for LE strengthening: (guided)  +SAQ 1 x10 with 0 #  + heel slide  + hip abduction  +Gastroc stretch 3x20 seconds      CGA needed with transfers and bed mobility.        EDUCATION:  NEW  Discussed with PT and son:  Used heat to address pain     PRIOR   current status, general goals, treatment plan   safety with use of rolator  Exercise without causing pain - if painful use smaller motion.  Given stp-light analogy  Orthostatic hypotension and safety practices when first sitting or standing  Standing periodically to reduce tightness  Stretching without pain  Progress with added resistance    Home Exercise program:  Per home care (standing at the sink)     ASSESSMENT:     Good tolerance of seated  exercise, but the pt has mild pain with both supine and standing exercise.  This was addressed with manual and heat between exercises with good result.  Pt ended therapy without pain.    Able to progress seated exercises with repetitions.  Improved foot clearance today at start and end of therapy.      Pain at the end of session:  0/10    EVAL:  Pt is a 89 y.o. Female who presents with impairments of R hip  pain, reduced strength, reduced ROM/flexibility, reduced coordination, and soft tissue restriction    These impairments have led to functional limitations including   falls, difficulty with transfers, standing, stairs, reduced balance with high fall risk, and  walking.     Pt would benefit from skilled physical therapy intervention to improve above impairments and facilitate return to function.    Complexity of Evaluation:   high  Based on the history including personal factors and/or comorbidities, examination of body systems including body structures and function, activity limitations, and/or participation restrictions, as well as clinical presentation, patient meets criteria for a high complexity evaluation.      Rehab potential:  Good to improve     GOALS:  Pt stated goal:  Improve flexibility, strength, balance, and coordination    Short term goals:  + Increase strength by 1/3 grade  + Increase ankle df  ROM by 5  degrees  + Reduce pain by 2 points  + Independent in a basic HEP  + able to stand 10 seconds without UE support  + assess balance on Tinetti  Perform  ocular motor testing    Long term goals:  +   Improve ROM of hip and knee extension to 0       +   Improve ankle dorsiflexion ROM to 0  +   Improve LE strength to 5/5 as needed for transfers and gait  +   5/5 ankle strength as needed for balance and postural sway  +   Improve balance by 5 points  on the Tinetti Balance scale as needed for reduced fall risk  +   Ambulate with on level surfaces  with Rolator with increased foot clearance and strep  length and no loss of balance.  +   Ascend and descend curb steps with SBA and  assistance of device with good control for safety in the community.  +  Reduce reported dizziness  with position changes  +   Independent  with son in a HEP for self-management of symptoms and continued safety     PLAN     Skilled physical therapy 2 times per week for 12 weeks  Treatment may include:  Therapeutic Exercise (89441)  Therapeutic Activity (59144)  Manual therapy (88938)  Neuro-muscular re-education (37535)  Gait Training (53637)  Ultrasound (10788)  Unattended Electrical Stimulation (/73862)  Light therapy (40277)      The pt agreed with above stated treatment plan and general goals.

## 2025-05-13 ENCOUNTER — TREATMENT (OUTPATIENT)
Dept: PHYSICAL THERAPY | Facility: CLINIC | Age: OVER 89
End: 2025-05-13
Payer: MEDICARE

## 2025-05-13 DIAGNOSIS — R26.81 UNSTEADY GAIT: ICD-10-CM

## 2025-05-13 DIAGNOSIS — Z09 HOSPITAL DISCHARGE FOLLOW-UP: ICD-10-CM
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
## 2025-05-13 PROCEDURE — 97110 THERAPEUTIC EXERCISES: CPT | Mod: GP | Performed by: PHYSICAL THERAPIST

## 2025-05-13 NOTE — PROGRESS NOTES
Physical TherapyTreatment    Patient Name: Xiomy Rubio  MRN: [id number]  Date: 5/13/2025  Time Calculation  Start Time: 1300  Stop Time: 1342  Time Calculation (min): 42 min       PT Therapeutic Procedures Time Entry  Therapeutic Exercise Time Entry: 40    INSURANCE:  Visit Number: 5  Insurance Type: Payor: MEDICARE / Plan: MEDICARE PART A AND B / Product Type: *No Product type* / NO AUTH / $257 DEDUCT not met / $0 used 2025 PT/ST / [place] VISITS / Per RTE.  2)  [employer] - NO AUTH / $1000 OOP MET, 100% COVERAGE after [facility] payment / $100,000 max coverage - $16745.17 used / 12V soft max / 0 used     CMS Re-Certification Period: 4/23/25 to 7/22/25    CURRENT PROBLEMS:   1. Unsteady gait  Follow Up In Physical Therapy      2. Fall, initial encounter  Referral to Physical Therapy    Follow Up In Physical Therapy      3. Hospital discharge follow-up  Referral to Physical Therapy    Follow Up In Physical Therapy          PRECAUTIONS:  Fall risk,   PMH: (pertinent to PT evaluation)  Hearing loss, hip arthritis, hip pain, osteoporosis with pathological fracture, fractured right patella, carpal tunnel syndrome, thoracic spine fracture, numbness, closed head injury, hypoxia, fatigue  *See Epic EMR for complete list.      Medical History Form: Reviewed (scanned into chart)  B hearing loss,     SPECIAL CONCERNS:   [place]  She gets dizzy when she gets out of bed.   Dizzy when she rolls to the R.      SUBJECTIVE:   The pt states her R hip and back hurt a little.  Per her son, she is wondring when she can use the bike,           4-23-25:  The pt got up in the middle of the night for snack and after the snack fell asleep in her chair.  She then fell out of the chair with multiple injuries.  She was in the hospital for 3-4 days for trauma.  Discharged to a rehab center .  She sustained another fall and went to the hospital.  Where she was found to have COVID (mild). She then went back to rehab on isolation.  In rehab 5-6 weeks in total.   Home care was for 2 weeks due to her doing well.      She had a fall last week and is bruised up when her foot got tangled in the rollator when she was turning. The R hip hurts when she is walking.  She now has significant bruising on her back.      Her knees bother some times.  She gets tired.    She has been using a 4WW full time.      Per her son, she is here to get stronger and work on balance   (This is the second time getting home care and has kept doing exercises since.)    PAIN:    Current:  mild in L hip    PRIOR LEVEL OF FUNCTION  Walks with a  4 wheeled Rolator    OBJECTIVE:  Enters PT with 4 WW with seat    BALANCE:  Static - poor - unable to stand without holding rolator     POSTURE:  Flexed     GAIT  Distance: 30 ft observed  Assistive device:  Rolator  Assistance needed: close SBA  Deviations:  +-slow and cautious  +very short step length on B LE  + Knee flexion in mid stance of the B LE    TREATMENTS: 30 min  Manual  STM to lumbar, lateral and anterior hip, and medial knee  Between exercises    Therapeutic exercise  Recumbent bike  seat 10 2 minutes  Slant baord - 1 foot at a time x2 each  with rocking    Standing heel raise  Small squat  R hip abduction  R flexion/stepping      Seated LE exercise for strengthening   LAQ 1 x10 with 2 #  Marching 1 x20 with 2 #  Hamstring curl   with mint green TB1 x10 each  Abduction with dk BTB theraband  x1 mintue  ball squeeze  x1 minute  ankle df/pf  1 x20    ==============DNP===================  Supine exercise for LE strengthening: (guided)  +SAQ 1 x10 with 0 #  + heel slide  + hip abduction  +Gastroc stretch 3x20 seconds  ==================================================    CGA/Min A needed with transfers and gait in therapy.        EDUCATION:  NEW  Discussed with PT and son:  Reviewed improvements with pt and son    PRIOR   current status, general goals, treatment plan   safety with use of rolator  Exercise without causing pain - if painful use smaller  motion.  Given stp-light analogy  Orthostatic hypotension and safety practices when first sitting or standing  Standing periodically to reduce tightness  Stretching without pain  Progress with added resistance  Used heat to address pain   Home Exercise program:  Per home care (standing at the sink)     ASSESSMENT:     Good tolerance of seated exercise. Able to increase resistance with only mild discomfort in the R knee.  Standing exercises performed on r LE only due to R knee pain in WB.  Heat during sitting exercise to counter soreness with good result.  The pt was retropulsive with transfers today with occasional Min a to correct.  Slant board was attempted to address, but pt had difficulty with directions.  Some instructions were written today due to increased difficulty hearing the PT today.         Pain at the end of session:  none reported    EVAL:  Pt is a 89 y.o. Female who presents with impairments of R hip  pain, reduced strength, reduced ROM/flexibility, reduced coordination, and soft tissue restriction    These impairments have led to functional limitations including   falls, difficulty with transfers, standing, stairs, reduced balance with high fall risk, and  walking.     Pt would benefit from skilled physical therapy intervention to improve above impairments and facilitate return to function.    Complexity of Evaluation:   high  Based on the history including personal factors and/or comorbidities, examination of body systems including body structures and function, activity limitations, and/or participation restrictions, as well as clinical presentation, patient meets criteria for a high complexity evaluation.      Rehab potential:  Good to improve     GOALS:  Pt stated goal:  Improve flexibility, strength, balance, and coordination    Short term goals:  + Increase strength by 1/3 grade  + Increase ankle df  ROM by 5  degrees  + Reduce pain by 2 points  + Independent in a basic HEP  + able to stand 10  seconds without UE support  + assess balance on Tinetti  Perform  ocular motor testing    Long term goals:  +   Improve ROM of hip and knee extension to 0       +   Improve ankle dorsiflexion ROM to 0  +   Improve LE strength to 5/5 as needed for transfers and gait  +   5/5 ankle strength as needed for balance and postural sway  +   Improve balance by 5 points  on the Tinetti Balance scale as needed for reduced fall risk  +   Ambulate with on level surfaces  with Rolator with increased foot clearance and strep length and no loss of balance.  +   Ascend and descend curb steps with SBA and  assistance of device with good control for safety in the community.  +  Reduce reported dizziness  with position changes  +   Independent  with son in a HEP for self-management of symptoms and continued safety     PLAN     Skilled physical therapy 2 times per week for 12 weeks  Treatment may include:  Therapeutic Exercise (88728)  Therapeutic Activity (06903)  Manual therapy (07636)  Neuro-muscular re-education (38829)  Gait Training (31391)  Ultrasound (82966)  Unattended Electrical Stimulation (/53893)  Light therapy (91415)      The pt agreed with above stated treatment plan and general goals.

## 2025-05-15 ENCOUNTER — TREATMENT (OUTPATIENT)
Dept: PHYSICAL THERAPY | Facility: CLINIC | Age: OVER 89
End: 2025-05-15
Payer: MEDICARE
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
## 2025-05-15 DIAGNOSIS — Z09 HOSPITAL DISCHARGE FOLLOW-UP: ICD-10-CM

## 2025-05-15 DIAGNOSIS — R26.81 UNSTEADY GAIT: ICD-10-CM

## 2025-05-15 PROCEDURE — 97110 THERAPEUTIC EXERCISES: CPT | Mod: GP | Performed by: PHYSICAL THERAPIST

## 2025-05-15 NOTE — PROGRESS NOTES
Physical TherapyTreatment    Patient Name: Xiomy Rubio  MRN: [id number]  Date: 5/15/2025  Time Calculation  Start Time: 1300  Stop Time: 1345  Time Calculation (min): 45 min       PT Therapeutic Procedures Time Entry  Therapeutic Exercise Time Entry: 40    INSURANCE:  Visit Number: 6  Insurance Type: Payor: MEDICARE / Plan: MEDICARE PART A AND B / Product Type: *No Product type* / NO AUTH / $257 DEDUCT not met / $0 used 2025 PT/ST / [place] VISITS / Per RTE.  2)  [employer] - NO AUTH / $1000 OOP MET, 100% COVERAGE after [facility] payment / $100,000 max coverage - $76247.17 used / 12V soft max / 0 used     CMS Re-Certification Period: 4/23/25 to 7/22/25    CURRENT PROBLEMS:   1. Unsteady gait  Follow Up In Physical Therapy      2. Fall, initial encounter  Referral to Physical Therapy    Follow Up In Physical Therapy      3. Hospital discharge follow-up  Referral to Physical Therapy    Follow Up In Physical Therapy          PRECAUTIONS:  Fall risk,   PMH: (pertinent to PT evaluation)  Hearing loss, hip arthritis, hip pain, osteoporosis with pathological fracture, fractured right patella, carpal tunnel syndrome, thoracic spine fracture, numbness, closed head injury, hypoxia, fatigue  *See Epic EMR for complete list.      Medical History Form: Reviewed (scanned into chart)  B hearing loss,     SPECIAL CONCERNS:   [place]  She gets dizzy when she gets out of bed.   Dizzy when she rolls to the R.      SUBJECTIVE:   The pt states her R hip hurts a little, but is overall feeling good.  She is doing her HEP at home.  She wants to know if it is OK to walk.  Would like to do the bike again.          4-23-25:  The pt got up in the middle of the night for snack and after the snack fell asleep in her chair.  She then fell out of the chair with multiple injuries.  She was in the hospital for 3-4 days for trauma.  Discharged to a rehab center .  She sustained another fall and went to the hospital.  Where she was found to have COVID (mild). She  then went back to rehab on isolation.  In rehab 5-6 weeks in total.  Home care was for 2 weeks due to her doing well.      She had a fall last week and is bruised up when her foot got tangled in the rollator when she was turning. The R hip hurts when she is walking.  She now has significant bruising on her back.      Her knees bother some times.  She gets tired.    She has been using a 4WW full time.      Per her son, she is here to get stronger and work on balance   (This is the second time getting home care and has kept doing exercises since.)    PAIN:    Current:  mild in L hip    PRIOR LEVEL OF FUNCTION  Walks with a  4 wheeled Rolator    OBJECTIVE:  Enters PT with 4 WW with seat    BALANCE:  Static - poor - unable to stand without holding rolator     POSTURE:  Flexed     GAIT  Distance: 30 ft observed  Assistive device:  Rolator  Assistance needed: close SBA  Deviations:  +-slow and cautious  +very short step length on B LE  + Knee flexion in mid stance of the B LE    TREATMENTS: 30 min  Manual  STM to lumbar, lateral and anterior hip, and medial knee  Between exercises    Therapeutic exercise  Recumbent bike  seat 9 for 4 minutes  Slant barajan - 1 foot at a time x2 each  with rocking  Seated hamstring stretch  1x30 seconds each    Standing   Small squat x10  R hip abduction x10  R flexion x10    Seated LE exercise for strengthening   LAQ 2 x10 with 2 #  Marching 1 x20 with 2 #  Hamstring curl   with mint green TB1 x10 each  Abduction with  mint green theraband  x1 mintue  ball squeeze  x1 minute  ankle df   x1 minute    Supine exercise for LE strengthening: (guided)  +SAQ 1 x10 with 0 # each  + heel slide 1x10 each  + hip abduction 1x10 each  + hook lying ER x10 each      CGA/Min A needed with transfers and gait in therapy.        EDUCATION:  NEW  Discussed with PT and son:  If standing on the R hurts, then only do standing exercises with R LE   Incread exercise today  Do not stand up if dizzy    PRIOR    current status, general goals, treatment plan   safety with use of rolator  Exercise without causing pain - if painful use smaller motion.  Given stp-light analogy  Orthostatic hypotension and safety practices when first sitting or standing  Standing periodically to reduce tightness  Stretching without pain  Progress with added resistance  Reviewed improvements with pt and son  Used heat to address pain     Home Exercise program:  Per home care (standing at the sink)     ASSESSMENT:     Good tolerance of seated exercise. Greater ease of motion on the bike.  Able to bike for longer.  The pt requires only short rests between exercises.  She has pain with R WB with standing exercise of L LE and therefore standign exercise only on R LE.  L LE was strengthened in sitting and     Pt would benefit from skilled physical therapy intervention to improve above impairments and facilitate return to function.    Pain at the end of session:  none reported    Complexity of Evaluation:   high  Based on the history including personal factors and/or comorbidities, examination of body systems including body structures and function, activity limitations, and/or participation restrictions, as well as clinical presentation, patient meets criteria for a high complexity evaluation.      Rehab potential:  Good to improve     GOALS:  Pt stated goal:  Improve flexibility, strength, balance, and coordination    Short term goals:  + Increase strength by 1/3 grade  + Increase ankle df  ROM by 5  degrees  + Reduce pain by 2 points  + Independent in a basic HEP  + able to stand 10 seconds without UE support  + assess balance on Tinetti  Perform  ocular motor testing    Long term goals:  +   Improve ROM of hip and knee extension to 0       +   Improve ankle dorsiflexion ROM to 0  +   Improve LE strength to 5/5 as needed for transfers and gait  +   5/5 ankle strength as needed for balance and postural sway  +   Improve balance by 5 points  on the  Tinetti Balance scale as needed for reduced fall risk  +   Ambulate with on level surfaces  with Rolator with increased foot clearance and strep length and no loss of balance.  +   Ascend and descend curb steps with SBA and  assistance of device with good control for safety in the community.  +  Reduce reported dizziness  with position changes  +   Independent  with son in a HEP for self-management of symptoms and continued safety     PLAN   Add  balance activities    Skilled physical therapy 2 times per week for 12 weeks  Treatment may include:  Therapeutic Exercise (63380)  Therapeutic Activity (07773)  Manual therapy (03758)  Neuro-muscular re-education (05492)  Gait Training (65444)  Ultrasound (71153)  Unattended Electrical Stimulation (/83853)  Light therapy (12884)      The pt agreed with above stated treatment plan and general goals.

## 2025-05-17 DIAGNOSIS — D64.9 ANEMIA, UNSPECIFIED TYPE: ICD-10-CM

## 2025-05-19 RX ORDER — FERROUS SULFATE 325(65) MG
65 TABLET ORAL EVERY OTHER DAY
Qty: 45 TABLET | Refills: 1 | Status: SHIPPED | OUTPATIENT
Start: 2025-05-19 | End: 2025-11-15

## 2025-05-20 ENCOUNTER — TREATMENT (OUTPATIENT)
Dept: PHYSICAL THERAPY | Facility: CLINIC | Age: OVER 89
End: 2025-05-20
Payer: MEDICARE
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
## 2025-05-20 DIAGNOSIS — Z09 HOSPITAL DISCHARGE FOLLOW-UP: ICD-10-CM

## 2025-05-20 DIAGNOSIS — R26.81 UNSTEADY GAIT: ICD-10-CM

## 2025-05-20 PROCEDURE — 97140 MANUAL THERAPY 1/> REGIONS: CPT | Mod: GP | Performed by: PHYSICAL THERAPIST

## 2025-05-20 PROCEDURE — 97110 THERAPEUTIC EXERCISES: CPT | Mod: GP | Performed by: PHYSICAL THERAPIST

## 2025-05-20 NOTE — PROGRESS NOTES
"Physical TherapyTreatment    Patient Name: Xiomy Rubio  MRN: [id number]  Date: 5/20/2025  Time Calculation  Start Time: 1300  Stop Time: 1350  Time Calculation (min): 50 min       PT Therapeutic Procedures Time Entry  Manual Therapy Time Entry: 15  Therapeutic Exercise Time Entry: 25    INSURANCE:  Visit Number: 7  Insurance Type: Payor: MEDICARE / Plan: MEDICARE PART A AND B / Product Type: *No Product type* / NO AUTH / $257 DEDUCT not met / $0 used 2025 PT/ST / [place] VISITS / Per RTE.  2)  [employer] - NO AUTH / $1000 OOP MET, 100% COVERAGE after [facility] payment / $100,000 max coverage - $61315.17 used / 12V soft max / 0 used     CMS Re-Certification Period: 4/23/25 to 7/22/25    CURRENT PROBLEMS:   1. Unsteady gait  Follow Up In Physical Therapy      2. Fall, initial encounter  Referral to Physical Therapy    Follow Up In Physical Therapy      3. Hospital discharge follow-up  Referral to Physical Therapy    Follow Up In Physical Therapy          PRECAUTIONS:  Fall risk,   PMH: (pertinent to PT evaluation)  Hearing loss, hip arthritis, hip pain, osteoporosis with pathological fracture, fractured right patella, carpal tunnel syndrome, thoracic spine fracture, numbness, closed head injury, hypoxia, fatigue  *See Epic EMR for complete list.      Medical History Form: Reviewed (scanned into chart)  B hearing loss,     SPECIAL CONCERNS:   [place]  She gets dizzy when she gets out of bed.   Dizzy when she rolls to the R.      SUBJECTIVE:   The pt states she is tired today...\"tired or lazy\"   This morning she had pain the the back of her hip.  It  is better now that she is sitting. When she is walking it isn't too bad, but \"enough to know it\".      4-23-25:  The pt got up in the middle of the night for snack and after the snack fell asleep in her chair.  She then fell out of the chair with multiple injuries.  She was in the hospital for 3-4 days for trauma.  Discharged to a rehab center .  She sustained another fall and went to the " hospital.  Where she was found to have COVID (mild). She then went back to rehab on isolation.  In rehab 5-6 weeks in total.  Home care was for 2 weeks due to her doing well.      She had a fall last week and is bruised up when her foot got tangled in the rollator when she was turning. The R hip hurts when she is walking.  She now has significant bruising on her back.      Her knees bother some times.  She gets tired.    She has been using a 4WW full time.      Per her son, she is here to get stronger and work on balance   (This is the second time getting home care and has kept doing exercises since.)    PAIN:    Current:  mild in L hip    PRIOR LEVEL OF FUNCTION  Walks with a  4 wheeled Rolator    OBJECTIVE:  Enters PT with 4 WW with seat    BALANCE:  Static - poor - unable to stand without holding rolator     POSTURE:  Flexed     GAIT  Distance: 30 ft observed  Assistive device:  Rolator  Assistance needed: close SBA  Deviations:  +-slow and cautious  +very short step length on B LE  + Knee flexion in mid stance of the B LE    TREATMENTS: 30 min  Manual  STM to lumbar, lateral and anterior hip, and medial knee  Between exercises    Therapeutic exercise  Seated hamstring stretch  1x30 seconds each    Standing   Static-without upper extremity support (contact-guard)   Add head turns x 3 (min assist)    Seated LE exercise for strengthening   LAQ 2 x10 with 0 #  Marching 1 x20 with 0 #  Hamstring curl   with mint green TB  1 x10 each  Abduction with  dk blue theraband  x1 mintue  ball squeeze  x1 minute  ankle df  and pf   x 20    Supine exercise for LE strengthening: (guided)  +SAQ 1 x10 with 0 # each  + heel slide 1x10 each  + hip abduction 1x10 each -dnp  + hook lying ER x10 each      CGA/Min A needed with transfers and gait in therapy.    Modality:  Moist heat to right knee in sitting with stretching and left lower extremity exercise        EDUCATION:  NEW  Discussed with PT and son:  Importance to keep moving,  "but be safe  If having and \"off\" today do exercises in sitting  If having a \"good\" day can do exercises in standing with upper extremity support    PRIOR   current status, general goals, treatment plan   safety with use of rolator  Exercise without causing pain - if painful use smaller motion.  Given stp-light analogy  Orthostatic hypotension and safety practices when first sitting or standing  Standing periodically to reduce tightness  Stretching without pain  Progress with added resistance  Reviewed improvements with pt and son  If standing on the R hurts, then only do standing exercises with R LE   Use heat to address pain  Incread exercise today  Do not stand up if dizzy      Home Exercise program:  Per home care (standing at the sink)     ASSESSMENT:     The pt was moving more slowly today.  She had c/o pain in the knee with SAQ that reduced with manaul.  She also had reports of clicking int he Rhip with supine exercises, but not present with sitting exercises on edge of bed.  She had complaints throughout treatment of feeling stiff.  This was in part due to maintaining muscle contractions and difficulty relaxing between exercises.  However, also addressed with manual and heat which did help some.  She had periods of decreased foot clearance with ambulation into and out of therapy with extra assistance given for safety.    Pt would benefit from skilled physical therapy intervention to improve above impairments and facilitate return to function.    Pain at the end of session:  none reported    Complexity of Evaluation:   high  Based on the history including personal factors and/or comorbidities, examination of body systems including body structures and function, activity limitations, and/or participation restrictions, as well as clinical presentation, patient meets criteria for a high complexity evaluation.      Rehab potential:  Good to improve     GOALS:  Pt stated goal:  Improve flexibility, strength, " balance, and coordination    Short term goals:  + Increase strength by 1/3 grade  + Increase ankle df  ROM by 5  degrees  + Reduce pain by 2 points  + Independent in a basic HEP  + able to stand 10 seconds without UE support  + assess balance on Tinetti  Perform  ocular motor testing    Long term goals:  +   Improve ROM of hip and knee extension to 0       +   Improve ankle dorsiflexion ROM to 0  +   Improve LE strength to 5/5 as needed for transfers and gait  +   5/5 ankle strength as needed for balance and postural sway  +   Improve balance by 5 points  on the Tinetti Balance scale as needed for reduced fall risk  +   Ambulate with on level surfaces  with Rolator with increased foot clearance and strep length and no loss of balance.  +   Ascend and descend curb steps with SBA and  assistance of device with good control for safety in the community.  +  Reduce reported dizziness  with position changes  +   Independent  with son in a HEP for self-management of symptoms and continued safety     PLAN   Add  balance activities as tolerated    Skilled physical therapy 2 times per week for 12 weeks  Treatment may include:  Therapeutic Exercise (46376)  Therapeutic Activity (33411)  Manual therapy (07764)  Neuro-muscular re-education (59641)  Gait Training (56470)  Ultrasound (75051)  Unattended Electrical Stimulation (/57483)  Light therapy (38275)      The pt agreed with above stated treatment plan and general goals.

## 2025-05-22 ENCOUNTER — TREATMENT (OUTPATIENT)
Dept: PHYSICAL THERAPY | Facility: CLINIC | Age: OVER 89
End: 2025-05-22
Payer: MEDICARE

## 2025-05-22 DIAGNOSIS — R26.81 UNSTEADY GAIT: ICD-10-CM

## 2025-05-22 DIAGNOSIS — Z09 HOSPITAL DISCHARGE FOLLOW-UP: ICD-10-CM
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
## 2025-05-22 PROCEDURE — 97110 THERAPEUTIC EXERCISES: CPT | Mod: GP | Performed by: PHYSICAL THERAPIST

## 2025-05-22 PROCEDURE — 97530 THERAPEUTIC ACTIVITIES: CPT | Mod: GP | Performed by: PHYSICAL THERAPIST

## 2025-05-22 NOTE — PROGRESS NOTES
Physical TherapyTreatment    Patient Name: Xiomy Rubio  MRN: [id number]  Date: 5/22/2025  Time Calculation  Start Time: 1300  Stop Time: 1345  Time Calculation (min): 45 min       PT Therapeutic Procedures Time Entry  Therapeutic Exercise Time Entry: 25  Therapeutic Activity Time Entry: 15    INSURANCE:  Visit Number: 8  Insurance Type: Payor: MEDICARE / Plan: MEDICARE PART A AND B / Product Type: *No Product type* / NO AUTH / $257 DEDUCT not met / $0 used 2025 PT/ST / [place] VISITS / Per RTE.  2)  [employer] - NO AUTH / $1000 OOP MET, 100% COVERAGE after [facility] payment / $100,000 max coverage - $67982.17 used / 12V soft max / 0 used     CMS Re-Certification Period: 4/23/25 to 7/22/25    CURRENT PROBLEMS:   1. Unsteady gait  Follow Up In Physical Therapy      2. Fall, initial encounter  Referral to Physical Therapy    Follow Up In Physical Therapy      3. Hospital discharge follow-up  Referral to Physical Therapy    Follow Up In Physical Therapy          PRECAUTIONS:  Fall risk,   PMH: (pertinent to PT evaluation)  Hearing loss, hip arthritis, hip pain, osteoporosis with pathological fracture, fractured right patella, carpal tunnel syndrome, thoracic spine fracture, numbness, closed head injury, hypoxia, fatigue  *See Epic EMR for complete list.      Medical History Form: Reviewed (scanned into chart)  B hearing loss,     SPECIAL CONCERNS:   [place]  She gets dizzy when she gets out of bed.   Dizzy when she rolls to the R.      SUBJECTIVE:   The pt that she is up and down.  She does her exercises at home as she can.  Legs get tired.      4-23-25:  The pt got up in the middle of the night for snack and after the snack fell asleep in her chair.  She then fell out of the chair with multiple injuries.  She was in the hospital for 3-4 days for trauma.  Discharged to a rehab center .  She sustained another fall and went to the hospital.  Where she was found to have COVID (mild). She then went back to rehab on isolation.  In rehab 5-6  Scripts were sent to correct pharmacy. weeks in total.  Home care was for 2 weeks due to her doing well.      She had a fall last week and is bruised up when her foot got tangled in the rollator when she was turning. The R hip hurts when she is walking.  She now has significant bruising on her back.      Her knees bother some times.  She gets tired.    She has been using a 4WW full time.      Per her son, she is here to get stronger and work on balance   (This is the second time getting home care and has kept doing exercises since.)    PAIN:    Current:  mild in L hip    PRIOR LEVEL OF FUNCTION  Walks with a  4 wheeled Rolator    OBJECTIVE:  Enters PT with 4 WW with seat    BALANCE:  Static - poor - unable to stand without holding rolator     POSTURE:  Flexed     GAIT  Distance: 30 ft observed  Assistive device:  Rolator  Assistance needed: close SBA  Deviations:  +-slow and cautious  +very short step length on B LE  + Knee flexion in mid stance of the B LE    TREATMENTS: 30 min  Manual  STM to lumbar, lateral and anterior hip, and medial knee  Between exercises    Therapeutic exercise  Seated hamstring stretch  1x30 seconds each    Standing   Seated LE exercise for strengthening   LAQ 2 x10 with 2 #  Marching 1 x20 with 2#  Hamstring curl   with dk blue TB  1 x10 each  Abduction with  dk blue theraband  x1 mintue  ball squeeze  x1 minute  ankle df  and pf   x 20    Supine exercise for LE strengthening: (guided)  +SAQ 1 x10 with 0 # each  + heel slide 1x10 each  +SLR 1x10 with 0# each  + hip abduction 1x10 each -dnp  + hook lying ER x10 each  + hamstring stretch 1x10    Therapeutic activity:  Standing weight shifting  at railing   A/P x10   L/R x10    Gait in and out of department  CGA needed with transfers and gait in therapy.    Bed mobility with CGA     Modality:  Moist heat to each hip during exercise to the other        EDUCATION:  NEW  Discussed with PT and son:  Today's performance.     PRIOR   current status, general goals, treatment plan    "safety with use of rolator  Exercise without causing pain - if painful use smaller motion.  Given stp-light analogy  Orthostatic hypotension and safety practices when first sitting or standing  Standing periodically to reduce tightness  Stretching without pain  Progress with added resistance  Reviewed improvements with pt and son  If standing on the R hurts, then only do standing exercises with R LE   Use heat to address pain  Incread exercise today  Do not stand up if dizzy  Importance to keep moving, but be safe  If having and \"off\" today do exercises in sitting  If having a \"good\" day can do exercises in standing with upper extremity support    Home Exercise program:  Per home care (standing at the sink)     ASSESSMENT:     The pt was able to resume exercises with resistance with seated exercises today. She was sore in the hips  with supine exercises, but was addressed with heat and was able to complete exercises.  She has reports of hip soreness with standing wt/shift activities.  Close SBA with gait due to reduced clearance on some steps today.    Pt would benefit from skilled physical therapy intervention to improve above impairments and facilitate return to function.    Pain at the end of session:  none reported    Complexity of Evaluation:   high  Based on the history including personal factors and/or comorbidities, examination of body systems including body structures and function, activity limitations, and/or participation restrictions, as well as clinical presentation, patient meets criteria for a high complexity evaluation.      Rehab potential:  Good to improve     GOALS:  Pt stated goal:  Improve flexibility, strength, balance, and coordination    Short term goals:  + Increase strength by 1/3 grade  + Increase ankle df  ROM by 5  degrees  + Reduce pain by 2 points  + Independent in a basic HEP  + able to stand 10 seconds without UE support  + assess balance on Tinetti  Perform  ocular motor " testing    Long term goals:  +   Improve ROM of hip and knee extension to 0       +   Improve ankle dorsiflexion ROM to 0  +   Improve LE strength to 5/5 as needed for transfers and gait  +   5/5 ankle strength as needed for balance and postural sway  +   Improve balance by 5 points  on the Tinetti Balance scale as needed for reduced fall risk  +   Ambulate with on level surfaces  with Rolator with increased foot clearance and strep length and no loss of balance.  +   Ascend and descend curb steps with SBA and  assistance of device with good control for safety in the community.  +  Reduce reported dizziness  with position changes  +   Independent  with son in a HEP for self-management of symptoms and continued safety     PLAN   Add  balance activities as tolerated  Re-assess in next 2 session  Recumbent bike as tolerated    Skilled physical therapy 2 times per week for 12 weeks  Treatment may include:  Therapeutic Exercise (44936)  Therapeutic Activity (37397)  Manual therapy (28906)  Neuro-muscular re-education (39168)  Gait Training (02267)  Ultrasound (67189)  Unattended Electrical Stimulation (/87537)  Light therapy (73453)      The pt agreed with above stated treatment plan and general goals.

## 2025-05-28 ENCOUNTER — TREATMENT (OUTPATIENT)
Dept: PHYSICAL THERAPY | Facility: CLINIC | Age: OVER 89
End: 2025-05-28
Payer: MEDICARE
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
## 2025-05-28 DIAGNOSIS — Z09 HOSPITAL DISCHARGE FOLLOW-UP: ICD-10-CM

## 2025-05-28 DIAGNOSIS — R26.81 UNSTEADY GAIT: ICD-10-CM

## 2025-05-28 PROCEDURE — 97530 THERAPEUTIC ACTIVITIES: CPT | Mod: GP | Performed by: PHYSICAL THERAPIST

## 2025-05-28 PROCEDURE — 97110 THERAPEUTIC EXERCISES: CPT | Mod: GP | Performed by: PHYSICAL THERAPIST

## 2025-05-28 NOTE — PROGRESS NOTES
Physical TherapyTreatment and reassessment    Patient Name: Xiomy Rubio  MRN: [id number]  Date: 5/28/2025  Time Calculation  Start Time: 1300  Stop Time: 1345  Time Calculation (min): 45 min       PT Therapeutic Procedures Time Entry  Therapeutic Exercise Time Entry: 25  Therapeutic Activity Time Entry: 15    INSURANCE:  Visit Number: 9  Insurance Type: Payor: MEDICARE / Plan: MEDICARE PART A AND B / Product Type: *No Product type* / NO AUTH / $257 DEDUCT not met / $0 used 2025 PT/ST / [place] VISITS / Per RTE.  2)  [employer] - NO AUTH / $1000 OOP MET, 100% COVERAGE after [facility] payment / $100,000 max coverage - $31904.17 used / 12V soft max / 0 used     CMS Re-Certification Period: 4/23/25 to 7/22/25    CURRENT PROBLEMS:   1. Unsteady gait  Follow Up In Physical Therapy      2. Fall, initial encounter  Referral to Physical Therapy    Follow Up In Physical Therapy      3. Hospital discharge follow-up  Referral to Physical Therapy    Follow Up In Physical Therapy          PRECAUTIONS:  Fall risk,   PMH: (pertinent to PT evaluation)  Hearing loss, hip arthritis, hip pain, osteoporosis with pathological fracture, fractured right patella, carpal tunnel syndrome, thoracic spine fracture, numbness, closed head injury, hypoxia, fatigue  *See Epic EMR for complete list.      Medical History Form: Reviewed (scanned into chart)  B hearing loss,     SPECIAL CONCERNS:   [place]  She gets dizzy when she gets out of bed.   Dizzy when she rolls to the R.      SUBJECTIVE:   The pt has more pian in the mornings, but in the morning she does sit to stands and that helps.  (*About 11 times)  The pt has pain off and on.  The colder weather  seems to affect it.  She is doing her exercises at home.      The patient's son reports that she often becomes fearful of falling which causes her to freeze.  She very much likes using the recumbent bike and when used in the past had good carryover into walking.          4-23-25:  The pt got up in the middle of  the night for snack and after the snack fell asleep in her chair.  She then fell out of the chair with multiple injuries.  She was in the hospital for 3-4 days for trauma.  Discharged to a rehab center .  She sustained another fall and went to the hospital.  Where she was found to have COVID (mild). She then went back to rehab on isolation.  In rehab 5-6 weeks in total.  Home care was for 2 weeks due to her doing well.      She had a fall last week and is bruised up when her foot got tangled in the rollator when she was turning. The R hip hurts when she is walking.  She now has significant bruising on her back.      Her knees bother some times.  She gets tired.    She has been using a 4WW full time.      Per her son, she is here to get stronger and work on balance   (This is the second time getting home care and has kept doing exercises since.)    PAIN:    Current  0 /10  RANGE: 0 /10  to  5/10  Location: R hip  Description:  achy , inconsistent  Aggravating: walking, mornings  Reducing: sitting       PRIOR LEVEL OF FUNCTION  Walks with a  4 wheeled Rolator    OBJECTIVE:  Enters PT with 4 WW with seat  Patient's son attended therapy for reassessment    Lower Extremity                       ROM     To be assessed                 MMT:                          R                      L   Hip flexion                     4+/5                 4+/5  Knee flex                        5-/5               5/5  Knee ext                        5 /5               5-/5   Dorsiflexion                  4+ /5               4+/5     FLEXIBILITY:                        R                L  Nasim                          ( + )                ( + )  Hamstrings  in sitting         75              68     COORDINATION:  Time for 10 toe taps   R  4 seconds   /   L  4 seconds  Rapid alternating movement - 10 toe taps each in 8 seconds      BALANCE:  Static -poor plus-   able to stand with HHA on each side.   for 1` mintue         POSTURE:  Able  to stand erect with walker     BED MOBILITY:  Sit to supine:    SBA  Supine to sit:   SBA       TRANSFERS:  Sit to stand: requires SBA, slow and cautious, less cueing needed, occasionally retropulsive  Stand to sit:requires SBA, with  B UE to control- slow and cautious  Bed to/from chair:- slow and cautious  with Rolator     GAIT  Distance: 50 ft observed and throughout therapy department  Assistive device:  Rolator  Assistance needed: close SBA  Deviations:  +-slow and cautious  +very short step length on B LE  + Knee flexion in mid stance of the B LE  + Entering therapy patient had decreased foot clearance but after exercise foot clearance improved     TREATMENTS:  Therapeutic exercise  See objective measures     Recumbent bike-5 minutes with rests (min assist to transfer onto bike and off of)  **seated hamstring stretch  1x30 seconds each  **Seated gastroc stretch with strap        Therapeutic activity:  Patient family education-see below and education and assessment sections    Gait in and out of department (see above)  SBA needed with transfers and gait in therapy.    =============== DNP===============================  Standing   Seated LE exercise for strengthening   LAQ 2 x10 with 2 #  Marching 1 x20 with 2#  Hamstring curl   with dk blue TB  1 x10 each  Abduction with  dk blue theraband  x1 mintue  ball squeeze  x1 minute  ankle df  and pf   x 20  Standing weight shifting  at railing   A/P x10   L/R x10      Supine exercise for LE strengthening: (guided)  +SAQ 1 x10 with 0 # each  + heel slide 1x10 each  +SLR 1x10 with 0# each  + hip abduction 1x10 each -dnp  + hook lying ER x10 each  + hamstring stretch 1x10  ============================================================  EDUCATION:  NEW  Discussed with PT and son:  Biomechanics of tight hamstrings and gastrocs as they relate to retropulsion in standing  Progress and deficits  Below plan    PRIOR   current status, general goals, treatment plan   safety with  "use of rolator  Exercise without causing pain - if painful use smaller motion.  Given stp-light analogy  Orthostatic hypotension and safety practices when first sitting or standing  Standing periodically to reduce tightness  Stretching without pain  Progress with added resistance  Reviewed improvements with pt and son  If standing on the R hurts, then only do standing exercises with R LE   Use heat to address pain  Incread exercise today  Do not stand up if dizzy  Importance to keep moving, but be safe  If having and \"off\" today do exercises in sitting  If having a \"good\" day can do exercises in standing with upper extremity support  Today's performance.     Home Exercise program:  Per home care (standing at the sink)     ASSESSMENT:     The pt is making progress in physical therapy with notable changes in strength.  Functional mobility varies, but today she entered with decreased foot clearance.  However, after performing muscle testing she demonstrated improved foot clearance throughout the department when walking with her rollator.  Discussed with the patient's son the trend of performing exercises and sleeping supine and sitting very well which is translating into strengthening.  However, limitations in standing activities due to pain.  The patient needs further physical therapy to progress into standing activities that can relate into balance for overall safety with mobility.  The patient and her son both agreed that they would like to continue with physical therapy.    Pt would benefit from skilled physical therapy intervention to improve above impairments and facilitate return to function.    Pain at the end of session:  none reported    Complexity of Evaluation:   high  Based on the history including personal factors and/or comorbidities, examination of body systems including body structures and function, activity limitations, and/or participation restrictions, as well as clinical presentation, patient meets " criteria for a high complexity evaluation.      Rehab potential:  Good to improve     GOALS:  Pt stated goal:  Improve flexibility, strength, balance, and coordination    Short term goals:  + Increase strength by 1/3 grade (met)  + Increase ankle df  ROM by 5  degrees (TBA)  + Reduce pain by 2 points (not met)  + Independent in a basic HEP (met)  + able to stand 10 seconds without UE support (met)  + assess balance on Tinetti  Perform  ocular motor testing    Long term goals:  +   Improve ROM of hip and knee extension to 0       +   Improve ankle dorsiflexion ROM to 0  +   Improve LE strength to 5/5 as needed for transfers and gait  +   5/5 ankle strength as needed for balance and postural sway  +   Improve balance by 5 points  on the Tinetti Balance scale as needed for reduced fall risk  +   Ambulate with on level surfaces  with Rolator with increased foot clearance and strep length and no loss of balance.  +   Ascend and descend curb steps with SBA and  assistance of device with good control for safety in the community.  +  Reduce reported dizziness  with position changes  +   Independent  with son in a HEP for self-management of symptoms and continued safety     PLAN   Add  balance activities as tolerated  Recumbent bike as tolerated    Skilled physical therapy 2 times per week for 12 weeks  Treatment may include:  Therapeutic Exercise (47138)  Therapeutic Activity (12875)  Manual therapy (24231)  Neuro-muscular re-education (74199)  Gait Training (05060)  Ultrasound (43675)  Unattended Electrical Stimulation (/71930)  Light therapy (41223)      The pt agreed with above stated treatment plan and general goals.

## 2025-05-29 ENCOUNTER — APPOINTMENT (OUTPATIENT)
Dept: PRIMARY CARE | Facility: CLINIC | Age: OVER 89
End: 2025-05-29
Payer: MEDICARE

## 2025-05-29 VITALS
BODY MASS INDEX: 23.63 KG/M2 | DIASTOLIC BLOOD PRESSURE: 54 MMHG | HEIGHT: 66 IN | WEIGHT: 147 LBS | SYSTOLIC BLOOD PRESSURE: 118 MMHG | OXYGEN SATURATION: 95 % | HEART RATE: 68 BPM

## 2025-05-29 DIAGNOSIS — K59.04 CHRONIC IDIOPATHIC CONSTIPATION: ICD-10-CM

## 2025-05-29 DIAGNOSIS — R73.03 PREDIABETES: ICD-10-CM

## 2025-05-29 DIAGNOSIS — E78.5 HYPERLIPIDEMIA, UNSPECIFIED HYPERLIPIDEMIA TYPE: Primary | ICD-10-CM

## 2025-05-29 DIAGNOSIS — M80.00XD AGE-RELATED OSTEOPOROSIS WITH CURRENT PATHOLOGICAL FRACTURE WITH ROUTINE HEALING, SUBSEQUENT ENCOUNTER: ICD-10-CM

## 2025-05-29 DIAGNOSIS — M81.0 AGE RELATED OSTEOPOROSIS: ICD-10-CM

## 2025-05-29 DIAGNOSIS — K21.9 GASTROESOPHAGEAL REFLUX DISEASE, UNSPECIFIED WHETHER ESOPHAGITIS PRESENT: ICD-10-CM

## 2025-05-29 DIAGNOSIS — F41.9 ANXIETY: ICD-10-CM

## 2025-05-29 DIAGNOSIS — G56.03 BILATERAL CARPAL TUNNEL SYNDROME: ICD-10-CM

## 2025-05-29 PROCEDURE — 1159F MED LIST DOCD IN RCRD: CPT | Performed by: STUDENT IN AN ORGANIZED HEALTH CARE EDUCATION/TRAINING PROGRAM

## 2025-05-29 PROCEDURE — G2211 COMPLEX E/M VISIT ADD ON: HCPCS | Performed by: STUDENT IN AN ORGANIZED HEALTH CARE EDUCATION/TRAINING PROGRAM

## 2025-05-29 PROCEDURE — 99214 OFFICE O/P EST MOD 30 MIN: CPT | Performed by: STUDENT IN AN ORGANIZED HEALTH CARE EDUCATION/TRAINING PROGRAM

## 2025-05-29 RX ORDER — ARM BRACE
2 EACH MISCELLANEOUS NIGHTLY
Qty: 2 EACH | Refills: 0 | Status: SHIPPED | OUTPATIENT
Start: 2025-05-29

## 2025-05-29 ASSESSMENT — PATIENT HEALTH QUESTIONNAIRE - PHQ9
9. THOUGHTS THAT YOU WOULD BE BETTER OFF DEAD, OR OF HURTING YOURSELF: NOT AT ALL
3. TROUBLE FALLING OR STAYING ASLEEP OR SLEEPING TOO MUCH: MORE THAN HALF THE DAYS
1. LITTLE INTEREST OR PLEASURE IN DOING THINGS: NOT AT ALL
5. POOR APPETITE OR OVEREATING: NEARLY EVERY DAY
7. TROUBLE CONCENTRATING ON THINGS, SUCH AS READING THE NEWSPAPER OR WATCHING TELEVISION: SEVERAL DAYS
4. FEELING TIRED OR HAVING LITTLE ENERGY: MORE THAN HALF THE DAYS
SUM OF ALL RESPONSES TO PHQ9 QUESTIONS 1 AND 2: 3
8. MOVING OR SPEAKING SO SLOWLY THAT OTHER PEOPLE COULD HAVE NOTICED. OR THE OPPOSITE, BEING SO FIGETY OR RESTLESS THAT YOU HAVE BEEN MOVING AROUND A LOT MORE THAN USUAL: NOT AT ALL
SUM OF ALL RESPONSES TO PHQ QUESTIONS 1-9: 11
2. FEELING DOWN, DEPRESSED OR HOPELESS: NEARLY EVERY DAY
6. FEELING BAD ABOUT YOURSELF - OR THAT YOU ARE A FAILURE OR HAVE LET YOURSELF OR YOUR FAMILY DOWN: NOT AT ALL

## 2025-05-29 NOTE — PROGRESS NOTES
"  Subjective   Patient ID:   Xiomy Rubio is a  89 y.o. female who presents for Follow-up (PT IS HERE FOR A 2 MO FOLLOW UP.).     HPI  Pt has continued to eat and snack in the middle of the night  She has been caught falling asleep at the table twice    Complaining about more pain, generalized.   Finished her first round of therapy due to progress she will complete a second round    Current Medications[1]    Visit Vitals  /54   Pulse 68   Ht 1.676 m (5' 6\")   Wt 66.7 kg (147 lb)   LMP  (LMP Unknown) Comment: Pt is 89 years old- last mentrauls cycle unknown   SpO2 95%   BMI 23.73 kg/m²   OB Status Postmenopausal   Smoking Status Never   BSA 1.76 m²       Objective   Physical Exam  Vitals reviewed.   Constitutional:       Appearance: Normal appearance.   Cardiovascular:      Rate and Rhythm: Normal rate and regular rhythm.      Heart sounds: Murmur heard.   Pulmonary:      Effort: Pulmonary effort is normal. No respiratory distress.      Breath sounds: Normal breath sounds. No wheezing.   Musculoskeletal:      Cervical back: Neck supple.      Left lower leg: No edema.   Neurological:      Mental Status: She is alert.         Assessment/Plan   Diagnoses and all orders for this visit:  Hyperlipidemia, unspecified hyperlipidemia type  -     Hemoglobin A1C; Future  -     CBC and Auto Differential; Future  -     Comprehensive metabolic panel; Future  Anxiety  -     Hemoglobin A1C; Future  -     CBC and Auto Differential; Future  -     Comprehensive metabolic panel; Future  Gastroesophageal reflux disease, unspecified whether esophagitis present  -     Hemoglobin A1C; Future  -     CBC and Auto Differential; Future  -     Comprehensive metabolic panel; Future  Chronic idiopathic constipation  -     Hemoglobin A1C; Future  -     CBC and Auto Differential; Future  -     Comprehensive metabolic panel; Future  Prediabetes  -     Hemoglobin A1C; Future  Age-related osteoporosis with current pathological fracture with " routine healing, subsequent encounter  -     XR DEXA bone density; Future  Age related osteoporosis  -     XR DEXA bone density; Future  Bilateral carpal tunnel syndrome  -     arm brace (Wrist Brace Medium) misc; 2 each once daily at bedtime.    Multiple falls  Pt reports no falls since our last visit two months ago   Pt is doing well with pain management with tylenol   Pt has one more at home physical therapy will transfer to outpt physical therapy  Ambulating with pain in the right hip  Injuries from fall   -B/l nasal bone fractures   -fracture plane traversing the maxillary sinuses  and inferior nasal septum  -Nondisplaced or minimally displaced fractures of the  posterior left 8th through 11th ribs  -completing second round of PT      Constipation   Pt is not consistent with her miralax would like to try tablets   Senna continue      Anxiety  Patient is following with both psychology and a psychiatrist  Due to patient's adverse reactions she has been reduced back to her Zoloft 50 mg daily  Continue buspar 10mg TID  There has been improvement to her adverse side effects.      GERD prophylaxis  Pantoprazole was refilled     Osteoporosis  Her vitamin D, calcium supplementation to be continued   Discussed previously other medication such as prolia for which pt has declined.      Constipation  Patient to continue with as needed laxative  Encourage daily prune juice or an increase in fiber     Prediabetes  A1c 5.7% 1 year    Note: eb, son answered  the depression questions from the AISHA Hernandez DO 03/12/25 2:02 PM        [1]   Current Outpatient Medications:     acetaminophen (Tylenol) 325 mg tablet, Take 3 tablets (975 mg) by mouth every 8 hours., Disp: , Rfl:     busPIRone (Buspar) 10 mg tablet, Take 1 tablet (10 mg) by mouth 3 times a day., Disp: , Rfl:     calcium carbonate-vitamin D3 (Oysco 500/D) 500 mg-5 mcg (200 unit) tablet, Take 1 tablet by mouth 2 times a day., Disp: 180 tablet, Rfl: 1     cholecalciferol (Vitamin D-3) 50 MCG (2000 UT) tablet, Take 1 tablet (2,000 Units) by mouth once daily., Disp: , Rfl:     cyanocobalamin (Vitamin B-12) 1,000 mcg tablet, Take 1 tablet (1,000 mcg) by mouth once daily., Disp: , Rfl:     ferrous sulfate 325 mg (65 mg elemental) tablet, TAKE 1 TABLET BY MOUTH EVERY OTHER DAY, Disp: 45 tablet, Rfl: 1    latanoprost (Xalatan) 0.005 % ophthalmic solution, Administer 1 drop into both eyes once daily at bedtime., Disp: , Rfl:     lidocaine 4 % patch, Place 1 patch over 12 hours on the skin once daily. Remove & discard patch within 12 hours or as directed by MD., Disp: , Rfl:     naproxen sodium (Aleve) 220 mg tablet, Take 1 tablet (220 mg) by mouth every 12 hours if needed for mild pain (1 - 3)., Disp: , Rfl:     ondansetron ODT (Zofran-ODT) 4 mg disintegrating tablet, Dissolve 1 tablet (4 mg) in the mouth every 8 hours if needed for nausea or vomiting., Disp: , Rfl:     pantoprazole (ProtoNix) 20 mg EC tablet, TAKE 1 TABLET BY MOUTH EVERY DAY, Disp: 90 tablet, Rfl: 1    sertraline (Zoloft) 50 mg tablet, Take 1 tablet (50 mg) by mouth once daily., Disp: , Rfl:     busPIRone (Buspar) 10 mg tablet, Take 1 tablet (10 mg) by mouth 3 times a day., Disp: 90 tablet, Rfl: 0    calcium citrate (Calcitrate) 200 mg (950 mg) tablet, Take 3 tablets (600 mg) by mouth 2 times a day. (Patient not taking: Reported on 5/29/2025), Disp: , Rfl:     sennosides (senna) 8.6 mg capsule, Take 1 capsule (8.6 mg) by mouth once daily at bedtime. (Patient not taking: Reported on 5/29/2025), Disp: 90 capsule, Rfl: 1

## 2025-05-30 ENCOUNTER — HOSPITAL ENCOUNTER (OUTPATIENT)
Dept: RADIOLOGY | Facility: HOSPITAL | Age: OVER 89
Discharge: HOME | End: 2025-05-30
Payer: MEDICARE

## 2025-05-30 DIAGNOSIS — M81.0 AGE RELATED OSTEOPOROSIS: ICD-10-CM

## 2025-05-30 DIAGNOSIS — M80.00XD AGE-RELATED OSTEOPOROSIS WITH CURRENT PATHOLOGICAL FRACTURE WITH ROUTINE HEALING, SUBSEQUENT ENCOUNTER: ICD-10-CM

## 2025-05-30 LAB
ALBUMIN SERPL-MCNC: 4.1 G/DL (ref 3.6–5.1)
ALP SERPL-CCNC: 75 U/L (ref 37–153)
ALT SERPL-CCNC: 10 U/L (ref 6–29)
ANION GAP SERPL CALCULATED.4IONS-SCNC: 10 MMOL/L (CALC) (ref 7–17)
AST SERPL-CCNC: 13 U/L (ref 10–35)
BASOPHILS # BLD AUTO: 60 CELLS/UL (ref 0–200)
BASOPHILS NFR BLD AUTO: 0.9 %
BILIRUB SERPL-MCNC: 0.8 MG/DL (ref 0.2–1.2)
BUN SERPL-MCNC: 31 MG/DL (ref 7–25)
CALCIUM SERPL-MCNC: 9.3 MG/DL (ref 8.6–10.4)
CHLORIDE SERPL-SCNC: 103 MMOL/L (ref 98–110)
CO2 SERPL-SCNC: 27 MMOL/L (ref 20–32)
CREAT SERPL-MCNC: 0.71 MG/DL (ref 0.6–0.95)
EGFRCR SERPLBLD CKD-EPI 2021: 79 ML/MIN/1.73M2
EOSINOPHIL # BLD AUTO: 442 CELLS/UL (ref 15–500)
EOSINOPHIL NFR BLD AUTO: 6.6 %
ERYTHROCYTE [DISTWIDTH] IN BLOOD BY AUTOMATED COUNT: 14.3 % (ref 11–15)
EST. AVERAGE GLUCOSE BLD GHB EST-MCNC: 131 MG/DL
EST. AVERAGE GLUCOSE BLD GHB EST-SCNC: 7.3 MMOL/L
GLUCOSE SERPL-MCNC: 107 MG/DL (ref 65–99)
HBA1C MFR BLD: 6.2 %
HCT VFR BLD AUTO: 42.3 % (ref 35–45)
HGB BLD-MCNC: 13.4 G/DL (ref 11.7–15.5)
LYMPHOCYTES # BLD AUTO: 1802 CELLS/UL (ref 850–3900)
LYMPHOCYTES NFR BLD AUTO: 26.9 %
MCH RBC QN AUTO: 28.6 PG (ref 27–33)
MCHC RBC AUTO-ENTMCNC: 31.7 G/DL (ref 32–36)
MCV RBC AUTO: 90.4 FL (ref 80–100)
MONOCYTES # BLD AUTO: 623 CELLS/UL (ref 200–950)
MONOCYTES NFR BLD AUTO: 9.3 %
NEUTROPHILS # BLD AUTO: 3772 CELLS/UL (ref 1500–7800)
NEUTROPHILS NFR BLD AUTO: 56.3 %
PLATELET # BLD AUTO: 204 THOUSAND/UL (ref 140–400)
PMV BLD REES-ECKER: 10 FL (ref 7.5–12.5)
POTASSIUM SERPL-SCNC: 4.4 MMOL/L (ref 3.5–5.3)
PROT SERPL-MCNC: 6.5 G/DL (ref 6.1–8.1)
RBC # BLD AUTO: 4.68 MILLION/UL (ref 3.8–5.1)
SODIUM SERPL-SCNC: 140 MMOL/L (ref 135–146)
WBC # BLD AUTO: 6.7 THOUSAND/UL (ref 3.8–10.8)

## 2025-05-30 PROCEDURE — 77080 DXA BONE DENSITY AXIAL: CPT

## 2025-06-10 ENCOUNTER — TREATMENT (OUTPATIENT)
Dept: PHYSICAL THERAPY | Facility: CLINIC | Age: OVER 89
End: 2025-06-10
Payer: MEDICARE

## 2025-06-10 DIAGNOSIS — R26.81 UNSTEADY GAIT: ICD-10-CM
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
## 2025-06-10 DIAGNOSIS — Z09 HOSPITAL DISCHARGE FOLLOW-UP: ICD-10-CM

## 2025-06-10 PROCEDURE — 97110 THERAPEUTIC EXERCISES: CPT | Mod: GP | Performed by: PHYSICAL THERAPIST

## 2025-06-10 NOTE — PROGRESS NOTES
Physical TherapyTreatment   Patient Name: Xiomy Rubio  MRN: [id number]  Date: 6/11/2025  Time Calculation  Start Time: 1350  Stop Time: 1430  Time Calculation (min): 40 min       PT Therapeutic Procedures Time Entry  Therapeutic Exercise Time Entry: 40    INSURANCE:  Visit Number: 10  Reassessment on visit 9 on 5/28/25  Insurance Type: Payor: MEDICARE / Plan: MEDICARE PART A AND B / Product Type: *No Product type* / NO AUTH / $257 DEDUCT not met / $0 used 2025 PT/ST / [place] VISITS / Per RTE.  2)  [employer] - NO AUTH / $1000 OOP MET, 100% COVERAGE after [facility] payment / $100,000 max coverage - $67952.17 used / 12V soft max / 0 used     CMS Re-Certification Period: 4/23/25 to 7/22/25    CURRENT PROBLEMS:   1. Unsteady gait  Follow Up In Physical Therapy      2. Fall, initial encounter  Referral to Physical Therapy    Follow Up In Physical Therapy      3. Hospital discharge follow-up  Referral to Physical Therapy    Follow Up In Physical Therapy          PRECAUTIONS:  Fall risk,   PMH: (pertinent to PT evaluation)  Hearing loss, hip arthritis, hip pain, osteoporosis with pathological fracture, fractured right patella, carpal tunnel syndrome, thoracic spine fracture, numbness, closed head injury, hypoxia, fatigue  *See Epic EMR for complete list.      Medical History Form: Reviewed (scanned into chart)  B hearing loss,     SPECIAL CONCERNS:   [place]  She gets dizzy when she gets out of bed.   Dizzy when she rolls to the R.      HPI:    The pt got up in the middle of the night for snack and after the snack fell asleep in her chair.  She then fell out of the chair with multiple injuries.  She was in the hospital for 3-4 days for trauma.  Discharged to a rehab center .  She sustained another fall and went to the hospital.  Where she was found to have COVID (mild). She then went back to rehab on isolation.  In rehab 5-6 weeks in total.  Home care was for 2 weeks due to her doing well.      She had a fall last week and is bruised up  when her foot got tangled in the rollator when she was turning. The R hip hurts when she is walking.  She now has significant bruising on her back.      Her knees bother some times.  She gets tired.    She has been using a 4WW full time.      Per her son, she is here to get stronger and work on balance   (This is the second time getting home care and has kept doing exercises since.)    PRIOR LEVEL OF FUNCTION  Walks with a  4 wheeled Rolator    SUBJECTIVE:   The pt reports that she is a bit tired.  She has discomfort in her back and the inside of the L knee.---Stiff.  She wonders if she s exercising engough    PAIN:    Current  a little in her back and knee      OBJECTIVE:  Enters PT with 4 WW with seat    Lower Extremity                       TREATMENTS:  Therapeutic exercise  Recumbent bike-6 minutes with rests (CG assist to transfer onto bike and off of)  *seated hamstring stretch   1x30 seconds bilateral  *Seated gastroc stretch with strap    Standing   In parallel bars  Slant board with Min A for baalnce 30 seconds x2  March in place x10 each  Hip abduction x10 each       Seated LE exercise for strengthening   LAQ 2 x10 with 2 #  Marching 1 x20 with 2#  Hamstring curl   with dk blue TB  1 x10 each  Abduction with  dk blue theraband  x1 mintue  ball squeeze  x1 minute  ankle df  and pf   x 20    Gait in and out of department (see above)  SBA needed with transfers and gait in therapy.    =============== DNP===============================  Supine exercise for LE strengthening: (guided)  +SAQ 1 x10 with 0 # each  + heel slide 1x10 each  +SLR 1x10 with 0# each  + hip abduction 1x10 each -dnp  + hook lying ER x10 each  + hamstring stretch 1x10  ============================================================  EDUCATION:  NEW  Discussed with PT and son:  Improvements noted today    PRIOR   current status, general goals, treatment plan   safety with use of rolator  Exercise without causing pain - if painful use smaller  "motion.  Given stp-light analogy  Orthostatic hypotension and safety practices when first sitting or standing  Standing periodically to reduce tightness  Stretching without pain  Progress with added resistance  Reviewed improvements with pt and son  If standing on the R hurts, then only do standing exercises with R LE   Use heat to address pain  Incread exercise today  Do not stand up if dizzy  Importance to keep moving, but be safe  If having and \"off\" today do exercises in sitting  If having a \"good\" day can do exercises in standing with upper extremity support  Today's performance.   Biomechanics of tight hamstrings and gastrocs as they relate to retropulsion in standing  Progress and deficits  Below plan    Home Exercise program:  Per home care (standing at the sink)     ASSESSMENT:     Able to increase time on the bike, which helps \"loosen\" up her legs.  She states she is stiff and feels it in the r knee and back.  (Also addressed with moist heat)  this reduced with heat and exercise.  She was able to tolerate standing exercises for the first time today.      Pt would benefit from skilled physical therapy intervention to improve above impairments and facilitate return to function.    Pain at the end of session:  none reported    Complexity of Evaluation:   high  Based on the history including personal factors and/or comorbidities, examination of body systems including body structures and function, activity limitations, and/or participation restrictions, as well as clinical presentation, patient meets criteria for a high complexity evaluation.      Rehab potential:  Good to improve     GOALS:  Pt stated goal:  Improve flexibility, strength, balance, and coordination    Short term goals:  + Increase strength by 1/3 grade (met)  + Increase ankle df  ROM by 5  degrees (TBA)  + Reduce pain by 2 points (not met)  + Independent in a basic HEP (met)  + able to stand 10 seconds without UE support (met)  + assess balance " on Tinetti  Perform  ocular motor testing    Long term goals:  +   Improve ROM of hip and knee extension to 0       +   Improve ankle dorsiflexion ROM to 0  +   Improve LE strength to 5/5 as needed for transfers and gait  +   5/5 ankle strength as needed for balance and postural sway  +   Improve balance by 5 points  on the Tinetti Balance scale as needed for reduced fall risk  +   Ambulate with on level surfaces  with Rolator with increased foot clearance and strep length and no loss of balance.  +   Ascend and descend curb steps with SBA and  assistance of device with good control for safety in the community.  +  Reduce reported dizziness  with position changes  +   Independent  with son in a HEP for self-management of symptoms and continued safety     PLAN   Add  balance activities as tolerated  Recumbent bike as tolerated    Skilled physical therapy 2 times per week for 12 weeks  Treatment may include:  Therapeutic Exercise (62469)  Therapeutic Activity (62081)  Manual therapy (47447)  Neuro-muscular re-education (03830)  Gait Training (84565)  Ultrasound (41378)  Unattended Electrical Stimulation (/44487)  Light therapy (13090)      The pt agreed with above stated treatment plan and general goals.

## 2025-06-12 ENCOUNTER — TREATMENT (OUTPATIENT)
Dept: PHYSICAL THERAPY | Facility: CLINIC | Age: OVER 89
End: 2025-06-12
Payer: MEDICARE

## 2025-06-12 DIAGNOSIS — R26.81 UNSTEADY GAIT: ICD-10-CM
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
## 2025-06-12 DIAGNOSIS — Z09 HOSPITAL DISCHARGE FOLLOW-UP: ICD-10-CM

## 2025-06-12 PROCEDURE — 97110 THERAPEUTIC EXERCISES: CPT | Mod: GP | Performed by: PHYSICAL THERAPIST

## 2025-06-12 PROCEDURE — 97140 MANUAL THERAPY 1/> REGIONS: CPT | Mod: GP | Performed by: PHYSICAL THERAPIST

## 2025-06-12 NOTE — PROGRESS NOTES
Physical TherapyTreatment   Patient Name: Xiomy Rubio  MRN: [id number]  Date: 6/12/2025  Time Calculation  Start Time: 1430  Stop Time: 1515  Time Calculation (min): 45 min       PT Therapeutic Procedures Time Entry  Therapeutic Exercise Time Entry: 35  Manual Therapy Time Entry: 10    INSURANCE:  Visit Number: 11  Reassessment on visit 9 on 5/28/25  Insurance Type: Payor: MEDICARE / Plan: MEDICARE PART A AND B / Product Type: *No Product type* / NO AUTH / $257 DEDUCT not met / $0 used 2025 PT/ST / [place] VISITS / Per RTE.  2)  [employer] - NO AUTH / $1000 OOP MET, 100% COVERAGE after [facility] payment / $100,000 max coverage - $28509.17 used / 12V soft max / 0 used     CMS Re-Certification Period: 4/23/25 to 7/22/25    CURRENT PROBLEMS:   1. Unsteady gait  Follow Up In Physical Therapy      2. Fall, initial encounter  Referral to Physical Therapy    Follow Up In Physical Therapy      3. Hospital discharge follow-up  Referral to Physical Therapy    Follow Up In Physical Therapy          PRECAUTIONS:  Fall risk,   PMH: (pertinent to PT evaluation)  Hearing loss, hip arthritis, hip pain, osteoporosis with pathological fracture, fractured right patella, carpal tunnel syndrome, thoracic spine fracture, numbness, closed head injury, hypoxia, fatigue  *See Epic EMR for complete list.      Medical History Form: Reviewed (scanned into chart)  B hearing loss,     SPECIAL CONCERNS:   [place]  She gets dizzy when she gets out of bed.   Dizzy when she rolls to the R.      HPI:    The pt got up in the middle of the night for snack and after the snack fell asleep in her chair.  She then fell out of the chair with multiple injuries.  She was in the hospital for 3-4 days for trauma.  Discharged to a rehab center .  She sustained another fall and went to the hospital.  Where she was found to have COVID (mild). She then went back to rehab on isolation.  In rehab 5-6 weeks in total.  Home care was for 2 weeks due to her doing well.      She had a  fall last week and is bruised up when her foot got tangled in the rollator when she was turning. The R hip hurts when she is walking.  She now has significant bruising on her back.      Her knees bother some times.  She gets tired.    She has been using a 4WW full time.      Per her son, she is here to get stronger and work on balance   (This is the second time getting home care and has kept doing exercises since.)    PRIOR LEVEL OF FUNCTION  Walks with a  4 wheeled Rolator    SUBJECTIVE:   The pt reports that she gets mad at herself as she can't do everything she used to and wakes her son up at night when she is moving around.  She wonders if she is sitting too much when she is hurting, but reports taht she exercises daily with sitting and standing exercise and does sit to stand exercises.      PAIN:    Current:   a little in her R hip      OBJECTIVE:  Enters PT with 4 WW with seat    Lower Extremity                       TREATMENTS:  Therapeutic exercise  Recumbent bike-6 minutes with rests (CG assist to transfer onto bike and off of)  *seated hamstring stretch   2x30 seconds bilateral  *Seated gastroc stretch with strap - DNP    Standing   In parallel bars  March in place 1x10  with 2# each  Hip abduction 1x10  with 2# each   Hip extension 1x10  with 2# each  Standing on compliant surface with UE support   Static   Sways    Step in place      Seated LE exercise for strengthening   LAQ 2 x10 with 2 #  Marching 1 x20 with 2#  Hamstring curl   with dk blue TB  1 x10 each  Abduction with  dk blue theraband  x1 mintue  ball squeeze  x1 minute  ankle df  and pf   x 20    Gait in and out of department (see above)  SBA needed with transfers and gait in therapy.    Bed mobility:  Min A to guide  =============== DNP===============================  Supine exercise for LE strengthening: (guided)  +SAQ 1 x10 with 0 # each  + heel slide 1x10 each  +SLR 1x10 with 0# each  + hip abduction 1x10 each -dnp  + hook lying ER x10  "each  + hamstring stretch 1x10  ============================================================  Manual:  STM to R hip posterior, anterior and lateral in hook lying with 6 inch bolster    EDUCATION:  NEW  Discussed with PT and son:  Improvements noted today  May use heat (15 minutes  Use meds as allowed by doctor to address pain as needed  Keep moving to not stiffen up    PRIOR   current status, general goals, treatment plan   safety with use of rolator  Exercise without causing pain - if painful use smaller motion.  Given stp-light analogy  Orthostatic hypotension and safety practices when first sitting or standing  Standing periodically to reduce tightness  Stretching without pain  Progress with added resistance  Reviewed improvements with pt and son  If standing on the R hurts, then only do standing exercises with R LE   Use heat to address pain  Incread exercise today  Do not stand up if dizzy  Importance to keep moving, but be safe  If having and \"off\" today do exercises in sitting  If having a \"good\" day can do exercises in standing with upper extremity support  Today's performance.   Biomechanics of tight hamstrings and gastrocs as they relate to retropulsion in standing  Progress and deficits  Below plan    Home Exercise program:  Per home care (standing at the sink)     ASSESSMENT:     Mild difficulty on the bike with L foot slipping off the peddle frequently.  Able to progress strengthening with resistance with standing exercises.  Manual attempted to address pain with mild results.  Initiated balance exercises    Pt would benefit from skilled physical therapy intervention to improve above impairments and facilitate return to function.    Pain at the end of session:  \"some\"    Complexity of Evaluation:   high  Based on the history including personal factors and/or comorbidities, examination of body systems including body structures and function, activity limitations, and/or participation restrictions, as well " as clinical presentation, patient meets criteria for a high complexity evaluation.      Rehab potential:  Good to improve     GOALS:  Pt stated goal:  Improve flexibility, strength, balance, and coordination    Short term goals:  + Increase strength by 1/3 grade (met)  + Increase ankle df  ROM by 5  degrees (TBA)  + Reduce pain by 2 points (not met)  + Independent in a basic HEP (met)  + able to stand 10 seconds without UE support (met)  + assess balance on Tinetti  Perform  ocular motor testing    Long term goals:  +   Improve ROM of hip and knee extension to 0       +   Improve ankle dorsiflexion ROM to 0  +   Improve LE strength to 5/5 as needed for transfers and gait  +   5/5 ankle strength as needed for balance and postural sway  +   Improve balance by 5 points  on the Tinetti Balance scale as needed for reduced fall risk  +   Ambulate with on level surfaces  with Rolator with increased foot clearance and strep length and no loss of balance.  +   Ascend and descend curb steps with SBA and  assistance of device with good control for safety in the community.  +  Reduce reported dizziness  with position changes  +   Independent  with son in a HEP for self-management of symptoms and continued safety     PLAN   Add  balance activities as tolerated  Recumbent bike as tolerated    Skilled physical therapy 2 times per week for 12 weeks  Treatment may include:  Therapeutic Exercise (53705)  Therapeutic Activity (19302)  Manual therapy (97537)  Neuro-muscular re-education (29633)  Gait Training (76089)  Ultrasound (77919)  Unattended Electrical Stimulation (/12316)  Light therapy (49064)      The pt agreed with above stated treatment plan and general goals.

## 2025-06-17 ENCOUNTER — TREATMENT (OUTPATIENT)
Dept: PHYSICAL THERAPY | Facility: CLINIC | Age: OVER 89
End: 2025-06-17
Payer: MEDICARE
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
## 2025-06-17 DIAGNOSIS — R26.81 UNSTEADY GAIT: ICD-10-CM

## 2025-06-17 DIAGNOSIS — Z09 HOSPITAL DISCHARGE FOLLOW-UP: ICD-10-CM

## 2025-06-17 PROCEDURE — 97110 THERAPEUTIC EXERCISES: CPT | Mod: GP | Performed by: PHYSICAL THERAPIST

## 2025-06-17 NOTE — PROGRESS NOTES
Physical TherapyTreatment   Patient Name: Xiomy Rubio  MRN: [id number]  Date: 6/17/2025  Time Calculation  Start Time: 1300  Stop Time: 1345  Time Calculation (min): 45 min       PT Therapeutic Procedures Time Entry  Therapeutic Exercise Time Entry: 43    INSURANCE:  Visit Number: 12  Reassessment on visit 9 on 5/28/25  Insurance Type: Payor: MEDICARE / Plan: MEDICARE PART A AND B / Product Type: *No Product type* / NO AUTH / $257 DEDUCT not met / $0 used 2025 PT/ST / [place] VISITS / Per RTE.  2)  [employer] - NO AUTH / $1000 OOP MET, 100% COVERAGE after [facility] payment / $100,000 max coverage - $77457.17 used / 12V soft max / 0 used     CMS Re-Certification Period: 4/23/25 to 7/22/25    CURRENT PROBLEMS:   1. Unsteady gait  Follow Up In Physical Therapy      2. Fall, initial encounter  Referral to Physical Therapy    Follow Up In Physical Therapy      3. Hospital discharge follow-up  Referral to Physical Therapy    Follow Up In Physical Therapy          PRECAUTIONS:  Fall risk,   PMH: (pertinent to PT evaluation)  Hearing loss, hip arthritis, hip pain, osteoporosis with pathological fracture, fractured right patella, carpal tunnel syndrome, thoracic spine fracture, numbness, closed head injury, hypoxia, fatigue  *See Epic EMR for complete list.      Medical History Form: Reviewed (scanned into chart)  B hearing loss,     SPECIAL CONCERNS:   [place]  She gets dizzy when she gets out of bed.   Dizzy when she rolls to the R.      HPI:    The pt got up in the middle of the night for snack and after the snack fell asleep in her chair.  She then fell out of the chair with multiple injuries.  She was in the hospital for 3-4 days for trauma.  Discharged to a rehab center .  She sustained another fall and went to the hospital.  Where she was found to have COVID (mild). She then went back to rehab on isolation.  In rehab 5-6 weeks in total.  Home care was for 2 weeks due to her doing well.      She had a fall last week and is bruised up  when her foot got tangled in the rollator when she was turning. The R hip hurts when she is walking.  She now has significant bruising on her back.      Her knees bother some times.  She gets tired.    She has been using a 4WW full time.      Per her son, she is here to get stronger and work on balance   (This is the second time getting home care and has kept doing exercises since.)    PRIOR LEVEL OF FUNCTION  Walks with a  4 wheeled Rolator    SUBJECTIVE:   The pt''s sone reprots that she fell again when she got up at night.  She is very anxious and frustrerated.  She has to try 3-4 times to get  up in the morning.  She has not had a bowel movement in 4-5 days.  She has medicine for that, but hasn't taken it.      Sometimes she has to read exercises many times before she understands them    PAIN:    Current:   a little in her R hip      OBJECTIVE:  Enters PT with 4 WW with seat  O2  95%  Pulse:  74bpm    Lower Extremity                       TREATMENTS:  Therapeutic exercise  Recumbent bike-6 minutes with rests (CG assist to transfer onto bike and off of)  *seated hamstring stretch   2x30 seconds bilateral  *Seated gastroc stretch with strap - DNP    Standing   In parallel bars  March in place 1x5  with 0# each  Hip abduction 1x5  with 0# each   Hip extension 1x5  with 0# each      Seated LE exercise for strengthening   LAQ 1x5 with 0 #  Marching 1 x5 with 0#  Hamstring curl   with dk blue TB  1 x10 each - DNP  Abduction with  green theraband  x10  ball squeeze  x10  ankle df  x10    Gait in and out and through department   CGA needed with transfers and gait in therapy.    Transfers:  CG/Min A with multiple trials and verbal cueing     =============== DNP===============================    Bed mobility:  Min A to guide    Standing on compliant surface with UE support   Static   Sways    Step in place    Supine exercise for LE strengthening: (guided)  +SAQ 1 x10 with 0 # each  + heel slide 1x10 each  +SLR 1x10 with  "0# each  + hip abduction 1x10 each -dnp  + hook lying ER x10 each  + hamstring stretch 1x10  ============================================================    EDUCATION:  NEW  Discussed with PT and son:  Issued exercise copies in a book form with only one exercises per page, reduced to 1-2 word instructions, and large/bold print  Recommendations to take medication for bowel movements as instructed by doctor  Deep breathing when feeling anxious    PRIOR   current status, general goals, treatment plan   safety with use of rolator  Exercise without causing pain - if painful use smaller motion.  Given stp-light analogy  Orthostatic hypotension and safety practices when first sitting or standing  Standing periodically to reduce tightness  Stretching without pain  Progress with added resistance  Reviewed improvements with pt and son  If standing on the R hurts, then only do standing exercises with R LE   Use heat to address pain  Incread exercise today  Do not stand up if dizzy  Importance to keep moving, but be safe  If having and \"off\" today do exercises in sitting  If having a \"good\" day can do exercises in standing with upper extremity support  Today's performance.   Biomechanics of tight hamstrings and gastrocs as they relate to retropulsion in standing  Progress and deficits  Below plan  Improvements noted today  May use heat (15 minutes  Use meds as allowed by doctor to address pain as needed  Keep moving to not stiffen up    Home Exercise program:  Issued :  standing hip flexion, extnesion, and abduction standing at the sink   sitting LAQ, marching, adduction, abduction with band, and df.      ASSESSMENT:     The patient was highly anxious and disturbed today.  She states she is frustrated and does not know what to do she is tired of not being able to walk well.  Due to reports of difficulty understanding exercises today focused on the issuing home exercise program and simplified form including large pictures and " "bold 1-2 word instructions.  She noted improved understanding of these exercises.  She required more assistance for safety and walking in the department.  She also had limited exercise tolerance but was able to attempt each exercise to show comprehension.  Summary of session was discussed with patient's son who reported understanding and agreement.    Pt would benefit from skilled physical therapy intervention to improve above impairments and facilitate return to function.    Pain at the end of session:  \"some\"    Complexity of Evaluation:   high  Based on the history including personal factors and/or comorbidities, examination of body systems including body structures and function, activity limitations, and/or participation restrictions, as well as clinical presentation, patient meets criteria for a high complexity evaluation.      Rehab potential:  Good to improve     GOALS:  Pt stated goal:  Improve flexibility, strength, balance, and coordination    Short term goals:  + Increase strength by 1/3 grade (met)  + Increase ankle df  ROM by 5  degrees (TBA)  + Reduce pain by 2 points (not met)  + Independent in a basic HEP (met)  + able to stand 10 seconds without UE support (met)  + assess balance on Tinetti  Perform  ocular motor testing    Long term goals:  +   Improve ROM of hip and knee extension to 0       +   Improve ankle dorsiflexion ROM to 0  +   Improve LE strength to 5/5 as needed for transfers and gait  +   5/5 ankle strength as needed for balance and postural sway  +   Improve balance by 5 points  on the Tinetti Balance scale as needed for reduced fall risk  +   Ambulate with on level surfaces  with Rolator with increased foot clearance and strep length and no loss of balance.  +   Ascend and descend curb steps with SBA and  assistance of device with good control for safety in the community.  +  Reduce reported dizziness  with position changes  +   Independent  with son in a HEP for self-management of " symptoms and continued safety     PLAN   Add  balance activities as tolerated  Recumbent bike as tolerated    Skilled physical therapy 2 times per week for 12 weeks  Treatment may include:  Therapeutic Exercise (64880)  Therapeutic Activity (23643)  Manual therapy (56483)  Neuro-muscular re-education (23962)  Gait Training (44453)  Ultrasound (49151)  Unattended Electrical Stimulation (/24543)  Light therapy (90665)      The pt agreed with above stated treatment plan and general goals.

## 2025-06-19 ENCOUNTER — TREATMENT (OUTPATIENT)
Dept: PHYSICAL THERAPY | Facility: CLINIC | Age: OVER 89
End: 2025-06-19
Payer: MEDICARE

## 2025-06-19 DIAGNOSIS — R26.81 UNSTEADY GAIT: ICD-10-CM

## 2025-06-19 DIAGNOSIS — Z09 HOSPITAL DISCHARGE FOLLOW-UP: ICD-10-CM
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
## 2025-06-19 PROCEDURE — 97110 THERAPEUTIC EXERCISES: CPT | Mod: GP | Performed by: PHYSICAL THERAPIST

## 2025-06-19 PROCEDURE — 97530 THERAPEUTIC ACTIVITIES: CPT | Mod: GP | Performed by: PHYSICAL THERAPIST

## 2025-06-19 NOTE — PROGRESS NOTES
Physical TherapyTreatment   Patient Name: Xiomy Rubio  MRN: [id number]  Date: 6/19/2025  Time Calculation  Start Time: 1430  Stop Time: 1520  Time Calculation (min): 50 min       PT Therapeutic Procedures Time Entry  Therapeutic Exercise Time Entry: 15  Therapeutic Activity Time Entry: 25    INSURANCE:  Visit Number: 13  Reassessment on visit 9 on 5/28/25  Insurance Type: Payor: MEDICARE / Plan: MEDICARE PART A AND B / Product Type: *No Product type* / NO AUTH / $257 DEDUCT not met / $0 used 2025 PT/ST / [place] VISITS / Per RTE.  2)  [employer] - NO AUTH / $1000 OOP MET, 100% COVERAGE after [facility] payment / $100,000 max coverage - $90047.17 used / 12V soft max / 0 used     CMS Re-Certification Period: 4/23/25 to 7/22/25    CURRENT PROBLEMS:   1. Unsteady gait  Follow Up In Physical Therapy      2. Fall, initial encounter  Referral to Physical Therapy    Follow Up In Physical Therapy      3. Hospital discharge follow-up  Referral to Physical Therapy    Follow Up In Physical Therapy          PRECAUTIONS:  Fall risk,   PMH: (pertinent to PT evaluation)  Hearing loss, hip arthritis, hip pain, osteoporosis with pathological fracture, fractured right patella, carpal tunnel syndrome, thoracic spine fracture, numbness, closed head injury, hypoxia, fatigue  *See Epic EMR for complete list.      Medical History Form: Reviewed (scanned into chart)  B hearing loss,     SPECIAL CONCERNS:   [place]  She gets dizzy when she gets out of bed.   Dizzy when she rolls to the R.      HPI:    The pt got up in the middle of the night for snack and after the snack fell asleep in her chair.  She then fell out of the chair with multiple injuries.  She was in the hospital for 3-4 days for trauma.  Discharged to a rehab center .  She sustained another fall and went to the hospital.  Where she was found to have COVID (mild). She then went back to rehab on isolation.  In rehab 5-6 weeks in total.  Home care was for 2 weeks due to her doing well.      She  had a fall last week and is bruised up when her foot got tangled in the rollator when she was turning. The R hip hurts when she is walking.  She now has significant bruising on her back.      Her knees bother some times.  She gets tired.    She has been using a 4WW full time.      Per her son, she is here to get stronger and work on balance   (This is the second time getting home care and has kept doing exercises since.)    PRIOR LEVEL OF FUNCTION  Walks with a  4 wheeled Rolator    SUBJECTIVE:   The pt's son reports that she is having a bad day and aggivtated.  He considered not coming, but didn't want to miss.  The pt states that she hurts in her back and hips...she  doesn't know what to do.  Near tearful at times.  She states she gets angry with herself for falling the other night and not being careful and waking her son up.      PAIN:    Current:   B hips and back      OBJECTIVE:  Enters PT with 4 WW with seat    Lower Extremity                       TREATMENTS:  Therapeutic exercise  Recumbent bike-6 minutes with rests (CG assist to transfer onto bike and off of)  - DNP          Seated LE exercise for strengthening   LAQ 1x10 with 0 #  Marching 1 x10 with 0#  Hamstring curl   with dk blue TB  1 x10 each - DNP  Abduction with  green theraband  x10  ball squeeze  x10  ankle df  x10    Gait in and out and through department   CGA needed with transfers and gait in therapy.    Transfers:  CG/Min A with multiple trials and verbal cueing     =============== HEP==============================  *seated hamstring stretch   2x30 seconds bilateral  *Seated gastroc stretch with strap     Standing   March in place  Hip abduction   Hip extension    Seated:  LAQ  Marching   Abduction with  green theraband   ball squeeze    ankle df   ============================================================    Therapeutic activity:  Extensive education with pt to on deep breathing and relaxation techniques, reassurance to reduce anxiety, and  "encouraging gentle motion in pain-free range.  This was combined with moist heat and light manual to shoulders, back and upper leg to encourage reduced stress to allow pain reduction and exercises    EDUCATION:  NEW  Discussed with PT and son:  Deep breathing when feeling anxious  Brochure given for [facility] on Aging.    Discussed possible services to look into including a night time sitter, bedside commode, counseling services  Keep moving to not stiffen up    PRIOR   current status, general goals, treatment plan   safety with use of rolator  Exercise without causing pain - if painful use smaller motion.  Given stp-light analogy  Orthostatic hypotension and safety practices when first sitting or standing  Standing periodically to reduce tightness  Stretching without pain  Progress with added resistance  Reviewed improvements with pt and son  If standing on the R hurts, then only do standing exercises with R LE   Use heat to address pain  Incread exercise today  Do not stand up if dizzy  Importance to keep moving, but be safe  If having and \"off\" today do exercises in sitting  If having a \"good\" day can do exercises in standing with upper extremity support  Today's performance.   Biomechanics of tight hamstrings and gastrocs as they relate to retropulsion in standing  Progress and deficits  Below plan  Improvements noted today  May use heat (15 minutes  Use meds as allowed by doctor to address pain as needed  Keep moving to not stiffen up  Issued exercise copies in a book form with only one exercises per page, reduced to 1-2 word instructions, and large/bold print  Recommendations to take medication for bowel movements as instructed by doctor  Deep breathing when feeling anxious    Home Exercise program:  Issued :  standing hip flexion, extnesion, and abduction standing at the sink   sitting LAQ, marching, adduction, abduction with band, and df.      ASSESSMENT:     The patient was highly anxious and " "disturbed today.  She is concerned with waking her son at night, but that is when she falls.  She may benefit from either a sitter at night or a bedside commode to reduce fall risk.  Resource given with recommendations.  She was able to relax off and on in therapy and perform some exercises.    Functional decline noted since last fall.    Pt would benefit from skilled physical therapy intervention to improve above impairments and facilitate return to function.    Pain at the end of session:  \"some\"    Complexity of Evaluation:   high  Based on the history including personal factors and/or comorbidities, examination of body systems including body structures and function, activity limitations, and/or participation restrictions, as well as clinical presentation, patient meets criteria for a high complexity evaluation.      Rehab potential:  Good to improve     GOALS:  Pt stated goal:  Improve flexibility, strength, balance, and coordination    Short term goals:  + Increase strength by 1/3 grade (met)  + Increase ankle df  ROM by 5  degrees (TBA)  + Reduce pain by 2 points (not met)  + Independent in a basic HEP (met)  + able to stand 10 seconds without UE support (met)  + assess balance on Tinetti  Perform  ocular motor testing    Long term goals:  +   Improve ROM of hip and knee extension to 0       +   Improve ankle dorsiflexion ROM to 0  +   Improve LE strength to 5/5 as needed for transfers and gait  +   5/5 ankle strength as needed for balance and postural sway  +   Improve balance by 5 points  on the Tinetti Balance scale as needed for reduced fall risk  +   Ambulate with on level surfaces  with Rolator with increased foot clearance and strep length and no loss of balance.  +   Ascend and descend curb steps with SBA and  assistance of device with good control for safety in the community.  +  Reduce reported dizziness  with position changes  +   Independent  with son in a HEP for self-management of symptoms and " continued safety     PLAN   Add  balance activities as tolerated  Recumbent bike as tolerated    Skilled physical therapy 2 times per week for 12 weeks  Treatment may include:  Therapeutic Exercise (87205)  Therapeutic Activity (95817)  Manual therapy (61984)  Neuro-muscular re-education (80574)  Gait Training (71121)  Ultrasound (32663)  Unattended Electrical Stimulation (/79734)  Light therapy (66021)      The pt agreed with above stated treatment plan and general goals.

## 2025-06-24 ENCOUNTER — APPOINTMENT (OUTPATIENT)
Dept: PHYSICAL THERAPY | Facility: CLINIC | Age: OVER 89
End: 2025-06-24
Payer: MEDICARE

## 2025-06-26 ENCOUNTER — TREATMENT (OUTPATIENT)
Dept: PHYSICAL THERAPY | Facility: CLINIC | Age: OVER 89
End: 2025-06-26
Payer: MEDICARE

## 2025-06-26 DIAGNOSIS — Z09 HOSPITAL DISCHARGE FOLLOW-UP: ICD-10-CM
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
## 2025-06-26 DIAGNOSIS — R26.81 UNSTEADY GAIT: ICD-10-CM

## 2025-06-26 NOTE — PROGRESS NOTES
Physical Therapy Treatment   Patient Name: Xiomy Rubio  MRN: [id number]  Date: 6/26/2025               INSURANCE:  Visit Number: 14  Reassessment on visit 9 on 5/28/25  Insurance Type: Payor: MEDICARE / Plan: MEDICARE PART A AND B / Product Type: *No Product type* / NO AUTH / $257 DEDUCT not met / $0 used 2025 PT/ST / [place]IS[place] / Per RTE.  2)  [employer] - NO AUTH / $1000 OOP MET, 100% COVERAGE after [facility] payment / $100,000 max coverage - $76829.17 used / 12V soft max / 0 used     CMS Re-Certification Period: 4/23/25 to 7/22/25    CURRENT PROBLEMS:   1. Unsteady gait  Follow Up In Physical Therapy      2. Fall, initial encounter  Referral to Physical Therapy    Follow Up In Physical Therapy      3. Hospital discharge follow-up  Referral to Physical Therapy    Follow Up In Physical Therapy          PRECAUTIONS:  Fall risk,   PMH: (pertinent to PT evaluation)  Hearing loss, hip arthritis, hip pain, osteoporosis with pathological fracture, fractured right patella, carpal tunnel syndrome, thoracic spine fracture, numbness, closed head injury, hypoxia, fatigue  *See Epic EMR for complete list.      Medical History Form: Reviewed (scanned into chart)  B hearing loss,     SPECIAL CONCERNS:   [place]  She gets dizzy when she gets out of bed.   Dizzy when she rolls to the R.      HPI:    The pt got up in the middle of the night for snack and after the snack fell asleep in her chair.  She then fell out of the chair with multiple injuries.  She was in the hospital for 3-4 days for trauma.  Discharged to a rehab center .  She sustained another fall and went to the hospital.  Where she was found to have COVID (mild). She then went back to rehab on isolation.  In rehab 5-6 weeks in total.  Home care was for 2 weeks due to her doing well.      She had a fall last week and is bruised up when her foot got tangled in the rollator when she was turning. The R hip hurts when she is walking.  She now has significant bruising on her back.       "Her knees bother some times.  She gets tired.    She has been using a 4WW full time.      Per her son, she is here to get stronger and work on balance   (This is the second time getting home care and has kept doing exercises since.)    PRIOR LEVEL OF FUNCTION  Walks with a  4 wheeled Rolator    SUBJECTIVE:      Pt very anxious this date from beginning of session , PTA met in hallway prior to entering   dept., crying out \"it hurts\" and \" I can't do this\" , Son escorted pt to dept .     Pt sat at bedside  crying out in discomfort  B hips and LBP ,  with other PT assist transferred pt to chair with arms and back rest  This PTA sat with pt. In an attempt to ease anxiety aprox 20 min ( Cervical HP placed across hips x 10 min )  During this time pt stating multiple times \"dont send me home\",       and PT spoke with pt's son , PT and PTA discusses calling 911 due to pt's pain and anxiety , son refused any admittance to hospital     During this time PTA stayed with pt. To ease anxiety levels for comfort and safety    PT and PTA with Son's help transferred sit to stand , using rollator , amb from dept to building entrance , with close W/C follow , Pt. yelling in discomfort , fearful of falling , and very anxious .    PT and PTA sat pt in w/c as Son positioned car for a better transfer , PT  and son transferred back into car       Time in dept: 12:30 to 1:45-----No charge this date since pt. was not able to participate in Therapy                The pt's son reports that she is having a bad day and aggivtated.  He considered not coming, but didn't want to miss.  The pt states that she hurts in her back and hips...she  doesn't know what to do.  Near tearful at times.  She states she gets angry with herself for falling the other night and not being careful and waking her son up.      PAIN:    Current:   B hips and back      OBJECTIVE:  Enters PT with 4 WW with seat    Lower Extremity                     "   TREATMENTS:  Therapeutic exercise-----------------DNP---------------------------  Recumbent bike-6 minutes with rests (CG assist to transfer onto bike and off of)  - DNP          Seated LE exercise for strengthening   LAQ 1x10 with 0 #  Marching 1 x10 with 0#  Hamstring curl   with dk blue TB  1 x10 each - DNP  Abduction with  green theraband  x10  ball squeeze  x10  ankle df  x10    Gait in and out and through department   CGA needed with transfers and gait in therapy.    Transfers:  CG/Min A with multiple trials and verbal cueing     =============== HEP==============================  *seated hamstring stretch   2x30 seconds bilateral  *Seated gastroc stretch with strap     Standing   March in place  Hip abduction   Hip extension    Seated:  LAQ  Marching   Abduction with  green theraband   ball squeeze    ankle df   ============================================================    Therapeutic activity:  Extensive education with pt to on deep breathing and relaxation techniques, reassurance to reduce anxiety, and encouraging gentle motion in pain-free range.  This was combined with moist heat and light manual to shoulders, back and upper leg to encourage reduced stress to allow pain reduction and exercises    EDUCATION:  NEW  Discussed with PT and son:  Deep breathing when feeling anxious  Brochure given for [facility] on [facility].    Discussed possible services to look into including a night time sitter, bedside commode, counseling services  Keep moving to not stiffen up    PRIOR   current status, general goals, treatment plan   safety with use of rolator  Exercise without causing pain - if painful use smaller motion.  Given stp-light analogy  Orthostatic hypotension and safety practices when first sitting or standing  Standing periodically to reduce tightness  Stretching without pain  Progress with added resistance  Reviewed improvements with pt and son  If standing on the R hurts, then only do standing  "exercises with R LE   Use heat to address pain  Incread exercise today  Do not stand up if dizzy  Importance to keep moving, but be safe  If having and \"off\" today do exercises in sitting  If having a \"good\" day can do exercises in standing with upper extremity support  Today's performance.   Biomechanics of tight hamstrings and gastrocs as they relate to retropulsion in standing  Progress and deficits  Below plan  Improvements noted today  May use heat (15 minutes  Use meds as allowed by doctor to address pain as needed  Keep moving to not stiffen up  Issued exercise copies in a book form with only one exercises per page, reduced to 1-2 word instructions, and large/bold print  Recommendations to take medication for bowel movements as instructed by doctor  Deep breathing when feeling anxious    Home Exercise program:  Issued :  standing hip flexion, extnesion, and abduction standing at the sink   sitting LAQ, marching, adduction, abduction with band, and df.      ASSESSMENT:   see \"Subjective \"with this sessions  events      The patient was highly anxious and disturbed today.  She is concerned with waking her son at night, but that is when she falls.  She may benefit from either a sitter at night or a bedside commode to reduce fall risk.  Resource given with recommendations.  She was able to relax off and on in therapy and perform some exercises.    Functional decline noted since last fall.    Pt would benefit from skilled physical therapy intervention to improve above impairments and facilitate return to function.    Pain at the end of session:  \"some\"    Complexity of Evaluation:   high  Based on the history including personal factors and/or comorbidities, examination of body systems including body structures and function, activity limitations, and/or participation restrictions, as well as clinical presentation, patient meets criteria for a high complexity evaluation.      Rehab potential:  Good to improve "     GOALS:  Pt stated goal:  Improve flexibility, strength, balance, and coordination    Short term goals:  + Increase strength by 1/3 grade (met)  + Increase ankle df  ROM by 5  degrees (TBA)  + Reduce pain by 2 points (not met)  + Independent in a basic HEP (met)  + able to stand 10 seconds without UE support (met)  + assess balance on Tinetti  Perform  ocular motor testing    Long term goals:  +   Improve ROM of hip and knee extension to 0       +   Improve ankle dorsiflexion ROM to 0  +   Improve LE strength to 5/5 as needed for transfers and gait  +   5/5 ankle strength as needed for balance and postural sway  +   Improve balance by 5 points  on the Tinetti Balance scale as needed for reduced fall risk  +   Ambulate with on level surfaces  with Rolator with increased foot clearance and strep length and no loss of balance.  +   Ascend and descend curb steps with SBA and  assistance of device with good control for safety in the community.  +  Reduce reported dizziness  with position changes  +   Independent  with son in a HEP for self-management of symptoms and continued safety     PLAN   Add  balance activities as tolerated  Recumbent bike as tolerated    Skilled physical therapy 2 times per week for 12 weeks  Treatment may include:  Therapeutic Exercise (73706)  Therapeutic Activity (04748)  Manual therapy (83463)  Neuro-muscular re-education (18889)  Gait Training (94150)  Ultrasound (07673)  Unattended Electrical Stimulation (/13158)  Light therapy (41303)      The pt agreed with above stated treatment plan and general goals.

## 2025-06-30 ENCOUNTER — APPOINTMENT (OUTPATIENT)
Dept: RADIOLOGY | Facility: HOSPITAL | Age: OVER 89
End: 2025-06-30
Payer: MEDICARE

## 2025-06-30 ENCOUNTER — HOSPITAL ENCOUNTER (INPATIENT)
Facility: HOSPITAL | Age: OVER 89
LOS: 3 days | Discharge: NURSING, REHAB OR LONG-TERM CARE | End: 2025-07-04
Attending: STUDENT IN AN ORGANIZED HEALTH CARE EDUCATION/TRAINING PROGRAM | Admitting: INTERNAL MEDICINE
Payer: MEDICARE

## 2025-06-30 ENCOUNTER — APPOINTMENT (OUTPATIENT)
Dept: PRIMARY CARE | Facility: CLINIC | Age: OVER 89
End: 2025-06-30
Payer: MEDICARE

## 2025-06-30 ENCOUNTER — APPOINTMENT (OUTPATIENT)
Dept: CARDIOLOGY | Facility: HOSPITAL | Age: OVER 89
End: 2025-06-30
Payer: MEDICARE

## 2025-06-30 DIAGNOSIS — F41.9 ANXIETY: ICD-10-CM

## 2025-06-30 DIAGNOSIS — F51.01 PRIMARY INSOMNIA: ICD-10-CM

## 2025-06-30 DIAGNOSIS — S09.90XA CLOSED HEAD INJURY, INITIAL ENCOUNTER: Primary | ICD-10-CM

## 2025-06-30 DIAGNOSIS — I10 PRIMARY HYPERTENSION: ICD-10-CM

## 2025-06-30 DIAGNOSIS — S22.42XA CLOSED FRACTURE OF MULTIPLE RIBS OF LEFT SIDE, INITIAL ENCOUNTER: ICD-10-CM

## 2025-06-30 DIAGNOSIS — J18.9 PNEUMONIA DUE TO INFECTIOUS ORGANISM, UNSPECIFIED LATERALITY, UNSPECIFIED PART OF LUNG: ICD-10-CM

## 2025-06-30 DIAGNOSIS — S02.2XXA CLOSED FRACTURE OF NASAL BONE, INITIAL ENCOUNTER: ICD-10-CM

## 2025-06-30 DIAGNOSIS — J18.9 PNEUMONIA OF BOTH LOWER LOBES DUE TO INFECTIOUS ORGANISM: ICD-10-CM

## 2025-06-30 LAB
ALBUMIN SERPL BCP-MCNC: 3.9 G/DL (ref 3.4–5)
ALP SERPL-CCNC: 91 U/L (ref 33–136)
ALT SERPL W P-5'-P-CCNC: 13 U/L (ref 7–45)
ANION GAP SERPL CALCULATED.3IONS-SCNC: 16 MMOL/L
APPEARANCE UR: ABNORMAL
AST SERPL W P-5'-P-CCNC: 14 U/L (ref 9–39)
ATRIAL RATE: 82 BPM
BASOPHILS # BLD AUTO: 0.05 X10*3/UL (ref 0–0.1)
BASOPHILS NFR BLD AUTO: 0.5 %
BILIRUB SERPL-MCNC: 1.4 MG/DL (ref 0–1.2)
BILIRUB UR STRIP.AUTO-MCNC: NEGATIVE MG/DL
BUN SERPL-MCNC: 18 MG/DL (ref 6–23)
CALCIUM SERPL-MCNC: 9.1 MG/DL (ref 8.6–10.3)
CARDIAC TROPONIN I PNL SERPL HS: 10 NG/L (ref 0–13)
CARDIAC TROPONIN I PNL SERPL HS: 12 NG/L (ref 0–13)
CHLORIDE SERPL-SCNC: 97 MMOL/L (ref 98–107)
CO2 SERPL-SCNC: 26 MMOL/L (ref 21–32)
COLOR UR: ABNORMAL
CREAT SERPL-MCNC: 0.65 MG/DL (ref 0.5–1.05)
EGFRCR SERPLBLD CKD-EPI 2021: 82 ML/MIN/1.73M*2
EOSINOPHIL # BLD AUTO: 0.02 X10*3/UL (ref 0–0.4)
EOSINOPHIL NFR BLD AUTO: 0.2 %
ERYTHROCYTE [DISTWIDTH] IN BLOOD BY AUTOMATED COUNT: 13.8 % (ref 11.5–14.5)
GLUCOSE SERPL-MCNC: 148 MG/DL (ref 74–99)
GLUCOSE UR STRIP.AUTO-MCNC: NORMAL MG/DL
HCT VFR BLD AUTO: 43.1 % (ref 36–46)
HGB BLD-MCNC: 14 G/DL (ref 12–16)
IMM GRANULOCYTES # BLD AUTO: 0.13 X10*3/UL (ref 0–0.5)
IMM GRANULOCYTES NFR BLD AUTO: 1.4 % (ref 0–0.9)
KETONES UR STRIP.AUTO-MCNC: NEGATIVE MG/DL
LEUKOCYTE ESTERASE UR QL STRIP.AUTO: NEGATIVE
LIPASE SERPL-CCNC: NORMAL U/L
LYMPHOCYTES # BLD AUTO: 0.84 X10*3/UL (ref 0.8–3)
LYMPHOCYTES NFR BLD AUTO: 8.9 %
MAGNESIUM SERPL-MCNC: 1.93 MG/DL (ref 1.6–2.4)
MCH RBC QN AUTO: 27.5 PG (ref 26–34)
MCHC RBC AUTO-ENTMCNC: 32.5 G/DL (ref 32–36)
MCV RBC AUTO: 85 FL (ref 80–100)
MONOCYTES # BLD AUTO: 0.67 X10*3/UL (ref 0.05–0.8)
MONOCYTES NFR BLD AUTO: 7.1 %
NEUTROPHILS # BLD AUTO: 7.69 X10*3/UL (ref 1.6–5.5)
NEUTROPHILS NFR BLD AUTO: 81.9 %
NITRITE UR QL STRIP.AUTO: NEGATIVE
NRBC BLD-RTO: 0 /100 WBCS (ref 0–0)
P AXIS: -7 DEGREES
P OFFSET: 200 MS
P ONSET: 142 MS
PH UR STRIP.AUTO: 7.5 [PH]
PLATELET # BLD AUTO: 304 X10*3/UL (ref 150–450)
POTASSIUM SERPL-SCNC: 3 MMOL/L (ref 3.5–5.3)
PR INTERVAL: 160 MS
PROT SERPL-MCNC: 6.4 G/DL (ref 6.4–8.2)
PROT UR STRIP.AUTO-MCNC: NEGATIVE MG/DL
Q ONSET: 222 MS
QRS COUNT: 14 BEATS
QRS DURATION: 94 MS
QT INTERVAL: 418 MS
QTC CALCULATION(BAZETT): 488 MS
QTC FREDERICIA: 463 MS
R AXIS: -19 DEGREES
RBC # BLD AUTO: 5.09 X10*6/UL (ref 4–5.2)
RBC # UR STRIP.AUTO: NEGATIVE MG/DL
SODIUM SERPL-SCNC: 136 MMOL/L (ref 136–145)
SP GR UR STRIP.AUTO: 1.01
T AXIS: 80 DEGREES
T OFFSET: 431 MS
UROBILINOGEN UR STRIP.AUTO-MCNC: NORMAL MG/DL
VENTRICULAR RATE: 82 BPM
WBC # BLD AUTO: 9.4 X10*3/UL (ref 4.4–11.3)

## 2025-06-30 PROCEDURE — G0378 HOSPITAL OBSERVATION PER HR: HCPCS

## 2025-06-30 PROCEDURE — 2500000004 HC RX 250 GENERAL PHARMACY W/ HCPCS (ALT 636 FOR OP/ED): Performed by: INTERNAL MEDICINE

## 2025-06-30 PROCEDURE — 36415 COLL VENOUS BLD VENIPUNCTURE: CPT | Performed by: PHYSICIAN ASSISTANT

## 2025-06-30 PROCEDURE — 85025 COMPLETE CBC W/AUTO DIFF WBC: CPT | Performed by: PHYSICIAN ASSISTANT

## 2025-06-30 PROCEDURE — 81003 URINALYSIS AUTO W/O SCOPE: CPT | Performed by: PHYSICIAN ASSISTANT

## 2025-06-30 PROCEDURE — 71045 X-RAY EXAM CHEST 1 VIEW: CPT | Performed by: RADIOLOGY

## 2025-06-30 PROCEDURE — 73502 X-RAY EXAM HIP UNI 2-3 VIEWS: CPT | Mod: LT

## 2025-06-30 PROCEDURE — 96375 TX/PRO/DX INJ NEW DRUG ADDON: CPT

## 2025-06-30 PROCEDURE — 96365 THER/PROPH/DIAG IV INF INIT: CPT | Mod: 59

## 2025-06-30 PROCEDURE — 84484 ASSAY OF TROPONIN QUANT: CPT | Performed by: PHYSICIAN ASSISTANT

## 2025-06-30 PROCEDURE — 2500000001 HC RX 250 WO HCPCS SELF ADMINISTERED DRUGS (ALT 637 FOR MEDICARE OP): Performed by: PHYSICIAN ASSISTANT

## 2025-06-30 PROCEDURE — 80053 COMPREHEN METABOLIC PANEL: CPT | Performed by: PHYSICIAN ASSISTANT

## 2025-06-30 PROCEDURE — 96361 HYDRATE IV INFUSION ADD-ON: CPT

## 2025-06-30 PROCEDURE — 73502 X-RAY EXAM HIP UNI 2-3 VIEWS: CPT | Mod: LEFT SIDE | Performed by: STUDENT IN AN ORGANIZED HEALTH CARE EDUCATION/TRAINING PROGRAM

## 2025-06-30 PROCEDURE — 83690 ASSAY OF LIPASE: CPT | Performed by: PHYSICIAN ASSISTANT

## 2025-06-30 PROCEDURE — 70450 CT HEAD/BRAIN W/O DYE: CPT | Performed by: RADIOLOGY

## 2025-06-30 PROCEDURE — 83735 ASSAY OF MAGNESIUM: CPT | Performed by: PHYSICIAN ASSISTANT

## 2025-06-30 PROCEDURE — 93005 ELECTROCARDIOGRAM TRACING: CPT

## 2025-06-30 PROCEDURE — 72125 CT NECK SPINE W/O DYE: CPT | Performed by: RADIOLOGY

## 2025-06-30 PROCEDURE — 70450 CT HEAD/BRAIN W/O DYE: CPT

## 2025-06-30 PROCEDURE — 99285 EMERGENCY DEPT VISIT HI MDM: CPT | Mod: 25 | Performed by: STUDENT IN AN ORGANIZED HEALTH CARE EDUCATION/TRAINING PROGRAM

## 2025-06-30 PROCEDURE — 2500000004 HC RX 250 GENERAL PHARMACY W/ HCPCS (ALT 636 FOR OP/ED): Performed by: PHYSICIAN ASSISTANT

## 2025-06-30 PROCEDURE — 2500000001 HC RX 250 WO HCPCS SELF ADMINISTERED DRUGS (ALT 637 FOR MEDICARE OP): Performed by: INTERNAL MEDICINE

## 2025-06-30 PROCEDURE — 71045 X-RAY EXAM CHEST 1 VIEW: CPT

## 2025-06-30 PROCEDURE — 72125 CT NECK SPINE W/O DYE: CPT

## 2025-06-30 PROCEDURE — 96368 THER/DIAG CONCURRENT INF: CPT

## 2025-06-30 PROCEDURE — 96372 THER/PROPH/DIAG INJ SC/IM: CPT | Performed by: INTERNAL MEDICINE

## 2025-06-30 RX ORDER — ONDANSETRON 4 MG/1
4 TABLET, ORALLY DISINTEGRATING ORAL EVERY 8 HOURS PRN
Status: DISCONTINUED | OUTPATIENT
Start: 2025-06-30 | End: 2025-07-04 | Stop reason: HOSPADM

## 2025-06-30 RX ORDER — SERTRALINE HYDROCHLORIDE 50 MG/1
50 TABLET, FILM COATED ORAL DAILY
Status: DISCONTINUED | OUTPATIENT
Start: 2025-07-01 | End: 2025-07-04 | Stop reason: HOSPADM

## 2025-06-30 RX ORDER — LATANOPROST 50 UG/ML
1 SOLUTION/ DROPS OPHTHALMIC NIGHTLY
Status: DISCONTINUED | OUTPATIENT
Start: 2025-06-30 | End: 2025-07-04 | Stop reason: HOSPADM

## 2025-06-30 RX ORDER — PSYLLIUM HUSK 0.4 G
1 CAPSULE ORAL 2 TIMES DAILY
Status: DISCONTINUED | OUTPATIENT
Start: 2025-06-30 | End: 2025-07-04 | Stop reason: HOSPADM

## 2025-06-30 RX ORDER — AZITHROMYCIN 250 MG/1
250 TABLET, FILM COATED ORAL EVERY 24 HOURS
Status: DISCONTINUED | OUTPATIENT
Start: 2025-07-01 | End: 2025-07-04 | Stop reason: HOSPADM

## 2025-06-30 RX ORDER — ENOXAPARIN SODIUM 100 MG/ML
40 INJECTION SUBCUTANEOUS EVERY 24 HOURS
Status: DISCONTINUED | OUTPATIENT
Start: 2025-06-30 | End: 2025-07-04 | Stop reason: HOSPADM

## 2025-06-30 RX ORDER — GUAIFENESIN 600 MG/1
600 TABLET, EXTENDED RELEASE ORAL EVERY 12 HOURS PRN
Status: DISCONTINUED | OUTPATIENT
Start: 2025-06-30 | End: 2025-07-04 | Stop reason: HOSPADM

## 2025-06-30 RX ORDER — ACETAMINOPHEN 325 MG/1
650 TABLET ORAL DAILY PRN
Status: DISCONTINUED | OUTPATIENT
Start: 2025-06-30 | End: 2025-06-30 | Stop reason: SDUPTHER

## 2025-06-30 RX ORDER — IPRATROPIUM BROMIDE AND ALBUTEROL SULFATE 2.5; .5 MG/3ML; MG/3ML
3 SOLUTION RESPIRATORY (INHALATION) EVERY 6 HOURS PRN
Status: DISCONTINUED | OUTPATIENT
Start: 2025-06-30 | End: 2025-07-04 | Stop reason: HOSPADM

## 2025-06-30 RX ORDER — GUAIFENESIN 600 MG/1
600 TABLET, EXTENDED RELEASE ORAL 2 TIMES DAILY PRN
Status: DISCONTINUED | OUTPATIENT
Start: 2025-06-30 | End: 2025-07-04 | Stop reason: HOSPADM

## 2025-06-30 RX ORDER — NAPROXEN SODIUM 220 MG
440 TABLET ORAL DAILY PRN
Status: DISCONTINUED | OUTPATIENT
Start: 2025-06-30 | End: 2025-06-30 | Stop reason: CLARIF

## 2025-06-30 RX ORDER — CEFTRIAXONE 1 G/50ML
1 INJECTION, SOLUTION INTRAVENOUS ONCE
Status: COMPLETED | OUTPATIENT
Start: 2025-06-30 | End: 2025-06-30

## 2025-06-30 RX ORDER — LANOLIN ALCOHOL/MO/W.PET/CERES
1000 CREAM (GRAM) TOPICAL DAILY
Status: DISCONTINUED | OUTPATIENT
Start: 2025-07-01 | End: 2025-07-04 | Stop reason: HOSPADM

## 2025-06-30 RX ORDER — PANTOPRAZOLE SODIUM 20 MG/1
20 TABLET, DELAYED RELEASE ORAL DAILY
Status: DISCONTINUED | OUTPATIENT
Start: 2025-07-01 | End: 2025-07-04 | Stop reason: HOSPADM

## 2025-06-30 RX ORDER — POLYETHYLENE GLYCOL 3350 17 G/17G
17 POWDER, FOR SOLUTION ORAL DAILY PRN
COMMUNITY

## 2025-06-30 RX ORDER — POLYETHYLENE GLYCOL 3350 17 G/17G
17 POWDER, FOR SOLUTION ORAL DAILY PRN
Status: DISCONTINUED | OUTPATIENT
Start: 2025-06-30 | End: 2025-07-04 | Stop reason: HOSPADM

## 2025-06-30 RX ORDER — MORPHINE SULFATE 2 MG/ML
2 INJECTION, SOLUTION INTRAMUSCULAR; INTRAVENOUS ONCE
Status: COMPLETED | OUTPATIENT
Start: 2025-06-30 | End: 2025-06-30

## 2025-06-30 RX ORDER — ACETAMINOPHEN 160 MG/5ML
650 SOLUTION ORAL EVERY 4 HOURS PRN
Status: DISCONTINUED | OUTPATIENT
Start: 2025-06-30 | End: 2025-07-04 | Stop reason: HOSPADM

## 2025-06-30 RX ORDER — NAPROXEN 250 MG/1
500 TABLET ORAL DAILY PRN
Status: DISCONTINUED | OUTPATIENT
Start: 2025-06-30 | End: 2025-07-04 | Stop reason: HOSPADM

## 2025-06-30 RX ORDER — FERROUS SULFATE 325(65) MG
65 TABLET ORAL EVERY OTHER DAY
Status: DISCONTINUED | OUTPATIENT
Start: 2025-07-01 | End: 2025-07-04 | Stop reason: HOSPADM

## 2025-06-30 RX ORDER — POTASSIUM CHLORIDE 1.5 G/1.58G
40 POWDER, FOR SOLUTION ORAL ONCE
Status: COMPLETED | OUTPATIENT
Start: 2025-06-30 | End: 2025-06-30

## 2025-06-30 RX ORDER — BUSPIRONE HYDROCHLORIDE 10 MG/1
10 TABLET ORAL 3 TIMES DAILY
Status: DISCONTINUED | OUTPATIENT
Start: 2025-06-30 | End: 2025-07-04 | Stop reason: HOSPADM

## 2025-06-30 RX ORDER — ONDANSETRON HYDROCHLORIDE 2 MG/ML
4 INJECTION, SOLUTION INTRAVENOUS EVERY 8 HOURS PRN
Status: DISCONTINUED | OUTPATIENT
Start: 2025-06-30 | End: 2025-07-04 | Stop reason: HOSPADM

## 2025-06-30 RX ORDER — ACETAMINOPHEN 650 MG/1
650 SUPPOSITORY RECTAL EVERY 4 HOURS PRN
Status: DISCONTINUED | OUTPATIENT
Start: 2025-06-30 | End: 2025-07-04 | Stop reason: HOSPADM

## 2025-06-30 RX ORDER — GUAIFENESIN/DEXTROMETHORPHAN 100-10MG/5
5 SYRUP ORAL EVERY 4 HOURS PRN
Status: DISCONTINUED | OUTPATIENT
Start: 2025-06-30 | End: 2025-07-04 | Stop reason: HOSPADM

## 2025-06-30 RX ORDER — ACETAMINOPHEN 325 MG/1
650 TABLET ORAL EVERY 4 HOURS PRN
Status: DISCONTINUED | OUTPATIENT
Start: 2025-06-30 | End: 2025-07-04 | Stop reason: HOSPADM

## 2025-06-30 RX ORDER — CEFTRIAXONE 1 G/50ML
1 INJECTION, SOLUTION INTRAVENOUS EVERY 24 HOURS
Status: DISCONTINUED | OUTPATIENT
Start: 2025-07-01 | End: 2025-07-04 | Stop reason: HOSPADM

## 2025-06-30 RX ORDER — POLYETHYLENE GLYCOL 3350 17 G/17G
17 POWDER, FOR SOLUTION ORAL DAILY PRN
Status: DISCONTINUED | OUTPATIENT
Start: 2025-06-30 | End: 2025-06-30 | Stop reason: SDUPTHER

## 2025-06-30 RX ORDER — LIDOCAINE 560 MG/1
1 PATCH PERCUTANEOUS; TOPICAL; TRANSDERMAL DAILY
Status: DISCONTINUED | OUTPATIENT
Start: 2025-06-30 | End: 2025-07-04 | Stop reason: HOSPADM

## 2025-06-30 RX ADMIN — CEFTRIAXONE 1 G: 1 INJECTION, SOLUTION INTRAVENOUS at 12:47

## 2025-06-30 RX ADMIN — BUSPIRONE HYDROCHLORIDE 10 MG: 10 TABLET ORAL at 21:16

## 2025-06-30 RX ADMIN — DEXTROSE MONOHYDRATE 500 MG: 50 INJECTION, SOLUTION INTRAVENOUS at 12:47

## 2025-06-30 RX ADMIN — MORPHINE SULFATE 2 MG: 2 INJECTION, SOLUTION INTRAMUSCULAR; INTRAVENOUS at 11:03

## 2025-06-30 RX ADMIN — ACETAMINOPHEN 650 MG: 160 SOLUTION ORAL at 21:19

## 2025-06-30 RX ADMIN — BUSPIRONE HYDROCHLORIDE 10 MG: 10 TABLET ORAL at 15:25

## 2025-06-30 RX ADMIN — SODIUM CHLORIDE 1000 ML: 900 INJECTION, SOLUTION INTRAVENOUS at 11:02

## 2025-06-30 RX ADMIN — Medication 1 TABLET: at 21:16

## 2025-06-30 RX ADMIN — ENOXAPARIN SODIUM 40 MG: 40 INJECTION SUBCUTANEOUS at 15:25

## 2025-06-30 RX ADMIN — ACETAMINOPHEN 650 MG: 325 TABLET ORAL at 15:24

## 2025-06-30 RX ADMIN — LATANOPROST 1 DROP: 50 SOLUTION OPHTHALMIC at 21:16

## 2025-06-30 RX ADMIN — POTASSIUM CHLORIDE 40 MEQ: 1.5 POWDER, FOR SOLUTION ORAL at 12:24

## 2025-06-30 SDOH — SOCIAL STABILITY: SOCIAL INSECURITY
WITHIN THE LAST YEAR, HAVE YOU BEEN RAPED OR FORCED TO HAVE ANY KIND OF SEXUAL ACTIVITY BY YOUR PARTNER OR EX-PARTNER?: NO

## 2025-06-30 SDOH — ECONOMIC STABILITY: HOUSING INSECURITY: IN THE PAST 12 MONTHS, HOW MANY TIMES HAVE YOU MOVED WHERE YOU WERE LIVING?: 1

## 2025-06-30 SDOH — ECONOMIC STABILITY: HOUSING INSECURITY: IN THE LAST 12 MONTHS, WAS THERE A TIME WHEN YOU WERE NOT ABLE TO PAY THE MORTGAGE OR RENT ON TIME?: NO

## 2025-06-30 SDOH — ECONOMIC STABILITY: FOOD INSECURITY: WITHIN THE PAST 12 MONTHS, YOU WORRIED THAT YOUR FOOD WOULD RUN OUT BEFORE YOU GOT THE MONEY TO BUY MORE.: NEVER TRUE

## 2025-06-30 SDOH — SOCIAL STABILITY: SOCIAL INSECURITY: DO YOU FEEL ANYONE HAS EXPLOITED OR TAKEN ADVANTAGE OF YOU FINANCIALLY OR OF YOUR PERSONAL PROPERTY?: UNABLE TO ASSESS

## 2025-06-30 SDOH — SOCIAL STABILITY: SOCIAL INSECURITY
WITHIN THE LAST YEAR, HAVE YOU BEEN KICKED, HIT, SLAPPED, OR OTHERWISE PHYSICALLY HURT BY YOUR PARTNER OR EX-PARTNER?: NO

## 2025-06-30 SDOH — ECONOMIC STABILITY: INCOME INSECURITY: IN THE PAST 12 MONTHS HAS THE ELECTRIC, GAS, OIL, OR WATER COMPANY THREATENED TO SHUT OFF SERVICES IN YOUR HOME?: NO

## 2025-06-30 SDOH — ECONOMIC STABILITY: FOOD INSECURITY: HOW HARD IS IT FOR YOU TO PAY FOR THE VERY BASICS LIKE FOOD, HOUSING, MEDICAL CARE, AND HEATING?: NOT VERY HARD

## 2025-06-30 SDOH — ECONOMIC STABILITY: HOUSING INSECURITY: AT ANY TIME IN THE PAST 12 MONTHS, WERE YOU HOMELESS OR LIVING IN A SHELTER (INCLUDING NOW)?: NO

## 2025-06-30 SDOH — SOCIAL STABILITY: SOCIAL INSECURITY: HAVE YOU HAD ANY THOUGHTS OF HARMING ANYONE ELSE?: NO

## 2025-06-30 SDOH — ECONOMIC STABILITY: FOOD INSECURITY: WITHIN THE PAST 12 MONTHS, THE FOOD YOU BOUGHT JUST DIDN'T LAST AND YOU DIDN'T HAVE MONEY TO GET MORE.: NEVER TRUE

## 2025-06-30 SDOH — SOCIAL STABILITY: SOCIAL INSECURITY: ARE YOU OR HAVE YOU BEEN THREATENED OR ABUSED PHYSICALLY, EMOTIONALLY, OR SEXUALLY BY ANYONE?: UNABLE TO ASSESS

## 2025-06-30 SDOH — SOCIAL STABILITY: SOCIAL INSECURITY: WITHIN THE LAST YEAR, HAVE YOU BEEN HUMILIATED OR EMOTIONALLY ABUSED IN OTHER WAYS BY YOUR PARTNER OR EX-PARTNER?: NO

## 2025-06-30 SDOH — SOCIAL STABILITY: SOCIAL INSECURITY: WITHIN THE LAST YEAR, HAVE YOU BEEN AFRAID OF YOUR PARTNER OR EX-PARTNER?: NO

## 2025-06-30 SDOH — SOCIAL STABILITY: SOCIAL INSECURITY: ABUSE: ADULT

## 2025-06-30 SDOH — ECONOMIC STABILITY: TRANSPORTATION INSECURITY: IN THE PAST 12 MONTHS, HAS LACK OF TRANSPORTATION KEPT YOU FROM MEDICAL APPOINTMENTS OR FROM GETTING MEDICATIONS?: NO

## 2025-06-30 SDOH — SOCIAL STABILITY: SOCIAL INSECURITY: HAVE YOU HAD THOUGHTS OF HARMING ANYONE ELSE?: NO

## 2025-06-30 SDOH — SOCIAL STABILITY: SOCIAL INSECURITY: DOES ANYONE TRY TO KEEP YOU FROM HAVING/CONTACTING OTHER FRIENDS OR DOING THINGS OUTSIDE YOUR HOME?: UNABLE TO ASSESS

## 2025-06-30 SDOH — SOCIAL STABILITY: SOCIAL INSECURITY: DO YOU FEEL UNSAFE GOING BACK TO THE PLACE WHERE YOU ARE LIVING?: UNABLE TO ASSESS

## 2025-06-30 SDOH — SOCIAL STABILITY: SOCIAL INSECURITY: HAS ANYONE EVER THREATENED TO HURT YOUR FAMILY OR YOUR PETS?: UNABLE TO ASSESS

## 2025-06-30 SDOH — SOCIAL STABILITY: SOCIAL INSECURITY: ARE THERE ANY APPARENT SIGNS OF INJURIES/BEHAVIORS THAT COULD BE RELATED TO ABUSE/NEGLECT?: UNABLE TO ASSESS

## 2025-06-30 SDOH — SOCIAL STABILITY: SOCIAL INSECURITY: WERE YOU ABLE TO COMPLETE ALL THE BEHAVIORAL HEALTH SCREENINGS?: YES

## 2025-06-30 ASSESSMENT — PAIN SCALES - GENERAL
PAINLEVEL_OUTOF10: 4
PAINLEVEL_OUTOF10: 9
PAINLEVEL_OUTOF10: 3

## 2025-06-30 ASSESSMENT — PAIN DESCRIPTION - PAIN TYPE: TYPE: ACUTE PAIN;CHRONIC PAIN

## 2025-06-30 ASSESSMENT — PAIN SCALES - PAIN ASSESSMENT IN ADVANCED DEMENTIA (PAINAD)
NEGVOCALIZATION: OCCASIONAL MOAN/GROAN, LOW SPEECH, NEGATIVE/DISAPPROVING QUALITY
BODYLANGUAGE: TENSE, DISTRESSED PACING, FIDGETING
CONSOLABILITY: DISTRACTED OR REASSURED BY VOICE/TOUCH
TOTALSCORE: 4
BREATHING: NORMAL
TOTALSCORE: MEDICATION (SEE MAR)
FACIALEXPRESSION: SAD, FRIGHTENED, FROWN

## 2025-06-30 ASSESSMENT — COGNITIVE AND FUNCTIONAL STATUS - GENERAL
STANDING UP FROM CHAIR USING ARMS: A LITTLE
MOVING TO AND FROM BED TO CHAIR: A LITTLE
WALKING IN HOSPITAL ROOM: A LITTLE
HELP NEEDED FOR BATHING: A LITTLE
TURNING FROM BACK TO SIDE WHILE IN FLAT BAD: A LITTLE
TOILETING: A LITTLE
CLIMB 3 TO 5 STEPS WITH RAILING: A LOT
PATIENT BASELINE BEDBOUND: NO
WALKING IN HOSPITAL ROOM: A LITTLE
DAILY ACTIVITIY SCORE: 20
MOVING TO AND FROM BED TO CHAIR: A LITTLE
WALKING IN HOSPITAL ROOM: A LITTLE
MOBILITY SCORE: 17
HELP NEEDED FOR BATHING: A LITTLE
HELP NEEDED FOR BATHING: A LITTLE
TOILETING: A LITTLE
DRESSING REGULAR UPPER BODY CLOTHING: A LITTLE
DRESSING REGULAR UPPER BODY CLOTHING: A LITTLE
MOBILITY SCORE: 17
MOBILITY SCORE: 17
CLIMB 3 TO 5 STEPS WITH RAILING: A LOT
DAILY ACTIVITIY SCORE: 20
DAILY ACTIVITIY SCORE: 20
MOVING FROM LYING ON BACK TO SITTING ON SIDE OF FLAT BED WITH BEDRAILS: A LITTLE
STANDING UP FROM CHAIR USING ARMS: A LITTLE
MOVING FROM LYING ON BACK TO SITTING ON SIDE OF FLAT BED WITH BEDRAILS: A LITTLE
TOILETING: A LITTLE
STANDING UP FROM CHAIR USING ARMS: A LITTLE
DRESSING REGULAR UPPER BODY CLOTHING: A LITTLE
TURNING FROM BACK TO SIDE WHILE IN FLAT BAD: A LITTLE
MOVING TO AND FROM BED TO CHAIR: A LITTLE
CLIMB 3 TO 5 STEPS WITH RAILING: A LOT
TURNING FROM BACK TO SIDE WHILE IN FLAT BAD: A LITTLE
DRESSING REGULAR LOWER BODY CLOTHING: A LITTLE
MOVING FROM LYING ON BACK TO SITTING ON SIDE OF FLAT BED WITH BEDRAILS: A LITTLE
DRESSING REGULAR LOWER BODY CLOTHING: A LITTLE
DRESSING REGULAR LOWER BODY CLOTHING: A LITTLE

## 2025-06-30 ASSESSMENT — LIFESTYLE VARIABLES
SKIP TO QUESTIONS 9-10: 1
PRESCIPTION_ABUSE_PAST_12_MONTHS: NO
HOW MANY STANDARD DRINKS CONTAINING ALCOHOL DO YOU HAVE ON A TYPICAL DAY: PATIENT DOES NOT DRINK
HOW OFTEN DO YOU HAVE 6 OR MORE DRINKS ON ONE OCCASION: NEVER
SUBSTANCE_ABUSE_PAST_12_MONTHS: NO
HOW OFTEN DO YOU HAVE A DRINK CONTAINING ALCOHOL: NEVER
AUDIT-C TOTAL SCORE: 0
AUDIT-C TOTAL SCORE: 0

## 2025-06-30 ASSESSMENT — ACTIVITIES OF DAILY LIVING (ADL)
LACK_OF_TRANSPORTATION: NO
LACK_OF_TRANSPORTATION: NO
DRESSING YOURSELF: NEEDS ASSISTANCE
GROOMING: NEEDS ASSISTANCE
ADEQUATE_TO_COMPLETE_ADL: YES
PATIENT'S MEMORY ADEQUATE TO SAFELY COMPLETE DAILY ACTIVITIES?: NO
BATHING: NEEDS ASSISTANCE
ASSISTIVE_DEVICE: WALKER
HEARING - RIGHT EAR: HEARING AID
TOILETING: NEEDS ASSISTANCE
HEARING - LEFT EAR: HEARING AID
FEEDING YOURSELF: INDEPENDENT
WALKS IN HOME: NEEDS ASSISTANCE
JUDGMENT_ADEQUATE_SAFELY_COMPLETE_DAILY_ACTIVITIES: NO

## 2025-06-30 ASSESSMENT — PATIENT HEALTH QUESTIONNAIRE - PHQ9
1. LITTLE INTEREST OR PLEASURE IN DOING THINGS: NOT AT ALL
2. FEELING DOWN, DEPRESSED OR HOPELESS: NOT AT ALL
SUM OF ALL RESPONSES TO PHQ9 QUESTIONS 1 & 2: 0

## 2025-06-30 ASSESSMENT — PAIN - FUNCTIONAL ASSESSMENT
PAIN_FUNCTIONAL_ASSESSMENT: PAINAD (PAIN ASSESSMENT IN ADVANCED DEMENTIA SCALE)
PAIN_FUNCTIONAL_ASSESSMENT: PAINAD (PAIN ASSESSMENT IN ADVANCED DEMENTIA SCALE)
PAIN_FUNCTIONAL_ASSESSMENT: 0-10
PAIN_FUNCTIONAL_ASSESSMENT: UNABLE TO SELF-REPORT

## 2025-06-30 ASSESSMENT — PAIN DESCRIPTION - LOCATION
LOCATION: ARM
LOCATION: HIP

## 2025-06-30 ASSESSMENT — PAIN DESCRIPTION - ORIENTATION: ORIENTATION: LEFT

## 2025-06-30 NOTE — ED PROVIDER NOTES
"The patient was seen in conjunction with the advanced practice provider, and I performed a substantive portion of the encounter. The following is my supervisory note.    History:  Patient presents to ED by EMS for reported fall.  EMS notes patient found in garage floor and unable to get up.  No obvious cephalic or appendicular skeletal trauma but noted LLE is minimally shorter without any rotation compared to RLE.  Patient reports mechanical fall with walker while walking in garage and landed on her left side/left hip.  Does not believe she hit her head or experience LOC.  Overall patient is a poor historian and difficult to recall events and difficult to ascertain any prodromal symptoms but presently not endorsing any headache or cardiopulmonary symptoms.    VS:  BP (!) 190/80 (BP Location: Right arm, Patient Position: Lying)   Pulse 69   Temp 37 °C (98.6 °F) (Temporal)   Resp 16   Ht 1.626 m (5' 4\")   Wt 66.7 kg (147 lb)   LMP  (LMP Unknown) Comment: Pt is 89 years old- last mentrauls cycle unknown  SpO2 96%   BMI 25.23 kg/m²      Physical exam:  Airway: patent  Breathing: bilateral breath sounds  Circulation: bilateral radial/DP/PT, central carotid/femoral  GCS: 14    CONST: alert, normal appearance, appears frail, no acute distress, does not appear ill/toxic  HEAD: normocephalic, atraumatic, midface stable  NECK: trachea midline, no midline C-spine tenderness or appreciable spinous process step-offs/deformities  ENT: no septal hematoma or epistaxis, no perioral/intraoral injuries or dental malocclusion  EYES: Pupils 4-2 mm, no periorbital ecchymosis, periorbital rims intact, EOMI, no appreciable ocular entrapment  CV: RRR, no murmurs, 2+ equal/symmetrical pulses x4, chest wall non-tender   PULM: CTAB, no respiratory distress, not requiring supplemental O2, equal/symmetrical chest wall rise, no appreciable paradoxical movement  ABD: soft, mild protuberant/non-tender/non-distended, no mass, no evidence of " ecchymosis  MSK: pelvis stable to compression and without pain, LLE appears slightly shortened compared to RLE without any internal/external rotation, no appreciable tenderness palpation of L hip, no obvious gross extremity trauma/deformities and no appreciable pain with PROM x4 (prox/mid/distal joints and extremity), no appreciable midline T/L-spine tenderness and no spinous process step-offs/deformities, no obvious posterior thoracic trauma  SKIN: no evidence of wounds/lacerations, minimally scattered ecchymosis, warm/dry, no pallor  NEURO: A&Ox2 (self and location), gross strength/motor/sensation intact x4  PSYCH: Appropriate affect      I personally reviewed and interpreted the following studies: EKG is NSR 82, normal axis, prolonged QTc 488 ms, poor R wave progression precordial leads, no appreciable ischemia with nonspecific TW findings, labs are significant for mild to moderate electrolyte derangement with hypokalemia and hypochloremia, images are notable for CTH no evidence of traumatic ICH or calvarium fracture and XR (pelvis) no gross evidence of L hip fracture or periprosthetic/hardware fracture.      MDM:  Patient presented to the ED for evaluation for fall and unable to get up with left..  Concerning PMHx of fatigue, HLD, osteoporosis.    Per Chart Review: Nothing pertinent to this ED encounter.    Assessment/evaluation consistent with physical deconditioning/weakness, unstable gait, L hip contusion, likely FTT and unable to successfully/safely ambulate and complete ADLs. No concerning history, clinical evidence/work-up, or exam findings for the concerning differentials of traumatic ICH/calvarium fracture, gross evidence of traumatic axial/appendicular skeletal processes. These conditions have been thoroughly evaluated and determined to be sufficiently unlikely to be the etiology of patient's presenting symptoms.       ED Course/Diagnosis:  ED Course as of 07/02/25 1026   Mon Jun 30, 2025   1048 I  personally reviewed and interpreted the EKG @1049: NSR 82, normal axis, no appreciable ischemia, prolonged Qtc 488 ms, poor R wave progression in precordial leads, non-specific TW findings, and prior EKG on 2/3/2025 reviewed without any appreciable specific/identifiable changes [BC]   1235 Spoke to Dr. Downs who has agreed to hospitalize under his service [AP]      ED Course User Index  [AP] Mike Amato PA-C  [BC] Stephen Rivera MD         Diagnoses as of 07/02/25 1026   Closed head injury, initial encounter   Pneumonia due to infectious organism, unspecified laterality, unspecified part of lung       1. Closed head injury, initial encounter        2. Pneumonia due to infectious organism, unspecified laterality, unspecified part of lung        3. Closed fracture of nasal bone, initial encounter  acetaminophen (Tylenol) 325 mg tablet      4. Closed fracture of multiple ribs of left side, initial encounter  acetaminophen (Tylenol) 325 mg tablet      5. Pneumonia of both lower lobes due to infectious organism  guaiFENesin (Mucinex) 600 mg 12 hr tablet    ipratropium-albuteroL (Duo-Neb) 0.5-2.5 mg/3 mL nebulizer solution    cefuroxime (Ceftin) 500 mg tablet    azithromycin (Zithromax) 500 mg tablet      6. Primary insomnia  melatonin 5 mg tablet      7. Anxiety  hydrOXYzine HCL (Atarax) 25 mg tablet               Stephen Rivera MD  07/02/25 1026

## 2025-06-30 NOTE — PROGRESS NOTES
Pharmacy Medication History Review    Xiomy Rubio is a 89 y.o. female admitted for Closed head injury, initial encounter. Pharmacy reviewed the patient's raxir-pm-meyehstik medications and allergies for accuracy.    Medications ADDED:  Polyethylene glycol 3350, 17 gm/dose; 17 grams PO once daily PRN constipation  Medications CHANGED:  APAP 325 mg, 3 tablets PO Q8H -> 2 tablets PO once daily PRN mild pain  Naproxen 220 mg, 1 PO Q12H PRN mild pain -> 2 PO once daily PRN mild pain  Medications REMOVED:   Calcium citrate supplement (Replaced with Oysco 500/D)  Cholecalciferol supplement (Replaced with Oysco 500/D)  Ondansetron 4 mg ODT (not taking)  Sennosides 8.6 mg capsule (not taking)     The list below reflects the updated PTA list.   Prior to Admission Medications   Prescriptions Last Dose Informant Patient Reported? Taking?   acetaminophen (Tylenol) 325 mg tablet Past Week Morning  No Yes   Sig: Take 3 tablets (975 mg) by mouth every 8 hours.   Patient taking differently: Take 2 tablets (650 mg) by mouth once daily as needed for mild pain (1 - 3).   arm brace (Wrist Brace Medium) misc   No No   Si each once daily at bedtime.   busPIRone (Buspar) 10 mg tablet 2025 Morning  Yes Yes   Sig: Take 1 tablet (10 mg) by mouth 3 times a day.   calcium carbonate-vitamin D3 (Oysco 500/D) 500 mg-5 mcg (200 unit) tablet 2025 Morning  No Yes   Sig: Take 1 tablet by mouth 2 times a day.   calcium citrate (Calcitrate) 200 mg (950 mg) tablet   No No   Sig: Take 3 tablets (600 mg) by mouth 2 times a day.   Patient not taking: Reported on 2025   cholecalciferol (Vitamin D-3) 50 MCG (2000 UT) tablet Not Taking  No No   Sig: Take 1 tablet (2,000 Units) by mouth once daily.   Patient not taking: Reported on 2025   cyanocobalamin (Vitamin B-12) 1,000 mcg tablet 2025 Morning  No Yes   Sig: Take 1 tablet (1,000 mcg) by mouth once daily.   ferrous sulfate 325 mg (65 mg elemental) tablet 2025 Morning  No  Yes   Sig: TAKE 1 TABLET BY MOUTH EVERY OTHER DAY   latanoprost (Xalatan) 0.005 % ophthalmic solution 6/29/2025 Evening  Yes Yes   Sig: Administer 1 drop into both eyes once daily at bedtime.   lidocaine 4 % patch Past Week  No Yes   Sig: Place 1 patch over 12 hours on the skin once daily. Remove & discard patch within 12 hours or as directed by MD.   naproxen sodium (Aleve) 220 mg tablet Past Week  Yes Yes   Sig: Take 2 tablets (440 mg) by mouth once daily as needed for mild pain (1 - 3).   ondansetron ODT (Zofran-ODT) 4 mg disintegrating tablet Not Taking  No No   Sig: Dissolve 1 tablet (4 mg) in the mouth every 8 hours if needed for nausea or vomiting.   Patient not taking: Reported on 6/30/2025   pantoprazole (ProtoNix) 20 mg EC tablet 6/30/2025 Morning  No Yes   Sig: TAKE 1 TABLET BY MOUTH EVERY DAY   Patient taking differently: Take 1 tablet (20 mg) by mouth once daily in the morning. Take before meals.   polyethylene glycol (Miralax) 17 gram/dose powder Past Week  Yes Yes   Sig: Mix 17 g of powder and drink once daily as needed for constipation.   sennosides (senna) 8.6 mg capsule   No No   Sig: Take 1 capsule (8.6 mg) by mouth once daily at bedtime.   Patient not taking: Reported on 5/29/2025   sertraline (Zoloft) 50 mg tablet 6/30/2025 Morning  Yes Yes   Sig: Take 1 tablet (50 mg) by mouth once daily.      Facility-Administered Medications: None        The list below reflects the updated allergy list. Please review each documented allergy for additional clarification and justification.  Allergies  Reviewed by Sukhjinder Tellez [facility] on 6/30/2025        Severity Reactions Comments    Adhesive Tape-silicones Medium Unknown Son does not recall reaction on behalf of pt (6/30/25)    Cortisone Low Rash     Penicillin Low Rash     Prednisone Low Rash     Silicone Low Unknown Son does not recall reaction on behalf of pt (6/30/25)            Patient accepts M2B at discharge.     Sources:   Pharmacy dispense  history  Child (son) Moderate historian  Chart Review     Additional Comments:  Med history compiled primarily via med list provided by family member (son) at bedside; pt is unable to provide details of her home med regimen at this time.    Son unable to provide details of existing drug allergies/reactions in chart; will maintain as noted.    Sukhjinder Tellez, PharmD  Clinical Pharmacy Specialist  06/30/25

## 2025-06-30 NOTE — NURSING NOTE
Patient arrived to room 453, alert to self only, call light within reach, bed alert on, son at bedside

## 2025-06-30 NOTE — ED PROVIDER NOTES
HPI   Chief Complaint   Patient presents with    Fall     Fell a few days ago     Hip Pain       HPI  89-year-old female coming into the emergency department by EMS for evaluation of fall, patient was using her walker and got tripped up and fell, she does not think she got lightheaded or dizzy but also not the strongest historian.  Patient primarily complaining of hip pain on the left.  Denies any blood thinning medication.      Patient History   Medical History[1]  Surgical History[2]  Family History[3]  Social History[4]    Physical Exam   ED Triage Vitals   Temp Pulse Resp BP   -- -- -- --      SpO2 Temp src Heart Rate Source Patient Position   -- -- -- --      BP Location FiO2 (%)     -- --       Physical Exam  PHYSICAL EXAMINATION    GENERAL APPEARANCE: Awake and alert.     VITAL SIGNS: As per the nurses' triage record.     HEENT: Normocephalic, atraumatic. Extraocular muscles are intact. Pupils equal round and reactive to light. Conjunctiva are pink. Negative scleral icterus. Mucous membranes are moist. Tongue in the midline. Pharynx was without erythema or exudates, uvula midline    NECK: Soft Nontender and supple, full gross ROM, no meningeal signs.    CHEST: Nontender to palpation. Clear to auscultation bilaterally. No rales, rhonchi, or wheezing.     HEART: S1, S2. Regular rate and rhythm. No murmurs, gallops or rubs.  Strong and equal pulses in the extremities.     ABDOMEN: Soft, nontender, nondistended, positive bowel sounds, no palpable masses.    MUSCULOSKELETAL: Pain on palpation of the left hip and pelvis region.  Distal sensation motor functions intact, evidence of some shortening of the left lower extremity.  No visual sign of injury or trauma     NEUROLOGICAL: Awake, alert and oriented x 3. Power intact in the upper and lower extremities. Sensation is intact to light touch in the upper and lower extremities.     IMMUNOLOGICAL: No lymphatic streaking noted     DERM: No petechiae, rashes, or  ecchymoses.    ED Course & MDM   ED Course as of 06/30/25 1236   Mon Jun 30, 2025   1048 I personally reviewed and interpreted the EKG @1049: NSR 82, normal axis, no appreciable ischemia, prolonged Qtc 488 ms, poor R wave progression in precordial leads, non-specific TW findings, and prior EKG on 2/3/2025 reviewed without any appreciable specific/identifiable changes [BC]   1235 Spoke to Dr. Downs who has agreed to hospitalize under his service [AP]      ED Course User Index  [AP] Mike Amato PA-C  [BC] Stephen Rivera MD         Diagnoses as of 06/30/25 1236   Closed head injury, initial encounter   Pneumonia due to infectious organism, unspecified laterality, unspecified part of lung                 No data recorded     [place] Coma Scale Score: 15 (06/30/25 1053 : Sarah Beth Olmos RN)                           Medical Decision Making  Parts of this chart have been completed using voice recognition software. Please excuse any errors of transcription.  My thought process and reason for plan has been formulated from the time that I saw the patient until the time of disposition and is not specific to one specific moment during their visit and furthermore my MDM encompasses this entire chart and not only this text box.      HPI: Detailed above.    Exam: A medically appropriate exam performed, outlined above, given the known history and presentation.    History Limited by: Nothing    History obtained from: Patient    External/internal records reviewed: No external record reviewed    Social Determinants of Health considered during this visit: Lives at home    Chronic conditions impacting care: Denies    Medications given during visit:  Medications   cefTRIAXone (Rocephin) 1 g in dextrose (iso) IV 50 mL (has no administration in time range)   azithromycin (Zithromax) 500 mg in dextrose 5%  mL (has no administration in time range)   sodium chloride 0.9 % bolus 1,000 mL (0 mL intravenous Stopped 6/30/25  1149)   morphine injection 2 mg (2 mg intravenous Given 6/30/25 1103)   potassium chloride (Klor-Con) packet 40 mEq (40 mEq oral Given 6/30/25 1224)        Diagnostic/tests  Labs Reviewed   CBC WITH AUTO DIFFERENTIAL - Abnormal       Result Value    WBC 9.4      nRBC 0.0      RBC 5.09      Hemoglobin 14.0      Hematocrit 43.1      MCV 85      MCH 27.5      MCHC 32.5      RDW 13.8      Platelets 304      Neutrophils % 81.9      Immature Granulocytes %, Automated 1.4 (*)     Lymphocytes % 8.9      Monocytes % 7.1      Eosinophils % 0.2      Basophils % 0.5      Neutrophils Absolute 7.69 (*)     Immature Granulocytes Absolute, Automated 0.13      Lymphocytes Absolute 0.84      Monocytes Absolute 0.67      Eosinophils Absolute 0.02      Basophils Absolute 0.05     COMPREHENSIVE METABOLIC PANEL - Abnormal    Glucose 148 (*)     Sodium 136      Potassium 3.0 (*)     Chloride 97 (*)     Bicarbonate 26      Anion Gap 16      Urea Nitrogen 18      Creatinine 0.65      eGFR 82      Calcium 9.1      Albumin 3.9      Alkaline Phosphatase 91      Total Protein 6.4      AST 14      Bilirubin, Total 1.4 (*)     ALT 13     URINALYSIS WITH REFLEX CULTURE AND MICROSCOPIC - Abnormal    Color, Urine Light-Yellow      Appearance, Urine Turbid (*)     Specific Gravity, Urine 1.012      pH, Urine 7.5      Protein, Urine NEGATIVE      Glucose, Urine Normal      Blood, Urine NEGATIVE      Ketones, Urine NEGATIVE      Bilirubin, Urine NEGATIVE      Urobilinogen, Urine Normal      Nitrite, Urine NEGATIVE      Leukocyte Esterase, Urine NEGATIVE     MAGNESIUM - Normal    Magnesium 1.93     SERIAL TROPONIN-INITIAL - Normal    Troponin I, High Sensitivity 10      Narrative:     Less than 99th percentile of normal range cutoff-  Female and children under 18 years old <14 ng/L; Male <21 ng/L: Negative  Repeat testing should be performed if clinically indicated.     Female and children under 18 years old 14-50 ng/L; Male 21-50 ng/L:  Consistent with  possible cardiac damage and possible increased clinical   risk. Serial measurements may help to assess extent of myocardial damage.     >50 ng/L: Consistent with cardiac damage, increased clinical risk and  myocardial infarction. Serial measurements may help assess extent of   myocardial damage.      NOTE: Children less than 1 year old may have higher baseline troponin   levels and results should be interpreted in conjunction with the overall   clinical context.     NOTE: Troponin I testing is performed using a different   testing methodology at [facility] than at other   [facility]. Direct result comparisons should only   be made within the same method.   LIPASE    Lipase        Narrative:     Venipuncture immediately after or during the administration of Metamizole may lead to falsely low results. Testing should be performed immediately prior to Metamizole dosing.   URINALYSIS WITH REFLEX CULTURE AND MICROSCOPIC    Narrative:     The following orders were created for panel order Urinalysis with Reflex Culture and Microscopic.  Procedure                               Abnormality         Status                     ---------                               -----------         ------                     Urinalysis with Reflex C...[[id number]]  Abnormal            Final result               Extra Urine Gray Tube[[id number]]                                                         Please view results for these tests on the individual orders.   TROPONIN SERIES- (INITIAL, 1 HR)    Narrative:     The following orders were created for panel order Troponin I Series, High Sensitivity (0, 1 HR).  Procedure                               Abnormality         Status                     ---------                               -----------         ------                     Troponin I, High Sensiti...[[id number]]  Normal              Final result               Troponin, High Sensitivi...[[id number]]                                                    Please view results for these tests on the individual orders.   EXTRA URINE GRAY TUBE   SERIAL TROPONIN, 1 HOUR      XR chest 1 view   Final Result   Mild interstitial infiltrates bilaterally, as above.        MACRO:   None.        Signed by: Archie Shane 6/30/2025 12:17 PM   Dictation workstation:   [id number]      XR hip left with pelvis when performed 2 or 3 views   Final Result   No acute fracture or malalignment.        Advanced left hip degenerative change.        Partially imaged cephalomedullary nail fixation intertrochanteric   left femur fracture is intact along its visualized portion.        Signed by: Dominick Kwan 6/30/2025 12:07 PM   Dictation workstation:   [id number]      CT cervical spine wo IV contrast   Final Result   Reversal of the normal cervical lordosis, due to muscle spasm and/or   positioning. Cervical spondylosis without acute fracture or facet   subluxation.        Signed by: Sunil Salamanca 6/30/2025 12:14 PM   Dictation workstation:   [id number]      CT head wo IV contrast   Final Result   Age related changes again present without acute intracranial process.        Signed by: Sunil Salamanca 6/30/2025 12:11 PM   Dictation workstation:   [id number]          Case discussed with: Dr mathis      Considerations/further MDM:  Patient generalized weakness and fall, will be treated for pneumonia, no clear sign of hip pain, seem to have much less pain on serial evaluations of the left hip, discussed this with the house present physician, if pain persists consideration of CT to the area may be considered but at this time with the inconsistent history and present illness and physical examination I do not feel that it is urgently warranted.  The patient seem to be able to move it a little bit and did not have pain response and the shortening that I previously noted I believe may have been some positioning.    Seen in conjunction with ED attending physician, spoke  to Dr. Downs who has agreed to hospitalize the patient for pneumonia and serial evaluations      Procedure  Procedures       [1]   Past Medical History:  Diagnosis Date    Anxiety     At risk for falling     Closed compression fracture of L4 lumbar vertebra with delayed healing, subsequent encounter     Closed head injury     Closed wedge compression fracture of T11 vertebra (Multi)     Constipation     Dementia     Depression     Facial fracture (Multi)     GERD (gastroesophageal reflux disease)     Hypertension     L4 vertebral fracture (Multi)     Left rib fracture     Memory loss     Nasal bone fracture     Osteoarthritis     Osteoporosis     Other conditions influencing health status 11/24/2017    History of cough    Pain in joints of unspecified hand 01/05/2016    Pain, hand joint    Personal history of diseases of the skin and subcutaneous tissue 11/24/2017    History of cellulitis    Personal history of Methicillin resistant Staphylococcus aureus infection 11/24/2017    History of methicillin resistant Staphylococcus aureus infection    Personal history of other diseases of the respiratory system 12/05/2017    History of acute bronchitis    Personal history of other specified conditions 05/04/2017    History of dizziness    Personal history of pneumonia (recurrent)     History of pneumonia    Rheumatoid arthritis     T11 vertebral fracture (Multi)     Weakness    [2]   Past Surgical History:  Procedure Laterality Date    OTHER SURGICAL HISTORY  08/29/2017    Hip Surgery Left   [3]   Family History  Problem Relation Name Age of Onset    No Known Problems Mother      No Known Problems Father     [4]   Social History  Tobacco Use    Smoking status: Never    Smokeless tobacco: Never   Vaping Use    Vaping status: Never Used   Substance Use Topics    Alcohol use: Never    Drug use: Not Currently        Mike Amato PA-C  06/30/25 8723

## 2025-06-30 NOTE — CARE PLAN
Problem: Pain - Adult  Goal: Verbalizes/displays adequate comfort level or baseline comfort level  Outcome: Progressing     Problem: Safety - Adult  Goal: Free from fall injury  Outcome: Progressing     Problem: Discharge Planning  Goal: Discharge to home or other facility with appropriate resources  Outcome: Progressing     Problem: Chronic Conditions and Co-morbidities  Goal: Patient's chronic conditions and co-morbidity symptoms are monitored and maintained or improved  Outcome: Progressing     Problem: Nutrition  Goal: Nutrient intake appropriate for maintaining nutritional needs  Outcome: Progressing   The patient's goals for the shift include      The clinical goals for the shift include antibiotics, safety

## 2025-07-01 LAB
ALBUMIN SERPL BCP-MCNC: 3.4 G/DL (ref 3.4–5)
ALP SERPL-CCNC: 83 U/L (ref 33–136)
ALT SERPL W P-5'-P-CCNC: 11 U/L (ref 7–45)
ANION GAP SERPL CALCULATED.3IONS-SCNC: 8 MMOL/L (ref 10–20)
AST SERPL W P-5'-P-CCNC: 12 U/L (ref 9–39)
BILIRUB SERPL-MCNC: 1.1 MG/DL (ref 0–1.2)
BUN SERPL-MCNC: 16 MG/DL (ref 6–23)
CALCIUM SERPL-MCNC: 8.2 MG/DL (ref 8.6–10.3)
CHLORIDE SERPL-SCNC: 103 MMOL/L (ref 98–107)
CO2 SERPL-SCNC: 29 MMOL/L (ref 21–32)
CREAT SERPL-MCNC: 0.61 MG/DL (ref 0.5–1.05)
EGFRCR SERPLBLD CKD-EPI 2021: 83 ML/MIN/1.73M*2
ERYTHROCYTE [DISTWIDTH] IN BLOOD BY AUTOMATED COUNT: 14 % (ref 11.5–14.5)
GLUCOSE SERPL-MCNC: 123 MG/DL (ref 74–99)
HCT VFR BLD AUTO: 40.5 % (ref 36–46)
HGB BLD-MCNC: 12.9 G/DL (ref 12–16)
MCH RBC QN AUTO: 27.4 PG (ref 26–34)
MCHC RBC AUTO-ENTMCNC: 31.9 G/DL (ref 32–36)
MCV RBC AUTO: 86 FL (ref 80–100)
NRBC BLD-RTO: 0 /100 WBCS (ref 0–0)
PLATELET # BLD AUTO: 251 X10*3/UL (ref 150–450)
POTASSIUM SERPL-SCNC: 3.1 MMOL/L (ref 3.5–5.3)
PROT SERPL-MCNC: 5.6 G/DL (ref 6.4–8.2)
RBC # BLD AUTO: 4.71 X10*6/UL (ref 4–5.2)
SODIUM SERPL-SCNC: 137 MMOL/L (ref 136–145)
WBC # BLD AUTO: 6.9 X10*3/UL (ref 4.4–11.3)

## 2025-07-01 PROCEDURE — 85027 COMPLETE CBC AUTOMATED: CPT | Performed by: INTERNAL MEDICINE

## 2025-07-01 PROCEDURE — 97165 OT EVAL LOW COMPLEX 30 MIN: CPT | Mod: GO

## 2025-07-01 PROCEDURE — 2500000005 HC RX 250 GENERAL PHARMACY W/O HCPCS: Performed by: INTERNAL MEDICINE

## 2025-07-01 PROCEDURE — 2500000002 HC RX 250 W HCPCS SELF ADMINISTERED DRUGS (ALT 637 FOR MEDICARE OP, ALT 636 FOR OP/ED): Performed by: INTERNAL MEDICINE

## 2025-07-01 PROCEDURE — 2500000004 HC RX 250 GENERAL PHARMACY W/ HCPCS (ALT 636 FOR OP/ED): Performed by: INTERNAL MEDICINE

## 2025-07-01 PROCEDURE — 36415 COLL VENOUS BLD VENIPUNCTURE: CPT | Performed by: INTERNAL MEDICINE

## 2025-07-01 PROCEDURE — 80053 COMPREHEN METABOLIC PANEL: CPT | Performed by: INTERNAL MEDICINE

## 2025-07-01 PROCEDURE — 1100000001 HC PRIVATE ROOM DAILY

## 2025-07-01 PROCEDURE — 2500000001 HC RX 250 WO HCPCS SELF ADMINISTERED DRUGS (ALT 637 FOR MEDICARE OP): Performed by: INTERNAL MEDICINE

## 2025-07-01 RX ORDER — ACETAMINOPHEN 500 MG
5 TABLET ORAL NIGHTLY PRN
Status: DISCONTINUED | OUTPATIENT
Start: 2025-07-01 | End: 2025-07-04 | Stop reason: HOSPADM

## 2025-07-01 RX ORDER — HYDROXYZINE HYDROCHLORIDE 25 MG/1
25 TABLET, FILM COATED ORAL EVERY 8 HOURS PRN
Status: DISCONTINUED | OUTPATIENT
Start: 2025-07-01 | End: 2025-07-04 | Stop reason: HOSPADM

## 2025-07-01 RX ADMIN — LATANOPROST 1 DROP: 50 SOLUTION OPHTHALMIC at 20:41

## 2025-07-01 RX ADMIN — CEFTRIAXONE 1 G: 1 INJECTION, SOLUTION INTRAVENOUS at 09:35

## 2025-07-01 RX ADMIN — AZITHROMYCIN DIHYDRATE 250 MG: 250 TABLET ORAL at 09:35

## 2025-07-01 RX ADMIN — Medication 1 TABLET: at 20:41

## 2025-07-01 RX ADMIN — ENOXAPARIN SODIUM 40 MG: 40 INJECTION SUBCUTANEOUS at 15:43

## 2025-07-01 RX ADMIN — ACETAMINOPHEN 650 MG: 325 TABLET ORAL at 12:40

## 2025-07-01 RX ADMIN — Medication 5 MG: at 20:42

## 2025-07-01 RX ADMIN — ACETAMINOPHEN 650 MG: 325 TABLET ORAL at 05:59

## 2025-07-01 RX ADMIN — LIDOCAINE 4% 1 PATCH: 40 PATCH TOPICAL at 09:35

## 2025-07-01 RX ADMIN — PANTOPRAZOLE SODIUM 20 MG: 20 TABLET, DELAYED RELEASE ORAL at 09:35

## 2025-07-01 RX ADMIN — BUSPIRONE HYDROCHLORIDE 10 MG: 10 TABLET ORAL at 15:43

## 2025-07-01 RX ADMIN — SERTRALINE 50 MG: 50 TABLET, FILM COATED ORAL at 09:35

## 2025-07-01 RX ADMIN — Medication 1 TABLET: at 09:35

## 2025-07-01 RX ADMIN — BUSPIRONE HYDROCHLORIDE 10 MG: 10 TABLET ORAL at 09:35

## 2025-07-01 RX ADMIN — BUSPIRONE HYDROCHLORIDE 10 MG: 10 TABLET ORAL at 20:40

## 2025-07-01 RX ADMIN — NAPROXEN 500 MG: 250 TABLET ORAL at 09:36

## 2025-07-01 RX ADMIN — Medication 1000 MCG: at 09:35

## 2025-07-01 RX ADMIN — HYDROXYZINE HYDROCHLORIDE 25 MG: 25 TABLET, FILM COATED ORAL at 20:42

## 2025-07-01 RX ADMIN — FERROUS SULFATE TAB 325 MG (65 MG ELEMENTAL FE) 1 TABLET: 325 (65 FE) TAB at 09:35

## 2025-07-01 ASSESSMENT — PAIN SCALES - PAIN ASSESSMENT IN ADVANCED DEMENTIA (PAINAD)
TOTALSCORE: 3
TOTALSCORE: 5
CONSOLABILITY: DISTRACTED OR REASSURED BY VOICE/TOUCH
TOTALSCORE: 2
NEGVOCALIZATION: REPEATED TROUBLED CALLING OUT, LOUD MOANING/GROANING, CRYING
TOTALSCORE: MEDICATION (SEE MAR);REPOSITIONED;REST
NEGVOCALIZATION: OCCASIONAL MOAN/GROAN, LOW SPEECH, NEGATIVE/DISAPPROVING QUALITY
FACIALEXPRESSION: SMILING OR INEXPRESSIVE
BREATHING: NORMAL
TOTALSCORE: 6
NEGVOCALIZATION: OCCASIONAL MOAN/GROAN, LOW SPEECH, NEGATIVE/DISAPPROVING QUALITY
BREATHING: NORMAL
CONSOLABILITY: DISTRACTED OR REASSURED BY VOICE/TOUCH
FACIALEXPRESSION: SMILING OR INEXPRESSIVE
BODYLANGUAGE: RELAXED
NEGVOCALIZATION: OCCASIONAL MOAN/GROAN, LOW SPEECH, NEGATIVE/DISAPPROVING QUALITY
BREATHING: NORMAL
BODYLANGUAGE: TENSE, DISTRESSED PACING, FIDGETING
CONSOLABILITY: NO NEED TO CONSOLE
BREATHING: OCCASIONAL LABORED BREATHING, SHORT PERIOD OF HYPERVENTILATION
FACIALEXPRESSION: SMILING OR INEXPRESSIVE
FACIALEXPRESSION: SMILING OR INEXPRESSIVE
BODYLANGUAGE: RELAXED
TOTALSCORE: MEDICATION (SEE MAR);DISTRACTION;FOOD;REST
FACIALEXPRESSION: SAD, FRIGHTENED, FROWN
BODYLANGUAGE: TENSE, DISTRESSED PACING, FIDGETING
BODYLANGUAGE: RELAXED
TOTALSCORE: 1
TOTALSCORE: 0
FACIALEXPRESSION: SAD, FRIGHTENED, FROWN
BREATHING: NORMAL
TOTALSCORE: MEDICATION (SEE MAR)
NEGVOCALIZATION: OCCASIONAL MOAN/GROAN, LOW SPEECH, NEGATIVE/DISAPPROVING QUALITY
CONSOLABILITY: NO NEED TO CONSOLE
CONSOLABILITY: DISTRACTED OR REASSURED BY VOICE/TOUCH
CONSOLABILITY: DISTRACTED OR REASSURED BY VOICE/TOUCH
BODYLANGUAGE: TENSE, DISTRESSED PACING, FIDGETING
FACIALEXPRESSION: SMILING OR INEXPRESSIVE
BREATHING: NORMAL
BODYLANGUAGE: RELAXED
TOTALSCORE: 1
TOTALSCORE: DISTRACTION;REPOSITIONED;REST;FOOD
BREATHING: OCCASIONAL LABORED BREATHING, SHORT PERIOD OF HYPERVENTILATION
CONSOLABILITY: DISTRACTED OR REASSURED BY VOICE/TOUCH

## 2025-07-01 ASSESSMENT — PAIN - FUNCTIONAL ASSESSMENT
PAIN_FUNCTIONAL_ASSESSMENT: PAINAD (PAIN ASSESSMENT IN ADVANCED DEMENTIA SCALE)

## 2025-07-01 ASSESSMENT — COGNITIVE AND FUNCTIONAL STATUS - GENERAL
DAILY ACTIVITIY SCORE: 20
DRESSING REGULAR UPPER BODY CLOTHING: A LITTLE
STANDING UP FROM CHAIR USING ARMS: A LITTLE
DAILY ACTIVITIY SCORE: 11
HELP NEEDED FOR BATHING: A LOT
MOBILITY SCORE: 16
MOVING TO AND FROM BED TO CHAIR: A LITTLE
PERSONAL GROOMING: A LOT
WALKING IN HOSPITAL ROOM: A LITTLE
TOILETING: A LITTLE
EATING MEALS: A LITTLE
TURNING FROM BACK TO SIDE WHILE IN FLAT BAD: A LITTLE
TOILETING: TOTAL
DRESSING REGULAR UPPER BODY CLOTHING: A LOT
HELP NEEDED FOR BATHING: A LITTLE
DRESSING REGULAR LOWER BODY CLOTHING: A LITTLE
CLIMB 3 TO 5 STEPS WITH RAILING: TOTAL
DRESSING REGULAR LOWER BODY CLOTHING: TOTAL
MOVING FROM LYING ON BACK TO SITTING ON SIDE OF FLAT BED WITH BEDRAILS: A LITTLE

## 2025-07-01 ASSESSMENT — ACTIVITIES OF DAILY LIVING (ADL)
BATHING_ASSISTANCE: MAXIMAL
ADL_ASSISTANCE: NEEDS ASSISTANCE

## 2025-07-01 ASSESSMENT — PAIN SCALES - GENERAL
PAINLEVEL_OUTOF10: 0 - NO PAIN
PAINLEVEL_OUTOF10: 0 - NO PAIN
PAINLEVEL_OUTOF10: 5 - MODERATE PAIN

## 2025-07-01 NOTE — CARE PLAN
The patient's goals for the shift include      The clinical goals for the shift include patient will remain safe and free from harm or falls throughout the shift      Problem: Safety - Adult  Goal: Free from fall injury  Outcome: Progressing

## 2025-07-01 NOTE — CARE PLAN
The patient's goals for the shift include      The clinical goals for the shift include IV antibiotic, remain free from falls, comfort patien      Problem: Pain - Adult  Goal: Verbalizes/displays adequate comfort level or baseline comfort level  Outcome: Progressing     Problem: Safety - Adult  Goal: Free from fall injury  Outcome: Progressing     Problem: Discharge Planning  Goal: Discharge to home or other facility with appropriate resources  Outcome: Progressing     Problem: Chronic Conditions and Co-morbidities  Goal: Patient's chronic conditions and co-morbidity symptoms are monitored and maintained or improved  Outcome: Progressing     Problem: Nutrition  Goal: Nutrient intake appropriate for maintaining nutritional needs  Outcome: Progressing     Problem: Skin  Goal: Decreased wound size/increased tissue granulation at next dressing change  Outcome: Progressing  Goal: Participates in plan/prevention/treatment measures  Outcome: Progressing  Goal: Prevent/manage excess moisture  Outcome: Progressing  Goal: Prevent/minimize sheer/friction injuries  Outcome: Progressing  Goal: Promote/optimize nutrition  Outcome: Progressing  Goal: Promote skin healing  Outcome: Progressing     Problem: Pain  Goal: Takes deep breaths with improved pain control throughout the shift  Outcome: Progressing  Goal: Turns in bed with improved pain control throughout the shift  Outcome: Progressing  Goal: Walks with improved pain control throughout the shift  Outcome: Progressing  Goal: Performs ADL's with improved pain control throughout shift  Outcome: Progressing  Goal: Participates in PT with improved pain control throughout the shift  Outcome: Progressing  Goal: Free from opioid side effects throughout the shift  Outcome: Progressing  Goal: Free from acute confusion related to pain meds throughout the shift  Outcome: Progressing

## 2025-07-01 NOTE — CARE PLAN
The patient's goals for the shift include      The clinical goals for the shift include patient will remain safe and free from harm or falls throughout the shift      Problem: Skin  Goal: Prevent/manage excess moisture  Outcome: Progressing  Flowsheets (Taken 6/30/2025 2236)  Prevent/manage excess moisture: Cleanse incontinence/protect with barrier cream

## 2025-07-01 NOTE — ASSESSMENT & PLAN NOTE
Pneumonia  Anxiety  [Atrial flutter  Chronic low back pain  Leg weakness  Dementia  Depression  GERD  Hypertension        Continue current medication and antibiotic.  The patient has been started on Rocephin and azithromycin.  Give aerosol treatment with albuterol and Atrovent.  Mucinex.  Check sputum culture.  Give physical therapy and Occupational Therapy.  Control pain.  Possible discharge soon

## 2025-07-01 NOTE — PROGRESS NOTES
Occupational Therapy    Evaluation    Patient Name: Xiomy Rubio  MRN: [id number]  Department: [address]  Room: [facility]-A  Today's Date: 7/1/2025  Time Calculation  Start Time: 0928  Stop Time: 0941  Time Calculation (min): 13 min    Assessment  IP OT Assessment  OT Assessment: Patient is a 89 year old female admitted s/p fall and with bilateral PNA. Patient is requring assist of 2 for bed mobility and is unable to transfer at time of eval. She is requiring an increased need for assist with ADL tasks. Recommend skilled OT to address the above deficits and maximize patient's safety and independence with daily tasks.  Prognosis: Fair  Barriers to Discharge Home: Caregiver assistance, Cognition needs, Physical needs  Caregiver Assistance: Caregiver assistance needed per identified barriers - however, level of patient's required assistance exceeds assistance available at home  Cognition Needs: 24hr supervision for safety awareness needed, Insight of patient limited regarding functional ability/needs, Cognition-related high falls risk  Physical Needs: 24hr mobility assistance needed, 24hr ADL assistance needed, High falls risk due to function or environment  Evaluation/Treatment Tolerance: Other (Comment) (anxious, fearful of falling)  End of Session Communication: Bedside nurse  End of Session Patient Position: Bed, 3 rail up, Alarm on  Plan:  Treatment Interventions: ADL retraining, Functional transfer training, UE strengthening/ROM, Endurance training, Cognitive reorientation, Patient/family training, Neuromuscular reeducation, Compensatory technique education  OT Frequency: 2 times per week (during this acute inpatint hospitalization)  OT Discharge Recommendations: Moderate intensity level of continued care, 24 hr supervision due to cognition (Based on current functional status and rehab potential, patient is anticipated to tolerate and benefit from 5 or more days per week of skilled rehabilitative therapy after  discharge from this acute inpatient hospitalization.)  Equipment Recommended upon Discharge:  (TBD)  OT Recommended Transfer Status: Assist of 2  OT - OK to Discharge: Yes    Subjective   Current Problem:  1. Closed head injury, initial encounter        2. Pneumonia due to infectious organism, unspecified laterality, unspecified part of lung          OT Visit Info:  OT Received On: 07/01/25  General Visit Info:  General  Reason for Referral: decline in ADLs, fall, bilateral PNA  Referred By: Dr. Downs  Past Medical History Relevant to Rehab: dementia, anxiety, depression, GERD, HTN, bilateral hearing loss  Family/Caregiver Present: No  Prior to Session Communication: Bedside nurse  Patient Position Received: Bed, 3 rail up, Alarm on  Preferred Learning Style: verbal, visual  General Comment: Patient is a 89 year old female who presented to the ED s/p fall. Patient admditted with fall, bilateral PNA. She is cleared by nursing for therapy. She is in bed upon arrival, very anxious, agreeable to participate.  Precautions:  Hearing/Visual Limitations: [place], hearing aides  Medical Precautions: Fall precautions    Pain:  Pain Assessment  Pain Assessment: PAINAD (Pain Assessment in Advanced Dementia Scale)  0-10 (Numeric) Pain Score: 5 - Moderate pain  Pain Type: Acute pain, Chronic pain  Pain Location: Hip  Pain Orientation: Right, Left  Pain Interventions: Repositioned (RN aware)    Objective   Cognition:  Overall Cognitive Status: Impaired  Orientation Level: Disoriented to place, Disoriented to time, Disoriented to situation  Cognition Comments: patient is very anxious, fearful of falling. difficulty following commands due to anxious tendencies at times. tearful due to fear  Safety/Judgement:  (decreased)  Insight: Severe  Impulsive: Mildly     Home Living:  Type of Home: House  Lives With: Adult children (Son)  Home Adaptive Equipment:  (rollator)  Home Layout: One level  Home Access: Stairs to enter with  rails  Entrance Stairs-Rails:  (one railing)  Entrance Stairs-Number of Steps: 3  Bathroom Shower/Tub: Tub/shower unit  Bathroom Toilet: Standard  Bathroom Equipment: Grab bars in shower, Shower chair with back  Home Living Comments: information gathered from EMR. patient unable to provide any intake information   Prior Function:  Level of [place]: Needs assistance with ADLs, Needs assistance with homemaking  Receives Help From: Family  ADL Assistance: Needs assistance (assume assist?)  Homemaking Assistance: Needs assistance (family manages?)  Ambulatory Assistance: Independent (with rollator)  Prior Function Comments: patient is a poor historian. infomation gathered from EMR  IADL History:  Homemaking Responsibilities: No  IADL Comments: family manages?  ADL:  Eating Assistance: Stand by  Eating Deficit: Supervision/safety (per clinical judgement)  Grooming Assistance: Moderate  Grooming Deficit: Setup, Steadying, Verbal cueing, Supervision/safety, Increased time to complete (per clinical judgement)  Bathing Assistance: Maximal  Bathing Deficit: Setup, Verbal cueing, Supervision/safety, Increased time to complete , Perineal area, Buttocks, Right upper leg, Left upper leg, Right lower leg including foot, Left lower leg including foot (per clinical judgement)  UE Dressing Assistance: Maximal  UE Dressing Deficit: Thread RUE, Thread LUE, Pull over head, Pull around back, Pull down in back  LE Dressing Assistance: Total  LE Dressing Deficit: Don/doff R sock, Don/doff L sock  Toileting Assistance with Device: Total  Toileting Deficit: Perineal hygiene  Activity Tolerance:  Endurance: Decreased tolerance for upright activites  Activity Tolerance Comments: anxious, fearful of falling  Bed Mobility/Transfers: Bed Mobility  Bed Mobility: Yes  Bed Mobility 1  Bed Mobility 1: Supine to sitting  Level of Assistance 1: Maximum assistance  Bed Mobility Comments 1: assist to move B LE and raise trunk  Bed Mobility 2  Bed  Mobility  2: Sitting to supine  Level of Assistance 2: Maximum assistance, +2  Bed Mobility Comments 2: assist to support trunk and raise B LE    Transfers  Transfer: No (not safe to attempt and patient declines)    Sitting Balance:  Static Sitting Balance  Static Sitting-Balance Support: Feet supported, Bilateral upper extremity supported  Static Sitting-Level of Assistance: Maximum assistance  Static Sitting-Comment/Number of Minutes: patient anxious, tearful at EOB, very posterior with Max A to right self    IADL's:   Homemaking Responsibilities: No  IADL Comments: family manages?  Vision: Vision - Basic Assessment  Current Vision: Wears glasses all the time  Sensation:  Light Touch: No apparent deficits  Strength:  Strength Comments: B UE ~3/5 grossly. observed functionally  Coordination:  Movements are Fluid and Coordinated: No  Coordination Comment: decreased rate/accuracy of movements   Hand Function:  Hand Function  Gross Grasp: Functional  Coordination: Functional  Extremities: RUE   RUE : Exceptions to WFL (generalized weakness) and LUE   LUE: Exceptions to WFL (generalized weakness)    Outcome Measures: [facility] Daily Activity  Putting on and taking off regular lower body clothing: Total  Bathing (including washing, rinsing, drying): A lot  Putting on and taking off regular upper body clothing: A lot  Toileting, which includes using toilet, bedpan or urinal: Total  Taking care of personal grooming such as brushing teeth: A lot  Eating Meals: A little  Daily Activity - Total Score: 11    Education Documentation  Body Mechanics, taught by Farnaz Harvey OT at 7/1/2025  9:55 AM.  Learner: Patient  Readiness: Acceptance  Method: Explanation, Demonstration  Response: Needs Reinforcement, No Evidence of Learning  Comment: Reviewed OT POC. Education on safe functional transfers, importance of OOB activity participation, and fall risks    Precautions, taught by Farnaz Harvey OT at 7/1/2025  9:55 AM.  Learner:  Patient  Readiness: Acceptance  Method: Explanation, Demonstration  Response: Needs Reinforcement, No Evidence of Learning  Comment: Reviewed OT POC. Education on safe functional transfers, importance of OOB activity participation, and fall risks    ADL Training, taught by Farnaz Harvey OT at 7/1/2025  9:55 AM.  Learner: Patient  Readiness: Acceptance  Method: Explanation, Demonstration  Response: Needs Reinforcement, No Evidence of Learning  Comment: Reviewed OT POC. Education on safe functional transfers, importance of OOB activity participation, and fall risks    Education Comments  No comments found.      Goals:   Encounter Problems       Encounter Problems (Active)       OT Goals       UB ADLs (Progressing)       Start:  07/01/25    Expected End:  07/29/25       Patient will complete UB ADL tasks, with supervision using AE need in order to increase patient's safety and independence with self-care tasks.          LB ADLs (Progressing)       Start:  07/01/25    Expected End:  07/29/25       Patient will complete LB ADL tasks, with minimal assist  using AE need in order to increase patient's safety and independence with self-care tasks.          Functional Transfers (Progressing)       Start:  07/01/25    Expected End:  07/29/25       Patient will complete functional transfers with stand by assist using least restrictive device, in order to increase patient's safety and independence with daily tasks.          Functional Mobility  (Progressing)       Start:  07/01/25    Expected End:  07/29/25       Patient will demonstrate the ability to complete item retrieval and functional mobility with stand by assist in order to increase safety and independence with daily tasks.          Activity Tolerance  (Progressing)       Start:  07/01/25    Expected End:  07/29/25       Patient will demonstrate the ability to participate in functional activity at least >/= 25 minutes in order to increase patient's safety and  independence with daily tasks.

## 2025-07-01 NOTE — PROGRESS NOTES
Spiritual Care Visit  Spiritual Care Request    Reason for Visit:  Routine Visit: Introduction     Request Received From:       Focus of Care:  Visited With: Patient         Refer to :          Spiritual Care Assessment    Spiritual Assessment:                      Care Provided:  Intended Effects: Establish rapport and connectedness, Build relationship of care and support, Convey a calming presence, Demonstrate caring and concern, Lessen someone's feelings of isolation  Methods: Offer emotional support    Sense of Community and or Quaker Affiliation:  Baptism         Addressed Needs/Concerns and/or Mary Through:  Quaker Encounters  Quaker Needs: Prayer       Outcome:  Outcome of Spiritual Care Visit: Identifying spiritual/emotional issues, Comfort/healing presence     Advance Directives:         Spiritual Care Annotation        Annotation: Patient received a visit from the Spiritual care volunteer while admitted.  This patient was offered emotional and spiritual support no other needs were expressed. Spiritual care will remain available for support as requested.     [place] Initial : Cinthya MOSQUEDA    Reviewed and Submitted by Chaplain Mooney

## 2025-07-01 NOTE — CARE PLAN
The patient's goals for the shift include  safety, rest, feel better.     The clinical goals for the shift include monitor labs and VS, manage pain, comfort and safety, PT/OT, maintain safety, no falls, IV antibx, promote rest, re-orient as needed, discharge planning.     No barriers toward meeting these goals. Call light and personal belongings within reach, fall risk interventions in place. Plan of care ongoing, no additional needs at this time.       Problem: Pain - Adult  Goal: Verbalizes/displays adequate comfort level or baseline comfort level  Outcome: Progressing     Problem: Safety - Adult  Goal: Free from fall injury  Outcome: Progressing     Problem: Discharge Planning  Goal: Discharge to home or other facility with appropriate resources  Outcome: Progressing     Problem: Chronic Conditions and Co-morbidities  Goal: Patient's chronic conditions and co-morbidity symptoms are monitored and maintained or improved  Outcome: Progressing     Problem: Nutrition  Goal: Nutrient intake appropriate for maintaining nutritional needs  Outcome: Progressing     Problem: Skin  Goal: Decreased wound size/increased tissue granulation at next dressing change  Outcome: Progressing  Goal: Participates in plan/prevention/treatment measures  Outcome: Progressing  Goal: Prevent/manage excess moisture  Outcome: Progressing  Goal: Prevent/minimize sheer/friction injuries  Outcome: Progressing  Goal: Promote/optimize nutrition  Outcome: Progressing  Goal: Promote skin healing  Outcome: Progressing     Problem: Pain  Goal: Takes deep breaths with improved pain control throughout the shift  Outcome: Progressing  Goal: Turns in bed with improved pain control throughout the shift  Outcome: Progressing  Goal: Walks with improved pain control throughout the shift  Outcome: Progressing  Goal: Performs ADL's with improved pain control throughout shift  Outcome: Progressing  Goal: Participates in PT with improved pain control throughout  the shift  Outcome: Progressing  Goal: Free from opioid side effects throughout the shift  Outcome: Progressing  Goal: Free from acute confusion related to pain meds throughout the shift  Outcome: Progressing     Problem: Fall/Injury  Goal: Not fall by end of shift  Outcome: Progressing  Goal: Be free from injury by end of the shift  Outcome: Progressing  Goal: Verbalize understanding of personal risk factors for fall in the hospital  Outcome: Progressing  Goal: Verbalize understanding of risk factor reduction measures to prevent injury from fall in the home  Outcome: Progressing  Goal: Use assistive devices by end of the shift  Outcome: Progressing  Goal: Pace activities to prevent fatigue by end of the shift  Outcome: Progressing

## 2025-07-01 NOTE — PROGRESS NOTES
07/01/25 1048   Discharge Planning   Living Arrangements Children   Support Systems Children   Assistance Needed walker, [place]   Type of Residence Private residence   Number of Stairs to Enter Residence 2   Number of Stairs Within Residence 14  (all needs 1st floor)   Home or Post Acute Services Post acute facilities (Rehab/SNF/etc)   Type of Post Acute Facility Services Rehab   Expected Discharge Disposition SNF   Does the patient need discharge transport arranged? Yes   [facility] coordination needed? Yes   Has discharge transport been arranged? No   Patient Choice   Provider Choice list and CMS website (https://medicare.gov/care-compare#search) for post-acute Quality and Resource Measure Data were provided and reviewed with: Family   Patient / Family choosing to utilize agency / facility established prior to hospitalization No     Currently patient is Observation and wouldn't qualify for Skilled rehab.   Patient can barely sit on edge of bed.   Rn-TCC following    [id number]  Choices received from son.  Made inpatient today.  Will need 3 midnights.   #1 [place], #2 [facility], #3 [facility], #4 [facility].

## 2025-07-01 NOTE — H&P
"History Of Present Illness  Xiomy Rubio is a 89 y.o. female presenting with complaint of fall.  Patient was walking to her car for an appointment using her walker.  she started feeling dizziness and her leg gave out and she fell on the floor.  With this complaint she was brought to the emergency room.  In the emergency room and initial workup was done the patient was found to have bilateral pneumonia and possible pulmonary edema on CAT scan.  Patient denied any loss of consciousness..     Past Medical History  Medical History[1]    Surgical History  Surgical History[2]     Social History  She reports that she has never smoked. She has never used smokeless tobacco. She reports that she does not currently use drugs. She reports that she does not drink alcohol.    Family History  Family History[3]     Allergies  Adhesive tape-silicones, Cortisone, Penicillin, Prednisone, and Silicone    Review of Systems  I reviewed all systems reviewed as above otherwise is negative.  Physical Exam  HEENT:  Head externally atraumatic, no pallor, no icterus, extraocular movements intact, pupils reactive to light, oral mucosa moist and throat clear.  Neck:  Supple, no JVP, no palpable adenopathy or thyromegaly.  No carotid bruit.  Chest:  Clear to auscultation and resonant.  Heart:  Regular rate and rhythm, no murmur or gallop could be appreciated.  Abdomen:  Soft, nontender, bowel sounds present, normoactive, no palpable hepatosplenomegaly.  Extremities:  No edema, pulses present, no cyanosis or clubbing.  CNS:  Patient alert, oriented to time, place and person.  Power 5/5 all over and deep tendon reflexes symmetrical, cranial nerves 2-12 grossly intact.  Skin:  No active rash.  Musculoskeletal:  No joint swelling or erythema, range of movement normal.  Last Recorded Vitals  Heart Rate:  [63-80]   Temp:  [36.6 °C (97.8 °F)-37.3 °C (99.1 °F)]   Resp:  [17-22]   BP: (128-175)/(55-80)   Height:  [162.6 cm (5' 4\")]   Weight:  [66.7 kg " (147 lb)]   SpO2:  [97 %-100 %]       Relevant Results        Results for orders placed or performed during the hospital encounter of 06/30/25 (from the past 24 hours)   ECG 12 Lead   Result Value Ref Range    Ventricular Rate 82 BPM    Atrial Rate 82 BPM    ME Interval 160 ms    QRS Duration 94 ms    QT Interval 418 ms    QTC Calculation(Bazett) 488 ms    P Axis -7 degrees    R Axis -19 degrees    T Axis 80 degrees    QRS Count 14 beats    Q Onset 222 ms    P Onset 142 ms    P Offset 200 ms    T Offset 431 ms    QTC Fredericia 463 ms   CBC and Auto Differential   Result Value Ref Range    WBC 9.4 4.4 - 11.3 x10*3/uL    nRBC 0.0 0.0 - 0.0 /100 WBCs    RBC 5.09 4.00 - 5.20 x10*6/uL    Hemoglobin 14.0 12.0 - 16.0 g/dL    Hematocrit 43.1 36.0 - 46.0 %    MCV 85 80 - 100 fL    MCH 27.5 26.0 - 34.0 pg    MCHC 32.5 32.0 - 36.0 g/dL    RDW 13.8 11.5 - 14.5 %    Platelets 304 150 - 450 x10*3/uL    Neutrophils % 81.9 40.0 - 80.0 %    Immature Granulocytes %, Automated 1.4 (H) 0.0 - 0.9 %    Lymphocytes % 8.9 13.0 - 44.0 %    Monocytes % 7.1 2.0 - 10.0 %    Eosinophils % 0.2 0.0 - 6.0 %    Basophils % 0.5 0.0 - 2.0 %    Neutrophils Absolute 7.69 (H) 1.60 - 5.50 x10*3/uL    Immature Granulocytes Absolute, Automated 0.13 0.00 - 0.50 x10*3/uL    Lymphocytes Absolute 0.84 0.80 - 3.00 x10*3/uL    Monocytes Absolute 0.67 0.05 - 0.80 x10*3/uL    Eosinophils Absolute 0.02 0.00 - 0.40 x10*3/uL    Basophils Absolute 0.05 0.00 - 0.10 x10*3/uL   Comprehensive Metabolic Panel   Result Value Ref Range    Glucose 148 (H) 74 - 99 mg/dL    Sodium 136 136 - 145 mmol/L    Potassium 3.0 (L) 3.5 - 5.3 mmol/L    Chloride 97 (L) 98 - 107 mmol/L    Bicarbonate 26 21 - 32 mmol/L    Anion Gap 16 mmol/L    Urea Nitrogen 18 6 - 23 mg/dL    Creatinine 0.65 0.50 - 1.05 mg/dL    eGFR 82 >60 mL/min/1.73m*2    Calcium 9.1 8.6 - 10.3 mg/dL    Albumin 3.9 3.4 - 5.0 g/dL    Alkaline Phosphatase 91 33 - 136 U/L    Total Protein 6.4 6.4 - 8.2 g/dL    AST 14 9 -  39 U/L    Bilirubin, Total 1.4 (H) 0.0 - 1.2 mg/dL    ALT 13 7 - 45 U/L   Lipase   Result Value Ref Range    Lipase     Magnesium   Result Value Ref Range    Magnesium 1.93 1.60 - 2.40 mg/dL   Troponin I, High Sensitivity, Initial   Result Value Ref Range    Troponin I, High Sensitivity 10 0 - 13 ng/L   Urinalysis with Reflex Culture and Microscopic   Result Value Ref Range    Color, Urine Light-Yellow Light-Yellow, Yellow, Dark-Yellow    Appearance, Urine Turbid (N) Clear    Specific Gravity, Urine 1.012 1.005 - 1.035    pH, Urine 7.5 5.0, 5.5, 6.0, 6.5, 7.0, 7.5, 8.0    Protein, Urine NEGATIVE NEGATIVE, 10 (TRACE), 20 (TRACE) mg/dL    Glucose, Urine Normal Normal mg/dL    Blood, Urine NEGATIVE NEGATIVE mg/dL    Ketones, Urine NEGATIVE NEGATIVE mg/dL    Bilirubin, Urine NEGATIVE NEGATIVE mg/dL    Urobilinogen, Urine Normal Normal mg/dL    Nitrite, Urine NEGATIVE NEGATIVE    Leukocyte Esterase, Urine NEGATIVE NEGATIVE   Troponin, High Sensitivity, 1 Hour   Result Value Ref Range    Troponin I, High Sensitivity 12 0 - 13 ng/L     Prior to Admission medications    Medication Sig Start Date End Date Taking? Authorizing Provider   acetaminophen (Tylenol) 325 mg tablet Take 3 tablets (975 mg) by mouth every 8 hours.  Patient taking differently: Take 2 tablets (650 mg) by mouth once daily as needed for mild pain (1 - 3). 2/7/25  Yes Katie Junior PA-C   busPIRone (Buspar) 10 mg tablet Take 1 tablet (10 mg) by mouth 3 times a day.   Yes Historical Provider, MD   calcium carbonate-vitamin D3 (Oysco 500/D) 500 mg-5 mcg (200 unit) tablet Take 1 tablet by mouth 2 times a day. 3/31/25  Yes Lian Hernandez DO   cyanocobalamin (Vitamin B-12) 1,000 mcg tablet Take 1 tablet (1,000 mcg) by mouth once daily. 2/8/25  Yes Katie Junior PA-C   ferrous sulfate 325 mg (65 mg elemental) tablet TAKE 1 TABLET BY MOUTH EVERY OTHER DAY 5/19/25 11/15/25 Yes Lian Hernandez DO   latanoprost (Xalatan) 0.005 % ophthalmic solution Administer 1  drop into both eyes once daily at bedtime. 9/2/21  Yes Historical Provider, MD   lidocaine 4 % patch Place 1 patch over 12 hours on the skin once daily. Remove & discard patch within 12 hours or as directed by MD. 2/8/25  Yes Katie Junior PA-C   naproxen sodium (Aleve) 220 mg tablet Take 2 tablets (440 mg) by mouth once daily as needed for mild pain (1 - 3).   Yes Historical Provider, MD   pantoprazole (ProtoNix) 20 mg EC tablet TAKE 1 TABLET BY MOUTH EVERY DAY  Patient taking differently: Take 1 tablet (20 mg) by mouth once daily in the morning. Take before meals. 1/13/25  Yes Lian Hernandez DO   polyethylene glycol (Miralax) 17 gram/dose powder Mix 17 g of powder and drink once daily as needed for constipation.   Yes Historical Provider, MD   sertraline (Zoloft) 50 mg tablet Take 1 tablet (50 mg) by mouth once daily.   Yes Historical Provider, MD   arm brace (Wrist Brace Medium) misc 2 each once daily at bedtime. 5/29/25   Lian Hernandez DO   busPIRone (Buspar) 10 mg tablet Take 1 tablet (10 mg) by mouth 3 times a day. 6/28/24 2/3/25  LEX [facility]   calcium citrate (Calcitrate) 200 mg (950 mg) tablet Take 3 tablets (600 mg) by mouth 2 times a day.  Patient not taking: Reported on 5/29/2025 2/7/25   Katie Junior PA-C   cholecalciferol (Vitamin D-3) 50 MCG (2000 UT) tablet Take 1 tablet (2,000 Units) by mouth once daily.  Patient not taking: Reported on 6/30/2025 2/8/25   Katie Junior PA-C   ondansetron ODT (Zofran-ODT) 4 mg disintegrating tablet Dissolve 1 tablet (4 mg) in the mouth every 8 hours if needed for nausea or vomiting.  Patient not taking: Reported on 6/30/2025 2/7/25   Katie Junior PA-C   sennosides (senna) 8.6 mg capsule Take 1 capsule (8.6 mg) by mouth once daily at bedtime.  Patient not taking: Reported on 5/29/2025 10/2/24   Lian C Ray, DO     Current Medications[4]  Imaging  XR chest 1 view  Result Date: 6/30/2025  Mild interstitial infiltrates  bilaterally, as above.   MACRO: None.   Signed by: Archie Shane 6/30/2025 12:17 PM Dictation workstation:   [id number]    CT cervical spine wo IV contrast  Result Date: 6/30/2025  Reversal of the normal cervical lordosis, due to muscle spasm and/or positioning. Cervical spondylosis without acute fracture or facet subluxation.   Signed by: Sunil Salamanca 6/30/2025 12:14 PM Dictation workstation:   [id number]    CT head wo IV contrast  Result Date: 6/30/2025  Age related changes again present without acute intracranial process.   Signed by: Sunil Salamanca 6/30/2025 12:11 PM Dictation workstation:   [id number]    XR hip left with pelvis when performed 2 or 3 views  Result Date: 6/30/2025  No acute fracture or malalignment.   Advanced left hip degenerative change.   Partially imaged cephalomedullary nail fixation intertrochanteric left femur fracture is intact along its visualized portion.   Signed by: Dominick Kwan 6/30/2025 12:07 PM Dictation workstation:   [id number]      Cardiology, Vascular, and Other Imaging  ECG 12 Lead  Result Date: 6/30/2025  Normal sinus rhythm Left ventricular hypertrophy with repolarization abnormality Abnormal ECG When compared with ECG of 03-FEB-2025 19:03, No significant change was found Confirmed by Sohan Casarez ([id number]) on 6/30/2025 2:41:16 PM      No results found for the last 90 days.       Assessment/Plan   Assessment & Plan  Closed head injury, initial encounter    Pneumonia  Anxiety  [Atrial flutter  Chronic low back pain  Leg weakness  Dementia  Depression  GERD  Hypertension        Continue current medication and antibiotic.  The patient has been started on Rocephin and azithromycin.  Give aerosol treatment with albuterol and Atrovent.  Mucinex.  Check sputum culture.  Give physical therapy and Occupational Therapy.  Control pain.  Possible discharge soon                   Honorio Downs MD        [1]   Past Medical History:  Diagnosis Date    Anxiety     At risk for  falling     Closed compression fracture of L4 lumbar vertebra with delayed healing, subsequent encounter     Closed head injury     Closed wedge compression fracture of T11 vertebra (Multi)     Constipation     Dementia     Depression     Facial fracture (Multi)     GERD (gastroesophageal reflux disease)     Hypertension     L4 vertebral fracture (Multi)     Left rib fracture     Memory loss     Nasal bone fracture     Osteoarthritis     Osteoporosis     Other conditions influencing health status 11/24/2017    History of cough    Pain in joints of unspecified hand 01/05/2016    Pain, hand joint    Personal history of diseases of the skin and subcutaneous tissue 11/24/2017    History of cellulitis    Personal history of Methicillin resistant Staphylococcus aureus infection 11/24/2017    History of methicillin resistant Staphylococcus aureus infection    Personal history of other diseases of the respiratory system 12/05/2017    History of acute bronchitis    Personal history of other specified conditions 05/04/2017    History of dizziness    Personal history of pneumonia (recurrent)     History of pneumonia    Rheumatoid arthritis     T11 vertebral fracture (Multi)     Weakness    [2]   Past Surgical History:  Procedure Laterality Date    OTHER SURGICAL HISTORY  08/29/2017    Hip Surgery Left   [3]   Family History  Problem Relation Name Age of Onset    No Known Problems Mother      No Known Problems Father     [4]   Current Facility-Administered Medications:     acetaminophen (Tylenol) tablet 650 mg, 650 mg, oral, q4h PRN, 650 mg at 06/30/25 1524 **OR** acetaminophen (Tylenol) oral liquid 650 mg, 650 mg, oral, q4h PRN, 650 mg at 06/30/25 2119 **OR** acetaminophen (Tylenol) suppository 650 mg, 650 mg, rectal, q4h PRN, Honorio Downs MD    azithromycin (Zithromax) tablet 250 mg, 250 mg, oral, q24h, Honorio Downs MD    benzocaine-menthol (Cepastat Sore Throat) lozenge 1 lozenge, 1 lozenge, Mouth/Throat, q2h  PRN, Honorio Downs MD    busPIRone (Buspar) tablet 10 mg, 10 mg, oral, TID, Honorio Downs MD, 10 mg at 06/30/25 2116    calcium carbonate-vitamin D3 500 mg-5 mcg (200 unit) tablet 1 tablet, 1 tablet, oral, BID, Honorio Downs MD, 1 tablet at 06/30/25 2116    cefTRIAXone (Rocephin) 1 g in dextrose (iso) IV 50 mL, 1 g, intravenous, q24h, Honorio Downs MD    cyanocobalamin (Vitamin B-12) tablet 1,000 mcg, 1,000 mcg, oral, Daily, Honorio Downs MD    dextromethorphan-guaifenesin (Robitussin DM)  mg/5 mL oral liquid 5 mL, 5 mL, oral, q4h PRN, Honorio Downs MD    enoxaparin (Lovenox) syringe 40 mg, 40 mg, subcutaneous, q24h, Honorio Downs MD, 40 mg at 06/30/25 1525    ferrous sulfate 325 mg (65 mg elemental) tablet 1 tablet, 65 mg of elemental iron, oral, Every other day, Honorio Downs MD    guaiFENesin (Mucinex) 12 hr tablet 600 mg, 600 mg, oral, q12h PRN, Honorio Downs MD    guaiFENesin (Mucinex) 12 hr tablet 600 mg, 600 mg, oral, BID PRN, Honorio Downs MD    ipratropium-albuteroL (Duo-Neb) 0.5-2.5 mg/3 mL nebulizer solution 3 mL, 3 mL, nebulization, q6h PRN, Honorio Downs MD    latanoprost (Xalatan) 0.005 % ophthalmic solution 1 drop, 1 drop, Both Eyes, Nightly, Honorio Downs MD, 1 drop at 06/30/25 2116    lidocaine 4 % patch 1 patch, 1 patch, transdermal, Daily, Honorio Downs MD    naproxen (Naprosyn) tablet 500 mg, 500 mg, oral, Daily PRN, Honorio Downs MD    ondansetron ODT (Zofran-ODT) disintegrating tablet 4 mg, 4 mg, oral, q8h PRN **OR** ondansetron (Zofran) injection 4 mg, 4 mg, intravenous, q8h PRN, Honorio Donws MD    pantoprazole (ProtoNix) EC tablet 20 mg, 20 mg, oral, Daily, Honorio Downs MD    polyethylene glycol (Glycolax, Miralax) packet 17 g, 17 g, oral, Daily PRN, Honorio Downs MD    sertraline (Zoloft) tablet 50 mg, 50 mg, oral, Daily, Honorio Downs MD

## 2025-07-02 LAB
ANION GAP SERPL CALCULATED.3IONS-SCNC: 8 MMOL/L (ref 10–20)
BASOPHILS # BLD AUTO: 0.08 X10*3/UL (ref 0–0.1)
BASOPHILS NFR BLD AUTO: 1.1 %
BUN SERPL-MCNC: 17 MG/DL (ref 6–23)
CALCIUM SERPL-MCNC: 8.6 MG/DL (ref 8.6–10.3)
CHLORIDE SERPL-SCNC: 105 MMOL/L (ref 98–107)
CO2 SERPL-SCNC: 31 MMOL/L (ref 21–32)
CREAT SERPL-MCNC: 0.67 MG/DL (ref 0.5–1.05)
EGFRCR SERPLBLD CKD-EPI 2021: 81 ML/MIN/1.73M*2
EOSINOPHIL # BLD AUTO: 0.14 X10*3/UL (ref 0–0.4)
EOSINOPHIL NFR BLD AUTO: 1.9 %
ERYTHROCYTE [DISTWIDTH] IN BLOOD BY AUTOMATED COUNT: 14.3 % (ref 11.5–14.5)
GLUCOSE SERPL-MCNC: 112 MG/DL (ref 74–99)
HCT VFR BLD AUTO: 43.1 % (ref 36–46)
HGB BLD-MCNC: 13.9 G/DL (ref 12–16)
IMM GRANULOCYTES # BLD AUTO: 0.15 X10*3/UL (ref 0–0.5)
IMM GRANULOCYTES NFR BLD AUTO: 2 % (ref 0–0.9)
LYMPHOCYTES # BLD AUTO: 1.98 X10*3/UL (ref 0.8–3)
LYMPHOCYTES NFR BLD AUTO: 26.9 %
MCH RBC QN AUTO: 27.9 PG (ref 26–34)
MCHC RBC AUTO-ENTMCNC: 32.3 G/DL (ref 32–36)
MCV RBC AUTO: 86 FL (ref 80–100)
MONOCYTES # BLD AUTO: 0.65 X10*3/UL (ref 0.05–0.8)
MONOCYTES NFR BLD AUTO: 8.8 %
NEUTROPHILS # BLD AUTO: 4.37 X10*3/UL (ref 1.6–5.5)
NEUTROPHILS NFR BLD AUTO: 59.3 %
NRBC BLD-RTO: 0 /100 WBCS (ref 0–0)
PLATELET # BLD AUTO: 247 X10*3/UL (ref 150–450)
POTASSIUM SERPL-SCNC: 3.5 MMOL/L (ref 3.5–5.3)
RBC # BLD AUTO: 4.99 X10*6/UL (ref 4–5.2)
SODIUM SERPL-SCNC: 140 MMOL/L (ref 136–145)
WBC # BLD AUTO: 7.4 X10*3/UL (ref 4.4–11.3)

## 2025-07-02 PROCEDURE — 2500000002 HC RX 250 W HCPCS SELF ADMINISTERED DRUGS (ALT 637 FOR MEDICARE OP, ALT 636 FOR OP/ED): Performed by: INTERNAL MEDICINE

## 2025-07-02 PROCEDURE — 1100000001 HC PRIVATE ROOM DAILY

## 2025-07-02 PROCEDURE — 85025 COMPLETE CBC W/AUTO DIFF WBC: CPT | Performed by: INTERNAL MEDICINE

## 2025-07-02 PROCEDURE — 97530 THERAPEUTIC ACTIVITIES: CPT | Mod: GP

## 2025-07-02 PROCEDURE — 80048 BASIC METABOLIC PNL TOTAL CA: CPT | Performed by: INTERNAL MEDICINE

## 2025-07-02 PROCEDURE — 36415 COLL VENOUS BLD VENIPUNCTURE: CPT | Performed by: INTERNAL MEDICINE

## 2025-07-02 PROCEDURE — 97161 PT EVAL LOW COMPLEX 20 MIN: CPT | Mod: GP

## 2025-07-02 PROCEDURE — 2500000005 HC RX 250 GENERAL PHARMACY W/O HCPCS: Performed by: INTERNAL MEDICINE

## 2025-07-02 PROCEDURE — 2500000001 HC RX 250 WO HCPCS SELF ADMINISTERED DRUGS (ALT 637 FOR MEDICARE OP): Performed by: INTERNAL MEDICINE

## 2025-07-02 PROCEDURE — 2500000004 HC RX 250 GENERAL PHARMACY W/ HCPCS (ALT 636 FOR OP/ED): Performed by: INTERNAL MEDICINE

## 2025-07-02 RX ORDER — CEFUROXIME AXETIL 500 MG/1
500 TABLET ORAL 2 TIMES DAILY
Qty: 10 TABLET | Refills: 0 | Status: SHIPPED | OUTPATIENT
Start: 2025-07-02 | End: 2025-07-07

## 2025-07-02 RX ORDER — ACETAMINOPHEN 325 MG/1
650 TABLET ORAL DAILY PRN
Start: 2025-07-02

## 2025-07-02 RX ORDER — AMLODIPINE BESYLATE 10 MG/1
10 TABLET ORAL DAILY
Status: DISCONTINUED | OUTPATIENT
Start: 2025-07-02 | End: 2025-07-02

## 2025-07-02 RX ORDER — HYDROXYZINE HYDROCHLORIDE 25 MG/1
25 TABLET, FILM COATED ORAL EVERY 8 HOURS PRN
Start: 2025-07-02

## 2025-07-02 RX ORDER — AZITHROMYCIN 500 MG/1
500 TABLET, FILM COATED ORAL DAILY
Qty: 3 TABLET | Refills: 0 | Status: SHIPPED | OUTPATIENT
Start: 2025-07-02 | End: 2025-07-05

## 2025-07-02 RX ORDER — GUAIFENESIN 600 MG/1
600 TABLET, EXTENDED RELEASE ORAL EVERY 12 HOURS PRN
Start: 2025-07-02

## 2025-07-02 RX ORDER — ACETAMINOPHEN 500 MG
5 TABLET ORAL NIGHTLY PRN
Start: 2025-07-02

## 2025-07-02 RX ORDER — POTASSIUM CHLORIDE 1.5 G/1.58G
40 POWDER, FOR SOLUTION ORAL ONCE
Status: COMPLETED | OUTPATIENT
Start: 2025-07-02 | End: 2025-07-02

## 2025-07-02 RX ORDER — AMLODIPINE BESYLATE 10 MG/1
10 TABLET ORAL DAILY
Status: COMPLETED | OUTPATIENT
Start: 2025-07-02 | End: 2025-07-02

## 2025-07-02 RX ORDER — IPRATROPIUM BROMIDE AND ALBUTEROL SULFATE 2.5; .5 MG/3ML; MG/3ML
3 SOLUTION RESPIRATORY (INHALATION) EVERY 6 HOURS PRN
Qty: 180 ML | Refills: 11 | Status: SHIPPED | OUTPATIENT
Start: 2025-07-02

## 2025-07-02 RX ADMIN — BUSPIRONE HYDROCHLORIDE 10 MG: 10 TABLET ORAL at 14:55

## 2025-07-02 RX ADMIN — Medication 1000 MCG: at 08:57

## 2025-07-02 RX ADMIN — ACETAMINOPHEN 650 MG: 325 TABLET ORAL at 22:59

## 2025-07-02 RX ADMIN — AZITHROMYCIN DIHYDRATE 250 MG: 250 TABLET ORAL at 08:57

## 2025-07-02 RX ADMIN — AMLODIPINE BESYLATE 10 MG: 10 TABLET ORAL at 07:01

## 2025-07-02 RX ADMIN — Medication 1 TABLET: at 20:24

## 2025-07-02 RX ADMIN — CEFTRIAXONE 1 G: 1 INJECTION, SOLUTION INTRAVENOUS at 08:56

## 2025-07-02 RX ADMIN — BUSPIRONE HYDROCHLORIDE 10 MG: 10 TABLET ORAL at 08:57

## 2025-07-02 RX ADMIN — HYDROCHLOROTHIAZIDE: 12.5 CAPSULE ORAL at 08:57

## 2025-07-02 RX ADMIN — ENOXAPARIN SODIUM 40 MG: 40 INJECTION SUBCUTANEOUS at 14:55

## 2025-07-02 RX ADMIN — BUSPIRONE HYDROCHLORIDE 10 MG: 10 TABLET ORAL at 20:24

## 2025-07-02 RX ADMIN — LIDOCAINE 4% 1 PATCH: 40 PATCH TOPICAL at 08:56

## 2025-07-02 RX ADMIN — LATANOPROST 1 DROP: 50 SOLUTION OPHTHALMIC at 20:38

## 2025-07-02 RX ADMIN — HYDROXYZINE HYDROCHLORIDE 25 MG: 25 TABLET, FILM COATED ORAL at 20:25

## 2025-07-02 RX ADMIN — ACETAMINOPHEN 650 MG: 325 TABLET ORAL at 13:59

## 2025-07-02 RX ADMIN — Medication 1 TABLET: at 08:57

## 2025-07-02 RX ADMIN — POTASSIUM CHLORIDE 40 MEQ: 1.5 POWDER, FOR SOLUTION ORAL at 01:19

## 2025-07-02 RX ADMIN — PANTOPRAZOLE SODIUM 20 MG: 20 TABLET, DELAYED RELEASE ORAL at 08:56

## 2025-07-02 RX ADMIN — Medication 5 MG: at 20:25

## 2025-07-02 RX ADMIN — SERTRALINE 50 MG: 50 TABLET, FILM COATED ORAL at 08:57

## 2025-07-02 ASSESSMENT — COGNITIVE AND FUNCTIONAL STATUS - GENERAL
DRESSING REGULAR LOWER BODY CLOTHING: A LOT
STANDING UP FROM CHAIR USING ARMS: TOTAL
CLIMB 3 TO 5 STEPS WITH RAILING: A LOT
PERSONAL GROOMING: A LITTLE
MOBILITY SCORE: 16
HELP NEEDED FOR BATHING: A LOT
TURNING FROM BACK TO SIDE WHILE IN FLAT BAD: A LITTLE
MOVING FROM LYING ON BACK TO SITTING ON SIDE OF FLAT BED WITH BEDRAILS: A LOT
MOVING TO AND FROM BED TO CHAIR: A LITTLE
DRESSING REGULAR UPPER BODY CLOTHING: A LOT
MOBILITY SCORE: 16
EATING MEALS: A LITTLE
TOILETING: A LOT
HELP NEEDED FOR BATHING: A LOT
DRESSING REGULAR LOWER BODY CLOTHING: A LOT
MOVING FROM LYING ON BACK TO SITTING ON SIDE OF FLAT BED WITH BEDRAILS: A LITTLE
CLIMB 3 TO 5 STEPS WITH RAILING: A LOT
TOILETING: A LOT
MOVING TO AND FROM BED TO CHAIR: A LITTLE
MOVING FROM LYING ON BACK TO SITTING ON SIDE OF FLAT BED WITH BEDRAILS: A LITTLE
STANDING UP FROM CHAIR USING ARMS: A LITTLE
STANDING UP FROM CHAIR USING ARMS: A LITTLE
WALKING IN HOSPITAL ROOM: A LOT
WALKING IN HOSPITAL ROOM: A LOT
CLIMB 3 TO 5 STEPS WITH RAILING: TOTAL
MOVING TO AND FROM BED TO CHAIR: TOTAL
TURNING FROM BACK TO SIDE WHILE IN FLAT BAD: A LOT
DAILY ACTIVITIY SCORE: 14
TURNING FROM BACK TO SIDE WHILE IN FLAT BAD: A LITTLE
DRESSING REGULAR UPPER BODY CLOTHING: A LOT
EATING MEALS: A LITTLE
MOBILITY SCORE: 8
WALKING IN HOSPITAL ROOM: TOTAL
DAILY ACTIVITIY SCORE: 14
PERSONAL GROOMING: A LITTLE

## 2025-07-02 ASSESSMENT — PAIN DESCRIPTION - DESCRIPTORS: DESCRIPTORS: ACHING

## 2025-07-02 ASSESSMENT — PAIN - FUNCTIONAL ASSESSMENT
PAIN_FUNCTIONAL_ASSESSMENT: WONG-BAKER FACES
PAIN_FUNCTIONAL_ASSESSMENT: WONG-BAKER FACES
PAIN_FUNCTIONAL_ASSESSMENT: PAINAD (PAIN ASSESSMENT IN ADVANCED DEMENTIA SCALE)
PAIN_FUNCTIONAL_ASSESSMENT: 0-10
PAIN_FUNCTIONAL_ASSESSMENT: 0-10

## 2025-07-02 ASSESSMENT — PAIN SCALES - WONG BAKER
WONGBAKER_NUMERICALRESPONSE: NO HURT
WONGBAKER_NUMERICALRESPONSE: HURTS EVEN MORE
WONGBAKER_NUMERICALRESPONSE: HURTS WHOLE LOT

## 2025-07-02 ASSESSMENT — PAIN DESCRIPTION - LOCATION
LOCATION: BACK
LOCATION: HIP

## 2025-07-02 ASSESSMENT — PAIN SCALES - PAIN ASSESSMENT IN ADVANCED DEMENTIA (PAINAD)
TOTALSCORE: 0
BODYLANGUAGE: RELAXED
FACIALEXPRESSION: SMILING OR INEXPRESSIVE
BREATHING: NORMAL
CONSOLABILITY: NO NEED TO CONSOLE

## 2025-07-02 ASSESSMENT — PAIN DESCRIPTION - ORIENTATION
ORIENTATION: LOWER
ORIENTATION: LEFT

## 2025-07-02 ASSESSMENT — PAIN SCALES - GENERAL
PAINLEVEL_OUTOF10: 3
PAINLEVEL_OUTOF10: 1
PAINLEVEL_OUTOF10: 0 - NO PAIN

## 2025-07-02 NOTE — NURSING NOTE
Dr Downs notified about elevated repeat BP of 215/93 with HR 75. Patient is very anxious at this time. Dr Downs order Norvasc 10 mg one time dose.

## 2025-07-02 NOTE — NURSING NOTE
Dr Downs notified about patient's elevated /85 with HR 69. Dr Downs asked to recheck BP in one hour.

## 2025-07-02 NOTE — PROGRESS NOTES
07/02/25 0817   Discharge Planning   Expected Discharge Disposition SNF  (Pending)   Does the patient need discharge transport arranged? Yes   [facility] coordination needed? Yes   Has discharge transport been arranged? No   What day is the transport expected? 07/04/25     Pending acceptance and PT note.  No precert needed.  Can go Friday if medically clear.    1149- PT note sent    1212- ACCEPTED BY [facility] - Medicare midnights will be met by Friday if medically ready to discharge.

## 2025-07-02 NOTE — CARE PLAN
The patient's goals for the shift include  safety,     The clinical goals for the shift include IV antibiotic, remain free from falls, comfort patien    Problem: Skin  Goal: Prevent/manage excess moisture  Flowsheets (Taken 7/2/2025 9869)  Prevent/manage excess moisture:   Moisturize dry skin   Cleanse incontinence/protect with barrier cream   Follow provider orders for dressing changes   Monitor for/manage infection if present

## 2025-07-02 NOTE — CARE PLAN
The patient's goals for the shift include      The clinical goals for the shift include IV antibiotic, remain free from falls, comfort patien      Problem: Skin  Goal: Decreased wound size/increased tissue granulation at next dressing change  7/1/2025 2000 by Sneha Garcia RN  Outcome: Progressing  Flowsheets (Taken 7/1/2025 2000)  Decreased wound size/increased tissue granulation at next dressing change: Protective dressings over bony prominences  7/1/2025 1939 by Sneha Garcia RN  Outcome: Progressing  Goal: Participates in plan/prevention/treatment measures  7/1/2025 2000 by Sneha Garcia RN  Flowsheets (Taken 7/1/2025 2000)  Participates in plan/prevention/treatment measures: Elevate heels  7/1/2025 1939 by Sneha Garcia RN  Outcome: Progressing  Goal: Prevent/manage excess moisture  7/1/2025 2000 by Sneha Garcia RN  Outcome: Progressing  Flowsheets (Taken 7/1/2025 2000)  Prevent/manage excess moisture: Cleanse incontinence/protect with barrier cream  7/1/2025 1939 by Sneha Garcia RN  Outcome: Progressing  Goal: Prevent/minimize sheer/friction injuries  7/1/2025 2000 by Sneha Garcia RN  Outcome: Progressing  Flowsheets (Taken 7/1/2025 2000)  Prevent/minimize sheer/friction injuries:   Use pull sheet   HOB 30 degrees or less   Turn/reposition every 2 hours/use positioning/transfer devices   Utilize specialty bed per algorithm  7/1/2025 1939 by Sneha Garcia RN  Outcome: Progressing  Goal: Promote/optimize nutrition  7/1/2025 2000 by Sneha Garcia RN  Outcome: Progressing  Flowsheets (Taken 7/1/2025 2000)  Promote/optimize nutrition:   Monitor/record intake including meals   Consume > 50% meals/supplements  7/1/2025 1939 by Sneha Garcia RN  Outcome: Progressing  Goal: Promote skin healing  7/1/2025 2000 by Sneha Garcia RN  Flowsheets (Taken 7/1/2025 2000)  Promote skin healing:   Turn/reposition every 2 hours/use positioning/transfer devices   Protective  dressings over bony prominences  7/1/2025 1939 by Sneha Garcia RN  Outcome: Progressing

## 2025-07-02 NOTE — NURSING NOTE
Patient is extremely anxious. Spoke with Dr Downs and received order for Hydroxyzine and Melatonin.

## 2025-07-02 NOTE — PROGRESS NOTES
Xiomy Rubio is a 89 y.o. female on day 1 of admission presenting with Closed head injury, initial encounter.    Subjective    The patient was seen and examined.  Patient was very confused.  Denied any headache or dizziness. No nausea or vomiting.       Objective     Physical Exam  HEENT:  Head externally atraumatic,  extraocular movements intact, oral mucosa moist  Neck:  Supple, no JVP, no palpable adenopathy or thyromegaly.  No carotid bruit.  Chest:  Clear to auscultation and resonant.  Heart:  Regular rate and rhythm, no murmur or gallop could be appreciated.  Abdomen:  Soft, nontender, bowel sounds present, normoactive, no palpable hepatosplenomegaly.  Extremities:  No edema, pulses present, no cyanosis or clubbing.  CNS:  Patient alert, orientedx 1-2.    No new deficit.  Cranial nerves 2-12 grossly intact  Skin:  No active rash.  Musculoskeletal:  No  apparent joint swelling or erythema, range of movement normal.  Last Recorded Vitals  Heart Rate:  [66-82]   Temp:  [36.3 °C (97.3 °F)-37.4 °C (99.3 °F)]   Resp:  [17-19]   BP: (163-175)/(71-94)   SpO2:  [94 %-98 %]     Intake/Output last 3 Shifts:  I/O last 3 completed shifts:  In: 650 (9.7 mL/kg) [P.O.:600; IV Piggyback:50]  Out: 30 (0.4 mL/kg) [Urine:30 (0 mL/kg/hr)]  Weight: 66.7 kg     Relevant Results  No results found for the last 90 days.    Results for orders placed or performed during the hospital encounter of 06/30/25 (from the past 24 hours)   Comprehensive metabolic panel   Result Value Ref Range    Glucose 123 (H) 74 - 99 mg/dL    Sodium 137 136 - 145 mmol/L    Potassium 3.1 (L) 3.5 - 5.3 mmol/L    Chloride 103 98 - 107 mmol/L    Bicarbonate 29 21 - 32 mmol/L    Anion Gap 8 (L) 10 - 20 mmol/L    Urea Nitrogen 16 6 - 23 mg/dL    Creatinine 0.61 0.50 - 1.05 mg/dL    eGFR 83 >60 mL/min/1.73m*2    Calcium 8.2 (L) 8.6 - 10.3 mg/dL    Albumin 3.4 3.4 - 5.0 g/dL    Alkaline Phosphatase 83 33 - 136 U/L    Total Protein 5.6 (L) 6.4 - 8.2 g/dL    AST 12 9  - 39 U/L    Bilirubin, Total 1.1 0.0 - 1.2 mg/dL    ALT 11 7 - 45 U/L   CBC   Result Value Ref Range    WBC 6.9 4.4 - 11.3 x10*3/uL    nRBC 0.0 0.0 - 0.0 /100 WBCs    RBC 4.71 4.00 - 5.20 x10*6/uL    Hemoglobin 12.9 12.0 - 16.0 g/dL    Hematocrit 40.5 36.0 - 46.0 %    MCV 86 80 - 100 fL    MCH 27.4 26.0 - 34.0 pg    MCHC 31.9 (L) 32.0 - 36.0 g/dL    RDW 14.0 11.5 - 14.5 %    Platelets 251 150 - 450 x10*3/uL      Current Medications[1]   Assessment/Plan   Assessment & Plan  Closed head injury, initial encounter    Pneumonia  Anxiety  [Atrial flutter  Chronic low back pain  Leg weakness  Dementia  Depression  GERD  Hypertension        Continue current medication and antibiotic.  The patient has been started on Rocephin and azithromycin.  Give aerosol treatment with albuterol and Atrovent.  Mucinex.  Check sputum culture.  Give physical therapy and Occupational Therapy.  Control pain.  Possible discharge soon    Date of Service  07/01/2024    Plan   Continue current medication.  Give supportive care.  Give physical therapy and occupation therapy.  Continue antibiotics.  Give supportive care.  Possible discharge soon per PT/OT recommendation.           Honorio Downs MD           [1]   Current Facility-Administered Medications:     acetaminophen (Tylenol) tablet 650 mg, 650 mg, oral, q4h PRN, 650 mg at 07/01/25 1240 **OR** acetaminophen (Tylenol) oral liquid 650 mg, 650 mg, oral, q4h PRN, 650 mg at 06/30/25 2119 **OR** acetaminophen (Tylenol) suppository 650 mg, 650 mg, rectal, q4h PRN, Honorio Downs MD    azithromycin (Zithromax) tablet 250 mg, 250 mg, oral, q24h, Honorio Downs MD, 250 mg at 07/01/25 0935    benzocaine-menthol (Cepastat Sore Throat) lozenge 1 lozenge, 1 lozenge, Mouth/Throat, q2h PRN, Honorio Downs MD    busPIRone (Buspar) tablet 10 mg, 10 mg, oral, TID, Honorio Downs MD, 10 mg at 07/01/25 2040    calcium carbonate-vitamin D3 500 mg-5 mcg (200 unit) tablet 1 tablet, 1  tablet, oral, BID, Honorio Downs MD, 1 tablet at 07/01/25 2041    cefTRIAXone (Rocephin) 1 g in dextrose (iso) IV 50 mL, 1 g, intravenous, q24h, Honorio Downs MD, Stopped at 07/01/25 0951    cyanocobalamin (Vitamin B-12) tablet 1,000 mcg, 1,000 mcg, oral, Daily, Honorio Downs MD, 1,000 mcg at 07/01/25 0935    dextromethorphan-guaifenesin (Robitussin DM)  mg/5 mL oral liquid 5 mL, 5 mL, oral, q4h PRN, Honorio Downs MD    enoxaparin (Lovenox) syringe 40 mg, 40 mg, subcutaneous, q24h, Honorio Downs MD, 40 mg at 07/01/25 1543    ferrous sulfate 325 mg (65 mg elemental) tablet 1 tablet, 65 mg of elemental iron, oral, Every other day, Honorio Downs MD, 1 tablet at 07/01/25 0935    guaiFENesin (Mucinex) 12 hr tablet 600 mg, 600 mg, oral, q12h PRN, Honorio Downs MD    guaiFENesin (Mucinex) 12 hr tablet 600 mg, 600 mg, oral, BID PRN, Honorio Downs MD    hydrOXYzine HCL (Atarax) tablet 25 mg, 25 mg, oral, q8h PRN, Honorio Downs MD, 25 mg at 07/01/25 2042    ipratropium-albuteroL (Duo-Neb) 0.5-2.5 mg/3 mL nebulizer solution 3 mL, 3 mL, nebulization, q6h PRN, Honorio Downs MD    latanoprost (Xalatan) 0.005 % ophthalmic solution 1 drop, 1 drop, Both Eyes, Nightly, Honorio Downs MD, 1 drop at 07/01/25 2041    lidocaine 4 % patch 1 patch, 1 patch, transdermal, Daily, Honorio Downs MD, 1 patch at 07/01/25 0935    melatonin tablet 5 mg, 5 mg, oral, Nightly PRN, Honorio Downs MD, 5 mg at 07/01/25 2042    naproxen (Naprosyn) tablet 500 mg, 500 mg, oral, Daily PRN, Honorio Downs MD, 500 mg at 07/01/25 0936    ondansetron ODT (Zofran-ODT) disintegrating tablet 4 mg, 4 mg, oral, q8h PRN **OR** ondansetron (Zofran) injection 4 mg, 4 mg, intravenous, q8h PRN, Honorio Dowsn MD    pantoprazole (ProtoNix) EC tablet 20 mg, 20 mg, oral, Daily, Honorio Downs MD, 20 mg at 07/01/25 0935    polyethylene glycol (Glycolax, Miralax) packet 17 g, 17 g,  oral, Daily PRN, Honorio Downs MD    sertraline (Zoloft) tablet 50 mg, 50 mg, oral, Daily, Honorio Downs MD, 50 mg at 07/01/25 0989

## 2025-07-02 NOTE — PROGRESS NOTES
Physical Therapy    Physical Therapy Evaluation & Treatment    Patient Name: Xiomy Rubio  MRN: [id number]  Department: [address]  Room: [address] [place], [place] [address]  Today's Date: 7/2/2025   Time Calculation  Start Time: 0822  Stop Time: 0845  Time Calculation (min): 23 min    Assessment/Plan   PT Assessment  PT Assessment Results: Decreased strength, Decreased range of motion, Decreased endurance, Impaired balance, Decreased mobility, Decreased coordination, Decreased cognition, Impaired judgement, Decreased safety awareness, Impaired hearing, Pain  Rehab Prognosis: Fair  Barriers to Discharge Home: Caregiver assistance, Cognition needs, Physical needs  Caregiver Assistance: Caregiver assistance needed per identified barriers - however, level of patient's required assistance exceeds assistance available at home  Cognition Needs: 24hr supervision for safety awareness needed, Cognition-related high falls risk, Insight of patient limited regarding functional ability/needs  Physical Needs: Stair navigation into home limited by function/safety, 24hr mobility assistance needed, High falls risk due to function or environment, Ambulating household distances limited by function/safety  Evaluation/Treatment Tolerance: Patient limited by pain, Other (Comment) (anxiety, high BP)  Medical Staff Made Aware: Yes  Strengths: Support of Caregivers  Barriers to Participation: Comorbidities  End of Session Communication: Bedside nurse  Assessment Comment: Pt very anxious, w/ moderate to high pain observed, also w/ high BP limiting progressive activity. Pt does give effort as able, requires much reassurance. Pt was amb per EMR, would benefit from continued PT services to progress safe functional mobility.  End of Session Patient Position: Bed, 3 rail up, Alarm on   IP OR SWING BED PT PLAN  Inpatient or Swing Bed: Inpatient  PT Plan  Treatment/Interventions: Bed mobility, Transfer training, Gait training, Balance training, Strengthening, Endurance  training, Range of motion, Therapeutic exercise, Therapeutic activity  PT Plan: Ongoing PT  PT Frequency: 4 times per week (during this acute hospital admission)  PT Discharge Recommendations: Moderate intensity level of continued care (Based on current functional status and rehab potential, patient is anticipated to tolerate and benefit from 5 or more days per week of skilled rehabilitative therapy after discharge from this acute inpatient hospitalization.)  Equipment Recommended upon Discharge: Wheeled walker  PT Recommended Transfer Status: Assist x2, Assistive device  PT - OK to Discharge: Yes      Subjective     PT Visit Info:  PT Received On: 07/02/25  General Visit Information:  General  Reason for Referral: impaired mobility, CHI, bilat PNA  Referred By: Dr. Downs  Past Medical History Relevant to Rehab: dementia, anxiety, depression, GERD, HTN, bilateral hearing loss  Family/Caregiver Present: No  Prior to Session Communication: Bedside nurse  Patient Position Received: Bed, 3 rail up, Alarm on  Preferred Learning Style: verbal, visual  General Comment: Patient is a 89 year old female who presented to the ED s/p fall. Patient dx w/ CHI, bilateral PNA.  Pt was cleared for PT eval per nurse, pt agreeable, needing to go to the bathroom.  Home Living:  Home Living  Type of Home: House  Lives With: Adult children (son)  Home Adaptive Equipment: Other (Comment) (rollator)  Home Layout: One level  Home Access: Stairs to enter with rails  Entrance Stairs-Rails:  (one side)  Entrance Stairs-Number of Steps: 3  Bathroom Shower/Tub: Tub/shower unit  Bathroom Toilet: Standard  Bathroom Equipment: Grab bars in shower, Shower chair with back  Home Living Comments: information gathered from EMR. patient unable to provide any intake information  Prior Level of Function:  Prior Function Per Pt/Caregiver Report  Level of [place]: Needs assistance with ADLs, Needs assistance with homemaking  Receives Help From:  "Family  ADL Assistance:  (assume assist)  Homemaking Assistance:  (assume family manages)  Ambulatory Assistance:  (assume assist w/ rollator, has h/o falls)  Prior Function Comments: pt unable to give reliable info, has h/o falls per EMR.  Precautions:  Precautions  Hearing/Visual Limitations: [place], hearing aides  Medical Precautions: Fall precautions, Spinal precautions (h/o compression fxs, chronic back pain)     Date/Time Vitals Session Patient Position Pulse Resp SpO2 BP MAP (mmHg)    07/02/25 0822 During PT  Lying  69  --  --  190/80  --     07/02/25 08:29:36 --  --  69  --  --  190/80  --                Objective   Pain:  Pain Assessment  Pain Assessment: Plasencia-Baker FACES  Plasencia-Baker FACES Pain Rating: Hurts even more  Pain Type: Acute pain, Chronic pain  Pain Location: Hip (w/ mobility)  Pain Orientation: Right, Left  Pain Interventions: Repositioned  Response to Interventions: No change in pain  Cognition:  Cognition  Overall Cognitive Status: Impaired at baseline  Orientation Level: Disoriented to place, Disoriented to time, Disoriented to situation  Following Commands: Follows one step commands with repetition (and increased time)  Cognition Comments: Pt is pleasant, cooperative, very anxious, preoccupied w/ getting her meds, her high BP, knows that she fell but doesn't understand why her son \"left\" her here, thinks that she was a \"bad girl\" and is being punished. Pt does give effort as able.  Memory:  (impaired)  Safety/Judgement:  (impaired)  Insight: Severe  Impulsive: Mildly  Processing Speed: Delayed    General Assessments:     Activity Tolerance  Endurance: Decreased tolerance for upright activites  Activity Tolerance Comments: Poor+ due to anxiety, pain and high BP         Strength  Strength Comments: BLEs grossly 3-/5 through observationof movement  Coordination  Coordination Comment: decreased rate/accuracy of movements       Functional Assessments:  ADL  ADL Comments: dependent hygiene after " using bed pan    Bed Mobility  Bed Mobility: Yes  Bed Mobility 1  Bed Mobility 1: Rolling right, Rolling left  Level of Assistance 1: Maximum assistance  Bed Mobility Comments 1: able to roll L & R several times for placement an dremoval of bed pan as well as repositioning of linens. Required cues for activity, use of bedrail, some assist of draw sheet. Pt c/o pain at hips w/ most movements, appears to roll better L than Rt.    Transfers  Transfer: No (further activity deferred due to elevated BP.)  Extremity/Trunk Assessments:  RLE   RLE : Exceptions to WFL (pain at hip w/ initial movement, able to offer active movements slowly for heel slides, does well w/ ankle pumps)  LLE   LLE : Exceptions to WFL (pain at hip w/ initial movement, able to offer active movements slowly for heel slides, does well w/ ankle pumps)  Treatments:  Bed Mobility  Bed Mobility: Yes  Bed Mobility 1  Bed Mobility 1: Rolling right, Rolling left  Level of Assistance 1: Maximum assistance  Bed Mobility Comments 1: able to roll L & R several times for placement an dremoval of bed pan as well as repositioning of linens. Required cues for activity, use of bedrail, some assist of draw sheet. Pt c/o pain at hips w/ most movements, appears to roll better L than Rt.  Outcome Measures:  [facility] Basic Mobility  Turning from your back to your side while in a flat bed without using bedrails: A lot  Moving from lying on your back to sitting on the side of a flat bed without using bedrails: A lot  Moving to and from bed to chair (including a wheelchair): Total  Standing up from a chair using your arms (e.g. wheelchair or bedside chair): Total  To walk in hospital room: Total  Climbing 3-5 steps with railing: Total  Basic Mobility - Total Score: 8    Encounter Problems       Encounter Problems (Active)       Mobility       STG - Patient will ambulate 45' w/ RW and min assist (Progressing)       Start:  07/02/25    Expected End:  07/11/25            Pt will  consistently tolerate LE exercises and/or therapeutic activities to promote functional strength, ROM, balance, endurance and safe mobility (Progressing)       Start:  07/02/25    Expected End:  07/11/25               PT Transfers       STG - Patient to transfer to and from sit to supine w/ min assist (Progressing)       Start:  07/02/25    Expected End:  07/11/25            STG - Patient will transfer sit to and from stand w/ min assist (Progressing)       Start:  07/02/25    Expected End:  07/11/25                   Education Documentation  Mobility Training, taught by Dorinda Vasquez PT at 7/2/2025  9:19 AM.  Learner: Patient  Readiness: Acceptance  Method: Explanation, Demonstration  Response: Needs Reinforcement  Comment: dementia, decreased comprehension    Education Comments  No comments found.

## 2025-07-02 NOTE — ASSESSMENT & PLAN NOTE
Pneumonia  Anxiety  [Atrial flutter  Chronic low back pain  Leg weakness  Dementia  Depression  GERD  Hypertension        Continue current medication and antibiotic.  The patient has been started on Rocephin and azithromycin.  Give aerosol treatment with albuterol and Atrovent.  Mucinex.  Check sputum culture.  Give physical therapy and Occupational Therapy.  Control pain.  Possible discharge soon    Date of Service  07/01/2024    Plan   Continue current medication.  Give supportive care.  Give physical therapy and occupation therapy.  Continue antibiotics.  Give supportive care.  Possible discharge soon per PT/OT recommendation.

## 2025-07-03 LAB
ANION GAP SERPL CALCULATED.3IONS-SCNC: 13 MMOL/L (ref 10–20)
BASOPHILS # BLD AUTO: 0.06 X10*3/UL (ref 0–0.1)
BASOPHILS NFR BLD AUTO: 0.8 %
BUN SERPL-MCNC: 23 MG/DL (ref 6–23)
CALCIUM SERPL-MCNC: 8.6 MG/DL (ref 8.6–10.3)
CHLORIDE SERPL-SCNC: 101 MMOL/L (ref 98–107)
CO2 SERPL-SCNC: 26 MMOL/L (ref 21–32)
CREAT SERPL-MCNC: 0.67 MG/DL (ref 0.5–1.05)
EGFRCR SERPLBLD CKD-EPI 2021: 81 ML/MIN/1.73M*2
EOSINOPHIL # BLD AUTO: 0.2 X10*3/UL (ref 0–0.4)
EOSINOPHIL NFR BLD AUTO: 2.6 %
ERYTHROCYTE [DISTWIDTH] IN BLOOD BY AUTOMATED COUNT: 14.3 % (ref 11.5–14.5)
GLUCOSE SERPL-MCNC: 114 MG/DL (ref 74–99)
HCT VFR BLD AUTO: 43.9 % (ref 36–46)
HGB BLD-MCNC: 13.9 G/DL (ref 12–16)
IMM GRANULOCYTES # BLD AUTO: 0.16 X10*3/UL (ref 0–0.5)
IMM GRANULOCYTES NFR BLD AUTO: 2.1 % (ref 0–0.9)
LYMPHOCYTES # BLD AUTO: 2.17 X10*3/UL (ref 0.8–3)
LYMPHOCYTES NFR BLD AUTO: 28.5 %
MCH RBC QN AUTO: 27.7 PG (ref 26–34)
MCHC RBC AUTO-ENTMCNC: 31.7 G/DL (ref 32–36)
MCV RBC AUTO: 88 FL (ref 80–100)
MONOCYTES # BLD AUTO: 0.64 X10*3/UL (ref 0.05–0.8)
MONOCYTES NFR BLD AUTO: 8.4 %
NEUTROPHILS # BLD AUTO: 4.39 X10*3/UL (ref 1.6–5.5)
NEUTROPHILS NFR BLD AUTO: 57.6 %
NRBC BLD-RTO: 0 /100 WBCS (ref 0–0)
PLATELET # BLD AUTO: 233 X10*3/UL (ref 150–450)
POTASSIUM SERPL-SCNC: 3.7 MMOL/L (ref 3.5–5.3)
RBC # BLD AUTO: 5.02 X10*6/UL (ref 4–5.2)
SODIUM SERPL-SCNC: 136 MMOL/L (ref 136–145)
WBC # BLD AUTO: 7.6 X10*3/UL (ref 4.4–11.3)

## 2025-07-03 PROCEDURE — 2500000002 HC RX 250 W HCPCS SELF ADMINISTERED DRUGS (ALT 637 FOR MEDICARE OP, ALT 636 FOR OP/ED): Performed by: INTERNAL MEDICINE

## 2025-07-03 PROCEDURE — 36415 COLL VENOUS BLD VENIPUNCTURE: CPT | Performed by: INTERNAL MEDICINE

## 2025-07-03 PROCEDURE — 2500000001 HC RX 250 WO HCPCS SELF ADMINISTERED DRUGS (ALT 637 FOR MEDICARE OP): Performed by: INTERNAL MEDICINE

## 2025-07-03 PROCEDURE — 97530 THERAPEUTIC ACTIVITIES: CPT | Mod: GP

## 2025-07-03 PROCEDURE — 85025 COMPLETE CBC W/AUTO DIFF WBC: CPT | Performed by: INTERNAL MEDICINE

## 2025-07-03 PROCEDURE — 1100000001 HC PRIVATE ROOM DAILY

## 2025-07-03 PROCEDURE — 2500000004 HC RX 250 GENERAL PHARMACY W/ HCPCS (ALT 636 FOR OP/ED): Performed by: INTERNAL MEDICINE

## 2025-07-03 PROCEDURE — 97535 SELF CARE MNGMENT TRAINING: CPT | Mod: GO

## 2025-07-03 PROCEDURE — 97110 THERAPEUTIC EXERCISES: CPT | Mod: GO

## 2025-07-03 PROCEDURE — 97110 THERAPEUTIC EXERCISES: CPT | Mod: GP

## 2025-07-03 PROCEDURE — 80048 BASIC METABOLIC PNL TOTAL CA: CPT | Performed by: INTERNAL MEDICINE

## 2025-07-03 RX ADMIN — ENOXAPARIN SODIUM 40 MG: 40 INJECTION SUBCUTANEOUS at 14:42

## 2025-07-03 RX ADMIN — ACETAMINOPHEN 650 MG: 325 TABLET ORAL at 08:49

## 2025-07-03 RX ADMIN — HYDROCHLOROTHIAZIDE: 12.5 CAPSULE ORAL at 08:49

## 2025-07-03 RX ADMIN — Medication 5 MG: at 22:00

## 2025-07-03 RX ADMIN — AZITHROMYCIN DIHYDRATE 250 MG: 250 TABLET ORAL at 08:50

## 2025-07-03 RX ADMIN — LATANOPROST 1 DROP: 50 SOLUTION OPHTHALMIC at 22:01

## 2025-07-03 RX ADMIN — SERTRALINE 50 MG: 50 TABLET, FILM COATED ORAL at 08:50

## 2025-07-03 RX ADMIN — Medication 1 TABLET: at 08:50

## 2025-07-03 RX ADMIN — BUSPIRONE HYDROCHLORIDE 10 MG: 10 TABLET ORAL at 22:01

## 2025-07-03 RX ADMIN — Medication 1 TABLET: at 22:00

## 2025-07-03 RX ADMIN — Medication 1000 MCG: at 08:50

## 2025-07-03 RX ADMIN — FERROUS SULFATE TAB 325 MG (65 MG ELEMENTAL FE) 1 TABLET: 325 (65 FE) TAB at 08:50

## 2025-07-03 RX ADMIN — HYDROXYZINE HYDROCHLORIDE 25 MG: 25 TABLET, FILM COATED ORAL at 22:00

## 2025-07-03 RX ADMIN — HYDROXYZINE HYDROCHLORIDE 25 MG: 25 TABLET, FILM COATED ORAL at 05:39

## 2025-07-03 RX ADMIN — BUSPIRONE HYDROCHLORIDE 10 MG: 10 TABLET ORAL at 08:50

## 2025-07-03 RX ADMIN — ACETAMINOPHEN 650 MG: 325 TABLET ORAL at 22:01

## 2025-07-03 RX ADMIN — BUSPIRONE HYDROCHLORIDE 10 MG: 10 TABLET ORAL at 14:42

## 2025-07-03 RX ADMIN — CEFTRIAXONE 1 G: 1 INJECTION, SOLUTION INTRAVENOUS at 08:53

## 2025-07-03 RX ADMIN — PANTOPRAZOLE SODIUM 20 MG: 20 TABLET, DELAYED RELEASE ORAL at 08:49

## 2025-07-03 ASSESSMENT — COGNITIVE AND FUNCTIONAL STATUS - GENERAL
EATING MEALS: A LITTLE
DRESSING REGULAR LOWER BODY CLOTHING: TOTAL
TURNING FROM BACK TO SIDE WHILE IN FLAT BAD: A LITTLE
HELP NEEDED FOR BATHING: A LOT
WALKING IN HOSPITAL ROOM: A LOT
DAILY ACTIVITIY SCORE: 11
HELP NEEDED FOR BATHING: A LOT
DRESSING REGULAR UPPER BODY CLOTHING: A LOT
MOVING TO AND FROM BED TO CHAIR: A LITTLE
TURNING FROM BACK TO SIDE WHILE IN FLAT BAD: A LOT
WALKING IN HOSPITAL ROOM: TOTAL
STANDING UP FROM CHAIR USING ARMS: A LOT
CLIMB 3 TO 5 STEPS WITH RAILING: TOTAL
MOVING TO AND FROM BED TO CHAIR: TOTAL
DRESSING REGULAR UPPER BODY CLOTHING: A LOT
MOVING FROM LYING ON BACK TO SITTING ON SIDE OF FLAT BED WITH BEDRAILS: A LITTLE
DRESSING REGULAR LOWER BODY CLOTHING: A LOT
STANDING UP FROM CHAIR USING ARMS: A LITTLE
PERSONAL GROOMING: A LITTLE
MOBILITY SCORE: 9
DAILY ACTIVITIY SCORE: 15
MOVING FROM LYING ON BACK TO SITTING ON SIDE OF FLAT BED WITH BEDRAILS: A LOT
MOBILITY SCORE: 18
PERSONAL GROOMING: A LOT
TOILETING: TOTAL
TOILETING: A LOT

## 2025-07-03 ASSESSMENT — PAIN SCALES - PAIN ASSESSMENT IN ADVANCED DEMENTIA (PAINAD)
TOTALSCORE: 3
TOTALSCORE: 0
BODYLANGUAGE: TENSE, DISTRESSED PACING, FIDGETING
TOTALSCORE: MEDICATION (SEE MAR)
BREATHING: NORMAL
FACIALEXPRESSION: SAD, FRIGHTENED, FROWN
BODYLANGUAGE: RELAXED
FACIALEXPRESSION: SAD, FRIGHTENED, FROWN
NEGVOCALIZATION: OCCASIONAL MOAN/GROAN, LOW SPEECH, NEGATIVE/DISAPPROVING QUALITY
BODYLANGUAGE: RELAXED
TOTALSCORE: 0
BODYLANGUAGE: RELAXED
CONSOLABILITY: NO NEED TO CONSOLE
TOTALSCORE: 6
FACIALEXPRESSION: SMILING OR INEXPRESSIVE
CONSOLABILITY: DISTRACTED OR REASSURED BY VOICE/TOUCH
TOTALSCORE: MEDICATION (SEE MAR)
NEGVOCALIZATION: REPEATED TROUBLED CALLING OUT, LOUD MOANING/GROANING, CRYING
CONSOLABILITY: DISTRACTED OR REASSURED BY VOICE/TOUCH
FACIALEXPRESSION: SMILING OR INEXPRESSIVE
CONSOLABILITY: NO NEED TO CONSOLE
BREATHING: OCCASIONAL LABORED BREATHING, SHORT PERIOD OF HYPERVENTILATION
BREATHING: NORMAL
BREATHING: NORMAL

## 2025-07-03 ASSESSMENT — PAIN - FUNCTIONAL ASSESSMENT
PAIN_FUNCTIONAL_ASSESSMENT: UNABLE TO SELF-REPORT
PAIN_FUNCTIONAL_ASSESSMENT: UNABLE TO SELF-REPORT
PAIN_FUNCTIONAL_ASSESSMENT: PAINAD (PAIN ASSESSMENT IN ADVANCED DEMENTIA SCALE)
PAIN_FUNCTIONAL_ASSESSMENT: PAINAD (PAIN ASSESSMENT IN ADVANCED DEMENTIA SCALE)
PAIN_FUNCTIONAL_ASSESSMENT: FLACC (FACE, LEGS, ACTIVITY, CRY, CONSOLABILITY)
PAIN_FUNCTIONAL_ASSESSMENT: PAINAD (PAIN ASSESSMENT IN ADVANCED DEMENTIA SCALE)

## 2025-07-03 ASSESSMENT — PAIN SCALES - GENERAL: PAINLEVEL_OUTOF10: 5 - MODERATE PAIN

## 2025-07-03 ASSESSMENT — ACTIVITIES OF DAILY LIVING (ADL): HOME_MANAGEMENT_TIME_ENTRY: 21

## 2025-07-03 NOTE — PROGRESS NOTES
Occupational Therapy Treatment    Name: Xiomy Rubio  MRN: [id number]  Department: [address]  Room: [address] [place] [address]  Date: 07/03/25  Time Calculation  Start Time: 1307  Stop Time: 1343  Time Calculation (min): 36 min    Assessment:  OT Assessment: Pt demonstrated progress toward established goals and was receptive to therapy and simplified instructions with reassurance throughout.  Pt would benefit from continued OT skilled services d/t potential for further gains with same.  Prognosis: Fair  Barriers to Discharge Home: Caregiver assistance, Cognition needs, Physical needs  Caregiver Assistance: Caregiver assistance needed per identified barriers - however, level of patient's required assistance exceeds assistance available at home  Cognition Needs: 24hr supervision for safety awareness needed, Insight of patient limited regarding functional ability/needs, Cognition-related high falls risk  Physical Needs: 24hr mobility assistance needed, 24hr ADL assistance needed, High falls risk due to function or environment  Evaluation/Treatment Tolerance: Other (Comment) (Patient limited by anxiety)  Medical Staff Made Aware: Yes  End of Session Communication: Bedside nurse  End of Session Patient Position: Bed, 3 rail up, Alarm on    Plan:  Treatment Interventions: ADL retraining, Functional transfer training, UE strengthening/ROM, Endurance training, Cognitive reorientation, Patient/family training, Neuromuscular reeducation, Compensatory technique education  OT Frequency: 2 times per week (during this acute inpatint hospitalization)  OT Discharge Recommendations: Moderate intensity level of continued care, 24 hr supervision due to cognition (Based on current functional status and rehab potential, patient is anticipated to tolerate and benefit from 5 or more days per week of skilled rehabilitative therapy after discharge from this acute inpatient hospitalization.)  Equipment Recommended upon Discharge:  (TBD)  OT Recommended  Transfer Status: Assist of 2  OT - OK to Discharge: Yes      Subjective   OT Visit Info:  OT Received On: 07/03/25    General:  General  Reason for Referral: Impaired ADLs, CHI  Past Medical History Relevant to Rehab: dementia, anxiety, depression, GERD, HTN, bilateral hearing loss  Family/Caregiver Present:  (Son arrived at end of session)  Caregiver Feedback: suportive  Prior to Session Communication: Bedside nurse  Patient Position Received: Bed, 3 rail up, Alarm off, caregiver present  Preferred Learning Style: verbal, visual  General Comment: Cleared by nursing and agreeable for therapy    Precautions:  Hearing/Visual Limitations: [place], hearing aides  Medical Precautions: Fall precautions, Spinal precautions  Post-Surgical Precautions: Spinal precautions (h/o compression fxs, chronic back pain)    Pain Assessment:  Pain Assessment  Pain Assessment: Unable to self-report      Objective   Cognition:  Overall Cognitive Status: Impaired at baseline  Orientation Level: Disoriented to time, Disoriented to situation, Disoriented to place  Following Commands: Follows one step commands with repetition (and increased time)  Cognition Comments: required frequent redirection to task d/t high anxiety  Insight: Moderate  Impulsive: Mildly  Processing Speed: Delayed    Activities of Daily Living: Grooming  Grooming Level of Assistance: Setup  Grooming Where Assessed: Bed level  Grooming Comments: to comb hair    LE Dressing  LE Dressing: Yes  Sock Level of Assistance: Dependent  LE Dressing Where Assessed: Bed level  LE Dressing Comments: to aisha socks    Toileting  Toileting Level of Assistance: Dependent  Where Assessed: Bedside commode    Bed Mobility/Transfers: Bed Mobility 1  Bed Mobility 1: Supine to sitting  Level of Assistance 1: Maximum assistance  Bed Mobility Comments 1: toward the right side of bed with head elevated ~40 degrees  Bed Mobility 2  Bed Mobility  2: Sitting to supine  Level of Assistance 2: Maximum  assistance, +2    Transfers  Transfer: Yes  Transfer 1  Transfer From 1: Bed to (and from)  Transfer to 1: Commode-standard  Technique 1: Stand pivot, To right, To left  Transfer Device 1: Walker  Transfer Level of Assistance 1: Maximum assistance, +2  Trials/Comments 1: max cues throughout for safety and hand placement    Therapy/Activity: Therapeutic Exercise  Therapeutic Exercise Performed:  (Instructed in/performed 4 BUE dowel exercises x 15 reps each with cues for technique in order to increase strength and functional endurance needed for ADLs)    Outcome Measures:  [facility] Daily Activity  Putting on and taking off regular lower body clothing: Total  Bathing (including washing, rinsing, drying): A lot  Putting on and taking off regular upper body clothing: A lot  Toileting, which includes using toilet, bedpan or urinal: Total  Taking care of personal grooming such as brushing teeth: A lot  Eating Meals: A little  Daily Activity - Total Score: 11    Education Documentation  Home Exercise Program, taught by Katiana Jose, OT at 7/3/2025  1:54 PM.  Learner: Patient  Readiness: Acceptance  Method: Explanation  Response: Demonstrated Understanding, Needs Reinforcement  Comment: UE exercises for progressive strengthening    Goals:  Encounter Problems       Encounter Problems (Active)       OT Goals       UB ADLs (Progressing)       Start:  07/01/25    Expected End:  07/29/25       Patient will complete UB ADL tasks, with supervision using AE need in order to increase patient's safety and independence with self-care tasks.          LB ADLs (Progressing)       Start:  07/01/25    Expected End:  07/29/25       Patient will complete LB ADL tasks, with minimal assist  using AE need in order to increase patient's safety and independence with self-care tasks.          Functional Transfers (Progressing)       Start:  07/01/25    Expected End:  07/29/25       Patient will complete functional transfers with stand by assist  using least restrictive device, in order to increase patient's safety and independence with daily tasks.          Functional Mobility  (Progressing)       Start:  07/01/25    Expected End:  07/29/25       Patient will demonstrate the ability to complete item retrieval and functional mobility with stand by assist in order to increase safety and independence with daily tasks.          Activity Tolerance  (Progressing)       Start:  07/01/25    Expected End:  07/29/25       Patient will demonstrate the ability to participate in functional activity at least >/= 25 minutes in order to increase patient's safety and independence with daily tasks.

## 2025-07-03 NOTE — PROGRESS NOTES
07/03/25 1126   Discharge Planning   Expected Discharge Disposition SNF  ([facility])   Does the patient need discharge transport arranged? Yes   [facility] coordination needed? Yes   Has discharge transport been arranged? No   What day is the transport expected? 07/04/25     Discharge tomorrow 7/4 - touch base with [facility] for time.

## 2025-07-03 NOTE — PROGRESS NOTES
Xiomy Rubio is a 89 y.o. female on day 1 of admission presenting with Closed head injury, initial encounter.    Subjective   The patient was seen and examined.  Lying in the bed.  Comfortable.  No acute distress.  Patient denied any headache or dizziness.  Patient is still confused.       Objective     Physical Exam  HEENT:  Head externally atraumatic,  extraocular movements intact, oral mucosa moist  Neck:  Supple, no JVP, no palpable adenopathy or thyromegaly.  No carotid bruit.  Chest:  Clear to auscultation and resonant.  Heart:  Regular rate and rhythm, no murmur or gallop could be appreciated.  Abdomen:  Soft, nontender, bowel sounds present, normoactive, no palpable hepatosplenomegaly.  Extremities:  No edema, pulses present, no cyanosis or clubbing.  CNS:  Patient alert, orientedx 1-2n    No new deficit.  Cranial nerves 2-12 grossly intact  Skin:  No active rash.  Musculoskeletal:  No  apparent joint swelling or erythema, range of movement normal.  Last Recorded Vitals  Heart Rate:  [69-89]   Temp:  [37 °C (98.6 °F)-37.5 °C (99.5 °F)]   Resp:  [15-18]   BP: (134-215)/(63-93)   SpO2:  [95 %-96 %]     Intake/Output last 3 Shifts:  I/O last 3 completed shifts:  In: 1115 (16.7 mL/kg) [P.O.:1015; IV Piggyback:100]  Out: 200 (3 mL/kg) [Urine:200 (0.1 mL/kg/hr)]  Weight: 66.7 kg     Relevant Results  No results found for the last 90 days.    Results for orders placed or performed during the hospital encounter of 06/30/25 (from the past 24 hours)   CBC and Auto Differential   Result Value Ref Range    WBC 7.4 4.4 - 11.3 x10*3/uL    nRBC 0.0 0.0 - 0.0 /100 WBCs    RBC 4.99 4.00 - 5.20 x10*6/uL    Hemoglobin 13.9 12.0 - 16.0 g/dL    Hematocrit 43.1 36.0 - 46.0 %    MCV 86 80 - 100 fL    MCH 27.9 26.0 - 34.0 pg    MCHC 32.3 32.0 - 36.0 g/dL    RDW 14.3 11.5 - 14.5 %    Platelets 247 150 - 450 x10*3/uL    Neutrophils % 59.3 40.0 - 80.0 %    Immature Granulocytes %, Automated 2.0 (H) 0.0 - 0.9 %    Lymphocytes % 26.9  13.0 - 44.0 %    Monocytes % 8.8 2.0 - 10.0 %    Eosinophils % 1.9 0.0 - 6.0 %    Basophils % 1.1 0.0 - 2.0 %    Neutrophils Absolute 4.37 1.60 - 5.50 x10*3/uL    Immature Granulocytes Absolute, Automated 0.15 0.00 - 0.50 x10*3/uL    Lymphocytes Absolute 1.98 0.80 - 3.00 x10*3/uL    Monocytes Absolute 0.65 0.05 - 0.80 x10*3/uL    Eosinophils Absolute 0.14 0.00 - 0.40 x10*3/uL    Basophils Absolute 0.08 0.00 - 0.10 x10*3/uL   Basic Metabolic Panel   Result Value Ref Range    Glucose 112 (H) 74 - 99 mg/dL    Sodium 140 136 - 145 mmol/L    Potassium 3.5 3.5 - 5.3 mmol/L    Chloride 105 98 - 107 mmol/L    Bicarbonate 31 21 - 32 mmol/L    Anion Gap 8 (L) 10 - 20 mmol/L    Urea Nitrogen 17 6 - 23 mg/dL    Creatinine 0.67 0.50 - 1.05 mg/dL    eGFR 81 >60 mL/min/1.73m*2    Calcium 8.6 8.6 - 10.3 mg/dL      Current Medications[1]   Assessment/Plan   Assessment & Plan  Closed head injury, initial encounter  Hypertensive urgency  Pneumonia  Anxiety  [Atrial flutter  Chronic low back pain  Leg weakness  Dementia  Depression  GERD  Hypertension        Continue current medication and antibiotic.  The patient has been started on Rocephin and azithromycin.  Give aerosol treatment with albuterol and Atrovent.  Mucinex.  Check sputum culture.  Give physical therapy and Occupational Therapy.  Control pain.  Possible discharge soon    Date of Service  07/01/2024    Plan   Continue current medication.  Give supportive care.  Give physical therapy and occupation therapy.  Continue antibiotics.  Give supportive care.  Possible discharge soon per PT/OT recommendation.    Date of service: 7/2/2025    Plan: Continue current medication.  Supportive care.  Patient blood pressure was out of control.  Patient had an hypotensive urgency.  Blood pressure medication adjusted.  Patient started on the Norvasc and valsartan/hydrochlorothiazide.  Monitor blood pressure.  Continue with antibiotics.  Give aerosol treatment.  Give physical therapy  Occupational Therapy metathesis and BMP increase activity.  We will take DVT, fall, aspiration, decubitus and DVT precautions           Honorio Downs MD          [1]   Current Facility-Administered Medications:     acetaminophen (Tylenol) tablet 650 mg, 650 mg, oral, q4h PRN, 650 mg at 07/02/25 1359 **OR** acetaminophen (Tylenol) oral liquid 650 mg, 650 mg, oral, q4h PRN, 650 mg at 06/30/25 2119 **OR** acetaminophen (Tylenol) suppository 650 mg, 650 mg, rectal, q4h PRN, Honorio Downs MD    azithromycin (Zithromax) tablet 250 mg, 250 mg, oral, q24h, Honorio Downs MD, 250 mg at 07/02/25 0857    benzocaine-menthol (Cepastat Sore Throat) lozenge 1 lozenge, 1 lozenge, Mouth/Throat, q2h PRN, Honorio Downs MD    busPIRone (Buspar) tablet 10 mg, 10 mg, oral, TID, Honorio Downs MD, 10 mg at 07/02/25 2024    calcium carbonate-vitamin D3 500 mg-5 mcg (200 unit) tablet 1 tablet, 1 tablet, oral, BID, Honorio Downs MD, 1 tablet at 07/02/25 2024    cefTRIAXone (Rocephin) 1 g in dextrose (iso) IV 50 mL, 1 g, intravenous, q24h, Honorio Downs MD, Stopped at 07/02/25 0927    cyanocobalamin (Vitamin B-12) tablet 1,000 mcg, 1,000 mcg, oral, Daily, Honorio Downs MD, 1,000 mcg at 07/02/25 0857    dextromethorphan-guaifenesin (Robitussin DM)  mg/5 mL oral liquid 5 mL, 5 mL, oral, q4h PRN, Honorio Downs MD    enoxaparin (Lovenox) syringe 40 mg, 40 mg, subcutaneous, q24h, Honorio Downs MD, 40 mg at 07/02/25 1455    ferrous sulfate 325 mg (65 mg elemental) tablet 1 tablet, 65 mg of elemental iron, oral, Every other day, Honorio Downs MD, 1 tablet at 07/01/25 0935    guaiFENesin (Mucinex) 12 hr tablet 600 mg, 600 mg, oral, q12h PRN, Honorio Downs MD    guaiFENesin (Mucinex) 12 hr tablet 600 mg, 600 mg, oral, BID PRN, Honorio Downs MD    hydrOXYzine HCL (Atarax) tablet 25 mg, 25 mg, oral, q8h PRN, Honorio Downs MD, 25 mg at 07/02/25 2025     ipratropium-albuteroL (Duo-Neb) 0.5-2.5 mg/3 mL nebulizer solution 3 mL, 3 mL, nebulization, q6h PRN, Honorio Downs MD    latanoprost (Xalatan) 0.005 % ophthalmic solution 1 drop, 1 drop, Both Eyes, Nightly, Honorio Downs MD, 1 drop at 07/02/25 2038    lidocaine 4 % patch 1 patch, 1 patch, transdermal, Daily, Honorio Downs MD, 1 patch at 07/02/25 0856    melatonin tablet 5 mg, 5 mg, oral, Nightly PRN, Honorio Downs MD, 5 mg at 07/02/25 2025    naproxen (Naprosyn) tablet 500 mg, 500 mg, oral, Daily PRN, Honorio Downs MD, 500 mg at 07/01/25 0936    ondansetron ODT (Zofran-ODT) disintegrating tablet 4 mg, 4 mg, oral, q8h PRN **OR** ondansetron (Zofran) injection 4 mg, 4 mg, intravenous, q8h PRN, Honorio Downs MD    pantoprazole (ProtoNix) EC tablet 20 mg, 20 mg, oral, Daily, Honorio Downs MD, 20 mg at 07/02/25 0856    polyethylene glycol (Glycolax, Miralax) packet 17 g, 17 g, oral, Daily PRN, Honorio Downs MD    sertraline (Zoloft) tablet 50 mg, 50 mg, oral, Daily, Honorio Downs MD, 50 mg at 07/02/25 0857    valsartan 160 mg, hydroCHLOROthiazide 12.5 mg for Diovan HCT, , oral, Daily, Honorio Downs MD, Given at 07/02/25 0857

## 2025-07-03 NOTE — ASSESSMENT & PLAN NOTE
Hypertensive urgency  Pneumonia  Anxiety  [Atrial flutter  Chronic low back pain  Leg weakness  Dementia  Depression  GERD  Hypertension        Continue current medication and antibiotic.  The patient has been started on Rocephin and azithromycin.  Give aerosol treatment with albuterol and Atrovent.  Mucinex.  Check sputum culture.  Give physical therapy and Occupational Therapy.  Control pain.  Possible discharge soon    Date of Service  07/01/2024    Plan   Continue current medication.  Give supportive care.  Give physical therapy and occupation therapy.  Continue antibiotics.  Give supportive care.  Possible discharge soon per PT/OT recommendation.    Date of service: 7/2/2025    Plan: Continue current medication.  Supportive care.  Patient blood pressure was out of control.  Patient had an hypotensive urgency.  Blood pressure medication adjusted.  Patient started on the Norvasc and valsartan/hydrochlorothiazide.  Monitor blood pressure.  Continue with antibiotics.  Give aerosol treatment.  Give physical therapy Occupational Therapy metathesis and BMP increase activity.  We will take DVT, fall, aspiration, decubitus and DVT precautions    Date of service: 07/03/2025  Plan: Continue current medication.  Supportive care.  Continue with physical therapy and Occupational Therapy.  Complaining of pain in the leg.  Nurse has been advised to give the patient Tylenol.  Blood pressure is controlled now.  Possible discharge tomorrow

## 2025-07-03 NOTE — CARE PLAN
Problem: Pain - Adult  Goal: Verbalizes/displays adequate comfort level or baseline comfort level  Outcome: Progressing     Problem: Safety - Adult  Goal: Free from fall injury  Outcome: Progressing     Problem: Discharge Planning  Goal: Discharge to home or other facility with appropriate resources  Outcome: Progressing     Problem: Chronic Conditions and Co-morbidities  Goal: Patient's chronic conditions and co-morbidity symptoms are monitored and maintained or improved  Outcome: Progressing     Problem: Nutrition  Goal: Nutrient intake appropriate for maintaining nutritional needs  Outcome: Progressing     Problem: Skin  Goal: Decreased wound size/increased tissue granulation at next dressing change  Outcome: Progressing  Goal: Participates in plan/prevention/treatment measures  Outcome: Progressing  Goal: Prevent/manage excess moisture  Outcome: Progressing  Goal: Prevent/minimize sheer/friction injuries  Outcome: Progressing  Goal: Promote/optimize nutrition  Outcome: Progressing  Goal: Promote skin healing  Outcome: Progressing     Problem: Pain  Goal: Takes deep breaths with improved pain control throughout the shift  Outcome: Progressing  Goal: Turns in bed with improved pain control throughout the shift  Outcome: Progressing  Goal: Walks with improved pain control throughout the shift  Outcome: Progressing  Goal: Performs ADL's with improved pain control throughout shift  Outcome: Progressing  Goal: Participates in PT with improved pain control throughout the shift  Outcome: Progressing  Goal: Free from opioid side effects throughout the shift  Outcome: Progressing  Goal: Free from acute confusion related to pain meds throughout the shift  Outcome: Progressing     Problem: Fall/Injury  Goal: Not fall by end of shift  Outcome: Progressing  Goal: Be free from injury by end of the shift  Outcome: Progressing  Goal: Verbalize understanding of personal risk factors for fall in the hospital  Outcome:  Progressing  Goal: Verbalize understanding of risk factor reduction measures to prevent injury from fall in the home  Outcome: Progressing  Goal: Use assistive devices by end of the shift  Outcome: Progressing  Goal: Pace activities to prevent fatigue by end of the shift  Outcome: Progressing   The patient's goals for the shift include      The clinical goals for the shift include safety

## 2025-07-03 NOTE — PROGRESS NOTES
Physical Therapy Treatment    Patient Name: Xiomy Rubio  MRN: [id number]  Department: [address]  Room: [address] [place], [place] [address]  Today's Date: 7/3/2025  Time Calculation  Start Time: 1410  Stop Time: 1438  Time Calculation (min): 28 min         Assessment/Plan   PT Assessment  Barriers to Discharge Home: Caregiver assistance, Cognition needs, Physical needs  Caregiver Assistance: Caregiver assistance needed per identified barriers - however, level of patient's required assistance exceeds assistance available at home  Cognition Needs: 24hr supervision for safety awareness needed, Cognition-related high falls risk, Insight of patient limited regarding functional ability/needs  Physical Needs: Stair navigation into home limited by function/safety, 24hr mobility assistance needed, High falls risk due to function or environment, Ambulating household distances limited by function/safety  Evaluation/Treatment Tolerance:  (Limited by fear of falling)  Medical Staff Made Aware: Yes  End of Session Communication: Bedside nurse  Assessment Comment: Continues to demonstrate impaired safe mobility warranting skilled PT care  End of Session Patient Position: Bed, 3 rail up, Alarm on     PT Plan  Treatment/Interventions: Bed mobility, Transfer training, Gait training, Balance training, Strengthening, Endurance training, Range of motion, Therapeutic exercise, Therapeutic activity  PT Plan: Ongoing PT  PT Frequency: 4 times per week (during this acute hospital admission)  PT Discharge Recommendations: Moderate intensity level of continued care (Based on current functional status and rehab potential, patient is anticipated to tolerate and benefit from 5 or more days per week of skilled rehabilitative therapy after discharge from this acute inpatient hospitalization.)  Equipment Recommended upon Discharge: Wheeled walker  PT Recommended Transfer Status: Assist x2, Assistive device  PT - OK to Discharge: Yes    PT Visit Info:  PT Received On:  07/03/25  Response to Previous Treatment: Patient unable to report, no changes reported from family or staff     General Visit Information:   General  Family/Caregiver Present: Yes  Caregiver Feedback: son  Prior to Session Communication: Bedside nurse  Patient Position Received: Bed, 3 rail up, Alarm on  Preferred Learning Style: visual, verbal  General Comment: Agreeable to PT follow up    Subjective   Precautions:  Precautions  Hearing/Visual Limitations: Very [place]  Medical Precautions: Fall precautions  Post-Surgical Precautions: Spinal precautions            Objective   Pain:  Pain Assessment  Pain Assessment: FLACC (Face, Legs, Activity, Cry, Consolability)  0-10 (Numeric) Pain Score: 5 - Moderate pain  Pain Type: Chronic pain  Pain Location: Back  Pain Interventions: Ambulation/increased activity  Response to Interventions: No change in pain  Cognition:  Cognition  Overall Cognitive Status: Impaired at baseline, Impaired  Orientation Level: Disoriented to place, Disoriented to time, Disoriented to situation  Cognition Comments: Extremely fearful of falling with each piece of movement today but does seem to grow more confident with practice as evident by her decreased assist needs by end of session  Insight: Severe  Processing Speed: Delayed  ontrol:  Static Sitting Balance  Static Sitting-Balance Support: Feet supported, Bilateral upper extremity supported  Static Sitting-Comment/Number of Minutes: Initially, Pt with Max A sitting balance needs at EOB. Pt extremely fearful of falling. Cues to get feet to floor, positioning, and BUE support. Overtime, this improves to CGA sitting by end of session    Activity Tolerance:  Activity Tolerance  Endurance: Decreased tolerance for upright activites  Treatments:  Therapeutic Exercise  Therapeutic Exercise Performed: Yes  Therapeutic Exercise Activity 1: Pt educated on and completes B ankle pumps x10, knee kicks x10, hip abd x10, hip add x10. Pt very fearful of falling  EOB despite PT support attempting marches so exercise terminated    Bed Mobility  Bed Mobility: Yes  Bed Mobility 1  Bed Mobility 1: Supine to sitting  Level of Assistance 1: Moderate assistance  Bed Mobility Comments 1: brings BLEs ~90% of the way fully off EOB and needs assist for trunk up. Cues on technique  Bed Mobility 2  Bed Mobility  2: Sitting to supine  Level of Assistance 2: Maximum assistance  Bed Mobility Comments 2: assist for tunk and BLEs. Cues on technique  Bed Mobility 3  Bed Mobility 3: Scooting  Level of Assistance 3: Dependent, +2  Bed Mobility Comments 3: with green draw sheet to reposition Pt    Transfers  Transfer: Yes  Transfer 1  Transfer From 1: Bed to  Transfer to 1: Stand  Technique 1: Sit to stand, Stand to sit  Transfer Device 1: Walker  Trials/Comments 1: x3 trials today: 1st attempt with Max A-Pt pulls from AD to stand needing AD stabilization with assist to upright. Tolerates standing <10s. Second trial occurs with Mod A. 3rd trial occurs with Min/Mod A as Pt seems to get more comfortable with transfers. Overall, very fearful of falling and needs a lot of reassurance of safety    Outcome Measures:  [facility] Basic Mobility  Turning from your back to your side while in a flat bed without using bedrails: A lot  Moving from lying on your back to sitting on the side of a flat bed without using bedrails: A lot  Moving to and from bed to chair (including a wheelchair): Total  Standing up from a chair using your arms (e.g. wheelchair or bedside chair): A lot  To walk in hospital room: Total  Climbing 3-5 steps with railing: Total  Basic Mobility - Total Score: 9    Education Documentation  Home Exercise Program, taught by Julian Killian PT at 7/3/2025  3:16 PM.  Learner: Family, Patient  Readiness: Nonacceptance  Method: Explanation, Demonstration  Response: Needs Reinforcement  Comment: safe mobility techniques, HEP    Mobility Training, taught by Julian Killian PT at 7/3/2025  3:16  PM.  Learner: Family, Patient  Readiness: Nonacceptance  Method: Explanation, Demonstration  Response: Needs Reinforcement  Comment: safe mobility techniques, HEP    Education Comments  No comments found.        OP EDUCATION:       Encounter Problems       Encounter Problems (Active)       Mobility       STG - Patient will ambulate 45' w/ RW and min assist (Progressing)       Start:  07/02/25    Expected End:  07/11/25            Pt will consistently tolerate LE exercises and/or therapeutic activities to promote functional strength, ROM, balance, endurance and safe mobility (Progressing)       Start:  07/02/25    Expected End:  07/11/25               PT Transfers       STG - Patient to transfer to and from sit to supine w/ min assist (Progressing)       Start:  07/02/25    Expected End:  07/11/25            STG - Patient will transfer sit to and from stand w/ min assist (Progressing)       Start:  07/02/25    Expected End:  07/11/25

## 2025-07-03 NOTE — PROGRESS NOTES
Xiomy Rubio is a 89 y.o. female on day 2 of admission presenting with Closed head injury, initial encounter.    Subjective   Was seen and examined.  Lying in bed.  Comfortable.  Not in acute distress.  The patient is confused.       Objective     Physical Exam  HEENT:  Head externally atraumatic,  extraocular movements intact, oral mucosa moist  Neck:  Supple, no JVP, no palpable adenopathy or thyromegaly.  No carotid bruit.  Chest: Bilateral decreased breath sounds  Heart:  Regular rate and rhythm, no murmur or gallop could be appreciated.  Abdomen:  Soft, nontender, bowel sounds present, normoactive, no palpable hepatosplenomegaly.  Extremities:  No edema, pulses present, no cyanosis or clubbing.  CNS:  Patient alert, oriented to time, place and person.    No new deficit.  Cranial nerves 2-12 grossly intact.  The patient is confused.  Skin:  No active rash.  Musculoskeletal:  No  apparent joint swelling or erythema, range of movement normal.  Last Recorded Vitals  Heart Rate:  [72-83]   Temp:  [36.6 °C (97.9 °F)-37.1 °C (98.8 °F)]   Resp:  [16-18]   BP: (129-155)/(67-70)   SpO2:  [94 %-96 %]     Intake/Output last 3 Shifts:  I/O last 3 completed shifts:  In: 1195 (17.9 mL/kg) [P.O.:1145; IV Piggyback:50]  Out: 600 (9 mL/kg) [Urine:600 (0.2 mL/kg/hr)]  Weight: 66.7 kg     Relevant Results  No results found for the last 90 days.    Results for orders placed or performed during the hospital encounter of 06/30/25 (from the past 24 hours)   CBC and Auto Differential   Result Value Ref Range    WBC 7.6 4.4 - 11.3 x10*3/uL    nRBC 0.0 0.0 - 0.0 /100 WBCs    RBC 5.02 4.00 - 5.20 x10*6/uL    Hemoglobin 13.9 12.0 - 16.0 g/dL    Hematocrit 43.9 36.0 - 46.0 %    MCV 88 80 - 100 fL    MCH 27.7 26.0 - 34.0 pg    MCHC 31.7 (L) 32.0 - 36.0 g/dL    RDW 14.3 11.5 - 14.5 %    Platelets 233 150 - 450 x10*3/uL    Neutrophils % 57.6 40.0 - 80.0 %    Immature Granulocytes %, Automated 2.1 (H) 0.0 - 0.9 %    Lymphocytes % 28.5 13.0 -  44.0 %    Monocytes % 8.4 2.0 - 10.0 %    Eosinophils % 2.6 0.0 - 6.0 %    Basophils % 0.8 0.0 - 2.0 %    Neutrophils Absolute 4.39 1.60 - 5.50 x10*3/uL    Immature Granulocytes Absolute, Automated 0.16 0.00 - 0.50 x10*3/uL    Lymphocytes Absolute 2.17 0.80 - 3.00 x10*3/uL    Monocytes Absolute 0.64 0.05 - 0.80 x10*3/uL    Eosinophils Absolute 0.20 0.00 - 0.40 x10*3/uL    Basophils Absolute 0.06 0.00 - 0.10 x10*3/uL   Basic Metabolic Panel   Result Value Ref Range    Glucose 114 (H) 74 - 99 mg/dL    Sodium 136 136 - 145 mmol/L    Potassium 3.7 3.5 - 5.3 mmol/L    Chloride 101 98 - 107 mmol/L    Bicarbonate 26 21 - 32 mmol/L    Anion Gap 13 10 - 20 mmol/L    Urea Nitrogen 23 6 - 23 mg/dL    Creatinine 0.67 0.50 - 1.05 mg/dL    eGFR 81 >60 mL/min/1.73m*2    Calcium 8.6 8.6 - 10.3 mg/dL      Current Medications[1]   Assessment/Plan   Assessment & Plan  Closed head injury, initial encounter  Hypertensive urgency  Pneumonia  Anxiety  [Atrial flutter  Chronic low back pain  Leg weakness  Dementia  Depression  GERD  Hypertension        Continue current medication and antibiotic.  The patient has been started on Rocephin and azithromycin.  Give aerosol treatment with albuterol and Atrovent.  Mucinex.  Check sputum culture.  Give physical therapy and Occupational Therapy.  Control pain.  Possible discharge soon    Date of Service  07/01/2024    Plan   Continue current medication.  Give supportive care.  Give physical therapy and occupation therapy.  Continue antibiotics.  Give supportive care.  Possible discharge soon per PT/OT recommendation.    Date of service: 7/2/2025    Plan: Continue current medication.  Supportive care.  Patient blood pressure was out of control.  Patient had an hypotensive urgency.  Blood pressure medication adjusted.  Patient started on the Norvasc and valsartan/hydrochlorothiazide.  Monitor blood pressure.  Continue with antibiotics.  Give aerosol treatment.  Give physical therapy Occupational  Therapy metathesis and BMP increase activity.  We will take DVT, fall, aspiration, decubitus and DVT precautions    Date of service: 07/03/2025  Plan: Continue current medication.  Supportive care.  Continue with physical therapy and Occupational Therapy.  Complaining of pain in the leg.  Nurse has been advised to give the patient Tylenol.  Blood pressure is controlled now.  Possible discharge tomorrow           Honorio Downs MD          [1]   Current Facility-Administered Medications:     acetaminophen (Tylenol) tablet 650 mg, 650 mg, oral, q4h PRN, 650 mg at 07/03/25 0849 **OR** acetaminophen (Tylenol) oral liquid 650 mg, 650 mg, oral, q4h PRN, 650 mg at 06/30/25 2119 **OR** acetaminophen (Tylenol) suppository 650 mg, 650 mg, rectal, q4h PRN, Honorio Downs MD    azithromycin (Zithromax) tablet 250 mg, 250 mg, oral, q24h, Honorio Downs MD, 250 mg at 07/03/25 0850    benzocaine-menthol (Cepastat Sore Throat) lozenge 1 lozenge, 1 lozenge, Mouth/Throat, q2h PRN, Honorio Downs MD    busPIRone (Buspar) tablet 10 mg, 10 mg, oral, TID, Honorio Downs MD, 10 mg at 07/03/25 1442    calcium carbonate-vitamin D3 500 mg-5 mcg (200 unit) tablet 1 tablet, 1 tablet, oral, BID, Honorio Downs MD, 1 tablet at 07/03/25 0850    cefTRIAXone (Rocephin) 1 g in dextrose (iso) IV 50 mL, 1 g, intravenous, q24h, Honorio Downs MD, Stopped at 07/03/25 0943    cyanocobalamin (Vitamin B-12) tablet 1,000 mcg, 1,000 mcg, oral, Daily, Honorio Downs MD, 1,000 mcg at 07/03/25 0850    dextromethorphan-guaifenesin (Robitussin DM)  mg/5 mL oral liquid 5 mL, 5 mL, oral, q4h PRN, Honorio Downs MD    enoxaparin (Lovenox) syringe 40 mg, 40 mg, subcutaneous, q24h, Honorio Downs MD, 40 mg at 07/03/25 1442    ferrous sulfate 325 mg (65 mg elemental) tablet 1 tablet, 65 mg of elemental iron, oral, Every other day, Honorio Downs MD, 1 tablet at 07/03/25 0850    guaiFENesin (Mucinex) 12 hr  tablet 600 mg, 600 mg, oral, q12h PRN, Honorio Downs MD    guaiFENesin (Mucinex) 12 hr tablet 600 mg, 600 mg, oral, BID PRN, Honorio Downs MD    hydrOXYzine HCL (Atarax) tablet 25 mg, 25 mg, oral, q8h PRN, Honorio Downs MD, 25 mg at 07/03/25 0539    ipratropium-albuteroL (Duo-Neb) 0.5-2.5 mg/3 mL nebulizer solution 3 mL, 3 mL, nebulization, q6h PRN, Honorio Downs MD    latanoprost (Xalatan) 0.005 % ophthalmic solution 1 drop, 1 drop, Both Eyes, Nightly, Honorio Downs MD, 1 drop at 07/02/25 2038    lidocaine 4 % patch 1 patch, 1 patch, transdermal, Daily, Honorio Downs MD, 1 patch at 07/02/25 0856    melatonin tablet 5 mg, 5 mg, oral, Nightly PRN, Honorio Downs MD, 5 mg at 07/02/25 2025    naproxen (Naprosyn) tablet 500 mg, 500 mg, oral, Daily PRN, Honorio Downs MD, 500 mg at 07/01/25 0936    ondansetron ODT (Zofran-ODT) disintegrating tablet 4 mg, 4 mg, oral, q8h PRN **OR** ondansetron (Zofran) injection 4 mg, 4 mg, intravenous, q8h PRN, Honorio Downs MD    pantoprazole (ProtoNix) EC tablet 20 mg, 20 mg, oral, Daily, Honorio Downs MD, 20 mg at 07/03/25 0849    polyethylene glycol (Glycolax, Miralax) packet 17 g, 17 g, oral, Daily PRN, Honorio Downs MD    sertraline (Zoloft) tablet 50 mg, 50 mg, oral, Daily, Honorio Downs MD, 50 mg at 07/03/25 0850    valsartan 160 mg, hydroCHLOROthiazide 12.5 mg for Diovan HCT, , oral, Daily, Honorio Downs MD, Given at 07/03/25 0849

## 2025-07-03 NOTE — ASSESSMENT & PLAN NOTE
Hypertensive urgency  Pneumonia  Anxiety  [Atrial flutter  Chronic low back pain  Leg weakness  Dementia  Depression  GERD  Hypertension        Continue current medication and antibiotic.  The patient has been started on Rocephin and azithromycin.  Give aerosol treatment with albuterol and Atrovent.  Mucinex.  Check sputum culture.  Give physical therapy and Occupational Therapy.  Control pain.  Possible discharge soon    Date of Service  07/01/2024    Plan   Continue current medication.  Give supportive care.  Give physical therapy and occupation therapy.  Continue antibiotics.  Give supportive care.  Possible discharge soon per PT/OT recommendation.    Date of service: 7/2/2025    Plan: Continue current medication.  Supportive care.  Patient blood pressure was out of control.  Patient had an hypotensive urgency.  Blood pressure medication adjusted.  Patient started on the Norvasc and valsartan/hydrochlorothiazide.  Monitor blood pressure.  Continue with antibiotics.  Give aerosol treatment.  Give physical therapy Occupational Therapy metathesis and BMP increase activity.  We will take DVT, fall, aspiration, decubitus and DVT precautions

## 2025-07-03 NOTE — CARE PLAN
The patient's goals for the shift include      The clinical goals for the shift include safety      Problem: Pain - Adult  Goal: Verbalizes/displays adequate comfort level or baseline comfort level  Flowsheets (Taken 7/2/2025 2335)  Verbalizes/displays adequate comfort level or baseline comfort level:   Encourage patient to monitor pain and request assistance   Assess pain using appropriate pain scale   Administer analgesics based on type and severity of pain and evaluate response   Implement non-pharmacological measures as appropriate and evaluate response   Consider cultural and social influences on pain and pain management   Notify Licensed Independent Practitioner if interventions unsuccessful or patient reports new pain     Problem: Safety - Adult  Goal: Free from fall injury  Flowsheets (Taken 7/2/2025 2335)  Free from fall injury:   Instruct family/caregiver on patient safety   Based on caregiver fall risk screen, instruct family/caregiver to ask for assistance with transferring infant if caregiver noted to have fall risk factors     Problem: Discharge Planning  Goal: Discharge to home or other facility with appropriate resources  Flowsheets (Taken 7/2/2025 2335)  Discharge to home or other facility with appropriate resources:   Identify barriers to discharge with patient and caregiver   Arrange for needed discharge resources and transportation as appropriate   Identify discharge learning needs (meds, wound care, etc)   Arrange for interpreters to assist at discharge as needed   Refer to discharge planning if patient needs post-hospital services based on physician order or complex needs related to functional status, cognitive ability or social support system     Problem: Chronic Conditions and Co-morbidities  Goal: Patient's chronic conditions and co-morbidity symptoms are monitored and maintained or improved  Flowsheets (Taken 7/2/2025 2335)  Care Plan - Patient's Chronic Conditions and Co-Morbidity Symptoms  are Monitored and Maintained or Improved:   Monitor and assess patient's chronic conditions and comorbid symptoms for stability, deterioration, or improvement   Collaborate with multidisciplinary team to address chronic and comorbid conditions and prevent exacerbation or deterioration   Update acute care plan with appropriate goals if chronic or comorbid symptoms are exacerbated and prevent overall improvement and discharge     Problem: Skin  Goal: Decreased wound size/increased tissue granulation at next dressing change  Flowsheets (Taken 7/2/2025 1344)  Decreased wound size/increased tissue granulation at next dressing change:   Promote sleep for wound healing   Protective dressings over bony prominences   Utilize specialty bed per algorithm  Goal: Participates in plan/prevention/treatment measures  Flowsheets (Taken 7/2/2025 2339)  Participates in plan/prevention/treatment measures:   Discuss with provider PT/OT consult   Elevate heels   Increase activity/out of bed for meals

## 2025-07-04 VITALS
DIASTOLIC BLOOD PRESSURE: 75 MMHG | WEIGHT: 147 LBS | HEART RATE: 67 BPM | SYSTOLIC BLOOD PRESSURE: 144 MMHG | BODY MASS INDEX: 25.1 KG/M2 | OXYGEN SATURATION: 95 % | HEIGHT: 64 IN | RESPIRATION RATE: 16 BRPM | TEMPERATURE: 98.8 F

## 2025-07-04 PROCEDURE — 2500000001 HC RX 250 WO HCPCS SELF ADMINISTERED DRUGS (ALT 637 FOR MEDICARE OP): Performed by: INTERNAL MEDICINE

## 2025-07-04 PROCEDURE — 2500000005 HC RX 250 GENERAL PHARMACY W/O HCPCS: Performed by: INTERNAL MEDICINE

## 2025-07-04 PROCEDURE — 2500000002 HC RX 250 W HCPCS SELF ADMINISTERED DRUGS (ALT 637 FOR MEDICARE OP, ALT 636 FOR OP/ED): Performed by: INTERNAL MEDICINE

## 2025-07-04 PROCEDURE — 2500000004 HC RX 250 GENERAL PHARMACY W/ HCPCS (ALT 636 FOR OP/ED): Performed by: INTERNAL MEDICINE

## 2025-07-04 RX ORDER — VALSARTAN AND HYDROCHLOROTHIAZIDE 160; 12.5 MG/1; MG/1
1 TABLET, FILM COATED ORAL DAILY
Start: 2025-07-04

## 2025-07-04 RX ADMIN — AZITHROMYCIN DIHYDRATE 250 MG: 250 TABLET ORAL at 08:44

## 2025-07-04 RX ADMIN — HYDROCHLOROTHIAZIDE: 12.5 CAPSULE ORAL at 08:44

## 2025-07-04 RX ADMIN — PANTOPRAZOLE SODIUM 20 MG: 20 TABLET, DELAYED RELEASE ORAL at 08:44

## 2025-07-04 RX ADMIN — BUSPIRONE HYDROCHLORIDE 10 MG: 10 TABLET ORAL at 08:44

## 2025-07-04 RX ADMIN — SERTRALINE 50 MG: 50 TABLET, FILM COATED ORAL at 08:44

## 2025-07-04 RX ADMIN — CEFTRIAXONE 1 G: 1 INJECTION, SOLUTION INTRAVENOUS at 08:45

## 2025-07-04 RX ADMIN — Medication 1 TABLET: at 08:44

## 2025-07-04 RX ADMIN — Medication 1000 MCG: at 08:44

## 2025-07-04 RX ADMIN — LIDOCAINE 4% 1 PATCH: 40 PATCH TOPICAL at 08:47

## 2025-07-04 ASSESSMENT — PAIN SCALES - GENERAL: PAINLEVEL_OUTOF10: 4

## 2025-07-04 ASSESSMENT — COGNITIVE AND FUNCTIONAL STATUS - GENERAL
MOBILITY SCORE: 13
DRESSING REGULAR LOWER BODY CLOTHING: A LITTLE
PERSONAL GROOMING: A LITTLE
TURNING FROM BACK TO SIDE WHILE IN FLAT BAD: A LITTLE
MOVING TO AND FROM BED TO CHAIR: A LOT
DRESSING REGULAR UPPER BODY CLOTHING: A LITTLE
CLIMB 3 TO 5 STEPS WITH RAILING: TOTAL
STANDING UP FROM CHAIR USING ARMS: A LOT
HELP NEEDED FOR BATHING: A LITTLE
TOILETING: A LOT
DAILY ACTIVITIY SCORE: 18
MOVING FROM LYING ON BACK TO SITTING ON SIDE OF FLAT BED WITH BEDRAILS: A LITTLE
WALKING IN HOSPITAL ROOM: A LOT

## 2025-07-04 ASSESSMENT — PAIN SCALES - WONG BAKER: WONGBAKER_NUMERICALRESPONSE: HURTS LITTLE MORE

## 2025-07-04 ASSESSMENT — PAIN - FUNCTIONAL ASSESSMENT
PAIN_FUNCTIONAL_ASSESSMENT: PAINAD (PAIN ASSESSMENT IN ADVANCED DEMENTIA SCALE)
PAIN_FUNCTIONAL_ASSESSMENT: UNABLE TO SELF-REPORT

## 2025-07-04 NOTE — CARE PLAN
The patient's goals for the shift include  DC    The clinical goals for the shift include Safety and comfort, pain mgmt, monitor VS.      Problem: Pain - Adult  Goal: Verbalizes/displays adequate comfort level or baseline comfort level  Outcome: Progressing     Problem: Safety - Adult  Goal: Free from fall injury  Outcome: Progressing     Problem: Discharge Planning  Goal: Discharge to home or other facility with appropriate resources  Outcome: Progressing     Problem: Chronic Conditions and Co-morbidities  Goal: Patient's chronic conditions and co-morbidity symptoms are monitored and maintained or improved  Outcome: Progressing     Problem: Nutrition  Goal: Nutrient intake appropriate for maintaining nutritional needs  Outcome: Progressing     Problem: Skin  Goal: Decreased wound size/increased tissue granulation at next dressing change  Outcome: Progressing  Goal: Participates in plan/prevention/treatment measures  Outcome: Progressing  Goal: Prevent/manage excess moisture  Outcome: Progressing  Goal: Prevent/minimize sheer/friction injuries  Outcome: Progressing  Goal: Promote/optimize nutrition  Outcome: Progressing  Goal: Promote skin healing  Outcome: Progressing     Problem: Pain  Goal: Takes deep breaths with improved pain control throughout the shift  Outcome: Progressing  Goal: Turns in bed with improved pain control throughout the shift  Outcome: Progressing  Goal: Walks with improved pain control throughout the shift  Outcome: Progressing  Goal: Performs ADL's with improved pain control throughout shift  Outcome: Progressing  Goal: Participates in PT with improved pain control throughout the shift  Outcome: Progressing  Goal: Free from opioid side effects throughout the shift  Outcome: Progressing  Goal: Free from acute confusion related to pain meds throughout the shift  Outcome: Progressing     Problem: Fall/Injury  Goal: Not fall by end of shift  Outcome: Progressing  Goal: Be free from injury by end  of the shift  Outcome: Progressing  Goal: Verbalize understanding of personal risk factors for fall in the hospital  Outcome: Progressing  Goal: Verbalize understanding of risk factor reduction measures to prevent injury from fall in the home  Outcome: Progressing  Goal: Use assistive devices by end of the shift  Outcome: Progressing  Goal: Pace activities to prevent fatigue by end of the shift  Outcome: Progressing

## 2025-07-04 NOTE — CARE PLAN
The patient's goals for the shift include  safety and comfort.    The clinical goals for the shift include Safety and comfort

## 2025-07-04 NOTE — PROGRESS NOTES
Xiomy Rubio is a 89 y.o. female on day 3 of admission presenting with Closed head injury, initial encounter.    Subjective   The patient was seen and examined.  Lying in the bed.  Comfortable.  Did not look in acute distress.       Objective     Physical Exam  HEENT:  Head externally atraumatic,  extraocular movements intact, oral mucosa moist  Neck:  Supple, no JVP, no palpable adenopathy or thyromegaly.  No carotid bruit.  Chest:  Clear to auscultation and resonant.  Heart:  Regular rate and rhythm, no murmur or gallop could be appreciated.  Abdomen:  Soft, nontender, bowel sounds present, normoactive, no palpable hepatosplenomegaly.  Extremities:  No edema, pulses present, no cyanosis or clubbing.  CNS:  Patient alert, oriented x 1-2.    No new deficit.  Cranial nerves 2-12 grossly intact  Skin:  No active rash.  Musculoskeletal:  No  apparent joint swelling or erythema, range of movement normal.  Last Recorded Vitals  Heart Rate:  [67-83]   Temp:  [36.8 °C (98.2 °F)-37.1 °C (98.8 °F)]   Resp:  [16-18]   BP: (136-155)/(60-75)   SpO2:  [94 %-96 %]     Intake/Output last 3 Shifts:  I/O last 3 completed shifts:  In: 1205 (18.1 mL/kg) [P.O.:1205]  Out: 700 (10.5 mL/kg) [Urine:700 (0.3 mL/kg/hr)]  Weight: 66.7 kg     Relevant Results  No results found for the last 90 days.    No results found for this or any previous visit (from the past 24 hours).   Current Medications[1]   Assessment/Plan   Assessment & Plan  Closed head injury, initial encounter  Hypertensive urgency  Pneumonia  Anxiety  [Atrial flutter  Chronic low back pain  Leg weakness  Dementia  Depression  GERD  Hypertension        Continue current medication and antibiotic.  The patient has been started on Rocephin and azithromycin.  Give aerosol treatment with albuterol and Atrovent.  Mucinex.  Check sputum culture.  Give physical therapy and Occupational Therapy.  Control pain.  Possible discharge soon    Date of Service  07/01/2024    Plan    Continue current medication.  Give supportive care.  Give physical therapy and occupation therapy.  Continue antibiotics.  Give supportive care.  Possible discharge soon per PT/OT recommendation.    Date of service: 7/2/2025    Plan: Continue current medication.  Supportive care.  Patient blood pressure was out of control.  Patient had an hypotensive urgency.  Blood pressure medication adjusted.  Patient started on the Norvasc and valsartan/hydrochlorothiazide.  Monitor blood pressure.  Continue with antibiotics.  Give aerosol treatment.  Give physical therapy Occupational Therapy metathesis and BMP increase activity.  We will take DVT, fall, aspiration, decubitus and DVT precautions    Date of service: 07/03/2025  Plan: Continue current medication.  Supportive care.  Continue with physical therapy and Occupational Therapy.  Complaining of pain in the leg.  Nurse has been advised to give the patient Tylenol.  Blood pressure is controlled now.  Possible discharge tomorrow           Honorio Downs MD          [1]   Current Facility-Administered Medications:     acetaminophen (Tylenol) tablet 650 mg, 650 mg, oral, q4h PRN, 650 mg at 07/03/25 2201 **OR** acetaminophen (Tylenol) oral liquid 650 mg, 650 mg, oral, q4h PRN, 650 mg at 06/30/25 2119 **OR** acetaminophen (Tylenol) suppository 650 mg, 650 mg, rectal, q4h PRN, Honorio Downs MD    azithromycin (Zithromax) tablet 250 mg, 250 mg, oral, q24h, Honorio Downs MD, 250 mg at 07/04/25 0844    benzocaine-menthol (Cepastat Sore Throat) lozenge 1 lozenge, 1 lozenge, Mouth/Throat, q2h PRN, Honorio Downs MD    busPIRone (Buspar) tablet 10 mg, 10 mg, oral, TID, Honorio Downs MD, 10 mg at 07/04/25 0844    calcium carbonate-vitamin D3 500 mg-5 mcg (200 unit) tablet 1 tablet, 1 tablet, oral, BID, Honorio Downs MD, 1 tablet at 07/04/25 0844    cefTRIAXone (Rocephin) 1 g in dextrose (iso) IV 50 mL, 1 g, intravenous, q24h, Honorio Downs MD,  Stopped at 07/04/25 0938    cyanocobalamin (Vitamin B-12) tablet 1,000 mcg, 1,000 mcg, oral, Daily, Honorio Downs MD, 1,000 mcg at 07/04/25 0844    dextromethorphan-guaifenesin (Robitussin DM)  mg/5 mL oral liquid 5 mL, 5 mL, oral, q4h PRN, Honorio Downs MD    enoxaparin (Lovenox) syringe 40 mg, 40 mg, subcutaneous, q24h, Honorio Downs MD, 40 mg at 07/03/25 1442    ferrous sulfate 325 mg (65 mg elemental) tablet 1 tablet, 65 mg of elemental iron, oral, Every other day, Honorio Downs MD, 1 tablet at 07/03/25 0850    guaiFENesin (Mucinex) 12 hr tablet 600 mg, 600 mg, oral, q12h PRN, Honorio Downs MD    guaiFENesin (Mucinex) 12 hr tablet 600 mg, 600 mg, oral, BID PRN, Honorio Downs MD    hydrOXYzine HCL (Atarax) tablet 25 mg, 25 mg, oral, q8h PRN, Honorio Downs MD, 25 mg at 07/03/25 2200    ipratropium-albuteroL (Duo-Neb) 0.5-2.5 mg/3 mL nebulizer solution 3 mL, 3 mL, nebulization, q6h PRN, Honorio Downs MD    latanoprost (Xalatan) 0.005 % ophthalmic solution 1 drop, 1 drop, Both Eyes, Nightly, Honorio Downs MD, 1 drop at 07/03/25 2201    lidocaine 4 % patch 1 patch, 1 patch, transdermal, Daily, Honorio Downs MD, 1 patch at 07/04/25 0847    melatonin tablet 5 mg, 5 mg, oral, Nightly PRN, Honorio Downs MD, 5 mg at 07/03/25 2200    naproxen (Naprosyn) tablet 500 mg, 500 mg, oral, Daily PRN, Honorio Downs MD, 500 mg at 07/01/25 0936    ondansetron ODT (Zofran-ODT) disintegrating tablet 4 mg, 4 mg, oral, q8h PRN **OR** ondansetron (Zofran) injection 4 mg, 4 mg, intravenous, q8h PRN, Honorio Downs MD    pantoprazole (ProtoNix) EC tablet 20 mg, 20 mg, oral, Daily, Honorio Downs MD, 20 mg at 07/04/25 0844    polyethylene glycol (Glycolax, Miralax) packet 17 g, 17 g, oral, Daily PRN, Honorio Downs MD    sertraline (Zoloft) tablet 50 mg, 50 mg, oral, Daily, Honorio Downs MD, 50 mg at 07/04/25 0844    valsartan 160 mg,  hydroCHLOROthiazide 12.5 mg for Diovan HCT, , oral, Daily, Honorio Downs MD, Given at 07/04/25 0844

## 2025-07-04 NOTE — DISCHARGE SUMMARY
Discharge Diagnosis  Closed head injury, initial encounter           Issues Requiring Follow-Up  Closed head injury, initial encounter  Hypertensive urgency  Pneumonia  Anxiety  [Atrial flutter  Chronic low back pain  Leg weakness  Dementia  Depression  GERD  Hypertension       Discharge Meds     Medication List      START taking these medications     azithromycin 500 mg tablet; Commonly known as: Zithromax; Take 1 tablet   (500 mg) by mouth once daily for 3 days.   cefuroxime 500 mg tablet; Commonly known as: Ceftin; Take 1 tablet (500   mg) by mouth 2 times a day for 5 days.   guaiFENesin 600 mg 12 hr tablet; Commonly known as: Mucinex; Take 1   tablet (600 mg) by mouth every 12 hours if needed for congestion. Do not   crush, chew, or split.   hydrOXYzine HCL 25 mg tablet; Commonly known as: Atarax; Take 1 tablet   (25 mg) by mouth every 8 hours if needed for anxiety.   ipratropium-albuteroL 0.5-2.5 mg/3 mL nebulizer solution; Commonly known   as: Duo-Neb; Take 3 mL by nebulization every 6 hours if needed for   wheezing.   melatonin 5 mg tablet; Take 1 tablet (5 mg) by mouth as needed at   bedtime for sleep.   valsartan-hydrochlorothiazide 160-12.5 mg tablet; Commonly known as:   Diovan-HCT; Take 1 tablet by mouth once daily.     CHANGE how you take these medications     busPIRone 10 mg tablet; Commonly known as: Buspar; What changed: Another   medication with the same name was removed. Continue taking this   medication, and follow the directions you see here.   pantoprazole 20 mg EC tablet; Commonly known as: ProtoNix; TAKE 1 TABLET   BY MOUTH EVERY DAY; What changed: when to take this     CONTINUE taking these medications     acetaminophen 325 mg tablet; Commonly known as: Tylenol; Take 2 tablets   (650 mg) by mouth once daily as needed for mild pain (1 - 3).   Aleve 220 mg tablet; Generic drug: naproxen sodium   calcium carbonate-vitamin D3 500 mg-5 mcg (200 unit) tablet; Commonly   known as: Oysco 500/D; Take  1 tablet by mouth 2 times a day.   cyanocobalamin 1,000 mcg tablet; Commonly known as: Vitamin B-12; Take 1   tablet (1,000 mcg) by mouth once daily.   ferrous sulfate 325 mg (65 mg elemental) tablet; TAKE 1 TABLET BY MOUTH   EVERY OTHER DAY   latanoprost 0.005 % ophthalmic solution; Commonly known as: Xalatan   lidocaine 4 % patch; Place 1 patch over 12 hours on the skin once daily.   Remove & discard patch within 12 hours or as directed by MD.   Miralax 17 gram/dose powder; Generic drug: polyethylene glycol   sertraline 50 mg tablet; Commonly known as: Zoloft   Wrist Brace Medium misc; Generic drug: arm brace; 2 each once daily at   bedtime.     STOP taking these medications     calcium citrate 950 mg (200 mg elemental) tablet; Commonly known as:   Calcitrate   cholecalciferol 50 mcg (2,000 units) tablet; Commonly known as: Vitamin   D-3   ondansetron ODT 4 mg disintegrating tablet; Commonly known as:   Zofran-ODT   senna 8.6 mg capsule; Generic drug: sennosides       Test Results Pending At Discharge  Pending Labs       Order Current Status    Extra Urine Gray Tube Collected (06/30/25 1103)    Urinalysis with Reflex Culture and Microscopic In process            Hospital Course  Patient was admitted with a complaint of fall.  The patient was found to have pneumonia and close head injury.  The patient has been hypertensive urgency.  Medications were adjusted.  Patient was treated with IV antibiotics.  Condition improved.  The patient is being discharged to the skilled nursing facility in stable condition.    Pertinent Physical Exam At Time of Discharge  Physical Exam    Outpatient Follow-Up  Future Appointments   Date Time Provider Department Center   9/2/2025  1:20 PM Lian Hernandez DO [id number]         Honorio Downs MD

## 2025-07-04 NOTE — NURSING NOTE
1331: Pt has DC order in place. AVS and packet done for DC. Report called to [facility] [facility]. Report given to [facility] .  1508: Transport here for pt. Call and LVM for pt's son Ramana.No personal info left on .

## 2025-07-04 NOTE — PROGRESS NOTES
07/04/25 1114   Discharge Planning   Type of Post Acute Facility Services Rehab;Skilled nursing   Expected Discharge Disposition SNF  ([facility], Ana Read tasked with 7000 and setting up transport for 3pm. N2N report number provided. Yareli has completed the dc.)   Does the patient need discharge transport arranged? Yes  (Ana Read tasked w 7000 completion and transport set up for 3 pm)   [facility] coordination needed? Yes   Has discharge transport been arranged?   (In process)   What day is the transport expected? 07/04/25   What time is the transport expected?   (@3pm pending set up)     Dispo: [facility] pending 7000 completion and ambo set up by Ana Read. Standby for details.   [facility] today @ 3 pm. [facility] coordinating with RN    Discharge plan NOT finalized. Please contact [facility] prior to attempting to discharge this patient. Thank you.     Update 1142 am:   Cleared for DC.    Transport details: Leg reference: [id number]  Leg is return: false  Original : 07- 15:00  Requested : 07- 15:00  Promised : 07- 15:00  Origin: [facility], [address], [address] [facility] [id number]  Facility notes: Room 453 Bed 453-A  Drop-off: 07- 15:26  Destination: [address] [place] [id number]

## 2025-07-05 ENCOUNTER — NURSING HOME VISIT (OUTPATIENT)
Dept: POST ACUTE CARE | Facility: EXTERNAL LOCATION | Age: OVER 89
End: 2025-07-05
Payer: MEDICARE

## 2025-07-05 DIAGNOSIS — S09.90XA CLOSED HEAD INJURY, INITIAL ENCOUNTER: ICD-10-CM

## 2025-07-05 DIAGNOSIS — F41.9 ANXIETY: ICD-10-CM

## 2025-07-05 DIAGNOSIS — M54.9 BACK PAIN, UNSPECIFIED BACK LOCATION, UNSPECIFIED BACK PAIN LATERALITY, UNSPECIFIED CHRONICITY: ICD-10-CM

## 2025-07-05 DIAGNOSIS — I16.0 HYPERTENSIVE URGENCY: ICD-10-CM

## 2025-07-05 DIAGNOSIS — I48.92 ATRIAL FLUTTER, UNSPECIFIED TYPE (MULTI): ICD-10-CM

## 2025-07-05 DIAGNOSIS — I10 HYPERTENSION, UNSPECIFIED TYPE: ICD-10-CM

## 2025-07-05 DIAGNOSIS — J18.9 PNEUMONIA DUE TO INFECTIOUS ORGANISM, UNSPECIFIED LATERALITY, UNSPECIFIED PART OF LUNG: Primary | ICD-10-CM

## 2025-07-05 DIAGNOSIS — R53.1 WEAKNESS: ICD-10-CM

## 2025-07-05 DIAGNOSIS — F03.90 DEMENTIA, UNSPECIFIED DEMENTIA SEVERITY, UNSPECIFIED DEMENTIA TYPE, UNSPECIFIED WHETHER BEHAVIORAL, PSYCHOTIC, OR MOOD DISTURBANCE OR ANXIETY (MULTI): ICD-10-CM

## 2025-07-05 DIAGNOSIS — F32.A DEPRESSION, UNSPECIFIED DEPRESSION TYPE: ICD-10-CM

## 2025-07-05 DIAGNOSIS — K21.9 GASTROESOPHAGEAL REFLUX DISEASE WITHOUT ESOPHAGITIS: ICD-10-CM

## 2025-07-05 DIAGNOSIS — Z91.81 AT RISK FOR FALLING: ICD-10-CM

## 2025-07-05 NOTE — LETTER
Patient: Xiomy Rubio  : 1931    Encounter Date: 2025    PLACE OF SERVICE:  [facility]    This is new/initial history and physical.    Subjective  Patient ID: Xiomy Rubio is a 89 y.o. female who presents for inital visit.    Ms. Xiomy Rubio is a 89-year-old female with recent history of pneumonia with hypertensive urgency.  She was found to have closed head injury after a fall.  She requires supportive care.    Review of Systems   Constitutional:  Negative for chills and fever.   Cardiovascular:  Negative for chest pain.   All other systems reviewed and are negative.    Objective  /84   Pulse 82   Temp 36.9 °C (98.4 °F)   Resp 18   LMP  (LMP Unknown) Comment: Pt is 89 years old- last mentrauls cycle unknown    Physical Exam  Vitals reviewed.   Constitutional:       Comments: This is a well-developed, well-nourished female, lying in bed, appearing weak and lethargic.   HENT:      Right Ear: Tympanic membrane, ear canal and external ear normal.      Left Ear: Tympanic membrane, ear canal and external ear normal.   Eyes:      General: No scleral icterus.     Pupils: Pupils are equal, round, and reactive to light.   Neck:      Vascular: No carotid bruit.   Cardiovascular:      Heart sounds: Normal heart sounds, S1 normal and S2 normal. No murmur heard.     No friction rub.   Pulmonary:      Effort: Pulmonary effort is normal.      Breath sounds: Decreased breath sounds (throughout.) present.   Abdominal:      Palpations: There is no hepatomegaly, splenomegaly or mass.   Musculoskeletal:         General: No swelling or deformity. Normal range of motion.      Cervical back: Neck supple.      Right lower leg: No edema.      Left lower leg: No edema.   Lymphadenopathy:      Cervical: No cervical adenopathy.      Upper Body:      Right upper body: No axillary adenopathy.      Left upper body: No axillary adenopathy.      Lower Body: No right inguinal adenopathy. No  left inguinal adenopathy.   Neurological:      Mental Status: She is oriented to person, place, and time.      Cranial Nerves: Cranial nerves 2-12 are intact. No cranial nerve deficit.      Sensory: No sensory deficit.      Motor: Motor function is intact. No weakness.      Gait: Gait is intact.      Deep Tendon Reflexes: Reflexes normal.   Psychiatric:         Mood and Affect: Mood normal. Mood is not anxious or depressed. Affect is not angry.         Behavior: Behavior is not agitated.         Thought Content: Thought content normal.         Judgment: Judgment normal.     LAB WORK:  Laboratory studies were reviewed.    Assessment/Plan  Problem List Items Addressed This Visit           ICD-10-CM    Anxiety F41.9    Closed head injury, initial encounter S09.90XA     Other Visit Diagnoses         Codes      Pneumonia due to infectious organism, unspecified laterality, unspecified part of lung    -  Primary J18.9      Hypertensive urgency     I16.0      Dementia, unspecified dementia severity, unspecified dementia type, unspecified whether behavioral, psychotic, or mood disturbance or anxiety (Multi)     F03.90      Depression, unspecified depression type     F32.A      Hypertension, unspecified type     I10      Gastroesophageal reflux disease without esophagitis     K21.9      Back pain, unspecified back location, unspecified back pain laterality, unspecified chronicity     M54.9      Atrial flutter, unspecified type (Multi)     I48.92      Weakness     R53.1      At risk for falling     Z91.81        1. Pneumonia, on antibiotic.  2. Hypertensive urgency, medically controlled.  3. Recent closed head injury.  Continue to monitor.  4. Dementia, unchanged.  5. Depression, on medication.  6. Hypertension, medically controlled.  7. GERD, on PPI.  8. Back pain, on Tylenol.  9. Anxiety, on medication.  10. Atrial flutter.  Continue to monitor.  11. Weakness, on PT/OT.  12. Fall risk, on fall precautions.    Scribe  Attestation  By signing my name below, I, Valeria Herzog attest that this documentation has been prepared under the direction and in the presence of Obey Osorio MD.     All medical record entries made by the scribe were personally dictated by me I have reviewed the chart and agree the record accurately reflects my personal performance of his history physical examination and management      Electronically Signed By: Obey Osorio MD   7/12/25 12:21 AM

## 2025-07-08 VITALS
SYSTOLIC BLOOD PRESSURE: 130 MMHG | DIASTOLIC BLOOD PRESSURE: 84 MMHG | TEMPERATURE: 98.4 F | HEART RATE: 82 BPM | RESPIRATION RATE: 18 BRPM

## 2025-07-08 DIAGNOSIS — K21.9 GASTROESOPHAGEAL REFLUX DISEASE, UNSPECIFIED WHETHER ESOPHAGITIS PRESENT: ICD-10-CM

## 2025-07-08 RX ORDER — PANTOPRAZOLE SODIUM 20 MG/1
20 TABLET, DELAYED RELEASE ORAL
Qty: 90 TABLET | Refills: 0 | Status: SHIPPED | OUTPATIENT
Start: 2025-07-08

## 2025-07-08 ASSESSMENT — ENCOUNTER SYMPTOMS
CHILLS: 0
FEVER: 0

## 2025-07-09 NOTE — PROGRESS NOTES
PLACE OF SERVICE:  [facility]    This is new/initial history and physical.    Subjective   Patient ID: Xiomy Rubio is a 89 y.o. female who presents for inital visit.    Ms. Xiomy Rubio is a 89-year-old female with recent history of pneumonia with hypertensive urgency.  She was found to have closed head injury after a fall.  She requires supportive care.    Review of Systems   Constitutional:  Negative for chills and fever.   Cardiovascular:  Negative for chest pain.   All other systems reviewed and are negative.    Objective   /84   Pulse 82   Temp 36.9 °C (98.4 °F)   Resp 18   LMP  (LMP Unknown) Comment: Pt is 89 years old- last mentrauls cycle unknown    Physical Exam  Vitals reviewed.   Constitutional:       Comments: This is a well-developed, well-nourished female, lying in bed, appearing weak and lethargic.   HENT:      Right Ear: Tympanic membrane, ear canal and external ear normal.      Left Ear: Tympanic membrane, ear canal and external ear normal.   Eyes:      General: No scleral icterus.     Pupils: Pupils are equal, round, and reactive to light.   Neck:      Vascular: No carotid bruit.   Cardiovascular:      Heart sounds: Normal heart sounds, S1 normal and S2 normal. No murmur heard.     No friction rub.   Pulmonary:      Effort: Pulmonary effort is normal.      Breath sounds: Decreased breath sounds (throughout.) present.   Abdominal:      Palpations: There is no hepatomegaly, splenomegaly or mass.   Musculoskeletal:         General: No swelling or deformity. Normal range of motion.      Cervical back: Neck supple.      Right lower leg: No edema.      Left lower leg: No edema.   Lymphadenopathy:      Cervical: No cervical adenopathy.      Upper Body:      Right upper body: No axillary adenopathy.      Left upper body: No axillary adenopathy.      Lower Body: No right inguinal adenopathy. No left inguinal adenopathy.   Neurological:      Mental Status: She is  oriented to person, place, and time.      Cranial Nerves: Cranial nerves 2-12 are intact. No cranial nerve deficit.      Sensory: No sensory deficit.      Motor: Motor function is intact. No weakness.      Gait: Gait is intact.      Deep Tendon Reflexes: Reflexes normal.   Psychiatric:         Mood and Affect: Mood normal. Mood is not anxious or depressed. Affect is not angry.         Behavior: Behavior is not agitated.         Thought Content: Thought content normal.         Judgment: Judgment normal.     LAB WORK:  Laboratory studies were reviewed.    Assessment/Plan   Problem List Items Addressed This Visit           ICD-10-CM    Anxiety F41.9    Closed head injury, initial encounter S09.90XA     Other Visit Diagnoses         Codes      Pneumonia due to infectious organism, unspecified laterality, unspecified part of lung    -  Primary J18.9      Hypertensive urgency     I16.0      Dementia, unspecified dementia severity, unspecified dementia type, unspecified whether behavioral, psychotic, or mood disturbance or anxiety (Multi)     F03.90      Depression, unspecified depression type     F32.A      Hypertension, unspecified type     I10      Gastroesophageal reflux disease without esophagitis     K21.9      Back pain, unspecified back location, unspecified back pain laterality, unspecified chronicity     M54.9      Atrial flutter, unspecified type (Multi)     I48.92      Weakness     R53.1      At risk for falling     Z91.81        1. Pneumonia, on antibiotic.  2. Hypertensive urgency, medically controlled.  3. Recent closed head injury.  Continue to monitor.  4. Dementia, unchanged.  5. Depression, on medication.  6. Hypertension, medically controlled.  7. GERD, on PPI.  8. Back pain, on Tylenol.  9. Anxiety, on medication.  10. Atrial flutter.  Continue to monitor.  11. Weakness, on PT/OT.  12. Fall risk, on fall precautions.    Scribe Attestation  By signing my name below, ICarmita, Scribe attest that this  documentation has been prepared under the direction and in the presence of Obey Osorio MD.     All medical record entries made by the scribe were personally dictated by me I have reviewed the chart and agree the record accurately reflects my personal performance of his history physical examination and management

## 2025-07-12 ENCOUNTER — NURSING HOME VISIT (OUTPATIENT)
Dept: POST ACUTE CARE | Facility: EXTERNAL LOCATION | Age: OVER 89
End: 2025-07-12
Payer: MEDICARE

## 2025-07-12 DIAGNOSIS — F03.90 DEMENTIA, UNSPECIFIED DEMENTIA SEVERITY, UNSPECIFIED DEMENTIA TYPE, UNSPECIFIED WHETHER BEHAVIORAL, PSYCHOTIC, OR MOOD DISTURBANCE OR ANXIETY (MULTI): ICD-10-CM

## 2025-07-12 DIAGNOSIS — M54.9 BACK PAIN, UNSPECIFIED BACK LOCATION, UNSPECIFIED BACK PAIN LATERALITY, UNSPECIFIED CHRONICITY: ICD-10-CM

## 2025-07-12 DIAGNOSIS — S09.90XA CLOSED HEAD INJURY, INITIAL ENCOUNTER: ICD-10-CM

## 2025-07-12 DIAGNOSIS — F32.A DEPRESSION, UNSPECIFIED DEPRESSION TYPE: ICD-10-CM

## 2025-07-12 DIAGNOSIS — K21.9 GASTROESOPHAGEAL REFLUX DISEASE WITHOUT ESOPHAGITIS: ICD-10-CM

## 2025-07-12 DIAGNOSIS — Z91.81 AT RISK FOR FALLING: ICD-10-CM

## 2025-07-12 DIAGNOSIS — R53.1 WEAKNESS: ICD-10-CM

## 2025-07-12 DIAGNOSIS — I48.92 ATRIAL FLUTTER, UNSPECIFIED TYPE (MULTI): ICD-10-CM

## 2025-07-12 DIAGNOSIS — J18.9 PNEUMONIA DUE TO INFECTIOUS ORGANISM, UNSPECIFIED LATERALITY, UNSPECIFIED PART OF LUNG: Primary | ICD-10-CM

## 2025-07-12 DIAGNOSIS — I10 HYPERTENSION, UNSPECIFIED TYPE: ICD-10-CM

## 2025-07-12 DIAGNOSIS — F41.9 ANXIETY: ICD-10-CM

## 2025-07-12 PROCEDURE — 99309 SBSQ NF CARE MODERATE MDM 30: CPT | Performed by: INTERNAL MEDICINE

## 2025-07-12 NOTE — LETTER
Patient: Xiomy Ruboi  : 1931    Encounter Date: 2025    PLACE OF SERVICE:  [facility]    This is a subsequent visit.    Subjective  Patient ID: Xiomy Rubio is a 89 y.o. female who presents for Follow-up.    Ms. Xiomy Rubio is a 89-year-old female with history of dementia with pneumonia, and recent hypertensive urgency.  She is unable to care for herself and requires supportive care.    Review of Systems   Constitutional:  Negative for chills and fever.   Cardiovascular:  Negative for chest pain.   All other systems reviewed and are negative.    Objective  /82   Pulse 78   Temp 36.6 °C (97.9 °F)   Resp 16   LMP  (LMP Unknown) Comment: Pt is 89 years old- last mentrauls cycle unknown    Physical Exam  Vitals reviewed.   Constitutional:       Comments: This is a well-developed, well-nourished female, lying in bed, appearing weak.   HENT:      Right Ear: Tympanic membrane, ear canal and external ear normal.      Left Ear: Tympanic membrane, ear canal and external ear normal.     Eyes:      General: No scleral icterus.     Pupils: Pupils are equal, round, and reactive to light.     Neck:      Vascular: No carotid bruit.     Cardiovascular:      Heart sounds: Normal heart sounds, S1 normal and S2 normal. No murmur heard.     No friction rub.   Pulmonary:      Effort: Pulmonary effort is normal.      Breath sounds: Normal breath sounds and air entry.   Abdominal:      Palpations: There is no hepatomegaly, splenomegaly or mass.     Musculoskeletal:         General: No swelling or deformity. Normal range of motion.      Cervical back: Neck supple.      Right lower leg: No edema.      Left lower leg: No edema.   Lymphadenopathy:      Cervical: No cervical adenopathy.      Upper Body:      Right upper body: No axillary adenopathy.      Left upper body: No axillary adenopathy.      Lower Body: No right inguinal adenopathy. No left inguinal adenopathy.     Neurological:       Mental Status: She is lethargic.      Cranial Nerves: Cranial nerves 2-12 are intact. No cranial nerve deficit.      Sensory: No sensory deficit.      Motor: Motor function is intact. No weakness.      Gait: Gait is intact.      Deep Tendon Reflexes: Reflexes normal.      Comments: The patient is alert and oriented x2.   Psychiatric:         Mood and Affect: Mood normal. Mood is not anxious or depressed. Affect is not angry.         Behavior: Behavior is not agitated.         Thought Content: Thought content normal.         Judgment: Judgment normal.     LAB WORK:  Laboratory studies were reviewed.    Assessment/Plan  Problem List Items Addressed This Visit           ICD-10-CM    Anxiety F41.9    Closed head injury, initial encounter S09.90XA     Other Visit Diagnoses         Codes      Pneumonia due to infectious organism, unspecified laterality, unspecified part of lung    -  Primary J18.9      Hypertension, unspecified type     I10      Dementia, unspecified dementia severity, unspecified dementia type, unspecified whether behavioral, psychotic, or mood disturbance or anxiety (Multi)     F03.90      Depression, unspecified depression type     F32.A      Atrial flutter, unspecified type (Multi)     I48.92      Back pain, unspecified back location, unspecified back pain laterality, unspecified chronicity     M54.9      Gastroesophageal reflux disease without esophagitis     K21.9      Weakness     R53.1      At risk for falling     Z91.81        1. Pneumonia, on antibiotic.  2. Hypertension, medically controlled.  3. Dementia, unchanged.  4. Depression, on medication.  5. Recent closed head injury.  Continue to monitor.  6. Anxiety, on medication.  7. Atrial flutter.  Continue to monitor.  8. Back pain, on Tylenol.  9. GERD, on PPI.  10. Weakness, on PT/OT.  11. Fall risk, on fall precautions.    Scribe Attestation  By signing my name below, ICarmita, Scribcharlotte attest that this documentation has been prepared  under the direction and in the presence of Obey Osorio MD.     All medical record entries made by the scribe were personally dictated by me I have reviewed the chart and agree the record accurately reflects my personal performance of his history physical examination and management      Electronically Signed By: Obey Osorio MD   7/17/25 11:19 PM

## 2025-07-16 VITALS
HEART RATE: 78 BPM | TEMPERATURE: 97.9 F | SYSTOLIC BLOOD PRESSURE: 126 MMHG | DIASTOLIC BLOOD PRESSURE: 82 MMHG | RESPIRATION RATE: 16 BRPM

## 2025-07-16 ASSESSMENT — ENCOUNTER SYMPTOMS
FEVER: 0
CHILLS: 0

## 2025-07-16 NOTE — PROGRESS NOTES
PLACE OF SERVICE:  [facility]    This is a subsequent visit.    Subjective   Patient ID: Xiomy Rubio is a 89 y.o. female who presents for Follow-up.    Ms. Xiomy Rbuio is a 89-year-old female with history of dementia with pneumonia, and recent hypertensive urgency.  She is unable to care for herself and requires supportive care.    Review of Systems   Constitutional:  Negative for chills and fever.   Cardiovascular:  Negative for chest pain.   All other systems reviewed and are negative.    Objective   /82   Pulse 78   Temp 36.6 °C (97.9 °F)   Resp 16   LMP  (LMP Unknown) Comment: Pt is 89 years old- last mentrauls cycle unknown    Physical Exam  Vitals reviewed.   Constitutional:       Comments: This is a well-developed, well-nourished female, lying in bed, appearing weak.   HENT:      Right Ear: Tympanic membrane, ear canal and external ear normal.      Left Ear: Tympanic membrane, ear canal and external ear normal.     Eyes:      General: No scleral icterus.     Pupils: Pupils are equal, round, and reactive to light.     Neck:      Vascular: No carotid bruit.     Cardiovascular:      Heart sounds: Normal heart sounds, S1 normal and S2 normal. No murmur heard.     No friction rub.   Pulmonary:      Effort: Pulmonary effort is normal.      Breath sounds: Normal breath sounds and air entry.   Abdominal:      Palpations: There is no hepatomegaly, splenomegaly or mass.     Musculoskeletal:         General: No swelling or deformity. Normal range of motion.      Cervical back: Neck supple.      Right lower leg: No edema.      Left lower leg: No edema.   Lymphadenopathy:      Cervical: No cervical adenopathy.      Upper Body:      Right upper body: No axillary adenopathy.      Left upper body: No axillary adenopathy.      Lower Body: No right inguinal adenopathy. No left inguinal adenopathy.     Neurological:      Mental Status: She is lethargic.      Cranial Nerves: Cranial  nerves 2-12 are intact. No cranial nerve deficit.      Sensory: No sensory deficit.      Motor: Motor function is intact. No weakness.      Gait: Gait is intact.      Deep Tendon Reflexes: Reflexes normal.      Comments: The patient is alert and oriented x2.   Psychiatric:         Mood and Affect: Mood normal. Mood is not anxious or depressed. Affect is not angry.         Behavior: Behavior is not agitated.         Thought Content: Thought content normal.         Judgment: Judgment normal.     LAB WORK:  Laboratory studies were reviewed.    Assessment/Plan   Problem List Items Addressed This Visit           ICD-10-CM    Anxiety F41.9    Closed head injury, initial encounter S09.90XA     Other Visit Diagnoses         Codes      Pneumonia due to infectious organism, unspecified laterality, unspecified part of lung    -  Primary J18.9      Hypertension, unspecified type     I10      Dementia, unspecified dementia severity, unspecified dementia type, unspecified whether behavioral, psychotic, or mood disturbance or anxiety (Multi)     F03.90      Depression, unspecified depression type     F32.A      Atrial flutter, unspecified type (Multi)     I48.92      Back pain, unspecified back location, unspecified back pain laterality, unspecified chronicity     M54.9      Gastroesophageal reflux disease without esophagitis     K21.9      Weakness     R53.1      At risk for falling     Z91.81        1. Pneumonia, on antibiotic.  2. Hypertension, medically controlled.  3. Dementia, unchanged.  4. Depression, on medication.  5. Recent closed head injury.  Continue to monitor.  6. Anxiety, on medication.  7. Atrial flutter.  Continue to monitor.  8. Back pain, on Tylenol.  9. GERD, on PPI.  10. Weakness, on PT/OT.  11. Fall risk, on fall precautions.    Scribe Attestation  By signing my name below, ICarmita Scribe attest that this documentation has been prepared under the direction and in the presence of Obey Osorio MD.      All medical record entries made by the scribe were personally dictated by me I have reviewed the chart and agree the record accurately reflects my personal performance of his history physical examination and management

## 2025-07-18 ENCOUNTER — TELEPHONE (OUTPATIENT)
Dept: PRIMARY CARE | Facility: CLINIC | Age: OVER 89
End: 2025-07-18
Payer: MEDICARE

## 2025-07-19 ENCOUNTER — NURSING HOME VISIT (OUTPATIENT)
Dept: POST ACUTE CARE | Facility: EXTERNAL LOCATION | Age: OVER 89
End: 2025-07-19
Payer: MEDICARE

## 2025-07-19 DIAGNOSIS — I10 HYPERTENSION, UNSPECIFIED TYPE: ICD-10-CM

## 2025-07-19 DIAGNOSIS — F41.9 ANXIETY: ICD-10-CM

## 2025-07-19 DIAGNOSIS — J18.9 PNEUMONIA DUE TO INFECTIOUS ORGANISM, UNSPECIFIED LATERALITY, UNSPECIFIED PART OF LUNG: ICD-10-CM

## 2025-07-19 DIAGNOSIS — M54.9 BACK PAIN, UNSPECIFIED BACK LOCATION, UNSPECIFIED BACK PAIN LATERALITY, UNSPECIFIED CHRONICITY: ICD-10-CM

## 2025-07-19 DIAGNOSIS — K21.9 GASTROESOPHAGEAL REFLUX DISEASE WITHOUT ESOPHAGITIS: ICD-10-CM

## 2025-07-19 DIAGNOSIS — F03.90 DEMENTIA, UNSPECIFIED DEMENTIA SEVERITY, UNSPECIFIED DEMENTIA TYPE, UNSPECIFIED WHETHER BEHAVIORAL, PSYCHOTIC, OR MOOD DISTURBANCE OR ANXIETY (MULTI): Primary | ICD-10-CM

## 2025-07-19 DIAGNOSIS — Z91.81 AT RISK FOR FALLING: ICD-10-CM

## 2025-07-19 DIAGNOSIS — F32.A DEPRESSION, UNSPECIFIED DEPRESSION TYPE: ICD-10-CM

## 2025-07-19 DIAGNOSIS — S09.90XA CLOSED HEAD INJURY, INITIAL ENCOUNTER: ICD-10-CM

## 2025-07-19 DIAGNOSIS — R53.1 WEAKNESS: ICD-10-CM

## 2025-07-19 DIAGNOSIS — I48.92 ATRIAL FLUTTER, UNSPECIFIED TYPE (MULTI): ICD-10-CM

## 2025-07-19 PROCEDURE — 99309 SBSQ NF CARE MODERATE MDM 30: CPT | Performed by: INTERNAL MEDICINE

## 2025-07-21 VITALS
HEART RATE: 80 BPM | RESPIRATION RATE: 16 BRPM | TEMPERATURE: 98 F | SYSTOLIC BLOOD PRESSURE: 126 MMHG | DIASTOLIC BLOOD PRESSURE: 78 MMHG

## 2025-07-21 ASSESSMENT — ENCOUNTER SYMPTOMS
CHILLS: 0
FEVER: 0

## 2025-07-21 NOTE — PROGRESS NOTES
PLACE OF SERVICE:  [facility].    This is a subsequent visit.    Subjective   Patient ID: Xiomy Rubio is a 89 y.o. female who presents for Follow-up.    Ms. Xiomy Rubio is a 89-year-old female with history of dementia with recent pneumonia.  She is unable to care for herself and requires supportive care.    Review of Systems   Constitutional:  Negative for chills and fever.   Cardiovascular:  Negative for chest pain.   All other systems reviewed and are negative.    Objective   /78   Pulse 80   Temp 36.7 °C (98 °F)   Resp 16   LMP  (LMP Unknown) Comment: Pt is 89 years old- last mentrauls cycle unknown    Physical Exam  Vitals reviewed.   Constitutional:       Comments: This is a well-developed, well-nourished female, lying in bed, appearing weak.   HENT:      Right Ear: Tympanic membrane, ear canal and external ear normal.      Left Ear: Tympanic membrane, ear canal and external ear normal.     Eyes:      General: No scleral icterus.     Pupils: Pupils are equal, round, and reactive to light.     Neck:      Vascular: No carotid bruit.     Cardiovascular:      Heart sounds: Normal heart sounds, S1 normal and S2 normal. No murmur heard.     No friction rub.   Pulmonary:      Effort: Pulmonary effort is normal.      Breath sounds: Normal breath sounds and air entry.   Abdominal:      Palpations: There is no hepatomegaly, splenomegaly or mass.     Musculoskeletal:         General: No swelling or deformity. Normal range of motion.      Cervical back: Neck supple.      Right lower leg: No edema.      Left lower leg: No edema.   Lymphadenopathy:      Cervical: No cervical adenopathy.      Upper Body:      Right upper body: No axillary adenopathy.      Left upper body: No axillary adenopathy.      Lower Body: No right inguinal adenopathy. No left inguinal adenopathy.     Neurological:      Mental Status: She is lethargic.      Cranial Nerves: Cranial nerves 2-12 are intact. No  cranial nerve deficit.      Sensory: No sensory deficit.      Motor: Motor function is intact. No weakness.      Gait: Gait is intact.      Deep Tendon Reflexes: Reflexes normal.      Comments: The patient is alert and oriented x2.   Psychiatric:         Mood and Affect: Mood normal. Mood is not anxious or depressed. Affect is not angry.         Behavior: Behavior is not agitated.         Thought Content: Thought content normal.         Judgment: Judgment normal.     LAB WORK: Laboratory studies reviewed.    Assessment/Plan   Problem List Items Addressed This Visit           ICD-10-CM       Mental Health    Anxiety F41.9       Neuro    Closed head injury, initial encounter S09.90XA     Other Visit Diagnoses         Codes      Dementia, unspecified dementia severity, unspecified dementia type, unspecified whether behavioral, psychotic, or mood disturbance or anxiety (Multi)    -  Primary F03.90      Depression, unspecified depression type     F32.A      Pneumonia due to infectious organism, unspecified laterality, unspecified part of lung     J18.9      Hypertension, unspecified type     I10      Atrial flutter, unspecified type (Multi)     I48.92      Gastroesophageal reflux disease without esophagitis     K21.9      Back pain, unspecified back location, unspecified back pain laterality, unspecified chronicity     M54.9      Weakness     R53.1      At risk for falling     Z91.81        1. Dementia, unchanged.  2. Depression, on medication.  3. Recent pneumonia, has finished antibiotic.  4. Hypertension, med controlled.  5. Atrial flutter, continue to monitor.  6. Anxiety, on medication.  7. Recent closed head injury, continue to monitor.  8. GERD, on PPI.  9. Back pain, on Tylenol.  10. Weakness, on PT/OT.  11. Fall risk, on fall precautions.    Scribe Attestation  By signing my name below, Muna EVANS, Scrori attest that this documentation has been prepared under the direction and in the presence of Obey  MD Devon.       All medical record entries made by the scribe were personally dictated by me I have reviewed the chart and agree the record accurately reflects my personal performance of his history physical examination and management

## 2025-07-28 ENCOUNTER — APPOINTMENT (OUTPATIENT)
Dept: PRIMARY CARE | Facility: CLINIC | Age: OVER 89
End: 2025-07-28
Payer: MEDICARE

## 2025-07-28 DIAGNOSIS — R29.6 FREQUENT FALLS: Primary | ICD-10-CM

## 2025-07-28 DIAGNOSIS — F22 DELUSIONAL DISORDERS: ICD-10-CM

## 2025-07-28 PROCEDURE — 99213 OFFICE O/P EST LOW 20 MIN: CPT | Performed by: STUDENT IN AN ORGANIZED HEALTH CARE EDUCATION/TRAINING PROGRAM

## 2025-07-28 PROCEDURE — 1111F DSCHRG MED/CURRENT MED MERGE: CPT | Performed by: STUDENT IN AN ORGANIZED HEALTH CARE EDUCATION/TRAINING PROGRAM

## 2025-07-28 NOTE — LETTER
To Whom it May Concern,    Re: Xiomy Rubio, 06/18/1931      I am writing on behalf of my patient, Xiomy Rubio and her son Ramana Rubio. Ms. Rubio has been my patient for the past 6 years. Ms. Rubio, who is currently in a skilled nursing facility and will soon require transition to need to transitioning facility.    Ms. Rubio's sole source of income is her late 's Social Security benefits, which are limited and do not allow for additional outside assistance. Her son, Ramana, has historically served as her primary caregiver and continues to be deeply involved in her care coordination and management of her household.    In my professional opinion, it is medically and socially necessary for Ms. Rubio to transition to non-skilled nursing due to her current medical and social needs. Ramana Rubio has been her primary care giver. His living with his mom could prevent Ms. Rubio from being placed in a nursing facility. Disrupting this arrangement could place undue hardship on both Ms. Rubio and her son, potentially compromising her ability to remain in long-term care and increasing risk of adverse health outcomes due to her increased needed care. I support Xiomy Rubio's continued residence in the home is essential to maintaining her continuity of care and avoiding unnecessary complications.    I respectfully recommend that approval be granted to allow this arrangement to continue in support of Ms. Rubio's nursing facility placement and overall well-being.     If you have any further questions, please do not hesitate to call our office.    Luis,        Lian Hernandez,    [facility]   [facility] [address]  [place] [address]  [phone] (P)  [phone] (F)

## 2025-07-28 NOTE — PROGRESS NOTES
Subjective   Patient ID:   Xiomy Rubio is a  89 y.o. female who presents for Family Problem (Xiomy son Ramana here today without patient to discuss Having to put her in a nursing home. He states her health is declining. There is some paperwork he would like to go over with you).     HPI  Pt fell and spent 5 days in hospital for PNA and fall--> switched to snf   Currently at the snf   Her mood, anxiety and dementia has worsened her claimant   She has moved into full care 24 hours   Currently not skilled   Bed written/wheelchair bound  Working with , nursing and financial advisors   Current billing is 10 k a month    Pt would need a letter due to him living with his mother to help   Her income is her 's social security.  In my professional opinion I feel his involvement- he has been her primary care giver and living with his mom would prevent Ms. Rubio from being placed in a nursing facility. He paid the mortgage      Current Medications[1]    Visit Vitals  LMP  (LMP Unknown) Comment: Pt is 89 years old- last mentrauls cycle unknown   OB Status Postmenopausal   Smoking Status Never           Assessment/Plan   Diagnoses and all orders for this visit:  Frequent falls  Delusional disorders       Discussed with pt family, Ramana Rubio, son, who presented for discussion of mother's future within the skilled nursing facility.   Letter for medical necessity due to continued needing to switch to non-skilled nursing due to frequent falls and generalized deconditioning.    Spent a total of 20min with family member to discuss support of non-skilled nursing as a tool to help keep pt safe.     Lian Hernandez DO 03/12/25 2:02 PM          [1]   Current Outpatient Medications:     acetaminophen (Tylenol) 325 mg tablet, Take 2 tablets (650 mg) by mouth once daily as needed for mild pain (1 - 3)., Disp: , Rfl:     arm brace (Wrist Brace Medium) misc, 2 each once daily at bedtime., Disp: 2 each, Rfl: 0     busPIRone (Buspar) 10 mg tablet, Take 1 tablet (10 mg) by mouth 3 times a day., Disp: , Rfl:     calcium carbonate-vitamin D3 (Oysco 500/D) 500 mg-5 mcg (200 unit) tablet, Take 1 tablet by mouth 2 times a day., Disp: 180 tablet, Rfl: 1    cyanocobalamin (Vitamin B-12) 1,000 mcg tablet, Take 1 tablet (1,000 mcg) by mouth once daily., Disp: , Rfl:     ferrous sulfate 325 mg (65 mg elemental) tablet, TAKE 1 TABLET BY MOUTH EVERY OTHER DAY, Disp: 45 tablet, Rfl: 1    guaiFENesin (Mucinex) 600 mg 12 hr tablet, Take 1 tablet (600 mg) by mouth every 12 hours if needed for congestion. Do not crush, chew, or split., Disp: , Rfl:     hydrOXYzine HCL (Atarax) 25 mg tablet, Take 1 tablet (25 mg) by mouth every 8 hours if needed for anxiety., Disp: , Rfl:     ipratropium-albuteroL (Duo-Neb) 0.5-2.5 mg/3 mL nebulizer solution, Take 3 mL by nebulization every 6 hours if needed for wheezing., Disp: 180 mL, Rfl: 11    latanoprost (Xalatan) 0.005 % ophthalmic solution, Administer 1 drop into both eyes once daily at bedtime., Disp: , Rfl:     lidocaine 4 % patch, Place 1 patch over 12 hours on the skin once daily. Remove & discard patch within 12 hours or as directed by MD., Disp: , Rfl:     melatonin 5 mg tablet, Take 1 tablet (5 mg) by mouth as needed at bedtime for sleep., Disp: , Rfl:     naproxen sodium (Aleve) 220 mg tablet, Take 2 tablets (440 mg) by mouth once daily as needed for mild pain (1 - 3)., Disp: , Rfl:     pantoprazole (ProtoNix) 20 mg EC tablet, Take 1 tablet (20 mg) by mouth once daily in the morning. Take before meals., Disp: 90 tablet, Rfl: 0    polyethylene glycol (Miralax) 17 gram/dose powder, Mix 17 g of powder and drink once daily as needed for constipation., Disp: , Rfl:     sertraline (Zoloft) 50 mg tablet, Take 1 tablet (50 mg) by mouth once daily., Disp: , Rfl:     valsartan-hydrochlorothiazide (Diovan-HCT) 160-12.5 mg tablet, Take 1 tablet by mouth once daily., Disp: , Rfl:

## 2025-08-02 ENCOUNTER — NURSING HOME VISIT (OUTPATIENT)
Dept: POST ACUTE CARE | Facility: EXTERNAL LOCATION | Age: OVER 89
End: 2025-08-02
Payer: MEDICARE

## 2025-08-02 DIAGNOSIS — F32.A DEPRESSION, UNSPECIFIED DEPRESSION TYPE: Primary | ICD-10-CM

## 2025-08-02 DIAGNOSIS — J18.9 PNEUMONIA DUE TO INFECTIOUS ORGANISM, UNSPECIFIED LATERALITY, UNSPECIFIED PART OF LUNG: ICD-10-CM

## 2025-08-02 DIAGNOSIS — M54.9 BACK PAIN, UNSPECIFIED BACK LOCATION, UNSPECIFIED BACK PAIN LATERALITY, UNSPECIFIED CHRONICITY: ICD-10-CM

## 2025-08-02 DIAGNOSIS — F41.9 ANXIETY: ICD-10-CM

## 2025-08-02 DIAGNOSIS — Z91.81 AT RISK FOR FALLING: ICD-10-CM

## 2025-08-02 DIAGNOSIS — K21.9 GASTROESOPHAGEAL REFLUX DISEASE WITHOUT ESOPHAGITIS: ICD-10-CM

## 2025-08-02 DIAGNOSIS — I10 HYPERTENSION, UNSPECIFIED TYPE: ICD-10-CM

## 2025-08-02 DIAGNOSIS — F03.90 DEMENTIA, UNSPECIFIED DEMENTIA SEVERITY, UNSPECIFIED DEMENTIA TYPE, UNSPECIFIED WHETHER BEHAVIORAL, PSYCHOTIC, OR MOOD DISTURBANCE OR ANXIETY (MULTI): ICD-10-CM

## 2025-08-02 DIAGNOSIS — I48.92 ATRIAL FLUTTER, UNSPECIFIED TYPE (MULTI): ICD-10-CM

## 2025-08-02 PROCEDURE — 99308 SBSQ NF CARE LOW MDM 20: CPT | Performed by: INTERNAL MEDICINE

## 2025-08-23 VITALS
SYSTOLIC BLOOD PRESSURE: 124 MMHG | TEMPERATURE: 98.2 F | RESPIRATION RATE: 16 BRPM | HEART RATE: 76 BPM | DIASTOLIC BLOOD PRESSURE: 78 MMHG

## 2025-08-23 ASSESSMENT — ENCOUNTER SYMPTOMS
CHILLS: 0
FEVER: 0

## 2025-09-02 ENCOUNTER — APPOINTMENT (OUTPATIENT)
Dept: PRIMARY CARE | Facility: CLINIC | Age: OVER 89
End: 2025-09-02
Payer: MEDICARE